# Patient Record
Sex: MALE | HISPANIC OR LATINO | Employment: UNEMPLOYED | URBAN - METROPOLITAN AREA
[De-identification: names, ages, dates, MRNs, and addresses within clinical notes are randomized per-mention and may not be internally consistent; named-entity substitution may affect disease eponyms.]

---

## 2017-01-18 ENCOUNTER — OFFICE VISIT (OUTPATIENT)
Dept: CARDIOLOGY | Facility: MEDICAL CENTER | Age: 52
End: 2017-01-18
Payer: MEDICARE

## 2017-01-18 VITALS
SYSTOLIC BLOOD PRESSURE: 120 MMHG | OXYGEN SATURATION: 95 % | WEIGHT: 218 LBS | HEART RATE: 86 BPM | HEIGHT: 73 IN | BODY MASS INDEX: 28.89 KG/M2 | DIASTOLIC BLOOD PRESSURE: 60 MMHG

## 2017-01-18 DIAGNOSIS — E78.5 DYSLIPIDEMIA: ICD-10-CM

## 2017-01-18 DIAGNOSIS — I25.10 CORONARY ARTERY DISEASE INVOLVING NATIVE CORONARY ARTERY OF NATIVE HEART WITHOUT ANGINA PECTORIS: ICD-10-CM

## 2017-01-18 DIAGNOSIS — I10 ESSENTIAL HYPERTENSION: ICD-10-CM

## 2017-01-18 DIAGNOSIS — I42.9 CARDIOMYOPATHY (HCC): ICD-10-CM

## 2017-01-18 PROCEDURE — 99214 OFFICE O/P EST MOD 30 MIN: CPT | Performed by: INTERNAL MEDICINE

## 2017-01-18 RX ORDER — ATORVASTATIN CALCIUM 40 MG/1
40 TABLET, FILM COATED ORAL
Qty: 90 TAB | Refills: 3 | Status: SHIPPED | OUTPATIENT
Start: 2017-01-18 | End: 2018-02-05

## 2017-01-18 RX ORDER — ATORVASTATIN CALCIUM 20 MG/1
20 TABLET, FILM COATED ORAL
Qty: 90 TAB | Refills: 3 | Status: SHIPPED | OUTPATIENT
Start: 2017-01-18 | End: 2018-02-04 | Stop reason: SDUPTHER

## 2017-01-18 ASSESSMENT — ENCOUNTER SYMPTOMS
BLOOD IN STOOL: 0
DIZZINESS: 0
ABDOMINAL PAIN: 0
PND: 0
HEADACHES: 0
NERVOUS/ANXIOUS: 0
SHORTNESS OF BREATH: 0
MYALGIAS: 0
ORTHOPNEA: 0
DEPRESSION: 0
BLURRED VISION: 0
COUGH: 0
FEVER: 0
BACK PAIN: 1
CLAUDICATION: 0
PALPITATIONS: 0
LOSS OF CONSCIOUSNESS: 0

## 2017-01-18 NOTE — MR AVS SNAPSHOT
"Dariel Diamond   2017 2:15 PM   Office Visit   MRN: [id number]    Department:  [facility]   Dept Phone:  [phone]    Description:  Male : 1965   Provider:  Albina Desai M.D.           Reason for Visit     Follow-Up           Allergies as of 2017     No Known Allergies      You were diagnosed with     Dyslipidemia   [753119]         Vital Signs     Blood Pressure Pulse Height Weight Body Mass Index Oxygen Saturation    120/60 mmHg 86 1.854 m (6' 0.99\") 98.884 kg (218 lb) 28.77 kg/m2 95%    Smoking Status                   Never Smoker            Basic Information     Date Of Birth Sex Race Ethnicity Preferred Language    1965 Male  or   Origin (H[place]er,Fijian,Vietnamese,[place], etc) English      Your appointments     2017  1:15 PM   FOLLOW UP with Albina Desai M.D.   [facility] (--)    [address]  [facility] [id number] [phone]              Problem List              ICD-10-CM Priority Class Noted - Resolved    Acute renal failure (ARF) (CMS-[facility]) N17.9   2014 - Present    Hyperkalemia E87.5   2014 - Present    Diabetes mellitus (CMS-HCC) E11.9   2014 - Present    Troponin level elevated R79.89   2014 - Present    Dyslipidemia E78.5   2014 - Present    HTN (hypertension) I10   2014 - Present    Edema R60.9   2014 - Present    Metabolic acidosis E87.2   2014 - Present    Ventricular tachycardia (CMS-[facility]) I47.2   2014 - Present    ESRD (end stage renal disease) (CMS-HCC) N18.6   10/21/2014 - Present    Cardiomyopathy (CMS-HCC) I42.9   11/10/2016 - Present      Health Maintenance        Date Due Completion Dates    IMM HEP B VACCINE (1 of 3 - Primary Series) 1965 ---    DIABETES MONOFILAMENT / LE EXAM 1966 ---    RETINAL SCREENING 1983 ---    FASTING LIPID PROFILE 1983 ---    IMM DTaP/Tdap/Td Vaccine (1 - Tdap) " 7/30/1984 ---    IMM PNEUMOCOCCAL 19-64 (ADULT) HIGHEST RISK SERIES (1 of 3 - PCV13) 7/30/1984 ---    A1C SCREENING 10/28/2014 4/28/2014    URINE ACR / MICROALBUMIN 4/27/2015 4/27/2014    COLONOSCOPY 7/30/2015 ---    IMM INFLUENZA (1) 9/1/2016 10/17/2014    SERUM CREATININE 12/6/2017 12/6/2016, 10/23/2014, 10/22/2014, 10/21/2014, 5/4/2014, 5/2/2014, 5/1/2014, 4/30/2014, 4/29/2014, 4/28/2014, 4/27/2014, 4/26/2014            Current Immunizations     Influenza TIV (IM) 10/17/2014    Tuberculin Skin Test  Incomplete      Below and/or attached are the medications your provider expects you to take. Review all of your home medications and newly ordered medications with your provider and/or pharmacist. Follow medication instructions as directed by your provider and/or pharmacist. Please keep your medication list with you and share with your provider. Update the information when medications are discontinued, doses are changed, or new medications (including over-the-counter products) are added; and carry medication information at all times in the event of emergency situations     Allergies:  No Known Allergies          Medications  Valid as of: January 18, 2017 -  2:39 PM    Generic Name Brand Name Tablet Size Instructions for use    Aspirin (Tablet Delayed Response) Aspirin 325 MG Take 1 Tab by mouth every day.        Atorvastatin Calcium (Tab) LIPITOR 40 MG Take 1 Tab by mouth every bedtime.        Atorvastatin Calcium (Tab) LIPITOR 20 MG Take 1 Tab by mouth every bedtime.        Carvedilol (Tab) COREG 25 MG Take 1 Tab by mouth 2 times a day, with meals.        Insulin Aspart (Solution) NOVOLOG 100 UNIT/ML insulin aspart (NOVOLOG) injection 2-9 Units  2-9 Units, Sq, 4 TIMES DAILY - qAC + qHS,   For glucose: 70   - 150  mg/dL = 0 Units 151 - 200  mg/dL = 2 Units 201 - 250  mg/dL = 3 Units 251 - 300  mg/dL  = 5 Units 301 - 350  mg/dL= 6 Units 351 - 400 mg/dL =   8 Units  >400 mg/dL =  9 Units        Insulin Aspart (Solution  Pen-injector) NOVOLOG 100 UNIT/ML 7 Units by Subdermal route.        Insulin Glargine (Solution) LANTUS 100 UNIT/ML Inject 10 Units as instructed every evening.        Lisinopril (Tab) PRINIVIL 10 MG Take 1 Tab by mouth every 12 hours.        Lisinopril (Tab) PRINIVIL 5 MG Take 1 Tab by mouth 2 times a day.        OxyCODONE HCl (Tab) ROXICODONE 5 MG Take 1 Tab by mouth every four hours as needed ((4-6)).        Sevelamer Carbonate (Tab) Sevelamer Carbonate 800 MG Take  by mouth.        Torsemide (Tab) DEMADEX 20 MG Take 1 Tab by mouth every day.        .                 Medicines prescribed today were sent to:     None      Medication refill instructions:       If your prescription bottle indicates you have medication refills left, it is not necessary to call your provider’s office. Please contact your pharmacy and they will refill your medication.    If your prescription bottle indicates you do not have any refills left, you may request refills at any time through one of the following ways: The online [employer] system (except Urgent Care), by calling your provider’s office, or by asking your pharmacy to contact your provider’s office with a refill request. Medication refills are processed only during regular business hours and may not be available until the next business day. Your provider may request additional information or to have a follow-up visit with you prior to refilling your medication.   *Please Note: Medication refills are assigned a new Rx number when refilled electronically. Your pharmacy may indicate that no refills were authorized even though a new prescription for the same medication is available at the pharmacy. Please request the medicine by name with the pharmacy before contacting your provider for a refill.        Your To Do List     Future Labs/Procedures Complete By Expires    COMP METABOLIC PANEL  As directed 1/18/2018    LIPID PROFILE  As directed 1/18/2018         [employer] Access Code:  [email or web address]  Expires: 1/21/2017  9:32 AM    U-Subs [employer]i  A secure, online tool to manage your health information     [employer]’s U-Subs [employer]i® is a secure, online tool that connects you to your personalized health information from the privacy of your home -- day or night - making it very easy for you to manage your healthcare. Once the activation process is completed, you can even access your medical information using the U-Subs Deli ruddy, which is available for free in the Apple Ruddy store or Google Play store.     [employer]i provides the following levels of access (as shown below):   My Chart Features   [facility] Primary Care Doctor [facility]  Specialists [facility]  Urgent  Care Non-[facility]  Primary Care  Doctor   Email your healthcare team securely and privately 24/7 X X X    Manage appointments: schedule your next appointment; view details of past/upcoming appointments X      Request prescription refills. X      View recent personal medical records, including lab and immunizations X X X X   View health record, including health history, allergies, medications X X X X   Read reports about your outpatient visits, procedures, consult and ER notes X X X X   See your discharge summary, which is a recap of your hospital and/or ER visit that includes your diagnosis, lab results, and care plan. X X       How to register for [employer]i:  1. Go to  [email or web address].  2. Click on the Sign Up Now box, which takes you to the New Member Sign Up page. You will need to provide the following information:  a. Enter your [employer]i Access Code exactly as it appears at the top of this page. (You will not need to use this code after you’ve completed the sign-up process. If you do not sign up before the expiration date, you must request a new code.)   b. Enter your date of birth.   c. Enter your home email address.   d. Click Submit, and follow the next screen’s instructions.  3. Create a U-[employer]s Deli ID. This will be your [employer]i login ID and cannot be  changed, so think of one that is secure and easy to remember.  4. Create a [employer] password. You can change your password at any time.  5. Enter your Password Reset Question and Answer. This can be used at a later time if you forget your password.   6. Enter your e-mail address. This allows you to receive e-mail notifications when new information is available in [employer].  7. Click Sign Up. You can now view your health information.    For assistance activating your [employer] account, call [phone]

## 2017-01-18 NOTE — PROGRESS NOTES
Subjective:   Dariel Diamond is a 51 y.o. male with known history of ischemic cardiomyopathy LVEF of 45 % angiogram from 2016 showed showed LAD proximal 30% lesion, D1 80-85% stenosis, distal LAD 30% stenosis, OM 2 occluded fills by collaterals, proximal RCA elongated 80% stenosis origin of PAD has subtotal stenosis PLV branch and mid posterior ventricular branch has elongated 90% stenosis, DM, hypertension dyslipidemia CKD on dialysis, presenting today for follow-up evaluation of CAD and ischemic cardiomyopathy.    With day to day activities patient denied any complaints of exertional chest pain, pressure, tightness or dyspnea. No complaints of myalgias while on statins. Denied any complaints of dizziness, lightheadedness or loss of consciousness.    Past Medical History   Diagnosis Date   • Dilated cardiomyopathy (CMS-HCC)    • Type II or unspecified type diabetes mellitus without mention of complication, not stated as uncontrolled    • Depression    • Hypertension    • Hyperlipidemia      Past Surgical History   Procedure Laterality Date   • Av fistula creation  5/1/2014     Performed by Beau Cowart M.D. at [facility]     Family History   Problem Relation Age of Onset   • Hypertension Mother    • Other Father      DM     History   Smoking status   • Never Smoker    Smokeless tobacco   • Never Used     No Known Allergies  Outpatient Encounter Prescriptions as of 1/18/2017   Medication Sig Dispense Refill   • lisinopril (PRINIVIL) 10 MG Tab Take 1 Tab by mouth every 12 hours. 180 Tab 3   • lisinopril (PRINIVIL) 5 MG Tab Take 1 Tab by mouth 2 times a day. 180 Tab 3   • NOVOLOG, insulin aspart, (NOVOLOG) 100 UNIT/ML Solution Pen-injector injection 7 Units by Subdermal route.     • insulin glargine (LANTUS) 100 UNIT/ML Solution Inject 10 Units as instructed every evening.     • Sevelamer Carbonate (RENVELA) 800 MG Tab Take  by mouth.     • atorvastatin (LIPITOR) 80 MG tablet Take 80 mg by mouth every  "evening.     • calcitRIOL (ROCALTROL) 0.25 MCG CAPS Take 1 Cap by mouth every day. 30 Cap 5   • amlodipine (NORVASC) 10 MG TABS Take 1 Tab by mouth every day. 30 Tab 3   • aspirin  MG TBEC Take 1 Tab by mouth every day. 30 Tab 3   • oxycodone, immediate release, (ROXICODONE) 5 MG TABS Take 1 Tab by mouth every four hours as needed ((4-6)). 30 Tab 1   • torsemide (DEMADEX) 20 MG TABS Take 1 Tab by mouth every day. 30 Tab 3   • insulin aspart (NOVOLOG) 100 UNIT/ML SOLN insulin aspart (NOVOLOG) injection 2-9 Units  2-9 Units, Sq, 4 TIMES DAILY - qAC + qHS,   For glucose: 70   - 150  mg/dL = 0 Units 151 - 200  mg/dL = 2 Units 201 - 250  mg/dL = 3 Units 251 - 300  mg/dL  = 5 Units 301 - 350  mg/dL= 6 Units 351 - 400 mg/dL =   8 Units  >400 mg/dL =  9 Units 1 Vial 11   • carvedilol (COREG) 25 MG TABS Take 1 Tab by mouth 2 times a day, with meals. 60 Tab 5     No facility-administered encounter medications on file as of 1/18/2017.     Review of Systems   Constitutional: Negative for fever.   Eyes: Negative for blurred vision.   Respiratory: Negative for cough and shortness of breath.    Cardiovascular: Negative for chest pain, palpitations, orthopnea, claudication, leg swelling and PND.   Gastrointestinal: Negative for abdominal pain, blood in stool and melena.   Genitourinary: Negative for dysuria.   Musculoskeletal: Positive for back pain and joint pain. Negative for myalgias.   Skin: Negative for rash.   Neurological: Negative for dizziness, loss of consciousness and headaches.   Psychiatric/Behavioral: Negative for depression. The patient is not nervous/anxious.         Objective:   /60 mmHg  Pulse 86  Ht 1.854 m (6' 0.99\")  Wt 98.884 kg (218 lb)  BMI 28.77 kg/m2  SpO2 95%    Physical Exam   Constitutional: He is oriented to person, place, and time. He appears well-developed and well-nourished.   HENT:   Head: Normocephalic and atraumatic.   Eyes: Conjunctivae are normal. Pupils are equal, round, and " reactive to light. Right eye exhibits no discharge. Left eye exhibits no discharge.   Neck: No JVD present. No tracheal deviation present.   Cardiovascular: Normal rate and regular rhythm.    No murmur heard.  Pulses:       Carotid pulses are 2+ on the right side, and 2+ on the left side.       Radial pulses are 2+ on the right side, and 2+ on the left side.        Dorsalis pedis pulses are 1+ on the right side, and 1+ on the left side.   Pulmonary/Chest: Effort normal and breath sounds normal. No respiratory distress. He has no wheezes. He has no rales. He exhibits no tenderness.   Abdominal: Soft. Bowel sounds are normal. He exhibits no distension. There is no tenderness. There is no rebound.   Neurological: He is alert and oriented to person, place, and time. No cranial nerve deficit.   Skin: Skin is warm and dry. No erythema.   Psychiatric: He has a normal mood and affect.     Angiogram: 12/6/16  LM free of disease.  LAD proximal 30% lesion. D1 has 80-85% stenosis. Distal to D1 LAD has 30% stenosis.   LCx large caliber. OM1 and OM to a tiny. OM 2 occluded in proximal segment. Fills distally via left sided collaterals. Distal to OM 2 LCx has 10-20% stenosis. Gives off large caliber OM 3 on for no significant disease.  RCA elongated 80% stenosis in the proximal portion acute marginal had 60% stenosis. Origin of PDA has subtotal stenosis but a small vessel. PLV branch and mid posterior ventricular branch has elongated 90% stenosis in midportion.   LVEF 45%.       As noted above, the right coronary artery is diffusely diseased and while the  proximal 2 lesions would be amenable to intervention, the distal lesions would not.  Recommend medical therapy    Transthoracic echo: 11/29/16  Left ventricular ejection fraction is visually estimated to be 60%.   Normal regional wall motion. Grade I diastolic dysfunction.  Trace mitral regurgitation.  Estimated right ventricular systolic pressure is 27 mmHg. Right atrial    pressure is estimated to be 8 mmHg.   Prior study is available for comparison, 04/28/2014 improved left   ventricle function. Prior echo LVEF 40%.    EKG: 10/21/14  EKG personally reviewed by me.  Sinus rhythm left axis deviation, T-wave inversion in lateral leads    Cath: 5/16/14  1.  Elevated left ventricular end diastolic filling pressures in the range of  23 with a modest global impairment in left ventricular systolic function with  ejection fraction of 45%.  2.  Widely patent left and right coronary trees, but with relatively diffuse  noncritical disease, left anterior descending and right coronaries as  described above.    TTE: 4/28/16  No mass or thrombus seen.  Global hypokinesis. Severely reduced left ventricular systolic function. Left ventricular ejection fraction is 20-30%.    TTE: 4/27/14  Mild concentric left ventricular hypertrophy.   Left ventricular ejection fraction is 40% to 45%.   Apical echo density about 1 cm spherical diameter mobile-probable fibroma.    Severely dilated left atrium. TERESA 48 ml.   Aortic sclerosis without borderline stenosis.  Right ventricular systolic pressure is estimated to be 32 to 37 mmHg consistent with mild pulmonary hypertension    Assessment:     1. CAD, proximal and distal LAD 30% stenosis D1 80-85% stenosis, OM 2 occluded proximal RCA 80% stenosis mid RCA 60% stenosis origin of PDA subtotal stenosis PLV and mid posterior ventricular branch elongated 90% stenosis  2. Hypertension  3. Dyslipidemia  4. Ischemic cardiomyopathy LVEF of 40-45%  Medical Decision Making:  Today's Assessment / Status / Plan:     1. Continue Coreg 25 mg BID.  Stable for now.  Continue aspirin   2. Blood pressure is well-controlled at the present visit..  Continue Coreg and lisinopril   3. LDL less than 40.  Decrease Lipitor to 60 mg qHs.  Hypertriglyceridemia-recommended dietary modification  Low HDL-increase physical activity.  4. Patient appears euvolemic.    Follow-up in 6 months.  Labs  prior to next visit.    Thank you for allowing me to participate in taking care of patient.    Albina Enciso MD.

## 2017-07-14 DIAGNOSIS — E78.5 DYSLIPIDEMIA: ICD-10-CM

## 2017-07-19 ENCOUNTER — OFFICE VISIT (OUTPATIENT)
Dept: CARDIOLOGY | Facility: MEDICAL CENTER | Age: 52
End: 2017-07-19
Payer: MEDICARE

## 2017-07-19 VITALS
HEART RATE: 82 BPM | HEIGHT: 71 IN | BODY MASS INDEX: 30.24 KG/M2 | WEIGHT: 216 LBS | SYSTOLIC BLOOD PRESSURE: 128 MMHG | OXYGEN SATURATION: 96 % | DIASTOLIC BLOOD PRESSURE: 70 MMHG

## 2017-07-19 DIAGNOSIS — E78.5 DYSLIPIDEMIA: ICD-10-CM

## 2017-07-19 DIAGNOSIS — I10 ESSENTIAL HYPERTENSION: ICD-10-CM

## 2017-07-19 PROCEDURE — 99214 OFFICE O/P EST MOD 30 MIN: CPT | Performed by: INTERNAL MEDICINE

## 2017-07-19 ASSESSMENT — ENCOUNTER SYMPTOMS
BLOOD IN STOOL: 0
MYALGIAS: 0
BACK PAIN: 1
BLURRED VISION: 0
CLAUDICATION: 0
PND: 0
PALPITATIONS: 0
COUGH: 0
ORTHOPNEA: 0
SHORTNESS OF BREATH: 0
ABDOMINAL PAIN: 0
HEADACHES: 0
NERVOUS/ANXIOUS: 0
DEPRESSION: 0
DIZZINESS: 0
FEVER: 0
LOSS OF CONSCIOUSNESS: 0

## 2017-07-19 NOTE — MR AVS SNAPSHOT
"Dariel Diamond   2017 1:15 PM   Office Visit   MRN: [id number]    Department:  [facility]   Dept Phone:  [phone]    Description:  Male : 1965   Provider:  Albina Desai M.D.           Reason for Visit     Follow-Up           Allergies as of 2017     No Known Allergies      You were diagnosed with     Dyslipidemia   [327246]       Essential hypertension   [5080486]         Vital Signs     Blood Pressure Pulse Height Weight Body Mass Index Oxygen Saturation    128/70 mmHg 82 1.803 m (5' 10.98\") 97.977 kg (216 lb) 30.14 kg/m2 96%    Smoking Status                   Never Smoker            Basic Information     Date Of Birth Sex Race Ethnicity Preferred Language    1965 Male  or   Origin (Faroese,German,Moroccan,[place], etc) English      Problem List              ICD-10-CM Priority Class Noted - Resolved    Acute renal failure (ARF) (CMS-HCC) N17.9   2014 - Present    Hyperkalemia E87.5   2014 - Present    Diabetes mellitus (CMS-HCC) E11.9   2014 - Present    Troponin level elevated R74.8   2014 - Present    Dyslipidemia E78.5   2014 - Present    HTN (hypertension) I10   2014 - Present    Edema R60.9   2014 - Present    Metabolic acidosis E87.2   2014 - Present    Ventricular tachycardia (CMS-HCC) I47.2   2014 - Present    ESRD (end stage renal disease) (CMS-HCC) N18.6   10/21/2014 - Present    Cardiomyopathy (CMS-HCC) I42.9   11/10/2016 - Present      Health Maintenance        Date Due Completion Dates    IMM HEP B VACCINE (1 of 3 - Primary Series) 1965 ---    DIABETES MONOFILAMENT / LE EXAM 1966 ---    RETINAL SCREENING 1983 ---    IMM DTaP/Tdap/Td Vaccine (1 - Tdap) 1984 ---    IMM PNEUMOCOCCAL 19-64 (ADULT) HIGHEST RISK SERIES (1 of 3 - PCV13) 1984 ---    A1C SCREENING 10/28/2014 2014    URINE ACR / MICROALBUMIN 2015    COLONOSCOPY 2015 ---    IMM INFLUENZA " (1) 9/1/2017 10/17/2014    SERUM CREATININE 12/6/2017 12/6/2016, 10/23/2014, 10/22/2014, 10/21/2014, 5/4/2014, 5/2/2014, 5/1/2014, 4/30/2014, 4/29/2014, 4/28/2014, 4/27/2014, 4/26/2014    FASTING LIPID PROFILE 7/14/2018 7/14/2017            Current Immunizations     Influenza TIV (IM) 10/17/2014    Tuberculin Skin Test  Incomplete      Below and/or attached are the medications your provider expects you to take. Review all of your home medications and newly ordered medications with your provider and/or pharmacist. Follow medication instructions as directed by your provider and/or pharmacist. Please keep your medication list with you and share with your provider. Update the information when medications are discontinued, doses are changed, or new medications (including over-the-counter products) are added; and carry medication information at all times in the event of emergency situations     Allergies:  No Known Allergies          Medications  Valid as of: July 19, 2017 -  1:36 PM    Generic Name Brand Name Tablet Size Instructions for use    Aspirin (Tablet Delayed Response) Aspirin 325 MG Take 1 Tab by mouth every day.        Atorvastatin Calcium (Tab) LIPITOR 40 MG Take 1 Tab by mouth every bedtime.        Atorvastatin Calcium (Tab) LIPITOR 20 MG Take 1 Tab by mouth every bedtime.        Carvedilol (Tab) COREG 25 MG Take 1 Tab by mouth 2 times a day, with meals.        Insulin Aspart (Solution) NOVOLOG 100 UNIT/ML insulin aspart (NOVOLOG) injection 2-9 Units  2-9 Units, Sq, 4 TIMES DAILY - qAC + qHS,   For glucose: 70   - 150  mg/dL = 0 Units 151 - 200  mg/dL = 2 Units 201 - 250  mg/dL = 3 Units 251 - 300  mg/dL  = 5 Units 301 - 350  mg/dL= 6 Units 351 - 400 mg/dL =   8 Units  >400 mg/dL =  9 Units        Insulin Aspart (Solution Pen-injector) NOVOLOG 100 UNIT/ML 7 Units by Subdermal route.        Insulin Glargine (Solution) LANTUS 100 UNIT/ML Inject 10 Units as instructed every evening.        Lisinopril (Tab)  PRINIVIL 10 MG Take 1 Tab by mouth every 12 hours.        Lisinopril (Tab) PRINIVIL 5 MG Take 1 Tab by mouth 2 times a day.        OxyCODONE HCl (Tab) ROXICODONE 5 MG Take 1 Tab by mouth every four hours as needed ((4-6)).        Sevelamer Carbonate (Tab) Sevelamer Carbonate 800 MG Take  by mouth.        Torsemide (Tab) DEMADEX 20 MG Take 1 Tab by mouth every day.        .                 Medicines prescribed today were sent to:     None      Medication refill instructions:       If your prescription bottle indicates you have medication refills left, it is not necessary to call your provider’s office. Please contact your pharmacy and they will refill your medication.    If your prescription bottle indicates you do not have any refills left, you may request refills at any time through one of the following ways: The online [employer] system (except Urgent Care), by calling your provider’s office, or by asking your pharmacy to contact your provider’s office with a refill request. Medication refills are processed only during regular business hours and may not be available until the next business day. Your provider may request additional information or to have a follow-up visit with you prior to refilling your medication.   *Please Note: Medication refills are assigned a new Rx number when refilled electronically. Your pharmacy may indicate that no refills were authorized even though a new prescription for the same medication is available at the pharmacy. Please request the medicine by name with the pharmacy before contacting your provider for a refill.        Your To Do List     Future Labs/Procedures Complete By Expires    COMP METABOLIC PANEL  As directed 7/19/2018    COMP METABOLIC PANEL  As directed 7/19/2018    LIPID PROFILE  As directed 7/19/2018    LIPID PROFILE  As directed 7/19/2018         [employer] Status: Patient Declined

## 2017-07-19 NOTE — PROGRESS NOTES
Subjective:   Dariel Diamond is a 51 y.o. male with known history of ischemic cardiomyopathy LVEF of 45 % angiogram from 2016 showed showed LAD proximal 30% lesion, D1 80-85% stenosis, distal LAD 30% stenosis, OM 2 occluded fills by collaterals, proximal RCA elongated 80% stenosis origin of PAD has subtotal stenosis PLV branch and mid posterior ventricular branch has elongated 90% stenosis, DM, hypertension dyslipidemia CKD on dialysis, presenting today for follow-up evaluation of CAD and ischemic cardiomyopathy.    Patient is going for daily walks approximately 3 miles a day.  No complaints of exertional chest pain pressure or tightness.  Denied any complaints of myalgias while on statins.no complaints of palpitations orthopnea or PND.  Denies any complaints of lower extremity edema or claudication.    Past Medical History   Diagnosis Date   • Dilated cardiomyopathy (CMS-HCC)    • Type II or unspecified type diabetes mellitus without mention of complication, not stated as uncontrolled    • Depression    • Hypertension    • Hyperlipidemia      Past Surgical History   Procedure Laterality Date   • Av fistula creation  5/1/2014     Performed by Beau Cowart M.D. at SURGERY [facility]     Family History   Problem Relation Age of Onset   • Hypertension Mother    • Other Father      DM     History   Smoking status   • Never Smoker    Smokeless tobacco   • Never Used     Allergies   Allergen Reactions   • Anesthetic Ether [Ether] Swelling     Swelling of lips, pet pt just stated Anesthesia   • Anesthetic [Benzocaine] Swelling     Swelling of lips, pet pt just stated Anesthesia     Outpatient Encounter Prescriptions as of 7/19/2017   Medication Sig Dispense Refill   • atorvastatin (LIPITOR) 40 MG Tab Take 1 Tab by mouth every bedtime. 90 Tab 3   • atorvastatin (LIPITOR) 20 MG Tab Take 1 Tab by mouth every bedtime. 90 Tab 3   • lisinopril (PRINIVIL) 10 MG Tab Take 1 Tab by mouth every 12 hours. 180 Tab 3   •  "lisinopril (PRINIVIL) 5 MG Tab Take 1 Tab by mouth 2 times a day. 180 Tab 3   • NOVOLOG, insulin aspart, (NOVOLOG) 100 UNIT/ML Solution Pen-injector injection 7 Units by Subdermal route.     • insulin glargine (LANTUS) 100 UNIT/ML Solution Inject 10 Units as instructed every evening.     • Sevelamer Carbonate (RENVELA) 800 MG Tab Take  by mouth.     • carvedilol (COREG) 25 MG TABS Take 1 Tab by mouth 2 times a day, with meals. 60 Tab 5   • aspirin  MG TBEC Take 1 Tab by mouth every day. 30 Tab 3   • torsemide (DEMADEX) 20 MG TABS Take 1 Tab by mouth every day. 30 Tab 3   • oxycodone, immediate release, (ROXICODONE) 5 MG TABS Take 1 Tab by mouth every four hours as needed ((4-6)). 30 Tab 1   • insulin aspart (NOVOLOG) 100 UNIT/ML SOLN insulin aspart (NOVOLOG) injection 2-9 Units  2-9 Units, Sq, 4 TIMES DAILY - qAC + qHS,   For glucose: 70   - 150  mg/dL = 0 Units 151 - 200  mg/dL = 2 Units 201 - 250  mg/dL = 3 Units 251 - 300  mg/dL  = 5 Units 301 - 350  mg/dL= 6 Units 351 - 400 mg/dL =   8 Units  >400 mg/dL =  9 Units 1 Vial 11     No facility-administered encounter medications on file as of 7/19/2017.     Review of Systems   Constitutional: Negative for fever.   Eyes: Negative for blurred vision.   Respiratory: Negative for cough and shortness of breath.    Cardiovascular: Negative for chest pain, palpitations, orthopnea, claudication, leg swelling and PND.   Gastrointestinal: Negative for abdominal pain, blood in stool and melena.   Genitourinary: Negative for dysuria.   Musculoskeletal: Positive for back pain and joint pain. Negative for myalgias.   Skin: Negative for rash.   Neurological: Negative for dizziness, loss of consciousness and headaches.   Psychiatric/Behavioral: Negative for depression. The patient is not nervous/anxious.         Objective:   /70 mmHg  Pulse 82  Ht 1.803 m (5' 10.98\")  Wt 97.977 kg (216 lb)  BMI 30.14 kg/m2  SpO2 96%    Physical Exam   Constitutional: He is oriented " to person, place, and time. He appears well-developed and well-nourished.   HENT:   Head: Normocephalic and atraumatic.   Eyes: Conjunctivae are normal. Pupils are equal, round, and reactive to light. Right eye exhibits no discharge. Left eye exhibits no discharge.   Neck: No JVD present. No tracheal deviation present.   Cardiovascular: Normal rate and regular rhythm.    No murmur heard.  Pulses:       Carotid pulses are 2+ on the right side, and 2+ on the left side.       Radial pulses are 2+ on the right side, and 2+ on the left side.        Dorsalis pedis pulses are 1+ on the right side, and 1+ on the left side.   Pulmonary/Chest: Effort normal and breath sounds normal. No respiratory distress. He has no wheezes. He has no rales. He exhibits no tenderness.   Abdominal: Soft. Bowel sounds are normal. He exhibits no distension. There is no tenderness. There is no rebound.   Neurological: He is alert and oriented to person, place, and time. No cranial nerve deficit.   Skin: Skin is warm and dry. No erythema.   Psychiatric: He has a normal mood and affect.     Angiogram: 12/6/16  LM free of disease.  LAD proximal 30% lesion. D1 has 80-85% stenosis. Distal to D1 LAD has 30% stenosis.   LCx large caliber. OM1 and OM to a tiny. OM 2 occluded in proximal segment. Fills distally via left sided collaterals. Distal to OM 2 LCx has 10-20% stenosis. Gives off large caliber OM 3 on for no significant disease.  RCA elongated 80% stenosis in the proximal portion acute marginal had 60% stenosis. Origin of PDA has subtotal stenosis but a small vessel. PLV branch and mid posterior ventricular branch has elongated 90% stenosis in midportion.   LVEF 45%.       As noted above, the right coronary artery is diffusely diseased and while the  proximal 2 lesions would be amenable to intervention, the distal lesions would not.  Recommend medical therapy    Transthoracic echo: 11/29/16  Left ventricular ejection fraction is visually  estimated to be 60%.   Normal regional wall motion. Grade I diastolic dysfunction.  Trace mitral regurgitation.  Estimated right ventricular systolic pressure is 27 mmHg. Right atrial   pressure is estimated to be 8 mmHg.   Prior study is available for comparison, 04/28/2014 improved left   ventricle function. Prior echo LVEF 40%.    EKG: 10/21/14  EKG personally reviewed by me.  Sinus rhythm left axis deviation, T-wave inversion in lateral leads    Cath: 5/16/14  1.  Elevated left ventricular end diastolic filling pressures in the range of  23 with a modest global impairment in left ventricular systolic function with  ejection fraction of 45%.  2.  Widely patent left and right coronary trees, but with relatively diffuse  noncritical disease, left anterior descending and right coronaries as  described above.    TTE: 4/28/16  No mass or thrombus seen.  Global hypokinesis. Severely reduced left ventricular systolic function. Left ventricular ejection fraction is 20-30%.    TTE: 4/27/14  Mild concentric left ventricular hypertrophy.   Left ventricular ejection fraction is 40% to 45%.   Apical echo density about 1 cm spherical diameter mobile-probable fibroma.    Severely dilated left atrium. TERESA 48 ml.   Aortic sclerosis without borderline stenosis.  Right ventricular systolic pressure is estimated to be 32 to 37 mmHg consistent with mild pulmonary hypertension    Assessment:     1. CAD, proximal and distal LAD 30% stenosis D1 80-85% stenosis, OM 2 occluded proximal RCA 80% stenosis mid RCA 60% stenosis origin of PDA subtotal stenosis PLV and mid posterior ventricular branch elongated 90% stenosis  2. Hypertension  3. Dyslipidemia  4. Ischemic cardiomyopathy LVEF of 40-45%  Medical Decision Making:  Today's Assessment / Status / Plan:     1. No complaints of angina.  Continue Coreg 25 mg BID.  Continue aspirin   2. Blood pressure is well-controlled at the present visit.  Continue Coreg and lisinopril   3. LDL less  than 40.  Decrease Lipitor to 40 mg qHs.  Hypertriglyceridemia-recommended dietary modification.  Check labs in 2 months and than 1 year.  Low HDL-increase physical activity.  4. Patient appears euvolemic.    Follow-up in 1 year.  Labs prior to next visit.    Thank you for allowing me to participate in taking care of patient.    Albina Enciso MD. We called

## 2017-10-25 DIAGNOSIS — E78.5 DYSLIPIDEMIA: ICD-10-CM

## 2017-10-31 NOTE — PROGRESS NOTES
Labs: 10/20/17  Sodium 133, potassium 3.5, chloride 90, bicarbonate 34, glucose 136, BUN 17, creatinine 4.2 GFR 15  ALT 10 AST 15, alkaline phosphatase 73  Total cholesterol 96, triglycerides 167, HDL 28, LDL 35

## 2017-11-03 ENCOUNTER — TELEPHONE (OUTPATIENT)
Dept: CARDIOLOGY | Facility: MEDICAL CENTER | Age: 52
End: 2017-11-03

## 2017-11-03 NOTE — TELEPHONE ENCOUNTER
Pt notified in [place].  Adriana GROSSMAN RN     ----- Message from Navya Perla sent at 11/2/2017  4:20 PM PDT -----   This patient is [place] speaking only - would you mind calling her and letting her know LFTs are good and continue lipitor?  Thanks       ----- Message -----  From: Albina Desai M.D.  Sent: 10/31/2017   3:38 PM  To: Michelle Saez R.N.    Continue Lipitor 40 mg.  LFTs lipid panel looks good.  ----- Message -----  From: Michelle Saez R.N.  Sent: 10/25/2017   9:12 AM  To: Albina Desai M.D.    seen 7/19 for annual F[facility] - Dr. Desai  Lipitor reduced to 40 mg for LDL 30 in July.This is f/u lab

## 2018-02-04 DIAGNOSIS — E78.5 DYSLIPIDEMIA: ICD-10-CM

## 2018-02-05 RX ORDER — ATORVASTATIN CALCIUM 20 MG/1
TABLET, FILM COATED ORAL
Qty: 90 TAB | Refills: 1 | Status: SHIPPED | OUTPATIENT
Start: 2018-02-05

## 2018-08-21 ENCOUNTER — HOSPITAL ENCOUNTER (OUTPATIENT)
Dept: RADIOLOGY | Facility: MEDICAL CENTER | Age: 53
End: 2018-08-21

## 2018-08-21 ENCOUNTER — HOSPITAL ENCOUNTER (INPATIENT)
Facility: MEDICAL CENTER | Age: 53
LOS: 4 days | DRG: 617 | End: 2018-08-25
Attending: EMERGENCY MEDICINE | Admitting: INTERNAL MEDICINE
Payer: MEDICARE

## 2018-08-21 DIAGNOSIS — L97.523 DIABETIC ULCER OF TOE OF LEFT FOOT ASSOCIATED WITH TYPE 2 DIABETES MELLITUS, WITH NECROSIS OF MUSCLE (HCC): ICD-10-CM

## 2018-08-21 DIAGNOSIS — L03.032 CELLULITIS OF TOE OF LEFT FOOT: ICD-10-CM

## 2018-08-21 DIAGNOSIS — E11.621 DIABETIC ULCER OF TOE OF LEFT FOOT ASSOCIATED WITH TYPE 2 DIABETES MELLITUS, WITH NECROSIS OF MUSCLE (HCC): ICD-10-CM

## 2018-08-21 DIAGNOSIS — E11.65 TYPE 2 DIABETES MELLITUS WITH HYPERGLYCEMIA, WITHOUT LONG-TERM CURRENT USE OF INSULIN (HCC): ICD-10-CM

## 2018-08-21 PROBLEM — R30.0 DYSURIA: Status: ACTIVE | Noted: 2018-08-21

## 2018-08-21 LAB
CRP SERPL HS-MCNC: 7.74 MG/DL (ref 0–0.75)
MAGNESIUM SERPL-MCNC: 2.2 MG/DL (ref 1.5–2.5)

## 2018-08-21 PROCEDURE — 87070 CULTURE OTHR SPECIMN AEROBIC: CPT

## 2018-08-21 PROCEDURE — 96365 THER/PROPH/DIAG IV INF INIT: CPT

## 2018-08-21 PROCEDURE — 99285 EMERGENCY DEPT VISIT HI MDM: CPT

## 2018-08-21 PROCEDURE — 700105 HCHG RX REV CODE 258: Performed by: EMERGENCY MEDICINE

## 2018-08-21 PROCEDURE — 700111 HCHG RX REV CODE 636 W/ 250 OVERRIDE (IP): Performed by: EMERGENCY MEDICINE

## 2018-08-21 PROCEDURE — 85652 RBC SED RATE AUTOMATED: CPT

## 2018-08-21 PROCEDURE — 83735 ASSAY OF MAGNESIUM: CPT

## 2018-08-21 PROCEDURE — 96366 THER/PROPH/DIAG IV INF ADDON: CPT

## 2018-08-21 PROCEDURE — 83036 HEMOGLOBIN GLYCOSYLATED A1C: CPT

## 2018-08-21 PROCEDURE — 84145 PROCALCITONIN (PCT): CPT

## 2018-08-21 PROCEDURE — 87205 SMEAR GRAM STAIN: CPT

## 2018-08-21 PROCEDURE — 770006 HCHG ROOM/CARE - MED/SURG/GYN SEMI*

## 2018-08-21 PROCEDURE — 86140 C-REACTIVE PROTEIN: CPT

## 2018-08-21 RX ORDER — LISINOPRIL 10 MG/1
10 TABLET ORAL EVERY 12 HOURS
Status: DISCONTINUED | OUTPATIENT
Start: 2018-08-21 | End: 2018-08-22

## 2018-08-21 RX ORDER — HEPARIN SODIUM 5000 [USP'U]/ML
5000 INJECTION, SOLUTION INTRAVENOUS; SUBCUTANEOUS EVERY 8 HOURS
Status: DISCONTINUED | OUTPATIENT
Start: 2018-08-21 | End: 2018-08-25 | Stop reason: HOSPADM

## 2018-08-21 RX ORDER — DEXTROSE MONOHYDRATE 25 G/50ML
25 INJECTION, SOLUTION INTRAVENOUS
Status: DISCONTINUED | OUTPATIENT
Start: 2018-08-21 | End: 2018-08-22

## 2018-08-21 RX ORDER — LABETALOL HYDROCHLORIDE 5 MG/ML
10 INJECTION, SOLUTION INTRAVENOUS EVERY 4 HOURS PRN
Status: DISCONTINUED | OUTPATIENT
Start: 2018-08-21 | End: 2018-08-25 | Stop reason: HOSPADM

## 2018-08-21 RX ORDER — CARVEDILOL 25 MG/1
25 TABLET ORAL 2 TIMES DAILY WITH MEALS
Status: DISCONTINUED | OUTPATIENT
Start: 2018-08-22 | End: 2018-08-25 | Stop reason: HOSPADM

## 2018-08-21 RX ORDER — LISINOPRIL 10 MG/1
5 TABLET ORAL 2 TIMES DAILY
Status: DISCONTINUED | OUTPATIENT
Start: 2018-08-22 | End: 2018-08-22

## 2018-08-21 RX ORDER — POLYETHYLENE GLYCOL 3350 17 G/17G
1 POWDER, FOR SOLUTION ORAL
Status: DISCONTINUED | OUTPATIENT
Start: 2018-08-21 | End: 2018-08-25 | Stop reason: HOSPADM

## 2018-08-21 RX ORDER — SEVELAMER CARBONATE 800 MG/1
800 TABLET, FILM COATED ORAL
Status: DISCONTINUED | OUTPATIENT
Start: 2018-08-21 | End: 2018-08-25 | Stop reason: HOSPADM

## 2018-08-21 RX ORDER — AMOXICILLIN 250 MG
2 CAPSULE ORAL 2 TIMES DAILY
Status: DISCONTINUED | OUTPATIENT
Start: 2018-08-21 | End: 2018-08-25 | Stop reason: HOSPADM

## 2018-08-21 RX ORDER — BISACODYL 10 MG
10 SUPPOSITORY, RECTAL RECTAL
Status: DISCONTINUED | OUTPATIENT
Start: 2018-08-21 | End: 2018-08-25 | Stop reason: HOSPADM

## 2018-08-21 RX ORDER — INSULIN GLARGINE 100 [IU]/ML
0.2 INJECTION, SOLUTION SUBCUTANEOUS EVERY EVENING
Status: DISCONTINUED | OUTPATIENT
Start: 2018-08-21 | End: 2018-08-22

## 2018-08-21 RX ORDER — TORSEMIDE 20 MG/1
20 TABLET ORAL DAILY
Status: DISCONTINUED | OUTPATIENT
Start: 2018-08-22 | End: 2018-08-25 | Stop reason: HOSPADM

## 2018-08-21 RX ORDER — ATORVASTATIN CALCIUM 20 MG/1
20 TABLET, FILM COATED ORAL EVERY EVENING
Status: DISCONTINUED | OUTPATIENT
Start: 2018-08-22 | End: 2018-08-25 | Stop reason: HOSPADM

## 2018-08-21 RX ORDER — ACETAMINOPHEN 325 MG/1
650 TABLET ORAL EVERY 6 HOURS PRN
Status: DISCONTINUED | OUTPATIENT
Start: 2018-08-21 | End: 2018-08-25 | Stop reason: HOSPADM

## 2018-08-21 RX ADMIN — VANCOMYCIN HYDROCHLORIDE 2500 MG: 100 INJECTION, POWDER, LYOPHILIZED, FOR SOLUTION INTRAVENOUS at 21:33

## 2018-08-21 ASSESSMENT — ENCOUNTER SYMPTOMS
FOCAL WEAKNESS: 0
PHOTOPHOBIA: 0
CHILLS: 1
DEPRESSION: 0
NAUSEA: 0
MYALGIAS: 0
NERVOUS/ANXIOUS: 0
SINUS PAIN: 0
SORE THROAT: 0
FALLS: 0
ORTHOPNEA: 0
CONSTIPATION: 1
FLANK PAIN: 0
BLOOD IN STOOL: 0
SEIZURES: 0
SHORTNESS OF BREATH: 0
SPEECH CHANGE: 0
WEIGHT LOSS: 0
HEADACHES: 0
SPUTUM PRODUCTION: 0
VOMITING: 0
FEVER: 1
DOUBLE VISION: 0
COUGH: 0
PND: 0
BLURRED VISION: 0
DIARRHEA: 0
SENSORY CHANGE: 0
BACK PAIN: 0
INSOMNIA: 0
PALPITATIONS: 0
WEAKNESS: 0
DIAPHORESIS: 0
ABDOMINAL PAIN: 0

## 2018-08-21 ASSESSMENT — LIFESTYLE VARIABLES: SUBSTANCE_ABUSE: 0

## 2018-08-21 ASSESSMENT — PAIN SCALES - GENERAL
PAINLEVEL_OUTOF10: 0
PAINLEVEL_OUTOF10: 3

## 2018-08-22 PROBLEM — I25.5 ISCHEMIC CARDIOMYOPATHY: Status: ACTIVE | Noted: 2018-08-22

## 2018-08-22 PROBLEM — I25.10 CORONARY ARTERY DISEASE INVOLVING NATIVE CORONARY ARTERY OF NATIVE HEART WITHOUT ANGINA PECTORIS: Status: ACTIVE | Noted: 2018-08-22

## 2018-08-22 PROBLEM — L97.523 DIABETIC ULCER OF TOE OF LEFT FOOT ASSOCIATED WITH TYPE 2 DIABETES MELLITUS, WITH NECROSIS OF MUSCLE (HCC): Status: ACTIVE | Noted: 2018-08-21

## 2018-08-22 PROBLEM — E11.621 DIABETIC ULCER OF TOE OF LEFT FOOT ASSOCIATED WITH TYPE 2 DIABETES MELLITUS, WITH NECROSIS OF MUSCLE (HCC): Status: ACTIVE | Noted: 2018-08-21

## 2018-08-22 LAB
ALBUMIN SERPL BCP-MCNC: 3.4 G/DL (ref 3.2–4.9)
ALBUMIN/GLOB SERPL: 1.1 G/DL
ALP SERPL-CCNC: 52 U/L (ref 30–99)
ALT SERPL-CCNC: 7 U/L (ref 2–50)
ANION GAP SERPL CALC-SCNC: 16 MMOL/L (ref 0–11.9)
AST SERPL-CCNC: 6 U/L (ref 12–45)
BASOPHILS # BLD AUTO: 0.3 % (ref 0–1.8)
BASOPHILS # BLD: 0.03 K/UL (ref 0–0.12)
BILIRUB SERPL-MCNC: 0.5 MG/DL (ref 0.1–1.5)
BUN SERPL-MCNC: 60 MG/DL (ref 8–22)
CALCIUM SERPL-MCNC: 9.1 MG/DL (ref 8.5–10.5)
CHLORIDE SERPL-SCNC: 92 MMOL/L (ref 96–112)
CO2 SERPL-SCNC: 29 MMOL/L (ref 20–33)
CREAT SERPL-MCNC: 8.65 MG/DL (ref 0.5–1.4)
EOSINOPHIL # BLD AUTO: 0.1 K/UL (ref 0–0.51)
EOSINOPHIL NFR BLD: 1.1 % (ref 0–6.9)
ERYTHROCYTE [DISTWIDTH] IN BLOOD BY AUTOMATED COUNT: 46.5 FL (ref 35.9–50)
ERYTHROCYTE [SEDIMENTATION RATE] IN BLOOD BY WESTERGREN METHOD: 51 MM/HOUR (ref 0–20)
EST. AVERAGE GLUCOSE BLD GHB EST-MCNC: 174 MG/DL
GLOBULIN SER CALC-MCNC: 3.2 G/DL (ref 1.9–3.5)
GLUCOSE BLD-MCNC: 145 MG/DL (ref 65–99)
GLUCOSE BLD-MCNC: 161 MG/DL (ref 65–99)
GLUCOSE SERPL-MCNC: 146 MG/DL (ref 65–99)
GRAM STN SPEC: NORMAL
HAV IGM SERPL QL IA: NEGATIVE
HBA1C MFR BLD: 7.7 % (ref 0–5.6)
HBV CORE IGM SER QL: NEGATIVE
HBV SURFACE AB SERPL IA-ACNC: 765.98 MIU/ML (ref 0–10)
HBV SURFACE AG SER QL: NEGATIVE
HCT VFR BLD AUTO: 33.7 % (ref 42–52)
HCV AB SER QL: NEGATIVE
HGB BLD-MCNC: 11.3 G/DL (ref 14–18)
IMM GRANULOCYTES # BLD AUTO: 0.04 K/UL (ref 0–0.11)
IMM GRANULOCYTES NFR BLD AUTO: 0.4 % (ref 0–0.9)
LYMPHOCYTES # BLD AUTO: 1.55 K/UL (ref 1–4.8)
LYMPHOCYTES NFR BLD: 16.4 % (ref 22–41)
MCH RBC QN AUTO: 32.6 PG (ref 27–33)
MCHC RBC AUTO-ENTMCNC: 33.5 G/DL (ref 33.7–35.3)
MCV RBC AUTO: 97.1 FL (ref 81.4–97.8)
MONOCYTES # BLD AUTO: 0.93 K/UL (ref 0–0.85)
MONOCYTES NFR BLD AUTO: 9.8 % (ref 0–13.4)
NEUTROPHILS # BLD AUTO: 6.83 K/UL (ref 1.82–7.42)
NEUTROPHILS NFR BLD: 72 % (ref 44–72)
NRBC # BLD AUTO: 0 K/UL
NRBC BLD-RTO: 0 /100 WBC
PHOSPHATE SERPL-MCNC: 6.1 MG/DL (ref 2.5–4.5)
PLATELET # BLD AUTO: 179 K/UL (ref 164–446)
PMV BLD AUTO: 10.7 FL (ref 9–12.9)
POTASSIUM SERPL-SCNC: 5 MMOL/L (ref 3.6–5.5)
PROCALCITONIN SERPL-MCNC: 0.51 NG/ML
PROT SERPL-MCNC: 6.6 G/DL (ref 6–8.2)
RBC # BLD AUTO: 3.47 M/UL (ref 4.7–6.1)
SIGNIFICANT IND 70042: NORMAL
SITE SITE: NORMAL
SODIUM SERPL-SCNC: 137 MMOL/L (ref 135–145)
SOURCE SOURCE: NORMAL
WBC # BLD AUTO: 9.5 K/UL (ref 4.8–10.8)

## 2018-08-22 PROCEDURE — 770006 HCHG ROOM/CARE - MED/SURG/GYN SEMI*

## 2018-08-22 PROCEDURE — A9270 NON-COVERED ITEM OR SERVICE: HCPCS | Performed by: HOSPITALIST

## 2018-08-22 PROCEDURE — 80053 COMPREHEN METABOLIC PANEL: CPT

## 2018-08-22 PROCEDURE — 86706 HEP B SURFACE ANTIBODY: CPT

## 2018-08-22 PROCEDURE — 87040 BLOOD CULTURE FOR BACTERIA: CPT | Mod: 91

## 2018-08-22 PROCEDURE — 82962 GLUCOSE BLOOD TEST: CPT | Mod: 91

## 2018-08-22 PROCEDURE — 700111 HCHG RX REV CODE 636 W/ 250 OVERRIDE (IP): Performed by: HOSPITALIST

## 2018-08-22 PROCEDURE — 700101 HCHG RX REV CODE 250: Performed by: HOSPITALIST

## 2018-08-22 PROCEDURE — 84100 ASSAY OF PHOSPHORUS: CPT

## 2018-08-22 PROCEDURE — 700105 HCHG RX REV CODE 258: Performed by: HOSPITALIST

## 2018-08-22 PROCEDURE — 36415 COLL VENOUS BLD VENIPUNCTURE: CPT

## 2018-08-22 PROCEDURE — 700102 HCHG RX REV CODE 250 W/ 637 OVERRIDE(OP): Performed by: INTERNAL MEDICINE

## 2018-08-22 PROCEDURE — 5A1D70Z PERFORMANCE OF URINARY FILTRATION, INTERMITTENT, LESS THAN 6 HOURS PER DAY: ICD-10-PCS | Performed by: INTERNAL MEDICINE

## 2018-08-22 PROCEDURE — 80074 ACUTE HEPATITIS PANEL: CPT

## 2018-08-22 PROCEDURE — 99223 1ST HOSP IP/OBS HIGH 75: CPT | Mod: AI,GC | Performed by: INTERNAL MEDICINE

## 2018-08-22 PROCEDURE — 90935 HEMODIALYSIS ONE EVALUATION: CPT

## 2018-08-22 PROCEDURE — A9270 NON-COVERED ITEM OR SERVICE: HCPCS | Performed by: STUDENT IN AN ORGANIZED HEALTH CARE EDUCATION/TRAINING PROGRAM

## 2018-08-22 PROCEDURE — 700111 HCHG RX REV CODE 636 W/ 250 OVERRIDE (IP)

## 2018-08-22 PROCEDURE — 700102 HCHG RX REV CODE 250 W/ 637 OVERRIDE(OP): Performed by: STUDENT IN AN ORGANIZED HEALTH CARE EDUCATION/TRAINING PROGRAM

## 2018-08-22 PROCEDURE — 85025 COMPLETE CBC W/AUTO DIFF WBC: CPT

## 2018-08-22 PROCEDURE — 700102 HCHG RX REV CODE 250 W/ 637 OVERRIDE(OP): Performed by: HOSPITALIST

## 2018-08-22 RX ORDER — DEXTROSE MONOHYDRATE 25 G/50ML
25 INJECTION, SOLUTION INTRAVENOUS
Status: DISCONTINUED | OUTPATIENT
Start: 2018-08-22 | End: 2018-08-22

## 2018-08-22 RX ORDER — INSULIN GLARGINE 100 [IU]/ML
10 INJECTION, SOLUTION SUBCUTANEOUS EVERY EVENING
Status: DISCONTINUED | OUTPATIENT
Start: 2018-08-22 | End: 2018-08-25 | Stop reason: HOSPADM

## 2018-08-22 RX ORDER — LISINOPRIL 20 MG/1
40 TABLET ORAL
Status: DISCONTINUED | OUTPATIENT
Start: 2018-08-23 | End: 2018-08-25 | Stop reason: HOSPADM

## 2018-08-22 RX ORDER — HEPARIN SODIUM 1000 [USP'U]/ML
INJECTION, SOLUTION INTRAVENOUS; SUBCUTANEOUS
Status: COMPLETED
Start: 2018-08-22 | End: 2018-08-22

## 2018-08-22 RX ORDER — HEPARIN SODIUM 1000 [USP'U]/ML
1750 INJECTION, SOLUTION INTRAVENOUS; SUBCUTANEOUS
Status: DISCONTINUED | OUTPATIENT
Start: 2018-08-22 | End: 2018-08-25 | Stop reason: HOSPADM

## 2018-08-22 RX ORDER — LISINOPRIL 5 MG/1
15 TABLET ORAL EVERY 12 HOURS
Status: DISCONTINUED | OUTPATIENT
Start: 2018-08-22 | End: 2018-08-22

## 2018-08-22 RX ORDER — DEXTROSE MONOHYDRATE 25 G/50ML
25 INJECTION, SOLUTION INTRAVENOUS
Status: DISCONTINUED | OUTPATIENT
Start: 2018-08-22 | End: 2018-08-24

## 2018-08-22 RX ADMIN — INSULIN GLARGINE 10 UNITS: 100 INJECTION, SOLUTION SUBCUTANEOUS at 18:00

## 2018-08-22 RX ADMIN — LABETALOL HYDROCHLORIDE 10 MG: 5 INJECTION, SOLUTION INTRAVENOUS at 01:27

## 2018-08-22 RX ADMIN — LISINOPRIL 15 MG: 5 TABLET ORAL at 04:28

## 2018-08-22 RX ADMIN — HEPARIN SODIUM 5000 UNITS: 5000 INJECTION, SOLUTION INTRAVENOUS; SUBCUTANEOUS at 04:28

## 2018-08-22 RX ADMIN — SEVELAMER CARBONATE 800 MG: 800 TABLET, FILM COATED ORAL at 17:49

## 2018-08-22 RX ADMIN — SEVELAMER CARBONATE 800 MG: 800 TABLET, FILM COATED ORAL at 08:53

## 2018-08-22 RX ADMIN — HEPARIN SODIUM 5000 UNITS: 5000 INJECTION, SOLUTION INTRAVENOUS; SUBCUTANEOUS at 20:59

## 2018-08-22 RX ADMIN — AMPICILLIN SODIUM AND SULBACTAM SODIUM 3 G: 2; 1 INJECTION, POWDER, FOR SOLUTION INTRAMUSCULAR; INTRAVENOUS at 15:58

## 2018-08-22 RX ADMIN — HEPARIN SODIUM 1750 UNITS: 1000 INJECTION, SOLUTION INTRAVENOUS; SUBCUTANEOUS at 10:07

## 2018-08-22 RX ADMIN — STANDARDIZED SENNA CONCENTRATE AND DOCUSATE SODIUM 2 TABLET: 8.6; 5 TABLET, FILM COATED ORAL at 04:28

## 2018-08-22 RX ADMIN — CARVEDILOL 25 MG: 25 TABLET, FILM COATED ORAL at 17:48

## 2018-08-22 RX ADMIN — AMPICILLIN SODIUM AND SULBACTAM SODIUM 3 G: 2; 1 INJECTION, POWDER, FOR SOLUTION INTRAMUSCULAR; INTRAVENOUS at 03:31

## 2018-08-22 RX ADMIN — CARVEDILOL 25 MG: 25 TABLET, FILM COATED ORAL at 08:53

## 2018-08-22 RX ADMIN — TORSEMIDE 20 MG: 20 TABLET ORAL at 04:28

## 2018-08-22 RX ADMIN — AMPICILLIN SODIUM AND SULBACTAM SODIUM 3 G: 2; 1 INJECTION, POWDER, FOR SOLUTION INTRAMUSCULAR; INTRAVENOUS at 08:54

## 2018-08-22 RX ADMIN — AMPICILLIN SODIUM AND SULBACTAM SODIUM 3 G: 2; 1 INJECTION, POWDER, FOR SOLUTION INTRAMUSCULAR; INTRAVENOUS at 20:55

## 2018-08-22 RX ADMIN — ANHYDROUS CITRIC ACID, SODIUM BICARBONATE, AND ASPIRIN 325 MG: 1000; 1985; 500 GRANULE, EFFERVESCENT ORAL at 04:28

## 2018-08-22 RX ADMIN — ATORVASTATIN CALCIUM 20 MG: 20 TABLET, FILM COATED ORAL at 17:49

## 2018-08-22 ASSESSMENT — COGNITIVE AND FUNCTIONAL STATUS - GENERAL
MOBILITY SCORE: 24
SUGGESTED CMS G CODE MODIFIER DAILY ACTIVITY: CH
SUGGESTED CMS G CODE MODIFIER MOBILITY: CH
DAILY ACTIVITIY SCORE: 24

## 2018-08-22 ASSESSMENT — ENCOUNTER SYMPTOMS
FEVER: 0
CONSTIPATION: 0
CHILLS: 1
DIARRHEA: 0
COUGH: 0
ABDOMINAL PAIN: 0
DIZZINESS: 0
WEAKNESS: 0
FOCAL WEAKNESS: 0
BLURRED VISION: 0
HEADACHES: 0
VOMITING: 0
SHORTNESS OF BREATH: 0
NECK PAIN: 0
BLOOD IN STOOL: 0
DOUBLE VISION: 0
TINGLING: 1
BACK PAIN: 0
NAUSEA: 0
LOSS OF CONSCIOUSNESS: 0
DIARRHEA: 1
PALPITATIONS: 0
CHILLS: 0

## 2018-08-22 ASSESSMENT — PAIN SCALES - GENERAL
PAINLEVEL_OUTOF10: 0

## 2018-08-22 ASSESSMENT — PATIENT HEALTH QUESTIONNAIRE - PHQ9
SUM OF ALL RESPONSES TO PHQ9 QUESTIONS 1 AND 2: 0
2. FEELING DOWN, DEPRESSED, IRRITABLE, OR HOPELESS: NOT AT ALL
1. LITTLE INTEREST OR PLEASURE IN DOING THINGS: NOT AT ALL

## 2018-08-22 ASSESSMENT — LIFESTYLE VARIABLES
ALCOHOL_USE: NO
EVER_SMOKED: YES

## 2018-08-22 NOTE — SENIOR ADMIT NOTE
[facility] INTERNAL MEDICINE SENIOR ADMIT NOTE:  Duglas Monterroso, PGY 3    Patient ID:   Name:             Dariel Diamond     YOB: 1965  Age:                 53 y.o.  male   MRN:               [id number]                                                          Chief Complaint:       Transfer from [facility] with a left 2nd toe infection    History of Present Illness:    Dariel Diamond is a 53 y.o. Bengali speaking male with a PMHx of T2DM on insulin, ESRD on RRT MWF, HTN, and Obesity presents as a transfer from [facility] with left 2nd toe ulcer, swelling, redness and discharge. Symptoms onset 4 days, started with small ulcer and pain, which soon has redness spreading to below knee. There is purulent discharge with pain and minimal swelling. Has a history of similar condition on contralateral toe couple years ago. Doesn't recall history of MRSA. Has low grade fever at [facility] 100.6. Blood cultures were obtained and was given a dose of rocephin before transfer.     ROS: Positive for chills/No fever. Left thigh pain   LABS: Labs from [facility]: wbc 9.9, Hgb 12, gfr 8,    IMAGING: Xray at [facility] was equivocal for OM.     PHYSICAL EXAM  Vitals:   Weight/BMI: Body mass index is 29.6 kg/m².  Blood pressure 158/84, pulse 88, temperature 36.6 °C (97.9 °F), resp. rate 16, height 1.829 m (6'), weight 99 kg (218 lb 4.1 oz), SpO2 98 %.  Vitals:    08/21/18 2039 08/21/18 2040   BP:  158/84   Pulse:  88   Resp:  16   Temp:  36.6 °C (97.9 °F)   SpO2:  98%   Weight: 99 kg (218 lb 4.1 oz)    Height: 1.829 m (6')      Oxygen Therapy:  Pulse Oximetry: 98 %, O2 Delivery: None (Room Air)    GENERAL: Obese male, in no apparent distress.   HEENT: NC/AT, PERRLA, MMM  CVS:RRR, Normal S1, S2, No MRG.  PULM: CTAB  ABD: Soft, NT/ND, (+) BS, No HSM  NEUR: AAO3, NFD  EXT: There is circular 1x1cm ulcerative lesion on the tip of the left digit of left foot, with malodorous purulent discharge. Red streak is present. Lower ext warm to touch. There is swelling and  erythema in foot, ankle and overlying the skin up to mid shin.      ASSESSMENT and PLAN:  # Diabetic foot ulcer of the 2nd left toe, unclear if complicated by OM.   # ESRD on HD MWF  # T2DM insulin dependent  # HTN  # Anemia of chronic disease     Admission to med/surg floor  Blood cultures/Follow blood cultures from [facility].  Unasyn and Vancomycin. Taper antibiotics based on C/S.  Tylenol/Norco for pain. Avoid NSAIDs given ESRD.   Wound care. Consider surgical consult.   ESR/CRP/Procalcitonin. If ESR elevated will pursue with MRI.   Continue hemodialysis MWF as inpatient. Nephrology consult placed.  Continue home insulins. Hypoglycemia protocol, diabetic diet    Continue home antihypertensive medications.     DVT PPX: Heparin  Code Status: Full    Please refer to  H&P for complete admission details.

## 2018-08-22 NOTE — DISCHARGE PLANNING
Outpatient Dialysis Note    Confirmed patient is active at:    [facility]  [address] [place] [id number]       Schedule: Monday, Wednesday, Friday  Time: 5:15 am    Spoke with Allyssa at facility who confirmed.    Forwarded records for review.    Dialysis Coordinator, [facility]  Jazmine Kee [phone]

## 2018-08-22 NOTE — ASSESSMENT & PLAN NOTE
- Pt on IV Unasyn 3g IV q6  - X ray from [facility] positive for osteomyelitis  - s/p 2nd left 2 amputation surgery, 3/4/5 perc flexor tenotomies 8/23    Plan  - continue IV Unasyn 3 gm Q6H  - wound care as per wound team

## 2018-08-22 NOTE — H&P
Internal Medicine Admitting History and Physical    Note Author: Faisal Del Rosario M.D.       Name Dariel Diamond 1965   Age/Sex 53 y.o. male   MRN [id number]   Code Status Full     After 5PM or if no immediate response to page, please call for cross-coverage  Attending/Team: Dr Gómez/ Chaim See Patient List for primary contact information  Call [phone] to page    1st Call - Day Intern (R1):   Dr Quiroz 2nd Call - Day Sr. Resident (R2/R3):   Dr Bonds       Chief Complaint:   Painful red toe x 4 days    HPI:  Pt is a 53 yr old male with PMH significant for long standing Insulin dependent diabetes mellitus type 2 on HD, HTN and DLD who was transferred to [facility] ER from [facility] for presentation of a painful ulcerated red second left toe with fever since the past 4 days. Pt is an exclusive Chinese speaker.   He describes the pain as being localized to the toe and not limiting his movement, with redness and swelling, with some active discharge, and associated with some fever and chills. He reports that he initially did not notice the wound and inflammation around it but later looked at the foot when it began to hurt him. Pt reports that pain is also present in the anterior aspect of his leg and medial thigh on the same side. He is able to walk albeit slowly, pain is currently 4/10. He c/o some tingling and numbness in his lower limbs up to the knees but could not give an exact timeline of onset of these symptoms.  X ray done at [facility] showed no signs of bone involvement, he was given 1 gm of Rocephin there before transfer.  He has had similar complaints in the past but over his right foot, which resolved spontaneously.  He has longstanding DM with nephropathy getting regular HD thrice weekly, he does not c/o altered mental status, dizziness, headache, nausea, vomiting or diarrhea but does endorse some constipation over the past 3 days.  He also denies palpitations, breathlessness, cough, back  pain or joint pain or muscular cramps.  He reports dysuria in the past few days but without hematuria, urgency, frequency, abdominal pain or flank pain.      Review of Systems   Constitutional: Positive for chills and fever. Negative for diaphoresis, malaise/fatigue and weight loss.   HENT: Negative for congestion, sinus pain and sore throat.    Eyes: Negative for blurred vision, double vision and photophobia.   Respiratory: Negative for cough, sputum production and shortness of breath.    Cardiovascular: Positive for leg swelling. Negative for chest pain, palpitations, orthopnea and PND.   Gastrointestinal: Positive for constipation. Negative for abdominal pain, blood in stool, diarrhea, melena, nausea and vomiting.   Genitourinary: Positive for dysuria. Negative for flank pain, frequency, hematuria and urgency.   Musculoskeletal: Negative for back pain, falls, joint pain and myalgias.   Skin: Negative for itching and rash.   Neurological: Negative for sensory change, speech change, focal weakness, seizures, weakness and headaches.   Endo/Heme/Allergies: Negative for environmental allergies.   Psychiatric/Behavioral: Negative for depression and substance abuse. The patient is not nervous/anxious and does not have insomnia.              Past Medical History (Chronic medical problem, known complications and current treatment)    Insulin dependent type 2 DM with CKD on HD  HTN  DLD     Past Surgical History:  Past Surgical History:   Procedure Laterality Date   • AV FISTULA CREATION  5/1/2014    Performed by Beau Cowart M.D. at SURGERY [facility]       Current Outpatient Medications:  Home Medications     Reviewed by Hannah Ford R.N. (Registered Nurse) on 08/21/18 at 2044  Med List Status: Not Addressed   Medication Last Dose Status   aspirin  MG TBEC 7/19/2017 Active   atorvastatin (LIPITOR) 20 MG Tab  Active   carvedilol (COREG) 25 MG TABS 7/19/2017 Active   insulin aspart (NOVOLOG) 100  UNIT/ML SOLN  Active   insulin glargine (LANTUS) 100 UNIT/ML Solution 7/19/2017 Active   lisinopril (PRINIVIL) 10 MG Tab 7/19/2017 Active   lisinopril (PRINIVIL) 5 MG Tab 7/19/2017 Active   NOVOLOG, insulin aspart, (NOVOLOG) 100 UNIT/ML Solution Pen-injector injection 7/19/2017 Active   oxycodone, immediate release, (ROXICODONE) 5 MG TABS 1/18/2017 Active   Sevelamer Carbonate (RENVELA) 800 MG Tab 7/19/2017 Active   torsemide (DEMADEX) 20 MG TABS 7/19/2017 Active                Medication Allergy/Sensitivities:  Allergies   Allergen Reactions   • Anesthetic Ether [Ether] Swelling     Swelling of lips, pet pt just stated Anesthesia   • Anesthetic [Benzocaine] Swelling     Swelling of lips, pet pt just stated Anesthesia         Family History (mandatory)   Family History   Problem Relation Age of Onset   • Hypertension Mother    • Other Father         DM       Social History (mandatory)   Social History     Social History   • Marital status:      Spouse name: N/A   • Number of children: N/A   • Years of education: N/A     Occupational History   • Not on file.     Social History Main Topics   • Smoking status: Never Smoker   • Smokeless tobacco: Never Used   • Alcohol use No   • Drug use: No   • Sexual activity: Not on file     Other Topics Concern   • Not on file     Social History Narrative   • No narrative on file     Living situation: lives with wife  PCP : Pcp Not In Computer    Physical Exam     Vitals:    08/21/18 2100 08/21/18 2208 08/21/18 2340 08/22/18 0000   BP: (!) 167/86 (!) 163/80 (!) 170/94 (!) 185/87   Pulse: 83 79 80 78   Resp: 16 16 16 16   Temp:    36.9 °C (98.4 °F)   SpO2:    97%   Weight:       Height:         Body mass index is 29.6 kg/m².  BP (!) 185/87   Pulse 78   Temp 36.9 °C (98.4 °F)   Resp 16   Ht 1.829 m (6')   Wt 99 kg (218 lb 4.1 oz)   SpO2 97%   BMI 29.60 kg/m²   O2 therapy: Pulse Oximetry: 97 %, O2 Delivery: None (Room Air)    Physical Exam   Constitutional: He is  oriented to person, place, and time and well-developed, well-nourished, and in no distress. No distress.   HENT:   Head: Normocephalic and atraumatic.   Right Ear: External ear normal.   Left Ear: External ear normal.   Nose: Nose normal.   Mouth/Throat: No oropharyngeal exudate.   Eyes: Conjunctivae and EOM are normal. Right eye exhibits no discharge. Left eye exhibits no discharge. No scleral icterus.   Neck: Normal range of motion. Neck supple. No thyromegaly present.   Cardiovascular: Normal rate, regular rhythm and normal heart sounds.  Exam reveals no gallop and no friction rub.    No murmur heard.  Pulmonary/Chest: Effort normal and breath sounds normal. No respiratory distress. He has no wheezes. He has no rales.   Abdominal: Soft. Bowel sounds are normal. He exhibits no distension and no mass. There is no tenderness. There is no guarding.   Musculoskeletal: Normal range of motion. He exhibits edema and tenderness. He exhibits no deformity.   Mildly tender right second toe, some nail changes, erythematous digit with ulcer at tip, erythema tracking upwards onto anterior shin, no limitation of ROM  DP pulse 1+ on left side  No other ulcer over foot   Lymphadenopathy:     He has no cervical adenopathy.   Neurological: He is alert and oriented to person, place, and time. He has normal reflexes. He exhibits normal muscle tone. GCS score is 15.   Skin: Skin is warm and dry. No rash noted. He is not diaphoretic. There is erythema. No pallor.   Psychiatric: Mood, memory, affect and judgment normal.   Vitals reviewed.        Data Review       Old Records Request:   Completed  Current Records review/summary: Completed    Lab Data Review:  Recent Results (from the past 24 hour(s))   WESTERGREN SED RATE    Collection Time: 08/21/18 10:38 PM   Result Value Ref Range    Sed Rate Westergren 51 (H) 0 - 20 mm/hour   CRP QUANTITIVE (NON-CARDIAC)    Collection Time: 08/21/18 10:38 PM   Result Value Ref Range    Stat C-Reactive  Protein 7.74 (H) 0.00 - 0.75 mg/dL   PROCALCITONIN    Collection Time: 08/21/18 10:38 PM   Result Value Ref Range    Procalcitonin 0.51 (H) <0.25 ng/mL   MAGNESIUM    Collection Time: 08/21/18 10:38 PM   Result Value Ref Range    Magnesium 2.2 1.5 - 2.5 mg/dL       Imaging/Procedures Review:    Independant Imaging Review: Completed  OUTSIDE IMAGES-DX LOWER EXTREMITY, LEFT   Final Result      MR-LOWER EXTREMITY-NO JOINT-W/O LEFT    (Results Pending)          Records reviewed and summarized in current documentation :  Yes  [employer] teaching service handout given to patient:  No         Assessment/Plan     * Cellulitis of second toe of left foot   Assessment & Plan    - Pt has longstanding DM type 2, with CKD on HD  - Pt febrile (100.6 F) before transfer, no leukocytosis at present  - Pt recd IV Rocephin prior to transfer  - Prior episode of similar complaints according to hx  - Present soft tissue inflammation is probably secondary to DM with unnoticed trauma to digit producing ulcer  - X ray showed no features suggestive of osteomyelitis    Plan  - Wound culture C/S  - Blood culture x 2  - ESR 51, CRP 7.74, Procalcitonin 0.51  - MRI ordered; infection seems to be tracking up the limb  - BP TPR Q8H, blood C/S if further fever spikes  - IV Vancomycin as per pharmacy protocol  - IV Unasyn 3 gm Q6H  - Wound care consult, to consider Surgery consult        Type 2 diabetes mellitus with chronic kidney disease on chronic dialysis (HCC)- (present on admission)   Assessment & Plan    - Pt has DM complicated by renal disease ESRD on HD M-W-F  - Requires home Insulin  - GFR 8, BUN 36, Cr 7.17, K 4.2, Glu 171 on admission    Plan  - Continue Lantus at 10 U QhS  - Humalog Insulin sliding scale 2-9 U  - Hypoglycemia protocol  - Monitor volume status, electrolytes  - Nephrology consult to resume HD while in hospital  - HbA1c  - Consider starting Duloxetine/ Pregabalin/ Amitriptyline for DM neuropathy        Dysuria   Assessment & Plan     - Pt c/o dysuria w/o pyuria. Hematuria, frequency or suprapubic / flank tenderness    Plan  - Urinalysis  - Urine C/S          HTN (hypertension)- (present on admission)   Assessment & Plan    - Continue home Lisinopril 15 mg PO BID  - Coreg 25 mg PO BID  - Torsemide 20 mg        Dyslipidemia- (present on admission)   Assessment & Plan    - Continue home Lipitor 20 mg             Anticipated Hospital stay:  >2 midnights        Quality Measures  Quality-Core Measures   Reviewed items::  Labs reviewed, Medications reviewed and Radiology images reviewed  Rodríguez catheter::  No Rodríguez  DVT prophylaxis pharmacological::  Heparin  Ulcer Prophylaxis::  No    PCP: Pcp Not In Computer

## 2018-08-22 NOTE — PROGRESS NOTES
2 RN skin check completed with Carol ROMERO. Pt educated on this and gave verbal consent. Foot ulcer noted to 2nd toe on left foot with redness to the left lower extremity. Some scarring noted to left shin. Left AV fistula present. Blanchable redness noted to sacrum. Otherwise skin is intact.

## 2018-08-22 NOTE — NON-PROVIDER
Internal Medicine Medical Student Note  Note Author: Leyda Harding, Student    Name Dariel Diamond 1965   Age/Sex 53 y.o. male   MRN [id number]   Code Status Full             Reason for interval visit  (Principal Problem)   Cellulitis of second toe of left foot    Interval Problem Daily Status Update  (problem status, last 24 hours, new history, new data )   Mr. Diamond is here for a diabetic ulcer of the second toe of the left foot. Yesterday, erythema seemed to be tracking up his leg but this morning he says he believes it looks better than yesterday. He notes he has had an ulcer on his right foot in the past, but he was able to take medicine and it went away. There were no complications. At the hospital in [facility], he was febrile at 100.6 F, but he has been afebrile during his time at [facility]. His WBC count is 9.5. He also mentions mild abdominal pain this morning. He had one episode of non-bloody diarrhea this morning around 5 AM, but he says he typically alternates between constipation and diarrhea regularly so this is not new for him.    Review of Systems   Constitutional: Negative for chills and fever.   HENT: Negative for hearing loss.    Eyes: Negative for blurred vision and double vision.   Respiratory: Negative for cough and shortness of breath.    Cardiovascular: Positive for leg swelling (left foot feels swollen). Negative for chest pain.   Gastrointestinal: Positive for diarrhea (once this morning, non-bloody. was constipated yesterday). Negative for abdominal pain, nausea and vomiting.   Genitourinary: Positive for dysuria. Negative for hematuria and urgency.   Musculoskeletal: Positive for joint pain (toe pain).   Skin: Negative for itching.   Neurological: Positive for tingling (bilateral lower extremities to the knee). Negative for dizziness, focal weakness and headaches.       Physical Exam       Vitals:    18 2340 18 0000 18 0400 18 0700   BP: (!) 170/94  (!) 185/87 (!) 170/92 (!) 161/91   Pulse: 80 78 81 80   Resp: 16 16 16 16   Temp:  36.9 °C (98.4 °F) 37.5 °C (99.5 °F) 37.2 °C (98.9 °F)   SpO2:  97% 94% 96%   Weight:       Height:         Body mass index is 29.6 kg/m². Weight: 99 kg (218 lb 4.1 oz)  Oxygen Therapy:  Pulse Oximetry: 96 %, O2 Delivery: None (Room Air)    Physical Exam   Constitutional: He is oriented to person, place, and time and well-developed, well-nourished, and in no distress.   HENT:   Head: Normocephalic and atraumatic.   Eyes: Pupils are equal, round, and reactive to light. EOM are normal.   Neck: Normal range of motion.   Cardiovascular: Normal rate and regular rhythm.  Exam reveals no gallop and no friction rub.    No murmur heard.  Pulses:       Dorsalis pedis pulses are 2+ on the right side, and 1+ on the left side.   Pulmonary/Chest: Effort normal and breath sounds normal. He has no wheezes.   Abdominal: Soft. Bowel sounds are normal. He exhibits no distension. There is no tenderness.   Musculoskeletal: He exhibits tenderness (in area of left second toe).        Left ankle: He exhibits swelling.   Second toe of left foot is violaceous with a 1 cm x 1 cm black ulcer at the tip of the toe. It is not draining fluid at this time. There are pen marks from where erythema existed yesterday, today the erythema seems to be decreased   Lymphadenopathy:     He has no cervical adenopathy.   Neurological: He is alert and oriented to person, place, and time.   Skin: Skin is dry. There is erythema (minimal, left foot). No pallor.     HEMATOLOGY/ ONCOLOGY/ID:            Recent Labs      08/22/18   0306   WBC  9.5   RBC  3.47*   HEMOGLOBIN  11.3*   HEMATOCRIT  33.7*   MCV  97.1   MCH  32.6   RDW  46.5   PLATELETCT  179   MPV  10.7   NEUTSPOLYS  72.00   LYMPHOCYTES  16.40*   MONOCYTES  9.80   EOSINOPHILS  1.10   BASOPHILS  0.30     RENAL:        Estimated GFR/CRCL = Estimated Creatinine Clearance: 12 mL/min (A) (by C-G formula based on SCr of 8.65 mg/dL  ()).  Recent Labs      08/21/18 2238  08/22/18   0306   SODIUM   --   137   POTASSIUM   --   5.0   CHLORIDE   --   92*   CO2   --   29   GLUCOSE   --   146*   BUN   --   60*   CREATININE   --   8.65*   CALCIUM   --   9.1   MAGNESIUM  2.2   --    PHOSPHORUS   --   6.1*   ALBUMIN   --   3.4       GASTROINTESTINAL/ HEPATIC:          Recent Labs      08/22/18   0306   ALTSGPT  7   ASTSGOT  6*   ALKPHOSPHAT  52   TBILIRUBIN  0.5   ALBUMIN  3.4   GLOBULIN  3.2       Lab Results   Component Value Date    HBA1C 7.7 (H) 08/21/2018    HBA1C 6.1 (H) 04/28/2014         Assessment/Plan     1) Diabetic ulcer on second toe of left foot  - Pt has 20 year history of Type 2 DM with ESRD (receiving hemodialysis)  - Pt has been afebrile, no leukocytosis since he has been at [facility]- when he was transferred from [facility] he had a fever of 100.6  - He had a similar ulcer on the right foot years ago, which he said was uncomplicated and got better with medicine  - Xray showed no osteomyelitis, but waiting on MRI to rule it out completely (will consult surgery once the MRI is done)  - Dorsalis pedis pulse is minimally palpable on the left foot- will get ultrasound to determine if vascularity is intact in the lower extremities. If patient has diminished flow to the area of the ulcer, it will likely take a very long time to heal and risk of infection is increased  - Continue IV Unasyn 3g q6h until cultures come back  - Monitor for signs of worsening infection- fever, leukocytosis, tachycardia    2) Type 2 diabetes  - Pt on Lantus 10 units, will adjust his Humalog such that his glucose is to be checked before meals    - He will get dialysis MWF while in the hospital, nephrology following    3) Dysuria  - Pt complained of dysuria yesterday, today he said it was mild  - UA and culture still pending  - He is on antibiotics for his foot ulcer currently, which will likely cover any gram negative UTI he may have    4) Other chronic dx's  - HTN:  continue home lisinopril, coreg & torsemide  - DLD: continue home lipitor

## 2018-08-22 NOTE — ASSESSMENT & PLAN NOTE
- Pt has DM complicated by renal disease ESRD on HD M-W-F  - Requires home Insulin  - GFR 8, BUN 36, Cr 7.17, K 4.2, Glu 171, HbAlc 7.7  - with renal osteodystrophy- Ca 8.6, PO4 5.8, , Vit D 86    Plan  - Continue Lantus at 10 U QhS  - Basal humalog 4U qhS, plus sliding scale  - HD per nephrology

## 2018-08-22 NOTE — PROGRESS NOTES
LIMB PRESERVATION SERVICE      DATE OF CONSULTATION: 8/22/2018    REASON FOR CONSULT: L 2nd toe     .HISTORY OF PRESENT ILLNESS: Dariel Diamond is a 53 y.o male  with a past medical history that includes uncontrolled type 2 diabetes, ESRD on HD, HTN admitted 8/21/2018 for diabetic foot ulcer.   IV antibiotics were started on this admission.  Infectious diseases has not been consulted.    Xray has been done at [facility], MRI pending. Pt reports 5 days ago 8/17 he noticed his left 2nd toe was black with pus draining from it. He cleaned it with gauze and applied antibiotic ointment. His developed erythema and edema to his foot that did not improve and proceeded to ED at [facility].       He was diagnosed with type 2 diabetes 20 years ago, and is currently managing with insulin.  He checks his blood sugars 3 times daily and reports that these typically run around 130-160s.  He has not had previous diabetes education.  He does have numbness in his feet.  He does not have diabetic shoes but does check his feet routinely.  He has not had previous foot ulcers or foot surgery. does not work.     Smoking: Patient does not currently smoke and denies history of smoking.   Alcohol: denies drinking        PAST MEDICAL HISTORY:   Past Medical History:   Diagnosis Date   • Depression    • Dilated cardiomyopathy (HCC)    • Hyperlipidemia    • Hypertension    • Type II or unspecified type diabetes mellitus without mention of complication, not stated as uncontrolled         MEDICATIONS:   Current Facility-Administered Medications   Medication Dose   • insulin glargine (LANTUS) injection 10 Units  10 Units   • [START ON 8/23/2018] aspirin EC (ECOTRIN) tablet 81 mg  81 mg   • [START ON 8/23/2018] lisinopril (PRINIVIL) tablet 40 mg  40 mg   • heparin injection 1,750 Units  1,750 Units   • insulin lispro (HUMALOG) injection 3 Units  3 Units    And   • insulin lispro (HUMALOG) injection 1-6 Units  1-6 Units    And   • glucose 4  g chewable tablet 16 g  16 g    And   • dextrose 50% (D50W) injection 25 mL  25 mL   • senna-docusate (PERICOLACE or SENOKOT S) 8.6-50 MG per tablet 2 Tab  2 Tab    And   • polyethylene glycol/lytes (MIRALAX) PACKET 1 Packet  1 Packet    And   • magnesium hydroxide (MILK OF MAGNESIA) suspension 30 mL  30 mL    And   • bisacodyl (DULCOLAX) suppository 10 mg  10 mg   • heparin injection 5,000 Units  5,000 Units   • labetalol (NORMODYNE,TRANDATE) injection 10 mg  10 mg   • acetaminophen (TYLENOL) tablet 650 mg  650 mg   • ampicillin/sulbactam (UNASYN) 3 g in  mL IVPB  3 g   • atorvastatin (LIPITOR) tablet 20 mg  20 mg   • carvedilol (COREG) tablet 25 mg  25 mg   • sevelamer carbonate (RENVELA) tablet 800 mg  800 mg   • torsemide (DEMADEX) tablet 20 mg  20 mg       ALLERGIES:    Allergies   Allergen Reactions   • Anesthetic Ether [Ether] Swelling     Swelling of lips, pet pt just stated Anesthesia   • Anesthetic [Benzocaine] Swelling     Swelling of lips, pet pt just stated Anesthesia        PAST SURGICAL HISTORY:   Past Surgical History:   Procedure Laterality Date   • AV FISTULA CREATION  5/1/2014    Performed by Beau Cowart M.D. at [facility]       SOCIAL HISTORY:   Social History     Social History   • Marital status:      Spouse name: N/A   • Number of children: N/A   • Years of education: N/A     Social History Main Topics   • Smoking status: Never Smoker   • Smokeless tobacco: Never Used   • Alcohol use No   • Drug use: No   • Sexual activity: Not on file     Other Topics Concern   • Not on file     Social History Narrative   • No narrative on file        FAMILY HISTORY:   Family History   Problem Relation Age of Onset   • Hypertension Mother    • Other Father         DM       REVIEW OF SYSTEMS:   Constitutional: Negative for chills, fever   Respiratory: Negative for cough and shortness of breath.    Cardiovascular:Negative for chest pain, positive for swelling in L leg, and  negative for claudication.   Gastrointestinal: Negative for constipation, diarrhea, nausea and vomiting.    Neurological: denies numbness in feet and lower legs    DIAGNOSTIC DATA:   Lab Results   Component Value Date/Time    HBA1C 7.7 (H) 08/21/2018 10:38 PM        Recent Labs      08/22/18   0306   WBC  9.5   RBC  3.47*   HEMOGLOBIN  11.3*   HEMATOCRIT  33.7*   MCV  97.1   MCH  32.6   MCHC  33.5*   RDW  46.5   PLATELETCT  179   MPV  10.7     Recent Labs      08/22/18   0306   SODIUM  137   POTASSIUM  5.0   CHLORIDE  92*   CO2  29   GLUCOSE  146*   BUN  60*     ESR: 51  CRP: 7.74    Xray: personal review of L foot images, osseus destruction to L 2nd distal phalanx with ulceration   MRI pending  Arterial studies: n/a  Wound culture: pending     PHYSICAL EXAMINATION:   VITAL SIGNS: /68   Pulse 86   Temp 36.7 °C (98 °F)   Resp 16   Ht 1.829 m (6')   Wt 99 kg (218 lb 4.1 oz)   SpO2 94%   BMI 29.60 kg/m²    Blood glucose 145     General Appearance:  Well developed, well nourished, in no acute distress  A/O x 4    Lower Extremity Assessment:  Edema+3 LLE    Pulses: RLE- DP pulse +2 palpable,  ; PT pulse unable to palpate   Doppler: multiphasic to DP, monophasic to PT               LLE- DP pulse +2 palpable, PT pulse unpalpable,  Doppler: multiphasic to DP, monophasic to PT    ROM: normal dorsi/plantarflexion intact  Structural /mechanical: hammertoes to bilateral toes 2-5, left 3-5 flexible hammertoes  Charcot changes to L foot     Sensory Assessment  Able to sense 4/10 to R foot to light touch  Able to sense 3/10 to L foot to light touch      Wound Assessment:       Wound Characteristics                                                    Location: L 2nd toe distal phalanx     Date: 8/22/18   Tissue Type and %: 40% necrotic, 60% slough   Periwound: Erythema, edema   Drainage: purulent   Exposed structures Bone visualized   Wound Edges:   open   Odor: malodor   S&S of Infection:   yes   Edema: +2-3    Sensation: lops     Measurements     Length (cm) 1.1   Width ( cm) 1.5   Depth (cm) bone   Area (cm2) 1.65   Tract/undermine Tract medial of distal phalanx                        ASSESSMENT AND PLAN:     Wound: L 2nd toe distal tip diabetic ulcer: xray done at [facility] -imaging reviewed, +OM noted,.  clinically bone exposed with purulent malodorous drainage. Canceled MRI.    Surgery: NPO midnight for L 2nd toe amputation with percutaneous tenotomies L toes 3-5 by Dr. Ma   Offloading: offloading shoe ordered   Wound care: dry gauze, tape pre op.       Infection: malodorous purulent drainage with erythema, warmth, and edema. The erythema has improved since starting on IV abx. Managed by [facility] at this time    Vascular: +2 DP pulses bilaterally, unable to palpate PTs bilaterally. DM for 20 years, ESRD. No NIV noted. Arterial studies ordered.     Diabetes: a1c 7.7% this admit. Diabetes edu ordered    Discharge Plan: TBD, lives in [place]    D/W: Dr. Quiroz, Dr. Ma

## 2018-08-22 NOTE — CARE PLAN
Problem: Communication  Goal: The ability to communicate needs accurately and effectively will improve  Outcome: PROGRESSING AS EXPECTED  Pt primarily Kyrgyz speaking. Language line used to complete admit profile.     Problem: Infection  Goal: Will remain free from infection  Outcome: PROGRESSING AS EXPECTED  Pt receiving IV abx for infection.    Problem: Venous Thromboembolism (VTW)/Deep Vein Thrombosis (DVT) Prevention:  Goal: Patient will participate in Venous Thrombosis (VTE)/Deep Vein Thrombosis (DVT)Prevention Measures  Outcome: PROGRESSING AS EXPECTED  Pt receiving unfractionated heparin for VTE prophylaxis.

## 2018-08-22 NOTE — PROGRESS NOTES
3 1/2hr HD started @ 1010 and completed @ 1341,tx well tolerated,VSS,net UF = 3000ml.LUAAVF + B/T,cannulation sites with DD,CDI,report given to Wendy Shukla RN.

## 2018-08-22 NOTE — CONSULTS
"[facility] -  CONSULTATION NOTE               Author: Nikkie Mondragon M.D. Date & Time: 8/22/2018  10:29 AM       REASON FOR CONSULTATION:   - Inpatient hemodialysis management.    CHIEF COMPLAINT:   -  \"My toe hurts\"    HISTORY OF PRESENT ILLNESS:    54 yo HM PMH ESRD MWF iHD, HTN, type 2 DM, anemia of CKD, renal osteodystrophy, and HLD who presents with CC as above.  He was initially seen at [facility] for left second toe pain and was found to have a non-draining wound on that toe with purple discoloration to it.  He denies any pain unless manipulated and states it started about 4 days prior to admit.  Never had anything similar and initially didn't think it was a big deal as it was slightly present but over the 4 day course it progressed rapidly.  XR of foot at [facility] did not show bony involvement but due to his ESRD status he was sent to [facility] for further evaluation/management.  He is Guyanese speaking mostly so an intrepretor was used during the encounter.  He denies C/N/V/CP/SOB.  No abd pain, melena, hematochezia, hematemesis.  No visual changes/HA.  He had some subjective fevers, but never measured them.    REVIEW OF SYSTEMS:    - As per HPI, otherwise review of systems negative per AMA/CMS criteria  - General:  No weakness, malaise  - HEENT:  No ocular pain; no nasal discharge  - CV:  No chest pain, no palpitations  - Lungs:  No cough; no PND  - GI:  No N/V/D; No constipation  - MSK:  No joint pain; No trauma  - Skin: No rashes; No lesions  - Neuro: No paresthesia; No LOC  - Psych: No depression; No anxiety    PAST MEDICAL HISTORY:   - ESRD MWF iHD  - HTN  - type 2 DM  - Anemia of CKD  - Renal osteodystrophy  - HLD  - CAD    PAST SURGICAL HISTORY:   - LAVF creation by Dr. Cowart    FAMILY HISTORY:   - Mother: HTN  - Father: DM    SOCIAL HISTORY:   - No tobacco  - No EtOH  - No illicits  -  and lives with his wife    HOME MEDICATIONS:   - Reviewed and " documented in chart    ALLERGIES:  Anesthetic ether [ether] and Anesthetic [benzocaine]    PHYSICAL EXAM:  VS:  BP (!) 161/91   Pulse 80   Temp 37.2 °C (98.9 °F)   Resp 16   Ht 1.829 m (6')   Wt 99 kg (218 lb 4.1 oz)   SpO2 96%   BMI 29.60 kg/m²   GENERAL:  WDWN, HM, NAD  HEENT:  NC/AT, no scleral icterus; conjunctiva normal  NECK:  Supple; Non tender  CV:  RRR, no m/r/g  LUNGS:  CTAB, no W/R  ABDOMEN:  SNTND, +BS  EXTREMETIES:  No C/C/E; Left second toe with purple discoloration of whole toe and some surrounding erythema at base; No drainage noted, no foul smell  SKIN:  Warm and dry  NEURO:  A&O, no focal deficits  PSYCH:  Cooperative, appropriate mood and affect    LABS:  Recent Results (from the past 24 hour(s))   WESTERGREN SED RATE    Collection Time: 08/21/18 10:38 PM   Result Value Ref Range    Sed Rate Westergren 51 (H) 0 - 20 mm/hour   CRP QUANTITIVE (NON-CARDIAC)    Collection Time: 08/21/18 10:38 PM   Result Value Ref Range    Stat C-Reactive Protein 7.74 (H) 0.00 - 0.75 mg/dL   PROCALCITONIN    Collection Time: 08/21/18 10:38 PM   Result Value Ref Range    Procalcitonin 0.51 (H) <0.25 ng/mL   MAGNESIUM    Collection Time: 08/21/18 10:38 PM   Result Value Ref Range    Magnesium 2.2 1.5 - 2.5 mg/dL   HEMOGLOBIN A1C    Collection Time: 08/21/18 10:38 PM   Result Value Ref Range    Glycohemoglobin 7.7 (H) 0.0 - 5.6 %    Est Avg Glucose 174 mg/dL   ACCU-CHEK GLUCOSE    Collection Time: 08/22/18 12:55 AM   Result Value Ref Range    Glucose - Accu-Ck 145 (H) 65 - 99 mg/dL   CBC with Differential    Collection Time: 08/22/18  3:06 AM   Result Value Ref Range    WBC 9.5 4.8 - 10.8 K/uL    RBC 3.47 (L) 4.70 - 6.10 M/uL    Hemoglobin 11.3 (L) 14.0 - 18.0 g/dL    Hematocrit 33.7 (L) 42.0 - 52.0 %    MCV 97.1 81.4 - 97.8 fL    MCH 32.6 27.0 - 33.0 pg    MCHC 33.5 (L) 33.7 - 35.3 g/dL    RDW 46.5 35.9 - 50.0 fL    Platelet Count 179 164 - 446 K/uL    MPV 10.7 9.0 - 12.9 fL    Neutrophils-Polys 72.00 44.00 -  72.00 %    Lymphocytes 16.40 (L) 22.00 - 41.00 %    Monocytes 9.80 0.00 - 13.40 %    Eosinophils 1.10 0.00 - 6.90 %    Basophils 0.30 0.00 - 1.80 %    Immature Granulocytes 0.40 0.00 - 0.90 %    Nucleated RBC 0.00 /100 WBC    Neutrophils (Absolute) 6.83 1.82 - 7.42 K/uL    Lymphs (Absolute) 1.55 1.00 - 4.80 K/uL    Monos (Absolute) 0.93 (H) 0.00 - 0.85 K/uL    Eos (Absolute) 0.10 0.00 - 0.51 K/uL    Baso (Absolute) 0.03 0.00 - 0.12 K/uL    Immature Granulocytes (abs) 0.04 0.00 - 0.11 K/uL    NRBC (Absolute) 0.00 K/uL   Comp Metabolic Panel (CMP)    Collection Time: 08/22/18  3:06 AM   Result Value Ref Range    Sodium 137 135 - 145 mmol/L    Potassium 5.0 3.6 - 5.5 mmol/L    Chloride 92 (L) 96 - 112 mmol/L    Co2 29 20 - 33 mmol/L    Anion Gap 16.0 (H) 0.0 - 11.9    Glucose 146 (H) 65 - 99 mg/dL    Bun 60 (H) 8 - 22 mg/dL    Creatinine 8.65 (HH) 0.50 - 1.40 mg/dL    Calcium 9.1 8.5 - 10.5 mg/dL    AST(SGOT) 6 (L) 12 - 45 U/L    ALT(SGPT) 7 2 - 50 U/L    Alkaline Phosphatase 52 30 - 99 U/L    Total Bilirubin 0.5 0.1 - 1.5 mg/dL    Albumin 3.4 3.2 - 4.9 g/dL    Total Protein 6.6 6.0 - 8.2 g/dL    Globulin 3.2 1.9 - 3.5 g/dL    A-G Ratio 1.1 g/dL   PHOSPHORUS    Collection Time: 08/22/18  3:06 AM   Result Value Ref Range    Phosphorus 6.1 (H) 2.5 - 4.5 mg/dL   ESTIMATED GFR    Collection Time: 08/22/18  3:06 AM   Result Value Ref Range    GFR If  8 (A) >60 mL/min/1.73 m 2    GFR If Non African American 6 (A) >60 mL/min/1.73 m 2       (click the triangle to expand results)    IMAGING:  OUTSIDE IMAGES-DX LOWER EXTREMITY, LEFT   Final Result      MR-LOWER EXTREMITY-NO JOINT-W/O LEFT    (Results Pending)     IMPRESSION:  - ESRD    * Etiology likely 2/2 HTN/DM    * MWF @ [facility]    * LAVF (+thrill/bruit)  - Non-healing wound left second toe    * Likely DM related complication    * No evidence of OM, but XR not sensitive for it    * MRI pending  - HTN    * Goal BP < 140/90  - Anemia of CKD    * Goal Hgb  10-11    * Goal TSat > 30%  - Renal osteodystrophy    * Ca normal    * PO4 pend    * PTH pend    * Vit D pend  - HLD  - CAD    PLAN:  - MWF iHD  - UF as tolerated  - MRI non contrast today  - MBD panel in AM  - Iron panel in AM  - Dose all meds per ESRD guidelines    Thank you for the consultation!

## 2018-08-22 NOTE — ED PROVIDER NOTES
ED Provider Note    Scribed for Luiz Lambert M.D. by Nehal Diaz. 8/21/2018, 8:48 PM.    Primary care provider: Pcp Not In Computer  Means of arrival: EMS  History obtained from: patient   History limited by: none     CHIEF COMPLAINT  Chief Complaint   Patient presents with   • Wound Infection     pt transfered from [facility], for diabetic foot cellulitis to (L) 2nd toe,  hx DM 2 and also dialysis pt.         SHAWNA Diamodn is a 53 y.o. male with a history of type 2 diabetes and hypertension who presents to the Emergency Department via EMS as a transfer from [facility] for evaluation of worsening diabetic foot cellulitis to his left second toe which began 4 days ago. Patient reports associated pain to the area with redness and swelling of his entire left second toe and drainage from the tip of the toe, pain to his left anterior shin, and fevers. His pain is exacerbated with palpation. Patient is also a dialysis patient and receives dialysis on Monday, Wednesday, and Friday's. No other acute medical complaints or concerns.     Per review of records from [facility], the patient's potassium was normal at 4.3, creatinine elevated at 7.92, glucose 177, WBC 9.9, H&H low at 12.0/35.0, x-ray shows possible osteomyelitis of the left second toe.     REVIEW OF SYSTEMS  Review of Systems   Constitutional: Positive for fever.   Musculoskeletal:        Pain to left anterior shin   Skin:        diabetic foot cellulitis to his left second toe with redness, pain, and drainage    All other systems reviewed and are negative.  C.     PAST MEDICAL HISTORY   has a past medical history of Depression; Dilated cardiomyopathy (HCC); Hyperlipidemia; Hypertension; and Type II or unspecified type diabetes mellitus without mention of complication, not stated as uncontrolled.    SURGICAL HISTORY   has a past surgical history that includes av fistula creation (5/1/2014).    SOCIAL HISTORY  Social History   Substance Use  Topics   • Smoking status: Never Smoker   • Smokeless tobacco: Never Used   • Alcohol use No      History   Drug Use No       FAMILY HISTORY  Family History   Problem Relation Age of Onset   • Hypertension Mother    • Other Father         DM       CURRENT MEDICATIONS  Home Medications     Reviewed by Hannah Ford R.N. (Registered Nurse) on 08/21/18 at 2044  Med List Status: Not Addressed   Medication Last Dose Status   aspirin  MG TBEC 7/19/2017 Active   atorvastatin (LIPITOR) 20 MG Tab  Active   carvedilol (COREG) 25 MG TABS 7/19/2017 Active   insulin aspart (NOVOLOG) 100 UNIT/ML SOLN  Active   insulin glargine (LANTUS) 100 UNIT/ML Solution 7/19/2017 Active   lisinopril (PRINIVIL) 10 MG Tab 7/19/2017 Active   lisinopril (PRINIVIL) 5 MG Tab 7/19/2017 Active   NOVOLOG, insulin aspart, (NOVOLOG) 100 UNIT/ML Solution Pen-injector injection 7/19/2017 Active   oxycodone, immediate release, (ROXICODONE) 5 MG TABS 1/18/2017 Active   Sevelamer Carbonate (RENVELA) 800 MG Tab 7/19/2017 Active   torsemide (DEMADEX) 20 MG TABS 7/19/2017 Active                ALLERGIES  Allergies   Allergen Reactions   • Anesthetic Ether [Ether] Swelling     Swelling of lips, pet pt just stated Anesthesia   • Anesthetic [Benzocaine] Swelling     Swelling of lips, pet pt just stated Anesthesia       PHYSICAL EXAM  VITAL SIGNS: /84   Pulse 88   Temp 36.6 °C (97.9 °F)   Resp 16   Ht 1.829 m (6')   Wt 99 kg (218 lb 4.1 oz)   SpO2 98%   BMI 29.60 kg/m²     Constitutional: Well developed, Well nourished, Non-toxic appearance.   HENT: Normocephalic, Atraumatic, Bilateral external ears normal, oropharynx moist, No oral exudates, Nose normal.   Eyes:conjunctiva is normal, there are no signs of exudate.   Neck: Supple, no meningeal signs.  Lymphatic: No lymphadenopathy noted.   Cardiovascular: Regular rate and rhythm without murmurs gallops or rubs.   Thorax & Lungs: No respiratory distress. Breathing comfortably. Lungs are  clear to auscultation bilaterally, there are no wheezes no rales. Chest wall is nontender.  Abdomen: Soft, nontender, nondistended. Bowel sounds are present.   Skin: Warm. Left second toe has an ulcer with drainage from the tip of the toe, there is tracking up the left anterior shin. Fistula to left arm with good thrill.   Back: No tenderness, No CVA tenderness.  Musculoskeletal: Good range of motion in all major joints. Intact distal pulses, no clubbing, no cyanosis. Left second toe has an ulcer with drainage from the tip of the toe, there is tracking up the left anterior shin. Fistula to left arm with good thrill.    Neurologic: Alert & oriented x 3, Moving all extremities. No gross abnormalities.    Psychiatric: Affect normal, Judgment normal, Mood normal.     LABS  From [facility] facility as described above  All labs reviewed by me.    RADIOLOGY  OUTSIDE IMAGES-DX LOWER EXTREMITY, LEFT   Final Result        The radiologist's interpretation of all radiological studies have been reviewed by me.    COURSE & MEDICAL DECISION MAKING  Pertinent Labs & Imaging studies reviewed. (See chart for details)    Per review of records from [facility], the patient's potassium was normal at 4.3, creatinine elevated at 7.92, glucose 177, WBC 9.9, H&H low at 12.0/35.0, x-ray shows possible osteomyelitis of the left second toe.     8:48 PM - Patient seen and examined at bedside. Patient will be treated with Vancomycin 2,500 mg in  mL IVPB. Ordered culture wound with gram stain to evaluate his symptoms. The differential diagnoses include but are not limited to: cellulitis, renal failure, osteomyelitis.      9:11 PM- Paged [facility] to admit.     9:18 PM- Spoke with [facility] who accepts the patient for admission.     Decision Making:  Patient does have what appears to be a diabetic ulcer with cellulitis of that toe.  It does extend was lymphangitis up into the anterior shin.  Patient was only given Rocephin at the [facility]  facility so I will add a dose of vancomycin.  At this point I feel the patient should be admitted to the hospital for further treatment and care as well as set up for dialysis tomorrow.  I have spoken to [place] [facility] for this admission.    DISPOSITION:  Patient will be admitted to [facility] in guarded condition.    FINAL IMPRESSION  1. Cellulitis of toe of left foot    2. Type 2 diabetes mellitus with hyperglycemia, without long-term current use of insulin (HCC)          INehal (Scribe), am scribing for, and in the presence of, Luiz Lambert M.D..    Electronically signed by: Nehal Diaz (Annibiram), 8/21/2018    ILuiz M.D. personally performed the services described in this documentation, as scribed by Nehal iDaz in my presence, and it is both accurate and complete.    The note accurately reflects work and decisions made by me.  Luiz Lambret  8/21/2018  10:17 PM

## 2018-08-22 NOTE — DIETARY
Nutrition note:  Pt with pressure ulcer per nutrition admit screen; however, pt does not have a pressure injury.  Pt has a diabetic ulcer on left foot.   Pt ate % of diabetic breakfast today.  RD will monitor per dept policy.

## 2018-08-22 NOTE — PROGRESS NOTES
Pt arrived on unit at 2350. Pt awake, alert and oriented x4, primarily Brazilian speaking. No complaints of pain/discomfort at this time. Language line used to complete admit profile and assessment. 2 RN skin check completed. FSBS 145. Pt is up self and ambulatory with steady gait. Pt denies any further needs at this time. Call light and personal belongings within reach, bed locked in lowest position.

## 2018-08-22 NOTE — ASSESSMENT & PLAN NOTE
- Pt c/o dysuria w/o pyuria. Hematuria, frequency or suprapubic / flank tenderness    Plan  - Urinalysis  - Urine C/S

## 2018-08-23 PROBLEM — R30.0 DYSURIA: Status: RESOLVED | Noted: 2018-08-21 | Resolved: 2018-08-23

## 2018-08-23 LAB
25(OH)D3 SERPL-MCNC: 86 NG/ML (ref 30–100)
ALBUMIN SERPL BCP-MCNC: 3.1 G/DL (ref 3.2–4.9)
BASOPHILS # BLD AUTO: 0.5 % (ref 0–1.8)
BASOPHILS # BLD: 0.03 K/UL (ref 0–0.12)
BUN SERPL-MCNC: 44 MG/DL (ref 8–22)
CALCIUM SERPL-MCNC: 8.6 MG/DL (ref 8.5–10.5)
CHLORIDE SERPL-SCNC: 96 MMOL/L (ref 96–112)
CHOLEST SERPL-MCNC: 123 MG/DL (ref 100–199)
CO2 SERPL-SCNC: 28 MMOL/L (ref 20–33)
CREAT SERPL-MCNC: 6.8 MG/DL (ref 0.5–1.4)
EOSINOPHIL # BLD AUTO: 0.14 K/UL (ref 0–0.51)
EOSINOPHIL NFR BLD: 2.2 % (ref 0–6.9)
ERYTHROCYTE [DISTWIDTH] IN BLOOD BY AUTOMATED COUNT: 45.1 FL (ref 35.9–50)
FERRITIN SERPL-MCNC: 64.7 NG/ML (ref 22–322)
GLUCOSE BLD-MCNC: 124 MG/DL (ref 65–99)
GLUCOSE BLD-MCNC: 301 MG/DL (ref 65–99)
GLUCOSE SERPL-MCNC: 124 MG/DL (ref 65–99)
GRAM STN SPEC: NORMAL
GRAM STN SPEC: NORMAL
HCT VFR BLD AUTO: 32.8 % (ref 42–52)
HDLC SERPL-MCNC: 24 MG/DL
HGB BLD-MCNC: 11.2 G/DL (ref 14–18)
IMM GRANULOCYTES # BLD AUTO: 0.02 K/UL (ref 0–0.11)
IMM GRANULOCYTES NFR BLD AUTO: 0.3 % (ref 0–0.9)
IRON SATN MFR SERPL: 17 % (ref 15–55)
IRON SERPL-MCNC: 45 UG/DL (ref 50–180)
LDLC SERPL CALC-MCNC: 65 MG/DL
LYMPHOCYTES # BLD AUTO: 1.39 K/UL (ref 1–4.8)
LYMPHOCYTES NFR BLD: 21.6 % (ref 22–41)
MAGNESIUM SERPL-MCNC: 2.2 MG/DL (ref 1.5–2.5)
MCH RBC QN AUTO: 32.7 PG (ref 27–33)
MCHC RBC AUTO-ENTMCNC: 34.1 G/DL (ref 33.7–35.3)
MCV RBC AUTO: 95.9 FL (ref 81.4–97.8)
MONOCYTES # BLD AUTO: 0.84 K/UL (ref 0–0.85)
MONOCYTES NFR BLD AUTO: 13 % (ref 0–13.4)
NEUTROPHILS # BLD AUTO: 4.02 K/UL (ref 1.82–7.42)
NEUTROPHILS NFR BLD: 62.4 % (ref 44–72)
NRBC # BLD AUTO: 0 K/UL
NRBC BLD-RTO: 0 /100 WBC
PHOSPHATE SERPL-MCNC: 5.8 MG/DL (ref 2.5–4.5)
PLATELET # BLD AUTO: 180 K/UL (ref 164–446)
PMV BLD AUTO: 10.2 FL (ref 9–12.9)
POTASSIUM SERPL-SCNC: 4.2 MMOL/L (ref 3.6–5.5)
PTH-INTACT SERPL-MCNC: 399.2 PG/ML (ref 14–72)
RBC # BLD AUTO: 3.42 M/UL (ref 4.7–6.1)
SIGNIFICANT IND 70042: NORMAL
SIGNIFICANT IND 70042: NORMAL
SITE SITE: NORMAL
SITE SITE: NORMAL
SODIUM SERPL-SCNC: 138 MMOL/L (ref 135–145)
SOURCE SOURCE: NORMAL
SOURCE SOURCE: NORMAL
TIBC SERPL-MCNC: 265 UG/DL (ref 250–450)
TRIGL SERPL-MCNC: 169 MG/DL (ref 0–149)
WBC # BLD AUTO: 6.4 K/UL (ref 4.8–10.8)

## 2018-08-23 PROCEDURE — 82306 VITAMIN D 25 HYDROXY: CPT

## 2018-08-23 PROCEDURE — 160035 HCHG PACU - 1ST 60 MINS PHASE I: Performed by: ORTHOPAEDIC SURGERY

## 2018-08-23 PROCEDURE — 83735 ASSAY OF MAGNESIUM: CPT

## 2018-08-23 PROCEDURE — 80061 LIPID PANEL: CPT

## 2018-08-23 PROCEDURE — 93922 UPR/L XTREMITY ART 2 LEVELS: CPT | Mod: 26 | Performed by: SURGERY

## 2018-08-23 PROCEDURE — 500881 HCHG PACK, EXTREMITY: Performed by: ORTHOPAEDIC SURGERY

## 2018-08-23 PROCEDURE — 87077 CULTURE AEROBIC IDENTIFY: CPT | Mod: 91

## 2018-08-23 PROCEDURE — 700105 HCHG RX REV CODE 258: Performed by: INTERNAL MEDICINE

## 2018-08-23 PROCEDURE — 87075 CULTR BACTERIA EXCEPT BLOOD: CPT

## 2018-08-23 PROCEDURE — 99232 SBSQ HOSP IP/OBS MODERATE 35: CPT | Mod: GC | Performed by: INTERNAL MEDICINE

## 2018-08-23 PROCEDURE — A9270 NON-COVERED ITEM OR SERVICE: HCPCS | Performed by: INTERNAL MEDICINE

## 2018-08-23 PROCEDURE — 160028 HCHG SURGERY MINUTES - 1ST 30 MINS LEVEL 3: Performed by: ORTHOPAEDIC SURGERY

## 2018-08-23 PROCEDURE — 501838 HCHG SUTURE GENERAL: Performed by: ORTHOPAEDIC SURGERY

## 2018-08-23 PROCEDURE — 700111 HCHG RX REV CODE 636 W/ 250 OVERRIDE (IP): Performed by: INTERNAL MEDICINE

## 2018-08-23 PROCEDURE — 160048 HCHG OR STATISTICAL LEVEL 1-5: Performed by: ORTHOPAEDIC SURGERY

## 2018-08-23 PROCEDURE — 85025 COMPLETE CBC W/AUTO DIFF WBC: CPT

## 2018-08-23 PROCEDURE — 160002 HCHG RECOVERY MINUTES (STAT): Performed by: ORTHOPAEDIC SURGERY

## 2018-08-23 PROCEDURE — 87205 SMEAR GRAM STAIN: CPT

## 2018-08-23 PROCEDURE — 700111 HCHG RX REV CODE 636 W/ 250 OVERRIDE (IP)

## 2018-08-23 PROCEDURE — 700105 HCHG RX REV CODE 258: Performed by: HOSPITALIST

## 2018-08-23 PROCEDURE — 700101 HCHG RX REV CODE 250

## 2018-08-23 PROCEDURE — 80069 RENAL FUNCTION PANEL: CPT

## 2018-08-23 PROCEDURE — 83550 IRON BINDING TEST: CPT

## 2018-08-23 PROCEDURE — 83970 ASSAY OF PARATHORMONE: CPT

## 2018-08-23 PROCEDURE — 82962 GLUCOSE BLOOD TEST: CPT | Mod: 91

## 2018-08-23 PROCEDURE — 160022 HCHG BLOCK: Performed by: ORTHOPAEDIC SURGERY

## 2018-08-23 PROCEDURE — 87070 CULTURE OTHR SPECIMN AEROBIC: CPT

## 2018-08-23 PROCEDURE — 160039 HCHG SURGERY MINUTES - EA ADDL 1 MIN LEVEL 3: Performed by: ORTHOPAEDIC SURGERY

## 2018-08-23 PROCEDURE — 87186 SC STD MICRODIL/AGAR DIL: CPT

## 2018-08-23 PROCEDURE — 700102 HCHG RX REV CODE 250 W/ 637 OVERRIDE(OP): Performed by: STUDENT IN AN ORGANIZED HEALTH CARE EDUCATION/TRAINING PROGRAM

## 2018-08-23 PROCEDURE — A9270 NON-COVERED ITEM OR SERVICE: HCPCS | Performed by: HOSPITALIST

## 2018-08-23 PROCEDURE — 770006 HCHG ROOM/CARE - MED/SURG/GYN SEMI*

## 2018-08-23 PROCEDURE — 93922 UPR/L XTREMITY ART 2 LEVELS: CPT

## 2018-08-23 PROCEDURE — 700102 HCHG RX REV CODE 250 W/ 637 OVERRIDE(OP): Performed by: HOSPITALIST

## 2018-08-23 PROCEDURE — 0Y6S0Z0 DETACHMENT AT LEFT 2ND TOE, COMPLETE, OPEN APPROACH: ICD-10-PCS | Performed by: ORTHOPAEDIC SURGERY

## 2018-08-23 PROCEDURE — 0L8W0ZZ DIVISION OF LEFT FOOT TENDON, OPEN APPROACH: ICD-10-PCS | Performed by: ORTHOPAEDIC SURGERY

## 2018-08-23 PROCEDURE — 700102 HCHG RX REV CODE 250 W/ 637 OVERRIDE(OP): Performed by: INTERNAL MEDICINE

## 2018-08-23 PROCEDURE — 82728 ASSAY OF FERRITIN: CPT

## 2018-08-23 PROCEDURE — 160009 HCHG ANES TIME/MIN: Performed by: ORTHOPAEDIC SURGERY

## 2018-08-23 PROCEDURE — A9270 NON-COVERED ITEM OR SERVICE: HCPCS | Performed by: STUDENT IN AN ORGANIZED HEALTH CARE EDUCATION/TRAINING PROGRAM

## 2018-08-23 PROCEDURE — 83540 ASSAY OF IRON: CPT

## 2018-08-23 PROCEDURE — 87015 SPECIMEN INFECT AGNT CONCNTJ: CPT

## 2018-08-23 PROCEDURE — 700111 HCHG RX REV CODE 636 W/ 250 OVERRIDE (IP): Performed by: HOSPITALIST

## 2018-08-23 PROCEDURE — 36415 COLL VENOUS BLD VENIPUNCTURE: CPT

## 2018-08-23 PROCEDURE — 160036 HCHG PACU - EA ADDL 30 MINS PHASE I: Performed by: ORTHOPAEDIC SURGERY

## 2018-08-23 RX ORDER — MAGNESIUM HYDROXIDE 1200 MG/15ML
LIQUID ORAL
Status: COMPLETED | OUTPATIENT
Start: 2018-08-23 | End: 2018-08-23

## 2018-08-23 RX ADMIN — HEPARIN SODIUM 5000 UNITS: 5000 INJECTION, SOLUTION INTRAVENOUS; SUBCUTANEOUS at 05:49

## 2018-08-23 RX ADMIN — TORSEMIDE 20 MG: 20 TABLET ORAL at 05:49

## 2018-08-23 RX ADMIN — STANDARDIZED SENNA CONCENTRATE AND DOCUSATE SODIUM 2 TABLET: 8.6; 5 TABLET, FILM COATED ORAL at 05:49

## 2018-08-23 RX ADMIN — HEPARIN SODIUM 5000 UNITS: 5000 INJECTION, SOLUTION INTRAVENOUS; SUBCUTANEOUS at 20:46

## 2018-08-23 RX ADMIN — SEVELAMER CARBONATE 800 MG: 800 TABLET, FILM COATED ORAL at 10:27

## 2018-08-23 RX ADMIN — ATORVASTATIN CALCIUM 20 MG: 20 TABLET, FILM COATED ORAL at 18:00

## 2018-08-23 RX ADMIN — AMPICILLIN SODIUM AND SULBACTAM SODIUM 3 G: 2; 1 INJECTION, POWDER, FOR SOLUTION INTRAMUSCULAR; INTRAVENOUS at 03:02

## 2018-08-23 RX ADMIN — INSULIN GLARGINE 10 UNITS: 100 INJECTION, SOLUTION SUBCUTANEOUS at 20:45

## 2018-08-23 RX ADMIN — LISINOPRIL 40 MG: 20 TABLET ORAL at 05:49

## 2018-08-23 RX ADMIN — SEVELAMER CARBONATE 800 MG: 800 TABLET, FILM COATED ORAL at 17:30

## 2018-08-23 RX ADMIN — AMPICILLIN SODIUM AND SULBACTAM SODIUM 3 G: 2; 1 INJECTION, POWDER, FOR SOLUTION INTRAMUSCULAR; INTRAVENOUS at 18:00

## 2018-08-23 RX ADMIN — STANDARDIZED SENNA CONCENTRATE AND DOCUSATE SODIUM 2 TABLET: 8.6; 5 TABLET, FILM COATED ORAL at 18:00

## 2018-08-23 RX ADMIN — ASPIRIN 81 MG: 81 TABLET, DELAYED RELEASE ORAL at 05:49

## 2018-08-23 RX ADMIN — CARVEDILOL 25 MG: 25 TABLET, FILM COATED ORAL at 17:30

## 2018-08-23 RX ADMIN — CARVEDILOL 25 MG: 25 TABLET, FILM COATED ORAL at 10:27

## 2018-08-23 ASSESSMENT — ENCOUNTER SYMPTOMS
GASTROINTESTINAL NEGATIVE: 1
NEUROLOGICAL NEGATIVE: 1
RESPIRATORY NEGATIVE: 1
CARDIOVASCULAR NEGATIVE: 1
PSYCHIATRIC NEGATIVE: 1
CONSTITUTIONAL NEGATIVE: 1
EYES NEGATIVE: 1

## 2018-08-23 ASSESSMENT — PAIN SCALES - GENERAL
PAINLEVEL_OUTOF10: 0

## 2018-08-23 NOTE — CONSULTS
8/23/2018    Dariel Diamond is a 53 y.o. male who presents with a nonhealing ulcer left second toe with exposed bone.  Erythema improved with antibiotics, second toe remains necrotic.  Flexible hammering other lesser toes.    Past Medical History:   Diagnosis Date   • Depression    • Dilated cardiomyopathy (HCC)    • Hyperlipidemia    • Hypertension    • Type II or unspecified type diabetes mellitus without mention of complication, not stated as uncontrolled        Past Surgical History:   Procedure Laterality Date   • AV FISTULA CREATION  5/1/2014    Performed by Beau Cowart M.D. at SURGERY [facility] ORS       Medications  No current facility-administered medications on file prior to encounter.      Current Outpatient Prescriptions on File Prior to Encounter   Medication Sig Dispense Refill   • atorvastatin (LIPITOR) 20 MG Tab TAKE 1 TABLET EVERY EVENING 90 Tab 1   • lisinopril (PRINIVIL) 10 MG Tab Take 1 Tab by mouth every 12 hours. 180 Tab 3   • lisinopril (PRINIVIL) 5 MG Tab Take 1 Tab by mouth 2 times a day. 180 Tab 3   • NOVOLOG, insulin aspart, (NOVOLOG) 100 UNIT/ML Solution Pen-injector injection 7 Units by Subdermal route.     • insulin glargine (LANTUS) 100 UNIT/ML Solution Inject 10 Units as instructed every evening.     • Sevelamer Carbonate (RENVELA) 800 MG Tab Take  by mouth.     • carvedilol (COREG) 25 MG TABS Take 1 Tab by mouth 2 times a day, with meals. 60 Tab 5   • aspirin  MG TBEC Take 1 Tab by mouth every day. 30 Tab 3   • oxycodone, immediate release, (ROXICODONE) 5 MG TABS Take 1 Tab by mouth every four hours as needed ((4-6)). 30 Tab 1   • torsemide (DEMADEX) 20 MG TABS Take 1 Tab by mouth every day. 30 Tab 3   • insulin aspart (NOVOLOG) 100 UNIT/ML SOLN insulin aspart (NOVOLOG) injection 2-9 Units  2-9 Units, Sq, 4 TIMES DAILY - qAC + qHS,   For glucose: 70   - 150  mg/dL = 0 Units 151 - 200  mg/dL = 2 Units 201 - 250  mg/dL = 3 Units 251 - 300  mg/dL  = 5 Units 301 - 350  mg/dL=  6 Units 351 - 400 mg/dL =   8 Units  >400 mg/dL =  9 Units 1 Vial 11       Allergies  Contrast media with iodine [iodine]    ROS   All other systems were reviewed and found to be negative    Family History   Problem Relation Age of Onset   • Hypertension Mother    • Other Father         DM       Social History     Social History   • Marital status:      Spouse name: N/A   • Number of children: N/A   • Years of education: N/A     Social History Main Topics   • Smoking status: Never Smoker   • Smokeless tobacco: Never Used   • Alcohol use No   • Drug use: No   • Sexual activity: Not on file     Other Topics Concern   • Not on file     Social History Narrative   • No narrative on file       Physical Exam  Vitals  Blood pressure 157/78, pulse 80, temperature 36.7 °C (98.1 °F), resp. rate 16, height 1.829 m (6'), weight 99 kg (218 lb 4.1 oz), SpO2 96 %.  General: Well Developed, Well Nourished, no acute distress  HEENT: Normocephalic, atraumatic  Eyes: Anicteric, PERRLA, EOMI  LLE: Necrotic second toe with ulcer at tip, bone palpable.  Dec sens stocking dist to above ankle.  Palpable PT and DP pulses.  Flexible hammering 3/4/5 toes.      Radiographs:  LE ART/TING         OUTSIDE IMAGES-DX LOWER EXTREMITY, LEFT   Final Result          Laboratory Values  Recent Labs      08/22/18   0306  08/23/18   0218   WBC  9.5  6.4   RBC  3.47*  3.42*   HEMOGLOBIN  11.3*  11.2*   HEMATOCRIT  33.7*  32.8*   MCV  97.1  95.9   MCH  32.6  32.7   MCHC  33.5*  34.1   RDW  46.5  45.1   PLATELETCT  179  180   MPV  10.7  10.2     Recent Labs      08/22/18   0306  08/23/18   0218   SODIUM  137  138   POTASSIUM  5.0  4.2   CHLORIDE  92*  96   CO2  29  28   GLUCOSE  146*  124*   BUN  60*  44*           Impression: Left nonhealing second toe ulcer with exposed bone, flexible hammering 3/4/5 toes.     Plan:     NPO for Left second toe amp, 3/4/5 perc flexor tenotomies  Patient in agreement with plan  WBAT LLE in shoe postop     Luiz GROSSMAN  Francesca FOSTER

## 2018-08-23 NOTE — CARE PLAN
Problem: Communication  Goal: The ability to communicate needs accurately and effectively will improve  Outcome: PROGRESSING AS EXPECTED    Intervention: [place] patient and significant other/support system to call light to alert staff of needs  Educated patient and family to use call light if he needs anything, pt verbally acknowledges. Call bell within reach.       Problem: Infection  Goal: Will remain free from infection  Outcome: PROGRESSING AS EXPECTED  Patient will show no signs of new or worsening infection. Continue IV abx treatment q6h.

## 2018-08-23 NOTE — OR SURGEON
Immediate Post OP Note    PreOp Diagnosis: Left necrotic second toe, left 3/4/5 hammertoes    PostOp Diagnosis: Same    Procedure(s):  TOE AMPUTATION L 2ND TOE - Wound Class: Dirty or Infected    Surgeon(s):  Luiz Ma M.D.    Anesthesiologist/Type of Anesthesia:  Anesthesiologist: Wilder Dick/General    Surgical Staff:  Circulator: Krystal Erazo R.N.  Scrub Person: Marifer Leiva    Specimens removed if any: Toe for culture, second prox phalanx for clean culture    Estimated Blood Loss: 10cc    Findings: Necrotic second toe    Complications: None        8/23/2018 2:04 PM Luiz Ma M.D.

## 2018-08-23 NOTE — H&P
Internal Medicine Interval Note  Note Author: Maicol Quiroz M.D.     Name Dariel Diamond 1965   Age/Sex 53 y.o. male   MRN [id number]   Code Status full     After 5PM or if no immediate response to page, please call for cross-coverage  Attending/Team: Chaim Gómez See Patient List for primary contact information  Call [phone] to page    1st Call - Day Intern (R1):   Gabriella 2nd Call - Day Sr. Resident (R2/R3):   Vamshi         Reason for interval visit  (Principal Problem)   Osteomyelitis 2/2 diabetic foot ulcer      Interval Problem Daily Status Update  (24 hours, problem oriented, brief subjective history, new lab/imaging data pertinent to that problem)   (exclusively Citizen of [place] speaker - RN bedside to help translate)    -patient has been NPO since midnight, for surgery in day time.  -wearing offloading shoe as per ortho  -the patient is allergic to contrast media, not anesthesia as was previously charted.    Review of Systems   Reason unable to perform ROS: Citizen of [place] speaker.       Disposition/Barriers to discharge:   Surgical intervention for osteomyelitis    Consultants/Specialty  Orthopedics - Dr. Ma  PCP: Pcp Not In Computer      Quality Measures  Quality-Core Measures   Reviewed items::  Labs reviewed and Medications reviewed  Rodríguez catheter::  No Rodríguez  DVT prophylaxis pharmacological::  Heparin          Physical Exam       Vitals:    18 1430 18 1445 18 1500 18 1526   BP:    158/74   Pulse: 71 70 73 77   Resp: 20 19 20 16   Temp:    36.3 °C (97.4 °F)   SpO2: 98% 100% 100% 98%   Weight:       Height:         Body mass index is 29.6 kg/m².    Oxygen Therapy:  Pulse Oximetry: 98 %, O2 (LPM): 0, O2 Delivery: Nasal Cannula    Physical Exam   Constitutional: He is oriented to person, place, and time and well-developed, well-nourished, and in no distress.   HENT:   Head: Normocephalic and atraumatic.   Eyes: EOM are normal.   Neck: Normal range of motion. Neck supple. No  tracheal deviation present.   Cardiovascular: Normal rate, regular rhythm and normal heart sounds.  Exam reveals no gallop and no friction rub.    No murmur heard.  Pulmonary/Chest: Breath sounds normal. No respiratory distress. He has no wheezes.   Abdominal: Bowel sounds are normal. He exhibits no distension. There is no tenderness.   Musculoskeletal: Normal range of motion. He exhibits no edema.   Left 2 toe was taped up with bandage, did not observe.  Erythematous, hyperpigmented L lower extremity  No tenderness to palpation of foot/calf/shin Left   Neurological: He is alert and oriented to person, place, and time.   Skin: Skin is warm and dry.   Psychiatric: Affect normal.             Assessment/Plan     * Diabetic ulcer of toe of left foot associated with type 2 diabetes mellitus, with necrosis of muscle (HCC)   Assessment & Plan    - Pt on IV Unasyn 3g IV q6  - X ray from [facility] positive for osteomyelitis  - s/p 2nd left 2 amputation surgery, 3/4/5 perc flexor tenotomies 8/23    Plan  - continue IV Unasyn 3 gm Q6H        Type 2 diabetes mellitus with chronic kidney disease on chronic dialysis (HCC)- (present on admission)   Assessment & Plan    - Pt has DM complicated by renal disease ESRD on HD M-W-F  - Requires home Insulin  - GFR 8, BUN 36, Cr 7.17, K 4.2, Glu 171, HbAlc 7.7    Plan  - Continue Lantus at 10 U QhS  - Basal humalog 3U qhS, plus sliding scale  - Hypoglycemia protocol  - Monitor volume status, electrolytes  - HD per nephrology  - Consider starting Duloxetine/ Pregabalin/ Amitriptyline for DM neuropathy        HTN (hypertension)- (present on admission)   Assessment & Plan    - Continue home Lisinopril 15 mg PO BID  - Coreg 25 mg PO BID  - Torsemide 20 mg        Dyslipidemia- (present on admission)   Assessment & Plan    - Continue home Lipitor 20 mg

## 2018-08-23 NOTE — OP REPORT
DATE OF SERVICE:  08/23/2018    PREOPERATIVE DIAGNOSES:  1.  Diabetes mellitus type 2.  2.  Left necrotic second toe with osteomyelitis.  3.  Left 3rd, 4th and 5th flexible hammertoes.    POSTOPERATIVE DIAGNOSES:  1.  Diabetes mellitus type 2.  2.  Left necrotic second toe with osteomyelitis.  3.  Left 3rd, 4th and 5th flexible hammertoes.    PROCEDURES:  1.  Left 2nd toe amputation.  2.  Left 3rd, 4th and 5th percutaneous tenotomies.    SURGEON:  Luiz Ma MD    ASSISTANT:  Isaeblla Wheeler.    ANESTHESIA:  General anesthesia.    ESTIMATED BLOOD LOSS:  10 mL.    FINDINGS:  Necrotic 2nd toe with palpation to bone.  Flexible hammering 3rd,   4th and 5th toes relaxed nicely with percutaneous tenotomy.    SPECIMENS:  The 2nd toe for culture, 2nd proximal phalanx for clean culture.    COMPLICATIONS:  None.    OUTCOME:  PACU in stable condition.    HISTORY OF PRESENT ILLNESS:  This is a pleasant 53-year-old gentleman who   presents with a nonhealing ulceration at the tip of the left 2nd toe.  The toe   has become necrotic.  The ulceration tracks to bone.  He is indicated for the   above stated procedure.  He was greeted in the preoperative holding area and   identified by name and medical record number.  The left lower extremity was   marked.  Risks of the procedure including bleeding, infection, pain, need for   more surgery ____.    DESCRIPTION OF PROCEDURE:  He was taken to the operating room, placed on the   table in supine position.  Preoperative antibiotics were administered.    General anesthesia was induced.  A nonsterile tourniquet was placed on the   left lower extremity, though was not used.  Left lower extremity was prepped   and draped in the usual sterile fashion.  An operative pause was undertaken,   where all present were in agreement with patient identification, laterality   and procedure to be performed.    3rd, 4th and 5th percutaneous flexor tenotomies; a 15 blade was inserted in   the plantar  aspect of the base of the 3rd toe, and while pulling axial   traction, the long flexor tendon was transected in its entirety.  This did   provide relaxation of the toe.  This identical procedure was performed on the   4th and the 5th also providing nice relaxation.  Each was closed with 3-0   nylon in simple fashion.    2nd toe amputation; a circumferential incision was made around the 2nd toe   around the condyles of the proximal phalanx.  This disarticulated the proximal   interphalangeal joint.  The toe was sent off for culture.  We then made a   longitudinal incision towards the metatarsophalangeal joint and the proximal   phalanx was removed in its entirety.  Flexor and extensor tendons were pulled   into the incision and cut.  The wound was copiously irrigated.  Forceps and   scalpel used to remove any additional nonviable tissue.  There was no tracking   erythema or purulence.  A 3-0 nylon suture was then used to close the wound   in horizontal mattress fashion.  Xeroform gauze and a compression wrap were   placed.  Patient was awakened and extubated in stable condition.    POSTOPERATIVE COURSE:  He will remain under the care of the hospitalist   service with orthopedics, limb preservation and infectious disease following.    A dressing change postoperative day #2.  Weightbearing as tolerated in a   postoperative shoe.       ____________________________________     MD SUDHA COVARRUBIAS / BERNABE    DD:  08/23/2018 14:35:25  DT:  08/23/2018 15:00:16    D#:  [id number]  Job#:  [id number]

## 2018-08-23 NOTE — WOUND TEAM
Pt seen by LPS with dressing recommendations made.  Pt to have amputation 8/24/18.  Wound team signing off for now.

## 2018-08-23 NOTE — PROGRESS NOTES
Delivered and applied off loading shoe to pt's left foot. Any questions please do not hesitate to contact the ortho tech team at ext# [phone]

## 2018-08-23 NOTE — PROGRESS NOTES
[facility] -  PROGRESS NOTE               Author: Nikkie Mondragon M.D. Date & Time: 8/23/2018  9:13 AM     HPI:  52 yo HM PMH ESRD MWF iHD, HTN, type 2 DM, anemia of CKD, renal osteodystrophy, and HLD who presents with CC as above.  He was initially seen at [facility] for left second toe pain and was found to have a non-draining wound on that toe with purple discoloration to it.  He denies any pain unless manipulated and states it started about 4 days prior to admit.  Never had anything similar and initially didn't think it was a big deal as it was slightly present but over the 4 day course it progressed rapidly.  XR of foot at [facility] did not show bony involvement but due to his ESRD status he was sent [facility] evaluation/management.  He is [place] speaking mostly so an intrepretor was used during the encounter.  He denies C/N/V/CP/SOB.  No abd pain, melena, hematochezia, hematemesis.  No visual changes/HA.  He had some subjective fevers, but never measured them.    DAILY NEPHROLOGY SUMMARY:  8/22/18 - Consult done  8/23/18 - NAEO, no complaints, doing well, NPO for surgery this AM    Review of Systems   Constitutional: Negative.    HENT: Negative.    Eyes: Negative.    Respiratory: Negative.    Cardiovascular: Negative.    Gastrointestinal: Negative.    Musculoskeletal: Positive for joint pain.   Skin: Negative.    Neurological: Negative.    Endo/Heme/Allergies: Negative.    Psychiatric/Behavioral: Negative.    All other systems reviewed and are negative.    Physical Exam   Constitutional: He is oriented to person, place, and time. He appears well-developed and well-nourished.   HENT:   Head: Normocephalic and atraumatic.   Eyes: Conjunctivae are normal. No scleral icterus.   Neck: Neck supple. No tracheal deviation present.   Cardiovascular: Normal rate and regular rhythm.    Pulmonary/Chest: Effort normal and breath sounds normal.   Abdominal: Soft. Bowel  sounds are normal.   Musculoskeletal: He exhibits tenderness (Left foot 2nd toe and milder pain from 3-5 toes on left). He exhibits no edema.   Neurological: He is alert and oriented to person, place, and time.   Skin: Skin is warm and dry. There is erythema (mild around left 2nd toe).   Psychiatric: He has a normal mood and affect. His behavior is normal.   Nursing note and vitals reviewed.    PAST FAMILY HISTORY: Reviewed and Unchanged  SOCIAL HISTORY: Reviewed and Unchanged  CURRENT MEDICATIONS: Reviewed  IMAGING STUDIES: Reviewed    Fluids:  In: 940 [P.O.:240; I.V.:200; Dialysis:500]  Out: 3500     LABS:  Recent Results (from the past 24 hour(s))   HEPATITIS PANEL ACUTE(4 COMPONENTS)    Collection Time: 08/22/18 10:10 AM   Result Value Ref Range    Hepatitis B Surface Antigen Negative Negative    Hepatitis C Antibody Negative Negative    Hepatitis B Cors Ab,IgM Negative Negative    Hepatitis A Virus Ab, IgM Negative Negative   HEP B SURFACE AB    Collection Time: 08/22/18 10:10 AM   Result Value Ref Range    Hep B Surface Antibody Quant 765.98 (H) 0.00 - 10.00 mIU/mL   ACCU-CHEK GLUCOSE    Collection Time: 08/22/18  5:55 PM   Result Value Ref Range    Glucose - Accu-Ck 161 (H) 65 - 99 mg/dL   LIPID PROFILE    Collection Time: 08/23/18  2:18 AM   Result Value Ref Range    Cholesterol,Tot 123 100 - 199 mg/dL    Triglycerides 169 (H) 0 - 149 mg/dL    HDL 24 (A) >=40 mg/dL    LDL 65 <100 mg/dL   MAGNESIUM    Collection Time: 08/23/18  2:18 AM   Result Value Ref Range    Magnesium 2.2 1.5 - 2.5 mg/dL   IRON/TOTAL IRON BIND    Collection Time: 08/23/18  2:18 AM   Result Value Ref Range    Iron 45 (L) 50 - 180 ug/dL    Total Iron Binding 265 250 - 450 ug/dL    % Saturation 17 15 - 55 %   CBC WITH DIFFERENTIAL    Collection Time: 08/23/18  2:18 AM   Result Value Ref Range    WBC 6.4 4.8 - 10.8 K/uL    RBC 3.42 (L) 4.70 - 6.10 M/uL    Hemoglobin 11.2 (L) 14.0 - 18.0 g/dL    Hematocrit 32.8 (L) 42.0 - 52.0 %    MCV 95.9  81.4 - 97.8 fL    MCH 32.7 27.0 - 33.0 pg    MCHC 34.1 33.7 - 35.3 g/dL    RDW 45.1 35.9 - 50.0 fL    Platelet Count 180 164 - 446 K/uL    MPV 10.2 9.0 - 12.9 fL    Neutrophils-Polys 62.40 44.00 - 72.00 %    Lymphocytes 21.60 (L) 22.00 - 41.00 %    Monocytes 13.00 0.00 - 13.40 %    Eosinophils 2.20 0.00 - 6.90 %    Basophils 0.50 0.00 - 1.80 %    Immature Granulocytes 0.30 0.00 - 0.90 %    Nucleated RBC 0.00 /100 WBC    Neutrophils (Absolute) 4.02 1.82 - 7.42 K/uL    Lymphs (Absolute) 1.39 1.00 - 4.80 K/uL    Monos (Absolute) 0.84 0.00 - 0.85 K/uL    Eos (Absolute) 0.14 0.00 - 0.51 K/uL    Baso (Absolute) 0.03 0.00 - 0.12 K/uL    Immature Granulocytes (abs) 0.02 0.00 - 0.11 K/uL    NRBC (Absolute) 0.00 K/uL   RENAL FUNCTION PANEL    Collection Time: 08/23/18  2:18 AM   Result Value Ref Range    Phosphorus 5.8 (H) 2.5 - 4.5 mg/dL    Sodium 138 135 - 145 mmol/L    Potassium 4.2 3.6 - 5.5 mmol/L    Chloride 96 96 - 112 mmol/L    Co2 28 20 - 33 mmol/L    Glucose 124 (H) 65 - 99 mg/dL    Creatinine 6.80 (HH) 0.50 - 1.40 mg/dL    Bun 44 (H) 8 - 22 mg/dL    Calcium 8.6 8.5 - 10.5 mg/dL    Albumin 3.1 (L) 3.2 - 4.9 g/dL   ESTIMATED GFR    Collection Time: 08/23/18  2:18 AM   Result Value Ref Range    GFR If African American 10 (A) >60 mL/min/1.73 m 2    GFR If Non African American 9 (A) >60 mL/min/1.73 m 2   VITAMIN D,25 HYDROXY    Collection Time: 08/23/18  2:19 AM   Result Value Ref Range    25-Hydroxy   Vitamin D 25 86 30 - 100 ng/mL   PTH INTACT (PTH ONLY)    Collection Time: 08/23/18  2:19 AM   Result Value Ref Range    Pth, Intact 399.2 (H) 14.0 - 72.0 pg/mL   FERRITIN    Collection Time: 08/23/18  2:19 AM   Result Value Ref Range    Ferritin 64.7 22.0 - 322.0 ng/mL       (click the triangle to expand results)    IMPRESSION:  - ESRD    * Etiology likely 2/2 HTN/DM    * MWF @ [facility]    * LAVF (+thrill/bruit)  - Non-healing wound left second toe    * Likely DM related complication    * No evidence of OM, but XR not  sensitive for it    * MRI cancelled    * Surgery to take to OR 8/23 for amputation  - HTN    * Goal BP < 140/90  - Anemia of CKD    * Goal Hgb 10-11    * Goal TSat > 30%  - Renal osteodystrophy    * Ca 8.6    * PO4 5.8    *     * Vit D 86  - HLD  - CAD     PLAN:  - MWF iHD  - UF as tolerated  - OR today for amputation  - Dose all meds per ESRD rec's  - No dietary protein restrictions  - Low sodium diet

## 2018-08-23 NOTE — PROGRESS NOTES
Internal Medicine Interval Note  Note Author: Maicol Quiroz M.D.     Name Dariel Diamond 1965   Age/Sex 53 y.o. male   MRN [id number]   Code Status full     After 5PM or if no immediate response to page, please call for cross-coverage  Attending/Team: Chaim Gómez See Patient List for primary contact information  Call [phone] to page    1st Call - Day Intern (R1):   Gabriella 2nd Call - Day Sr. Resident (R2/R3):   Vamshi         Reason for interval visit  (Principal Problem)   Ulcer of left 2nd digit      Interval Problem Daily Status Update  (24 hours, problem oriented, brief subjective history, new lab/imaging data pertinent to that problem)     (Welsh speaker)    -no new events overnight.  No pain over ulcerated area.  No active puss draining.  Some numbness and tingling radiating up medial left thigh.  -orthopedics has seen patient.  Will go for surgery tomorrow (L 2nd toe amputation with percutaneous tenotomies L toes 3-5 by Dr. Ma)     Review of Systems   Constitutional: Positive for chills. Negative for fever and malaise/fatigue.   Respiratory: Negative for cough.    Cardiovascular: Negative for chest pain and palpitations.   Gastrointestinal: Negative for abdominal pain, blood in stool, constipation, diarrhea, nausea and vomiting.   Genitourinary: Negative for dysuria.   Musculoskeletal: Negative for back pain and neck pain.   Skin: Negative for itching.   Neurological: Negative for dizziness, loss of consciousness, weakness and headaches.       Disposition/Barriers to discharge:   Surgical intervention    Consultants/Specialty  Orthopedics - Dr. Ma  PCP: Pcp Not In Computer      Quality Measures  Quality-Core Measures   Reviewed items::  Labs reviewed, Medications reviewed and Radiology images reviewed  Rodríguez catheter::  No Rodríguez  DVT prophylaxis pharmacological::  Heparin          Physical Exam       Vitals:    18 0000 18 0400 18 0700 18 1500   BP: (!)  185/87 (!) 170/92 (!) 161/91 147/68   Pulse: 78 81 80 86   Resp: 16 16 16 16   Temp: 36.9 °C (98.4 °F) 37.5 °C (99.5 °F) 37.2 °C (98.9 °F) 36.7 °C (98 °F)   SpO2: 97% 94% 96% 94%   Weight:       Height:         Body mass index is 29.6 kg/m². Weight: 99 kg (218 lb 4.1 oz)  Oxygen Therapy:  Pulse Oximetry: 94 %, O2 Delivery: None (Room Air)    Physical Exam   Constitutional: He is oriented to person, place, and time and well-developed, well-nourished, and in no distress.   HENT:   Head: Normocephalic and atraumatic.   Eyes: EOM are normal.   Neck: Normal range of motion. Neck supple. No tracheal deviation present.   Cardiovascular: Normal rate, regular rhythm and normal heart sounds.  Exam reveals no gallop and no friction rub.    No murmur heard.  Pulmonary/Chest: Effort normal and breath sounds normal. No respiratory distress. He has no wheezes.   Abdominal: Bowel sounds are normal. He exhibits no distension. There is no tenderness. There is no guarding.   Musculoskeletal: Normal range of motion. He exhibits no edema.   Ulcerated L 2nd digit with surrounding erythema and edema radiating up calf and medial thigh.  parethesias running up from medial ankle to medial thigh.   Neurological: He is alert and oriented to person, place, and time.   Skin: Skin is warm and dry. No rash noted.   Psychiatric: Mood and affect normal.             Assessment/Plan     * Diabetic ulcer of toe of left foot associated with type 2 diabetes mellitus, with necrosis of muscle (HCC)   Assessment & Plan    - Pt has longstanding DM type 2, with CKD on HD  - Pt febrile (100.6 F) before transfer, no leukocytosis at present  - Pt on IV Unasyn 3g IV q6  - Present soft tissue inflammation is probably secondary to DM with unnoticed trauma to digit producing ulcer  - X ray shows some features of OM  - ESR 51, CRP 7.74, Procalcitonin 0.51    Plan  - orthopedics on board  - for L 2nd digit amputation tomrrow  - keep NPO after midnight  - continue IV  Unasyn 3 gm Q6H  - Wound care consult  -arterial doppler lower extremities        Type 2 diabetes mellitus with chronic kidney disease on chronic dialysis (HCC)- (present on admission)   Assessment & Plan    - Pt has DM complicated by renal disease ESRD on HD M-W-F  - Requires home Insulin  - GFR 8, BUN 36, Cr 7.17, K 4.2, Glu 171, HbAlc 7.7    Plan  - Continue Lantus at 10 U QhS  - Basal humalog 3U qhS, plus sliding scale  - Hypoglycemia protocol  - Monitor volume status, electrolytes  - HD per nephrology  - Consider starting Duloxetine/ Pregabalin/ Amitriptyline for DM neuropathy        Dysuria   Assessment & Plan    - Pt c/o dysuria w/o pyuria. Hematuria, frequency or suprapubic / flank tenderness    Plan  - Urinalysis  - Urine C/S          HTN (hypertension)- (present on admission)   Assessment & Plan    - Continue home Lisinopril 15 mg PO BID  - Coreg 25 mg PO BID  - Torsemide 20 mg        Dyslipidemia- (present on admission)   Assessment & Plan    - Continue home Lipitor 20 mg

## 2018-08-23 NOTE — PROGRESS NOTES
Patient alert and oriented x4. Up self using diabetic shoe on left foot. Patient is to be NPO at midnight in preparation for amputation of 2nd digit on LLE due to diabetic ulcer. Continue IV abx Unasyn q6h. Patient receives hemodialysis M-W-F, has AV fistula in left arm, thrill and bruit present. No c/o pain. No signs of distress noted at this time.

## 2018-08-24 LAB
ALBUMIN SERPL BCP-MCNC: 3.2 G/DL (ref 3.2–4.9)
ALBUMIN/GLOB SERPL: 1 G/DL
ALP SERPL-CCNC: 48 U/L (ref 30–99)
ALT SERPL-CCNC: 8 U/L (ref 2–50)
ANION GAP SERPL CALC-SCNC: 13 MMOL/L (ref 0–11.9)
AST SERPL-CCNC: 7 U/L (ref 12–45)
BACTERIA WND AEROBE CULT: NORMAL
BILIRUB SERPL-MCNC: 0.4 MG/DL (ref 0.1–1.5)
BUN SERPL-MCNC: 61 MG/DL (ref 8–22)
CALCIUM SERPL-MCNC: 8.5 MG/DL (ref 8.5–10.5)
CHLORIDE SERPL-SCNC: 94 MMOL/L (ref 96–112)
CO2 SERPL-SCNC: 27 MMOL/L (ref 20–33)
CREAT SERPL-MCNC: 8.52 MG/DL (ref 0.5–1.4)
ERYTHROCYTE [DISTWIDTH] IN BLOOD BY AUTOMATED COUNT: 45.1 FL (ref 35.9–50)
GLOBULIN SER CALC-MCNC: 3.2 G/DL (ref 1.9–3.5)
GLUCOSE BLD-MCNC: 118 MG/DL (ref 65–99)
GLUCOSE BLD-MCNC: 161 MG/DL (ref 65–99)
GLUCOSE BLD-MCNC: 166 MG/DL (ref 65–99)
GLUCOSE BLD-MCNC: 185 MG/DL (ref 65–99)
GLUCOSE SERPL-MCNC: 202 MG/DL (ref 65–99)
GRAM STN SPEC: NORMAL
HCT VFR BLD AUTO: 31.3 % (ref 42–52)
HGB BLD-MCNC: 10.6 G/DL (ref 14–18)
MAGNESIUM SERPL-MCNC: 2.3 MG/DL (ref 1.5–2.5)
MCH RBC QN AUTO: 32.5 PG (ref 27–33)
MCHC RBC AUTO-ENTMCNC: 33.9 G/DL (ref 33.7–35.3)
MCV RBC AUTO: 96 FL (ref 81.4–97.8)
PHOSPHATE SERPL-MCNC: 6.6 MG/DL (ref 2.5–4.5)
PLATELET # BLD AUTO: 191 K/UL (ref 164–446)
PMV BLD AUTO: 10.4 FL (ref 9–12.9)
POTASSIUM SERPL-SCNC: 4.8 MMOL/L (ref 3.6–5.5)
PROT SERPL-MCNC: 6.4 G/DL (ref 6–8.2)
RBC # BLD AUTO: 3.26 M/UL (ref 4.7–6.1)
SIGNIFICANT IND 70042: NORMAL
SITE SITE: NORMAL
SODIUM SERPL-SCNC: 134 MMOL/L (ref 135–145)
SOURCE SOURCE: NORMAL
WBC # BLD AUTO: 6.2 K/UL (ref 4.8–10.8)

## 2018-08-24 PROCEDURE — A9270 NON-COVERED ITEM OR SERVICE: HCPCS | Performed by: INTERNAL MEDICINE

## 2018-08-24 PROCEDURE — 99232 SBSQ HOSP IP/OBS MODERATE 35: CPT | Mod: GC | Performed by: INTERNAL MEDICINE

## 2018-08-24 PROCEDURE — 700111 HCHG RX REV CODE 636 W/ 250 OVERRIDE (IP): Performed by: HOSPITALIST

## 2018-08-24 PROCEDURE — 700102 HCHG RX REV CODE 250 W/ 637 OVERRIDE(OP): Performed by: HOSPITALIST

## 2018-08-24 PROCEDURE — 85027 COMPLETE CBC AUTOMATED: CPT

## 2018-08-24 PROCEDURE — 5A1D70Z PERFORMANCE OF URINARY FILTRATION, INTERMITTENT, LESS THAN 6 HOURS PER DAY: ICD-10-PCS | Performed by: INTERNAL MEDICINE

## 2018-08-24 PROCEDURE — 700102 HCHG RX REV CODE 250 W/ 637 OVERRIDE(OP): Performed by: INTERNAL MEDICINE

## 2018-08-24 PROCEDURE — 90935 HEMODIALYSIS ONE EVALUATION: CPT

## 2018-08-24 PROCEDURE — A9270 NON-COVERED ITEM OR SERVICE: HCPCS | Performed by: HOSPITALIST

## 2018-08-24 PROCEDURE — 700102 HCHG RX REV CODE 250 W/ 637 OVERRIDE(OP): Performed by: STUDENT IN AN ORGANIZED HEALTH CARE EDUCATION/TRAINING PROGRAM

## 2018-08-24 PROCEDURE — 700111 HCHG RX REV CODE 636 W/ 250 OVERRIDE (IP): Performed by: INTERNAL MEDICINE

## 2018-08-24 PROCEDURE — 770006 HCHG ROOM/CARE - MED/SURG/GYN SEMI*

## 2018-08-24 PROCEDURE — 83735 ASSAY OF MAGNESIUM: CPT

## 2018-08-24 PROCEDURE — 80053 COMPREHEN METABOLIC PANEL: CPT

## 2018-08-24 PROCEDURE — 82962 GLUCOSE BLOOD TEST: CPT | Mod: 91

## 2018-08-24 PROCEDURE — 700105 HCHG RX REV CODE 258: Performed by: INTERNAL MEDICINE

## 2018-08-24 PROCEDURE — 700111 HCHG RX REV CODE 636 W/ 250 OVERRIDE (IP)

## 2018-08-24 PROCEDURE — A9270 NON-COVERED ITEM OR SERVICE: HCPCS | Performed by: STUDENT IN AN ORGANIZED HEALTH CARE EDUCATION/TRAINING PROGRAM

## 2018-08-24 PROCEDURE — 36415 COLL VENOUS BLD VENIPUNCTURE: CPT

## 2018-08-24 PROCEDURE — 84100 ASSAY OF PHOSPHORUS: CPT

## 2018-08-24 RX ORDER — ERGOCALCIFEROL 1.25 MG/1
50000 CAPSULE ORAL
COMMUNITY

## 2018-08-24 RX ORDER — DEXTROSE MONOHYDRATE 25 G/50ML
25 INJECTION, SOLUTION INTRAVENOUS
Status: DISCONTINUED | OUTPATIENT
Start: 2018-08-24 | End: 2018-08-25 | Stop reason: HOSPADM

## 2018-08-24 RX ORDER — HEPARIN SODIUM 1000 [USP'U]/ML
INJECTION, SOLUTION INTRAVENOUS; SUBCUTANEOUS
Status: COMPLETED
Start: 2018-08-24 | End: 2018-08-24

## 2018-08-24 RX ADMIN — HEPARIN SODIUM 5000 UNITS: 5000 INJECTION, SOLUTION INTRAVENOUS; SUBCUTANEOUS at 21:18

## 2018-08-24 RX ADMIN — LISINOPRIL 40 MG: 20 TABLET ORAL at 05:01

## 2018-08-24 RX ADMIN — HEPARIN SODIUM 1750 UNITS: 1000 INJECTION, SOLUTION INTRAVENOUS; SUBCUTANEOUS at 08:11

## 2018-08-24 RX ADMIN — ATORVASTATIN CALCIUM 20 MG: 20 TABLET, FILM COATED ORAL at 17:41

## 2018-08-24 RX ADMIN — HEPARIN SODIUM 5000 UNITS: 5000 INJECTION, SOLUTION INTRAVENOUS; SUBCUTANEOUS at 13:06

## 2018-08-24 RX ADMIN — ASPIRIN 81 MG: 81 TABLET, DELAYED RELEASE ORAL at 05:01

## 2018-08-24 RX ADMIN — CARVEDILOL 25 MG: 25 TABLET, FILM COATED ORAL at 17:41

## 2018-08-24 RX ADMIN — HEPARIN SODIUM 5000 UNITS: 5000 INJECTION, SOLUTION INTRAVENOUS; SUBCUTANEOUS at 05:01

## 2018-08-24 RX ADMIN — TORSEMIDE 20 MG: 20 TABLET ORAL at 05:01

## 2018-08-24 RX ADMIN — SEVELAMER CARBONATE 800 MG: 800 TABLET, FILM COATED ORAL at 17:41

## 2018-08-24 RX ADMIN — AMPICILLIN SODIUM AND SULBACTAM SODIUM 3 G: 2; 1 INJECTION, POWDER, FOR SOLUTION INTRAMUSCULAR; INTRAVENOUS at 17:40

## 2018-08-24 RX ADMIN — INSULIN GLARGINE 10 UNITS: 100 INJECTION, SOLUTION SUBCUTANEOUS at 17:46

## 2018-08-24 RX ADMIN — SEVELAMER CARBONATE 800 MG: 800 TABLET, FILM COATED ORAL at 12:40

## 2018-08-24 RX ADMIN — SEVELAMER CARBONATE 800 MG: 800 TABLET, FILM COATED ORAL at 07:48

## 2018-08-24 RX ADMIN — CARVEDILOL 25 MG: 25 TABLET, FILM COATED ORAL at 07:48

## 2018-08-24 RX ADMIN — STANDARDIZED SENNA CONCENTRATE AND DOCUSATE SODIUM 2 TABLET: 8.6; 5 TABLET, FILM COATED ORAL at 05:01

## 2018-08-24 RX ADMIN — STANDARDIZED SENNA CONCENTRATE AND DOCUSATE SODIUM 2 TABLET: 8.6; 5 TABLET, FILM COATED ORAL at 17:41

## 2018-08-24 ASSESSMENT — ENCOUNTER SYMPTOMS
EYES NEGATIVE: 1
NEUROLOGICAL NEGATIVE: 1
PSYCHIATRIC NEGATIVE: 1
RESPIRATORY NEGATIVE: 1
GASTROINTESTINAL NEGATIVE: 1
CARDIOVASCULAR NEGATIVE: 1
CONSTITUTIONAL NEGATIVE: 1

## 2018-08-24 ASSESSMENT — PAIN SCALES - GENERAL
PAINLEVEL_OUTOF10: 0
PAINLEVEL_OUTOF10: 0

## 2018-08-24 NOTE — DISCHARGE SUMMARY
Internal Medicine Discharge Summary  Note Author: Maicol Quiroz M.D.       Name Dariel Diamond 1965   Age/Sex 53 y.o. male   MRN [id number]         Admit Date:  2018       Discharge Date:   2018    Service:   R Internal Medicine orange Team  Attending Physician(s):   Darrell Gómez       Senior Resident(s):   Cecil Bonds  Thad Resident(s):   Maicol Quiroz  PCP: Jacqueline Pepper      Primary Diagnosis:   Osteomyelitis    Secondary Diagnoses:                Principal Problem (Resolved):    Diabetic ulcer of toe of left foot associated with type 2 diabetes mellitus, with necrosis of muscle (HCC) POA: Unknown  Active Problems:    Type 2 diabetes mellitus with chronic kidney disease on chronic dialysis (HCC) POA: Yes    Dyslipidemia POA: Yes    HTN (hypertension) POA: Yes    Coronary artery disease involving native coronary artery of native heart without angina pectoris POA: Unknown      Overview: Cath 2016: Findings: 80% prox RCA, 60% id RCA. ST at origin of very       small PDA. 90% mid JOSE. 90% origin of large ISA. 30% prox LAD. 30-40%       lLAD immediately distal to LADD1 origin.100% small OM 2 with L to L       filling. LV EF45% with akinetic anterior wall.    Ischemic cardiomyopathy POA: Unknown      Overview: Echo 2016: Left ventricular ejection fraction is visually estimated to       be 60%. Normal regional wall motion. Grade I diastolic dysfunction.Trace       mitral regurgitation.Estimated right ventricular systolic pressure is 27       mmHg. Right atrial pressure is estimated to be 8 mmHg. Prior study is       available for comparison, 2014 improved left ventricle function.       Prior echo LVEF 40%.  Resolved Problems:    Dysuria POA: Unknown      Hospital Summary (Brief Narrative):       53-year-old patient with a past medical history of end-stage renal failure on hemodialysis, hypertension, type II-diabetes mellitus, anemia of chronic disease, renal osteodystrophy,  dyslipidemia presented to the emergency department as a transfer from [facility] for left second toe diabetic ulcer with exposed bone.  Necrosis was noted and the patient underwent amputation on 08/23/18.  Unasyn was chosen as an antibiotics and culture were positive for Enterococcus and Gram negatives. Follow ups with orthopedics, primary care provider, nephrology, and wound care.  Patient was discharged on Augmentin and Linzeolid for 2 days.    Patient /Hospital Summary (Details -- Problem Oriented) :          Type 2 diabetes mellitus with chronic kidney disease on chronic dialysis ([facility])   Assessment & Plan    - Pt has DM complicated by renal disease ESRD on HD M-W-F  - Requires home Insulin  - GFR 8, BUN 36, Cr 7.17, K 4.2, Glu 171, HbAlc 7.7  - with renal osteodystrophy- Ca 8.6, PO4 5.8, , Vit D 86    Plan  - Continue Lantus at 10 U QhS  - Basal humalog 4U qhS, plus sliding scale  - HD per nephrology        * Diabetic ulcer of toe of left foot associated with type 2 diabetes mellitus, with necrosis of muscle ([facility])   Assessment & Plan    - Pt on IV Unasyn 3g IV q6  - X ray from [facility] positive for osteomyelitis  - s/p 2nd left 2 amputation surgery, 3/4/5 perc flexor tenotomies 8/23    Plan  - continue on Augmentin bid and Linezolid 600 bid for 2 days  - toe cultures positive for Group D enterococcus and grame negative rods        Dysuria-resolved as of 8/23/2018   Assessment & Plan    - Pt c/o dysuria w/o pyuria. Hematuria, frequency or suprapubic / flank tenderness        HTN (hypertension)   Assessment & Plan    - Continue home Lisinopril 15 mg PO BID  - Coreg 25 mg PO BID  - Torsemide 20 mg        Dyslipidemia   Assessment & Plan    - Continue home Lipitor 20 mg             Consultants:     Nephrology - Dr. Nikkie stock  Orthopedics - Dr. Luiz Ma      Procedures:        Left second toe amp, 3/4/5 perc flexor tenotomies    Imaging/ Testing:      LE ART/TING   Final Result       OUTSIDE IMAGES-DX LOWER EXTREMITY, LEFT   Final Result            Discharge Medications:         Medication Reconciliation: Completed       Medication List      START taking these medications      Instructions   amoxicillin-clavulanate 875-125 MG Tabs  Commonly known as:  AUGMENTIN   Take 1 Tab by mouth every 12 hours for 2 days.  Dose:  1 Tab     insulin lispro 100 UNIT/ML Soln  Commonly known as:  HUMALOG   Inject 4 Units as instructed 3 times a day before meals.  Dose:  4 Units     linezolid 600 MG Tabs  Commonly known as:  ZYVOX   Take 1 Tab by mouth every 12 hours for 2 days.  Dose:  600 mg        CHANGE how you take these medications      Instructions   aspirin 81 MG EC tablet  What changed:  · medication strength  · how much to take   Take 1 Tab by mouth every day.  Dose:  81 mg     sevelamer carbonate 800 MG Tabs tablet  What changed:  · how much to take  · when to take this  Commonly known as:  RENVELA   Take 1 Tab by mouth 3 times a day, with meals.  Dose:  800 mg        CONTINUE taking these medications      Instructions   atorvastatin 20 MG Tabs  Commonly known as:  LIPITOR   TAKE 1 TABLET EVERY EVENING     carvedilol 25 MG Tabs  Commonly known as:  COREG   Take 1 Tab by mouth 2 times a day, with meals.  Dose:  25 mg     insulin glargine 100 UNIT/ML Soln  Commonly known as:  LANTUS   Inject 12 Units as instructed every evening.  Dose:  12 Units     lisinopril 10 MG Tabs  Commonly known as:  PRINIVIL   Doctor's comments:  Lisinopril 15 mg BID  Take 1 Tab by mouth every 12 hours.  Dose:  10 mg     torsemide 20 MG Tabs  Commonly known as:  DEMADEX   Take 1 Tab by mouth every day.  Dose:  20 mg     vitamin D (Ergocalciferol) 47295 units Caps capsule  Commonly known as:  DRISDOL   Take 50,000 Units by mouth every 7 days.  Dose:  40331 Units        STOP taking these medications    insulin aspart 100 UNIT/ML Soln  Commonly known as:  NOVOLOG            Can use .DISCHARGEMEDSLIST if going to another facility          Disposition:   stable    Diet:   Diabetic, Renal    Activity:   As tolerated    Instructions:         The patient was instructed to return to the ER in the event of worsening symptoms. I have counseled the patient on the importance of compliance and the patient has agreed to proceed with all medical recommendations and follow up plan indicated above.   The patient understands that all medications come with benefits and risks. Risks may include permanent injury or death and these risks can be minimized with close reassessment and monitoring.        Primary Care Provider:    Jacqueline Pepper  Discharge summary faxed to primary care provider:  Deferred  Copy of discharge summary given to the patient: Deferred      Follow up appointment details :      PCP - Jacqueline Pepper ([place])  Dixie Ma    Pending Studies:        none    Time spent on discharge day patient visit, preparing discharge paperwork and arranging for patient follow up.    Summary of follow up issues:       Discharge Time (Minutes) :    45  Hospital Course Type: Inpatient Stay < 2 midnights, patient recovered more rapidly than anticipated      Condition on Discharge    ______________________________________________________________________    Interval history/exam for day of discharge:    Patient is stable, afrebrile. Was seen by wound team, wound clean.  No pain, fevers/chills.  Blood sugars in 100's.  Tolerated HD well yesterday.      Most Recent Labs:    Lab Results   Component Value Date/Time    WBC 8.0 08/25/2018 02:48 AM    RBC 3.38 (L) 08/25/2018 02:48 AM    HEMOGLOBIN 11.1 (L) 08/25/2018 02:48 AM    HEMATOCRIT 33.2 (L) 08/25/2018 02:48 AM    MCV 98.2 (H) 08/25/2018 02:48 AM    MCH 32.8 08/25/2018 02:48 AM    MCHC 33.4 (L) 08/25/2018 02:48 AM    MPV 10.1 08/25/2018 02:48 AM    NEUTSPOLYS 58.40 08/25/2018 02:48 AM    LYMPHOCYTES 30.40 08/25/2018 02:48 AM    MONOCYTES 9.00 08/25/2018 02:48 AM    EOSINOPHILS 1.30 08/25/2018 02:48  AM    BASOPHILS 0.40 08/25/2018 02:48 AM      Lab Results   Component Value Date/Time    SODIUM 134 (L) 08/24/2018 02:22 AM    POTASSIUM 4.8 08/24/2018 02:22 AM    CHLORIDE 94 (L) 08/24/2018 02:22 AM    CO2 27 08/24/2018 02:22 AM    GLUCOSE 202 (H) 08/24/2018 02:22 AM    BUN 61 (H) 08/24/2018 02:22 AM    CREATININE 8.52 (HH) 08/24/2018 02:22 AM      Lab Results   Component Value Date/Time    ALTSGPT 8 08/24/2018 02:22 AM    ASTSGOT 7 (L) 08/24/2018 02:22 AM    ALKPHOSPHAT 48 08/24/2018 02:22 AM    TBILIRUBIN 0.4 08/24/2018 02:22 AM    ALBUMIN 3.2 08/24/2018 02:22 AM    GLOBULIN 3.2 08/24/2018 02:22 AM    INR 1.00 12/06/2016 07:20 AM     Lab Results   Component Value Date/Time    PROTHROMBTM 13.5 12/06/2016 07:20 AM    INR 1.00 12/06/2016 07:20 AM

## 2018-08-24 NOTE — PROGRESS NOTES
Hemodialysis ordered by Dr. Mondragon. Treatment started at 0811 and ended at 1141. Pt stable, vss, no c/o post tx. Net UF 3.0 L. Report to ANN Perez RN. Lt ua avf dressing clean, dry, intact.

## 2018-08-24 NOTE — PROGRESS NOTES
Med rec is updated after calling the pharmacy.  Unable to interview the patient.  Pharmacist updated.

## 2018-08-24 NOTE — PROGRESS NOTES
[facility] -  PROGRESS NOTE               Author: Nikkie Mondragon M.D. Date & Time: 8/24/2018  9:11 AM     HPI:  52 yo HM PMH ESRD MWF iHD, HTN, type 2 DM, anemia of CKD, renal osteodystrophy, and HLD who presents with CC as above.  He was initially seen at [facility] for left second toe pain and was found to have a non-draining wound on that toe with purple discoloration to it.  He denies any pain unless manipulated and states it started about 4 days prior to admit.  Never had anything similar and initially didn't think it was a big deal as it was slightly present but over the 4 day course it progressed rapidly.  XR of foot at [facility] did not show bony involvement but due to his ESRD status he was sent to [facility] for further evaluation/management.  He is Danish speaking mostly so an intrepretor was used during the encounter.  He denies C/N/V/CP/SOB.  No abd pain, melena, hematochezia, hematemesis.  No visual changes/HA.  He had some subjective fevers, but never measured them.    DAILY NEPHROLOGY SUMMARY:  8/22/18 - Consult done  8/23/18 - NAEO, no complaints, doing well, NPO for surgery this AM  8/24/18 - NAEO, SOD, Qb350, 3L UF goal    Review of Systems   Constitutional: Negative.    HENT: Negative.    Eyes: Negative.    Respiratory: Negative.    Cardiovascular: Negative.    Gastrointestinal: Negative.    Musculoskeletal: Positive for joint pain.   Skin: Negative.    Neurological: Negative.    Endo/Heme/Allergies: Negative.    Psychiatric/Behavioral: Negative.    All other systems reviewed and are negative.    Physical Exam   Constitutional: He is oriented to person, place, and time. He appears well-developed and well-nourished.   HENT:   Head: Normocephalic and atraumatic.   Eyes: Conjunctivae are normal. No scleral icterus.   Neck: Neck supple. No tracheal deviation present.   Cardiovascular: Normal rate and regular rhythm.    Pulmonary/Chest: Effort normal and breath  sounds normal.   Abdominal: Soft. Bowel sounds are normal.   Musculoskeletal: He exhibits tenderness (Left foot 2nd toe and milder pain from 3-5 toes on left). He exhibits no edema.   Neurological: He is alert and oriented to person, place, and time.   Skin: Skin is warm and dry. There is erythema (mild around left 2nd toe).   Psychiatric: He has a normal mood and affect. His behavior is normal.   Nursing note and vitals reviewed.    PAST FAMILY HISTORY: Reviewed and Unchanged  SOCIAL HISTORY: Reviewed and Unchanged  CURRENT MEDICATIONS: Reviewed  IMAGING STUDIES: Reviewed    Fluids:  In: 580 [P.O.:480; I.V.:100]  Out: 210     LABS:  Recent Results (from the past 24 hour(s))   ACCU-CHEK GLUCOSE    Collection Time: 08/23/18 10:40 AM   Result Value Ref Range    Glucose - Accu-Ck 124 (H) 65 - 99 mg/dL   GRAM STAIN    Collection Time: 08/23/18  1:40 PM   Result Value Ref Range    Significant Indicator .     Source TISS     Site Second Toe     Gram Stain Result Few WBCs.  No organisms seen.      GRAM STAIN    Collection Time: 08/23/18  1:42 PM   Result Value Ref Range    Significant Indicator .     Source TISS     Site Proxmal Phalanx Clean     Gram Stain Result Rare WBCs.  No organisms seen.      ACCU-CHEK GLUCOSE    Collection Time: 08/23/18  8:43 PM   Result Value Ref Range    Glucose - Accu-Ck 301 (H) 65 - 99 mg/dL   CBC WITHOUT DIFFERENTIAL    Collection Time: 08/24/18  2:22 AM   Result Value Ref Range    WBC 6.2 4.8 - 10.8 K/uL    RBC 3.26 (L) 4.70 - 6.10 M/uL    Hemoglobin 10.6 (L) 14.0 - 18.0 g/dL    Hematocrit 31.3 (L) 42.0 - 52.0 %    MCV 96.0 81.4 - 97.8 fL    MCH 32.5 27.0 - 33.0 pg    MCHC 33.9 33.7 - 35.3 g/dL    RDW 45.1 35.9 - 50.0 fL    Platelet Count 191 164 - 446 K/uL    MPV 10.4 9.0 - 12.9 fL   COMP METABOLIC PANEL    Collection Time: 08/24/18  2:22 AM   Result Value Ref Range    Sodium 134 (L) 135 - 145 mmol/L    Potassium 4.8 3.6 - 5.5 mmol/L    Chloride 94 (L) 96 - 112 mmol/L    Co2 27 20 - 33  mmol/L    Anion Gap 13.0 (H) 0.0 - 11.9    Glucose 202 (H) 65 - 99 mg/dL    Bun 61 (H) 8 - 22 mg/dL    Creatinine 8.52 (HH) 0.50 - 1.40 mg/dL    Calcium 8.5 8.5 - 10.5 mg/dL    AST(SGOT) 7 (L) 12 - 45 U/L    ALT(SGPT) 8 2 - 50 U/L    Alkaline Phosphatase 48 30 - 99 U/L    Total Bilirubin 0.4 0.1 - 1.5 mg/dL    Albumin 3.2 3.2 - 4.9 g/dL    Total Protein 6.4 6.0 - 8.2 g/dL    Globulin 3.2 1.9 - 3.5 g/dL    A-G Ratio 1.0 g/dL   MAGNESIUM    Collection Time: 08/24/18  2:22 AM   Result Value Ref Range    Magnesium 2.3 1.5 - 2.5 mg/dL   PHOSPHORUS    Collection Time: 08/24/18  2:22 AM   Result Value Ref Range    Phosphorus 6.6 (H) 2.5 - 4.5 mg/dL   ESTIMATED GFR    Collection Time: 08/24/18  2:22 AM   Result Value Ref Range    GFR If  8 (A) >60 mL/min/1.73 m 2    GFR If Non African American 7 (A) >60 mL/min/1.73 m 2   ACCU-CHEK GLUCOSE    Collection Time: 08/24/18  7:47 AM   Result Value Ref Range    Glucose - Accu-Ck 161 (H) 65 - 99 mg/dL       (click the triangle to expand results)    IMPRESSION:  - ESRD    * Etiology likely 2/2 HTN/DM    * MWF @ [facility]    * LAVF (+thrill/bruit)  - Non-healing wound left second toe    * Likely DM related complication    * No evidence of OM, but XR not sensitive for it    * MRI cancelled    * S/P left 2nd toe amputation  - HTN    * Goal BP < 140/90  - Anemia of CKD    * Goal Hgb 10-11    * Goal TSat > 30%  - Renal osteodystrophy    * Ca 8.6    * PO4 5.8    *     * Vit D 86  - HLD  - CAD     PLAN:  - MWF iHD  - UF as tolerated  - Dose all meds per ESRD rec's  - No dietary protein restrictions  - Low sodium diet

## 2018-08-24 NOTE — PROGRESS NOTES
Pt is AOX4. He denies pain at this time. Pt has original dressing on left foot. Pt tolerated HD well this morning. All needs have been met. VSS. Will continue to monitor.

## 2018-08-24 NOTE — PROGRESS NOTES
Internal Medicine Interval Note  Note Author: Maicol Quiroz M.D.     Name Dariel Diamond 1965   Age/Sex 53 y.o. male   MRN [id number]   Code Status full     After 5PM or if no immediate response to page, please call for cross-coverage  Attending/Team: sharon whalen See Patient List for primary contact information  Call [phone] to page    1st Call - Day Intern (R1):   Gabriella 2nd Call - Day Sr. Resident (R2/R3):   Vamshi         Reason for interval visit  (Principal Problem)   Osteomyelitis require 2nd L toe amputation      Interval Problem Daily Status Update  (24 hours, problem oriented, brief subjective history, new lab/imaging data pertinent to that problem)   -Albanian speaker    -1 day post L toe amputation - patient doesn't complain of pain, fevers/chills.  -hemodialysis well tolerated at 11 pm  - blood sugars in 160's over last 24 hours.  Insulin regimen 10 lantus, 3 short, plus sliding scale    Review of Systems   Reason unable to perform ROS: Albanian speaker. no pain and no other complaint when prompted.       Disposition/Barriers to discharge:   Post-surgical    Consultants/Specialty  Nephrology - Dr. stock  Orthopedic - Dr. Ma  PCP: Pcp Not In Computer      Quality Measures  Quality-Core Measures   Reviewed items::  Labs reviewed and Medications reviewed  Rodríguez catheter::  No Rodríguez  DVT prophylaxis pharmacological::  Heparin          Physical Exam       Vitals:    18 0340 18 0715 18 0811 18 1141   BP: 155/89 (!) 170/69 (!) 176/90 151/82   Pulse: 74 72 73 71   Resp:    Temp: 36.9 °C (98.4 °F) 36.3 °C (97.3 °F) 36.2 °C (97.2 °F) 36.2 °C (97.1 °F)   SpO2: 96% 96%     Weight:       Height:         Body mass index is 29.6 kg/m².    Oxygen Therapy:  Pulse Oximetry: 96 %, O2 (LPM): 0, O2 Delivery: None (Room Air)    Physical Exam   Constitutional: He is oriented to person, place, and time and well-developed, well-nourished, and in no distress.   HENT:   Head:  Normocephalic and atraumatic.   Eyes: EOM are normal.   Neck: Normal range of motion. Neck supple. No tracheal deviation present.   Cardiovascular: Normal rate and regular rhythm.  Exam reveals no gallop and no friction rub.    No murmur heard.  Pulmonary/Chest: Breath sounds normal. No respiratory distress. He has no wheezes. He has no rales.   Abdominal: Bowel sounds are normal. He exhibits no distension. There is no tenderness.   Musculoskeletal: Normal range of motion.   Left foot wrapped tightly - did not inspect toe   Neurological: He is alert and oriented to person, place, and time.   Skin: Skin is dry. No rash noted.   Psychiatric: Mood and affect normal.             Assessment/Plan     * Diabetic ulcer of toe of left foot associated with type 2 diabetes mellitus, with necrosis of muscle (HCC)   Assessment & Plan    - Pt on IV Unasyn 3g IV q6  - X ray from [facility] positive for osteomyelitis  - s/p 2nd left 2 amputation surgery, 3/4/5 perc flexor tenotomies 8/23    Plan  - continue IV Unasyn 3 gm Q6H  - wound care as per wound team        Type 2 diabetes mellitus with chronic kidney disease on chronic dialysis (HCC)- (present on admission)   Assessment & Plan    - Pt has DM complicated by renal disease ESRD on HD M-W-F  - Requires home Insulin  - GFR 8, BUN 36, Cr 7.17, K 4.2, Glu 171, HbAlc 7.7  - with renal osteodystrophy- Ca 8.6, PO4 5.8, , Vit D 86    Plan  - Continue Lantus at 10 U QhS  - Basal humalog 3U qhS, plus sliding scale  - Hypoglycemia protocol  - Monitor volume status, electrolytes  - HD per nephrology        HTN (hypertension)- (present on admission)   Assessment & Plan    - Continue home Lisinopril 15 mg PO BID  - Coreg 25 mg PO BID  - Torsemide 20 mg        Dyslipidemia- (present on admission)   Assessment & Plan    - Continue home Lipitor 20 mg

## 2018-08-24 NOTE — CARE PLAN
Problem: Infection  Goal: Will remain free from infection  Outcome: PROGRESSING AS EXPECTED  Patient will show no signs of new or worsening infection from amputation. Pt receiving IV abx treatment Unasyn q24h.    Problem: Knowledge Deficit  Goal: Knowledge of disease process/condition, treatment plan, diagnostic tests, and medications will improve  Outcome: PROGRESSING AS EXPECTED  Discussed treatment plan with patient and family, all verbally acknowledge understanding.

## 2018-08-24 NOTE — PROGRESS NOTES
Patient alert and oriented x4, up self with steady gait. Patient had amputation of second toe on left foot, drsng is c/d/i. Foot is elevated on pillow. Pt does not complain of pain at this time, no PRN given. Pt receiving IV abx treatment of Unasyn q24h. Pt receives hemodialysis M-W-F, AV fistula in left arm, thrill and bruit present. Pt to receive diabetic education tomorrow 8/24/18. No signs of distress noted at this time.

## 2018-08-25 ENCOUNTER — PATIENT OUTREACH (OUTPATIENT)
Dept: HEALTH INFORMATION MANAGEMENT | Facility: OTHER | Age: 53
End: 2018-08-25

## 2018-08-25 VITALS
SYSTOLIC BLOOD PRESSURE: 158 MMHG | HEART RATE: 79 BPM | WEIGHT: 218.26 LBS | TEMPERATURE: 98 F | RESPIRATION RATE: 16 BRPM | BODY MASS INDEX: 29.56 KG/M2 | DIASTOLIC BLOOD PRESSURE: 73 MMHG | HEIGHT: 72 IN | OXYGEN SATURATION: 95 %

## 2018-08-25 PROBLEM — E11.621 DIABETIC ULCER OF TOE OF LEFT FOOT ASSOCIATED WITH TYPE 2 DIABETES MELLITUS, WITH NECROSIS OF MUSCLE (HCC): Status: RESOLVED | Noted: 2018-08-21 | Resolved: 2018-08-25

## 2018-08-25 PROBLEM — L97.523 DIABETIC ULCER OF TOE OF LEFT FOOT ASSOCIATED WITH TYPE 2 DIABETES MELLITUS, WITH NECROSIS OF MUSCLE (HCC): Status: RESOLVED | Noted: 2018-08-21 | Resolved: 2018-08-25

## 2018-08-25 LAB
BASOPHILS # BLD AUTO: 0.4 % (ref 0–1.8)
BASOPHILS # BLD: 0.03 K/UL (ref 0–0.12)
BNP SERPL-MCNC: 942 PG/ML (ref 0–100)
EOSINOPHIL # BLD AUTO: 0.1 K/UL (ref 0–0.51)
EOSINOPHIL NFR BLD: 1.3 % (ref 0–6.9)
ERYTHROCYTE [DISTWIDTH] IN BLOOD BY AUTOMATED COUNT: 45.6 FL (ref 35.9–50)
GLUCOSE BLD-MCNC: 148 MG/DL (ref 65–99)
GLUCOSE BLD-MCNC: 96 MG/DL (ref 65–99)
HCT VFR BLD AUTO: 33.2 % (ref 42–52)
HGB BLD-MCNC: 11.1 G/DL (ref 14–18)
IMM GRANULOCYTES # BLD AUTO: 0.04 K/UL (ref 0–0.11)
IMM GRANULOCYTES NFR BLD AUTO: 0.5 % (ref 0–0.9)
LYMPHOCYTES # BLD AUTO: 2.43 K/UL (ref 1–4.8)
LYMPHOCYTES NFR BLD: 30.4 % (ref 22–41)
MCH RBC QN AUTO: 32.8 PG (ref 27–33)
MCHC RBC AUTO-ENTMCNC: 33.4 G/DL (ref 33.7–35.3)
MCV RBC AUTO: 98.2 FL (ref 81.4–97.8)
MONOCYTES # BLD AUTO: 0.72 K/UL (ref 0–0.85)
MONOCYTES NFR BLD AUTO: 9 % (ref 0–13.4)
NEUTROPHILS # BLD AUTO: 4.68 K/UL (ref 1.82–7.42)
NEUTROPHILS NFR BLD: 58.4 % (ref 44–72)
NRBC # BLD AUTO: 0 K/UL
NRBC BLD-RTO: 0 /100 WBC
PLATELET # BLD AUTO: 199 K/UL (ref 164–446)
PMV BLD AUTO: 10.1 FL (ref 9–12.9)
RBC # BLD AUTO: 3.38 M/UL (ref 4.7–6.1)
TROPONIN I SERPL-MCNC: 0.06 NG/ML (ref 0–0.04)
WBC # BLD AUTO: 8 K/UL (ref 4.8–10.8)

## 2018-08-25 PROCEDURE — 700102 HCHG RX REV CODE 250 W/ 637 OVERRIDE(OP): Performed by: INTERNAL MEDICINE

## 2018-08-25 PROCEDURE — A9270 NON-COVERED ITEM OR SERVICE: HCPCS | Performed by: INTERNAL MEDICINE

## 2018-08-25 PROCEDURE — 82962 GLUCOSE BLOOD TEST: CPT | Mod: 91

## 2018-08-25 PROCEDURE — 84484 ASSAY OF TROPONIN QUANT: CPT

## 2018-08-25 PROCEDURE — 36415 COLL VENOUS BLD VENIPUNCTURE: CPT

## 2018-08-25 PROCEDURE — 700102 HCHG RX REV CODE 250 W/ 637 OVERRIDE(OP): Performed by: STUDENT IN AN ORGANIZED HEALTH CARE EDUCATION/TRAINING PROGRAM

## 2018-08-25 PROCEDURE — A9270 NON-COVERED ITEM OR SERVICE: HCPCS | Performed by: STUDENT IN AN ORGANIZED HEALTH CARE EDUCATION/TRAINING PROGRAM

## 2018-08-25 PROCEDURE — 700111 HCHG RX REV CODE 636 W/ 250 OVERRIDE (IP): Performed by: HOSPITALIST

## 2018-08-25 PROCEDURE — 83880 ASSAY OF NATRIURETIC PEPTIDE: CPT

## 2018-08-25 PROCEDURE — 99239 HOSP IP/OBS DSCHRG MGMT >30: CPT | Mod: GC | Performed by: INTERNAL MEDICINE

## 2018-08-25 PROCEDURE — 700102 HCHG RX REV CODE 250 W/ 637 OVERRIDE(OP): Performed by: HOSPITALIST

## 2018-08-25 PROCEDURE — 85025 COMPLETE CBC W/AUTO DIFF WBC: CPT

## 2018-08-25 PROCEDURE — A9270 NON-COVERED ITEM OR SERVICE: HCPCS | Performed by: HOSPITALIST

## 2018-08-25 RX ORDER — ASPIRIN 81 MG/1
81 TABLET ORAL DAILY
Qty: 30 TAB | Refills: 2 | Status: SHIPPED | OUTPATIENT
Start: 2018-08-26

## 2018-08-25 RX ORDER — AMOXICILLIN AND CLAVULANATE POTASSIUM 875; 125 MG/1; MG/1
1 TABLET, FILM COATED ORAL EVERY 12 HOURS
Status: DISCONTINUED | OUTPATIENT
Start: 2018-08-25 | End: 2018-08-25 | Stop reason: HOSPADM

## 2018-08-25 RX ORDER — LINEZOLID 600 MG/1
600 TABLET, FILM COATED ORAL EVERY 12 HOURS
Qty: 4 TAB | Refills: 0 | Status: SHIPPED | OUTPATIENT
Start: 2018-08-25 | End: 2018-08-27

## 2018-08-25 RX ORDER — LINEZOLID 600 MG/1
600 TABLET, FILM COATED ORAL EVERY 12 HOURS
Status: DISCONTINUED | OUTPATIENT
Start: 2018-08-25 | End: 2018-08-25 | Stop reason: HOSPADM

## 2018-08-25 RX ORDER — AMOXICILLIN AND CLAVULANATE POTASSIUM 875; 125 MG/1; MG/1
1 TABLET, FILM COATED ORAL EVERY 12 HOURS
Qty: 4 TAB | Refills: 0 | Status: SHIPPED | OUTPATIENT
Start: 2018-08-25 | End: 2018-08-27

## 2018-08-25 RX ORDER — SEVELAMER CARBONATE 800 MG/1
800 TABLET, FILM COATED ORAL
Qty: 270 TAB | Refills: 0 | Status: SHIPPED | OUTPATIENT
Start: 2018-08-25

## 2018-08-25 RX ADMIN — SEVELAMER CARBONATE 800 MG: 800 TABLET, FILM COATED ORAL at 12:15

## 2018-08-25 RX ADMIN — SEVELAMER CARBONATE 800 MG: 800 TABLET, FILM COATED ORAL at 07:36

## 2018-08-25 RX ADMIN — HEPARIN SODIUM 5000 UNITS: 5000 INJECTION, SOLUTION INTRAVENOUS; SUBCUTANEOUS at 05:45

## 2018-08-25 RX ADMIN — TORSEMIDE 20 MG: 20 TABLET ORAL at 05:45

## 2018-08-25 RX ADMIN — STANDARDIZED SENNA CONCENTRATE AND DOCUSATE SODIUM 2 TABLET: 8.6; 5 TABLET, FILM COATED ORAL at 05:45

## 2018-08-25 RX ADMIN — CARVEDILOL 25 MG: 25 TABLET, FILM COATED ORAL at 07:36

## 2018-08-25 RX ADMIN — ASPIRIN 81 MG: 81 TABLET, DELAYED RELEASE ORAL at 05:45

## 2018-08-25 RX ADMIN — LISINOPRIL 40 MG: 20 TABLET ORAL at 05:45

## 2018-08-25 ASSESSMENT — ENCOUNTER SYMPTOMS
PSYCHIATRIC NEGATIVE: 1
NEUROLOGICAL NEGATIVE: 1
EYES NEGATIVE: 1
RESPIRATORY NEGATIVE: 1
CONSTITUTIONAL NEGATIVE: 1
GASTROINTESTINAL NEGATIVE: 1
CARDIOVASCULAR NEGATIVE: 1

## 2018-08-25 ASSESSMENT — PAIN SCALES - GENERAL: PAINLEVEL_OUTOF10: 0

## 2018-08-25 NOTE — DISCHARGE INSTRUCTIONS
Discharge Instructions    Discharged to home by car with relative. Discharged via wheelchair, hospital escort: Yes.  Special equipment needed: Not Applicable    Be sure to schedule a follow-up appointment with your primary care doctor or any specialists as instructed.     Discharge Plan:   Diet Plan: Discussed  Activity Level: Discussed  Confirmed Follow up Appointment: Patient to Call and Schedule Appointment  Confirmed Symptoms Management: Discussed  Medication Reconciliation Updated: Yes  Influenza Vaccine Indication: Patient Refuses    I understand that a diet low in cholesterol, fat, and sodium is recommended for good health. Unless I have been given specific instructions below for another diet, I accept this instruction as my diet prescription.   Other diet: Diabetic    Special Instructions: None    · Is patient discharged on Warfarin / Coumadin?   No     Depression / Suicide Risk    As you are discharged from this [facility] facility, it is important to learn how to keep safe from harming yourself.    Recognize the warning signs:  · Abrupt changes in personality, positive or negative- including increase in energy   · Giving away possessions  · Change in eating patterns- significant weight changes-  positive or negative  · Change in sleeping patterns- unable to sleep or sleeping all the time   · Unwillingness or inability to communicate  · Depression  · Unusual sadness, discouragement and loneliness  · Talk of wanting to die  · Neglect of personal appearance   · Rebelliousness- reckless behavior  · Withdrawal from people/activities they love  · Confusion- inability to concentrate     If you or a loved one observes any of these behaviors or has concerns about self-harm, here's what you can do:  · Talk about it- your feelings and reasons for harming yourself  · Remove any means that you might use to hurt yourself (examples: pills, rope, extension cords, firearm)  · Get professional help from the community  (Mental Health, Substance Abuse, psychological counseling)  · Do not be alone:Call your Safe Contact- someone whom you trust who will be there for you.  · Call your local CRISIS HOTLINE 170-8147 or [phone]  · Call your local Children's Mobile Crisis Response Team [place] (673) 438-3814 or www.Enclarity  · Call the toll free National Suicide Prevention Hotlines   · National Suicide Prevention Lifeline 445-247-VBCY (1985)  · [employer] 800-SUICIDE (384-6909)    La diabetes mellitus y los alimentos  (Diabetes Mellitus and Food)  Es importante que controle manzo nivel de azúcar en la steph (glucosa). El nivel de glucosa en steph depende en gran medida de lo que usted come. [place] alimentos saludables en las cantidades adecuadas a lo jeanie del día, aproximadamente a la misma hora todos los días, lo ayudará a controlar manzo nivel de glucosa en steph. También puede ayudarlo a retrasar o evitar el empeoramiento de la diabetes mellitus. [place] de manera saludable incluso puede ayudarlo a mejorar el nivel de presión arterial y a alcanzar o mantener un peso saludable.  Entre las recomendaciones generales para alimentarse y cocinar los alimentos de forma saludable, se incluyen las siguientes:  · Respetar las comidas principales y comer colaciones con regularidad. Evitar pasar largos períodos sin comer con el fin de perder peso.  · Seguir bettina dieta que consista principalmente en alimentos de origen vegetal, charlotte frutas, vegetales, junior secos, legumbres y cereales integrales.  · Utilizar métodos de cocción a baja temperatura, charlotte hornear, en lugar de métodos de cocción a jay jay temperatura, charlotte freír en abundante aceite.  Trabaje con el nutricionista para aprender a usar la información nutricional de las etiquetas de los alimentos.  ¿CÓMO PUEDEN AFECTARME LOS ALIMENTOS?  Carbohidratos   Los carbohidratos afectan el nivel de glucosa en steph más que cualquier otro tipo de alimento. El nutricionista  lo ayudará a determinar cuántos carbohidratos puede consumir en cada comida y enseñarle a contarlos. El recuento de carbohidratos es importante para mantener la glucosa en steph en un nivel saludable, en especial si utiliza insulina o grant determinados medicamentos para la diabetes mellitus.  Alcohol   El alcohol puede provocar disminuciones súbitas de la glucosa en steph (hipoglucemia), en especial si utiliza insulina o grant determinados medicamentos para la diabetes mellitus. La hipoglucemia es bettina afección que puede poner en peligro la matthew. Los síntomas de la hipoglucemia (somnolencia, mareos y desorientación) son similares a los síntomas de vernell consumido mucho alcohol.  Si el médico lo autoriza a beber alcohol, hágalo con moderación y siga estas pautas:  · Las mujeres no deben beber más de un trago por día, y los hombres no deben beber más de dos tragos por día. Un trago es igual a:  ¨ 12 onzas (355 ml) de cerveza  ¨ 5 onzas de vino (150 ml) de vino  ¨ 1,5 onzas (45 ml) de bebidas espirituosas  · No brandi con el estómago vacío.  · Manténgase hidratado. Brandi agua, gaseosas dietéticas o té helado sin azúcar.  · Las gaseosas comunes, los jugos y otros refrescos podrían contener muchos carbohidratos y se deben contar.  ¿QUÉ ALIMENTOS NO SE RECOMIENDAN?  Cuando elias las elecciones de alimentos, es importante que recuerde que todos los alimentos son distintos. Algunos tienen menos nutrientes que otros por porción, aunque podrían tener la misma cantidad de calorías o carbohidratos. Es difícil darle al cuerpo lo que necesita cuando consume alimentos con menos nutrientes. Estos son algunos ejemplos de alimentos que debería evitar ya que contienen muchas calorías y carbohidratos, chely pocos nutrientes:  · Grasas trans (la mayoría de los alimentos procesados incluyen grasas trans en la etiqueta de Información nutricional).  · Gaseosas comunes.  · Jugos.  · Caramelos.  · Dulces, charlotte tortas, pasteles, rosquillas y  galletas.  · Comidas fritas.  ¿QUÉ ALIMENTOS PUEDO COMER?  Consuma alimentos ricos en nutrientes, que nutrirán el cuerpo y lo mantendrán saludable. Los alimentos que debe comer también dependerán de varios factores, charlotte:  · Las calorías que necesita.  · Los medicamentos que grant.  · Ga peso.  · El nivel de glucosa en steph.  · El nivel de presión arterial.  · El nivel de colesterol.  Debe consumir bettina amplia variedad de alimentos, por ejemplo:  · Proteínas.  ¨ Avelar de carne magros.  ¨ Proteínas con bajo contenido de grasas saturadas, charlotte pescado, tierra de huevo y frijoles. Evite las olaf procesadas.  · Frutas y vegetales.  ¨ Frutas y vegetales que pueden ayudar a controlar los niveles sanguíneos de glucosa, charlotet manzanas, mangos y batatas.  · Productos lácteos.  ¨ Elija productos lácteos sin grasa o con bajo contenido de grasa, charlotte leche, yogur y queso.  · Cereales, panes, pastas y arroz.  ¨ Elija cereales integrales, charlotte panes multicereales, nik en grano y arroz integral. Estos alimentos pueden ayudar a controlar la presión arterial.  · Grasas.  ¨ Alimentos que contengan grasas saludables, charlotte junior secos, aguacate, aceite de cortes, aceite de canola y pescado.  ¿TODOS LOS QUE PADECEN DIABETES MELLITUS TIENEN EL MISMO PLAN DE COMIDAS?  Dado que todas las personas que padecen diabetes mellitus son distintas, no hay un solo plan de comidas que funcione para todos. Es muy importante que se reúna con un nutricionista que lo ayudará a crear un plan de comidas adecuado para usted.  Esta información no tiene charlotte fin reemplazar el consejo del médico. Asegúrese de hacerle al médico cualquier pregunta que tenga.  Document Released: 03/26/2009 Document Revised: 01/08/2016 Document Reviewed: 11/14/2014  Elsevier Interactive Patient Education © 2017 Elsevier Inc.      Diabetes y cuidados del pie  (Diabetes and Foot Care)  La diabetes puede ser la causa de que el flujo sanguíneo (circulación) en las piernas y los  pies sea deficiente. Debido a esto, la piel de los pies se torna más delgada, se rompe con facilidad y se jese más lentamente. La piel puede estar seca, despellejarse y agrietarse. También pueden estar dañados los nervios de las piernas y de los pies lo que provoca bettina disminución de la sensibilidad. Es posible que no advierta heridas más pequeñas en los pies, que pueden causar infecciones graves. Cuidar inez pies es bettina de las cosas más importantes que puede hacer por usted mismo.  INSTRUCCIONES PARA EL CUIDADO EN EL HOGAR  · Use siempre calzado, aún dentro de manzo casa. No camine descalzo. Caminar descalzo facilita que se lastime.  · Controle inez pies diariamente para observar ampollas, spencer y enrojecimiento. Si no puede alec la planta del pie, use un mary beth o pídale ayuda a otra persona.  · Lave inez pies con agua tibia (no use [place]) y un jabón suave. Seque rayo inez pies, y la lidya entre los dedos dando palmaditas, hasta que estén completamente secos. Noremoje los pies, ya que esto puede resecar la piel.  · Aplique bettina loción hidratante o vaselina (que no contenga alcohol ni perfume) en los pies y en las uñas secas y quebradizas. No aplique loción entre los dedos.  · Recorte las uñas en forma recta. No escarbe debajo de las uñas o alrededor de las cutículas. Lime los bordes de las uñas con bettina lima o esmeril.  · No argelia las durezas o callosidades, ni trate de quitarlas con medicamentos.  · Use calcetines de algodón o medias limpias todos los días. Asegúrese de que no le ajusten demasiado. Nouse calcetines que le lleguen a las rodillas, ya que podrían disminuir el flujo de steph a las piernas.  · Use zapatos de cuero que le queden rayo y que gabe acolchados. Para amoldar los zapatos, cálcelos sólo algunas horas por día. [place] evitará lesiones en los pies. Revise siempre los zapatos antes de ponerlos para asegurarse de que no haya objetos en manzo interior.  · No cruce las piernas. [place] puede disminuir el flujo  de steph a los pies.  · Si algo le ha raspado, cortado o lastimado la piel de los pies, mantenga la piel de tigre lidya limpia y seca. Debe higienizar estas zonas con agua y un jabón suave. No limpie la lidya con agua oxigenada, alcohol ni yodo.  · Cuando se quite un vendaje adhesivo, asegúrese de no dañar la piel.  · Si tiene bettina herida, obsérvela varias veces por día para asegurarse de que se está curando.  · No use bolsas de [place] ni almohadillas térmicas. Podrían causar quemaduras. Si ha perdido la sensibilidad en los pies o las piernas, no sabrá lo que le está sucediendo hasta que sea demasiado tarde.  · Asegúrese de que manzo médico le elias un examen completo de los pies por lo menos bettina vez al año, o con más frecuencia si usted tiene problemas en los pies. Informe todos los spencer, llagas o moretones a manzo médico inmediatamente.  SOLICITE ATENCIÓN MÉDICA SI:  · Tiene bettina lesión que no se jese.  · Tiene spencer o rajaduras en la piel.  · Tiene bettina uña encarnada.  · Nota bettina lidya irritada en las piernas o los pies.  · Siente bettina sensación de ardor u hormigueo en las piernas o los pies.  · Siente dolor o calambres en las piernas o los pies.  · Las piernas o los pies están adormecidos.  · Siente los pies siempre fríos.  SOLICITE ATENCIÓN MÉDICA DE INMEDIATO SI:  · Presenta enrojecimiento, hinchazón o aumento del dolor en bettina herida.  · Nota bettina línea kristian que sube por pierna.  · Aparece pus en la herida.  · Le sube la fiebre o según lo que le indique el médico.  · Advierte un olor fétido que proviene de bettina úlcera o bettina herida.  Esta información no tiene charlotte fin reemplazar el consejo del médico. Asegúrese de hacerle al médico cualquier pregunta que tenga.  Document Released: 12/18/2006 Document Revised: 04/10/2017 Document Reviewed: 05/27/2014  ElsePrezi Interactive Patient Education © 2017 Elsevier Inc.      Cómo evitar los problemas relacionados con la diabetes  (How to Avoid Diabetes Problems)  Usted puede  hacer varias cosas para prevenir o disminuir los problemas relacionados con la diabetes. Seguir un plan para la diabetes y cuidarse usted mismo puede reducir el riesgo de complicaciones graves o potencialmente mortales. A continuación, encontrará algunas cosas que puede hacer para prevenir los problemas de la diabetes.  CONTROLE LA DIABETES  Siga las instrucciones de manzo médico, enfermera educadora en diabetes y nutricionista para controlar la enfermedad. Le enseñarán los fundamentos para el cuidado de la diabetes. Le ayudar con las preguntas que pueda tener. Aprenda acerca de la diabetes y a masha decisiones saludables en materia de alimentación y actividad física. Controle manzo nivel de glucosa en la steph con regularidad. El médico le ayudará a decidir con qué frecuencia debe revisar manzo nivel de glucosa en la steph, en función de los objetivos de manzo tratamiento y el éxito en cumplirlos.   NO USE NICOTINA  La nicotina y la diabetes son bettina combinación peligrosa. La nicotina aumenta el riesgo de problemas con la diabetes. Si darwin de usar nicotina, reducirá el riesgo de infarto de miocardio, ictus, enfermedades del sistema nervioso y enfermedades renales. Pueden mejorar el colesterol y inez niveles de presión arterial. La circulación sanguínea mejorará también. No consuma ningún producto que contenga tabaco, incluidos cigarrillos, tabaco de mascar o cigarrillos electrónicos. Si necesita ayuda para dejar de fumar, hable con el médico.  MANTENGA MANZO PRESIÓN ARTERIAL BAJO CONTROL  El médico determinará cuál debería ser manzo presión arterial en función de manzo edad, los medicamentos que consuma, el tiempo que hace que tiene diabetes y cualquier otra enfermedad que padezca. La presión arterial consiste en dos números. En general, el objetivo es mantener el número de arriba (presión sistólica) en un valor benny de 130 y el número de abajo (presión diastólica) en un valor benny de 80. Si corresponde, el médico recomendará un  objetivo de presión arterial con valores más bajos. La planificación de comidas, los medicamentos y el ejercicio pueden ayudarle a alcanzar inez objetivos. Asegúrese de que el médico le mida la presión arterial en cada visita.  MANTENGA LOS NIVELES DE COLESTEROL BAJO CONTROL  Los niveles normales de colesterol ayudan a prevenir enfermedades del corazón y el ictus. Estos son los mayores problemas de dereck para las personas con diabetes. Mantener los niveles de colesterol bajo control también puede ayudar con el flujo sanguíneo. Controle manzo nivel de colesterol por lo menos bettina vez al año. Manzo médico puede recetarle un medicamento llamado estatina. Las estatinas reducen el colesterol. Si no está tomando bettina estatina, pregúntele a manzo médico si debería tomarla. La planificación de comidas, el ejercicio y los medicamentos pueden ayudar a alcanzar inez objetivos relacionados con el nivel de colesterol.   PLANIFIQUE Y CUMPLA CON INEZ EXAMENES FISICOS Y OCULARES ANUALES  El médico le dirá la frecuencia con la que quiere controlarlo en función de manzo plan de tratamiento. Es importante que cumpla con estos controles para identificar rápidamente posibles problemas y puedan evitarse o tratarse las complicaciones.  · En cada visita, manzo médico debe pesarlo, medirle la presión arterial y evaluar manzo control del nivel de glucosa.  · La hemoglobina A1c debe controlarse:  ¨ Por lo menos dos veces al año si usted está en el nivel adecuado.  ¨ Cada 3 meses si hay cambios en el tratamiento.  ¨ Si usted no está alcanzando inez objetivos.  · Los lípidos de la steph deben controlarse anualmente. También hay que controlar anualmente la presencia de proteínas en la orina (microalbuminuria).  · Si tiene diabetes tipo 1, hágase un examen ocular en el término de 3 a 5 años después del diagnóstico, y luego bettina vez al año después del primer examen.  · Si tiene diabetes tipo 2, hágase un examen ocular tan pronto charlotte le diagnostiquen la enfermedad, y  luego bettina vez al año después del primer examen.  MANTÉNGASE AL DÍA CON LAS VACUNAS  Se recomienda que se vacune contra la gripe (influenza) todos los años. Además, que se vacune contra la neumonía (vacuna antineumocócica) y la hepatitis B. Si es mayor de 65 años y nunca se vacunó contra la neumonía, esta vacuna puede administrarse charlotte bettina serie de dos vacunas por separado. Pregúntele al médico qué otras vacunas se pueden recomendar.  CUIDE VEDA PIES   La diabetes puede hacer que el flujo sanguíneo (circulación) en las piernas y los pies sea deficiente. Debido a esto, la piel se torna más delgada, se rompe con facilidad y se jese más lentamente. También puede sufrir un daño en los nervios de las piernas y los pies, lo que disminuye la sensibilidad. Es posible que no advierta las heridas más pequeñas que pueden conducir a infecciones graves. El cuidado de los pies es muy importante.  Se hará bettina inspección visual en cada visita médica de rutina. Estos controles observarán si hay spencer, lesiones u otros problemas en los pies. Bettina vez por año debe hacerse un examen más intensivo. Emiliana examen incluye la inspección visual y bettina evaluación de los pulsos del pie y la sensibilidad. Usted también debe hacer lo siguiente:  · Examine veda pies todos los días para detectar spencer, ampollas, callos, uñas encarnadas, y signos de infección, tales charlotte enrojecimiento, hinchazón o pus.  · Lave y seque rayo los pies, especialmente entre los dedos.  · Evite sumergir veda pies regularmente en [place].  · Hidrate la piel seca con loción, evitando colocarla entre los dedos.  · Córtese las uñas en línea recta y lime los bordes.  · Evite los zapatos que no calzan rayo o tienen áreas que irritan la piel.  · Evite ir descalzo o con calcetines solamente. Veda pies necesitan protección.  CUIDE VEDA DIENTES  Las personas con diabetes mal controlada son más propensas a tener enfermedades en las encías (periodontales). Estas infecciones hacen  que la diabetes sea difícil de controlar. Las enfermedades periodontales, si se adan sin tratamiento, pueden conducir a la pérdida de dientes. [place] inez dientes dos veces al día, use hilo dental y visite al dentista para los controles y limpieza cada 6 meses o 2 veces al año.  CONSULTE A MANZO MÉDICO SOBRE EL CONSUMO DE ASPIRINA  Sirena aspirina a diario se recomienda para ayudar a prevenir la enfermedad cardiovascular en personas con y sin diabetes. Pregúntele a manzo médico si esto lo beneficiaría y cuál es la dosis que le recomienda.  ADRIEL DE MANERA RESPONSABLE  Las cantidades moderadas de alcohol (menos de 1 bebida al día para mujeres adultas y menores de 2 bebidas al día para hombres adultos) tienen un mínimo efecto sobre la glucosa en la steph si se ingiere con los alimentos. Es importante comer alimentos cuando se clyde alcohol para evitar la hipoglucemia. Las personas deben evitar el alcohol si tienen un historial de consumo excesivo o dependencia, si es bettina giuliana embarazada, y si tiene bettina enfermedad hepática, pancreatitis, neuropatía avanzada, o hipertrigliceridemia grave.  DISMINUYA EL NIVEL DE ESTRÉS  Vivir con diabetes puede ser estresante. Cuando usted está bajo estrés, el nivel de glucosa en la steph puede verse afectada de dos maneras:  · Las hormonas del estrés pueden hacer que la glucosa en la steph se eleve.  · Probablemente no se cuidó lo suficiente.  Es bettina buena idea estar al tanto del nivel de estrés y hacer los cambios que gabe necesarios para ayudar a manejar mejor las situaciones difíciles. Los grupos de apoyo, bettina relajación planificada, un pasatiempo que le guste, la meditación, las relaciones saludables y hacer ejercicio son factores que ayudan a reducir el nivel de estrés. Si inez esfuerzos no parecen tener buenos resultados, pídale ayuda a manzo médico o a un profesional de la dereck mental capacitado.  Esta información no tiene charlotte fin reemplazar el consejo del médico. Asegúrese de hacerle  al médico cualquier pregunta que tenga.  Document Released: 12/06/2012 Document Revised: 04/10/2017  Elsevier Interactive Patient Education © 2017 Elsevier Inc.      Diabetes mellitus tipo 2 en los adultos, cuidados personales  (Type 2 Diabetes Mellitus, Self Care, Adult)  Las personas que tienen diabetes tipo 2 (diabetes mellitus tipo 2) deben mantener el nivel de glucosa en la steph bajo control. Es posible hacerlo a través de lo siguiente:  · Nutrición.  · Actividad física.  · Cambios en el estilo de matthew.  · Medicamentos o insulina, si es necesario.  · El apoyo de los médicos y de otras personas.  ¿CÓMO ME CONTROLO EL NIVEL DE GLUCOSA EN LA STEPH?  · Contrólese la glucemia todos los días, con la frecuencia que le hayan indicado.  · Llame al médico si el nivel de glucosa en la steph está por encima de las cifras ideales en 2 análisis seguidos.  · Contrólese el nivel de hemoglobina A1c al menos dos veces al año. Realícese controles más frecuentes si el médico se lo indica.  El médico fijará los objetivos del tratamiento para usted. Generalmente, los resultados de los niveles de glucosa en la steph deben ser los siguientes:  · Antes de las comidas (preprandial): de 80 a 130 mg/dl (4,4 a 7,2 mmol/l).  · Después de las comidas (posprandial): menos de 180 mg/dl (10 mmol/l).  · Nivel de A1c: menos del 7 %.  ¿QUÉ MORRIS SABER ACERCA DE LA GLUCEMIA ELVA?  El nivel alto de glucosa en la steph se denomina hiperglucemia. Conozca los signos de la hiperglucemia. Los signos pueden incluir lo siguiente:  · Sentir que tiene lo siguiente:  ¨ Sed.  ¨ Apetito.  ¨ Mucho cansancio.  · Necesidad de orinar con mayor frecuencia que lo habitual.  · Visión borrosa.  ¿QUÉ MORRIS SABER ACERCA DE LA GLUCEMIA BAJA?  El nivel bajo de glucosa en la steph se denomina hipoglucemia. Emiliana cuadro ocurre cuando el nivel de glucosa en la steph es igual o paul que 70 mg/dl (3,9 mmol/l). Entre los síntomas se pueden incluir los siguientes:  · Sentir  que tiene lo siguiente:  ¨ Apetito.  ¨ Preocupación o nervios (ansioso).  ¨ Sudoración y piel húmeda.  ¨ Confusión.  ¨ Mareos.  ¨ Somnolencia.  ¨ Náuseas.  · Tener lo siguiente:  ¨ Latidos cardíacos rápidos (palpitaciones).  ¨ Dolor de christiana.  ¨ Cambios en la visión.  ¨ Bettina crisis de movimientos que no puede controlar (convulsiones).  ¨ Pesadillas.  ¨ Hormigueo y falta de sensibilidad (adormecimiento) alrededor de la boca, los labios o la lengua.  · Dificultades para hacer lo siguiente:  ¨ Hablar.  ¨ Prestar atención (concentrarse).  ¨ Moverse (coordinación).  ¨ Dormir.  · Temblores.  · Desmayos.  · Molestarse con facilidad (irritabilidad).  Tratamiento de la glucemia baja   Para tratar la glucemia baja, ingiera un alimento o bettina bebida azucarada de inmediato. Si puede pensar con claridad y tragar de manera lezama, siga la apoorva 15/15, que consiste en lo siguiente:  · Consumir 15 gramos de un hidrato de carbono de acción rápida. Algunos hidratos de carbono de acción rápida son los siguientes:  ¨ 1 pomo de glucosa en gel.  ¨ 3 comprimidos de azúcar (comprimidos de glucosa).  ¨ 6 a 8 unidades de caramelos duros.  ¨ 4 onzas (120 ml) de jugo de frutas.  ¨ 4 onzas (120 ml) de gaseosa común (no dietética).  · Contrólese la glucemia 15 minutos después de ingerir el hidrato de carbono.  · Si el nivel de glucosa en la steph todavía es igual o paul que 70 mg/dl (3,9 mmol/l), ingiera nuevamente 15 gramos de un hidrato de carbono.  · Si el nivel de glucosa en la steph no supera los 70 mg/dl (3,9 mmol/l) después de 3 intentos, solicite ayuda de inmediato.  · Ingiera bettina comida o bettina colación en el transcurso de 1 hora después de que la glucemia se haya normalizado.  Tratamiento de la glucemia muy baja   Si el nivel de glucosa en la steph es igual o paul que 54 mg/dl (3 mmol/l), significa que está muy bajo (hipoglucemia grave). [place] es bettina emergencia. No espere hasta que los síntomas desaparezcan. Solicite atención médica  de inmediato. Comuníquese con el servicio de emergencias de manzo localidad (911 en los [place]). No conduzca por inez propios medios hasta el hospital.  Si manzo nivel de glucosa en la steph muy bajo y no puede ingerir ningún alimento ni bebida, qian vez deba aplicarse bettina inyección de glucagón. Un familiar o un amigo deben aprender a controlarle la glucemia y a aplicarle bettina inyección de glucagón. Pregúntele al médico si debe tener un kit de inyecciones de glucagón en manzo casa.  ¿QUÉ OTRAS COSAS SON IMPORTANTES PARA CONTROLAR LA DIABETES?  Medicamentos   Siga estas indicaciones respecto de la insulina y los medicamentos para la diabetes:  · Tómelos charlotte se lo haya indicado el médico.  · Ajústelos charlotte se lo haya indicado el médico.  · No se quede sin medicamentos.  La diabetes puede aumentar el riesgo de tener otras enfermedades a jeanie plazo. Estas incluyen las cardiopatías coronarias o enfermedades renales. El médico puede recetar medicamentos para evitar los problemas causados por la diabetes.  Alimento   · Opte por opciones de alimentos saludables. Estos incluyen los siguientes:  ¨ Jayson, pescado, claras de huevos y frijoles.  ¨ Braxton, salvado, marcella burgol, arroz integral, quinua y mijo.  ¨ Frutas y verduras frescas.  ¨ Productos lácteos descremados.  ¨ Gregoria secos, aguacate, aceite de cortes y aceite de canola.  · Arme un plan de alimentación con un especialista (nutricionista).  · Siga las indicaciones del médico respecto de lo que no puede comer o beber.  · Donna suficiente líquido para mantener el pis (orina) vicky o de color amarillo pálido.  · Ingiera colaciones saludables entre comidas saludables.  · Lleve un registro de los hidratos de carbono que consume. Raquel las etiquetas de los alimentos. Conozca el tamaño de las porciones de los alimentos.  · Siga el plan para los días de enfermedad cuando no pueda comer o beber normalmente. Arme martin plan con el médico, de modo que esté listo para  usarlo.  Actividad   · Practique ejercicios al menos 3 veces por semana.  · No deje pasar más de 2 días sin hacer actividad física.  · Hable con el médico antes de comenzar un ejercicio nuevo. Es posible que el médico deba hacer ajustes en la insulina, los medicamentos o los alimentos.  Estilo de matthew   · No use productos que contengan tabaco. Estos incluyen cigarrillos, tabaco de mascar y cigarrillos electrónicos. Si necesita ayuda para dejar de fumar, consulte al médico.  · Pregúntele a manzo médico qué cantidad de alcohol puede masha.  · Aprenda a sobrellevar el estrés. Si necesita ayuda para lograrlo, consulte al médico.  Cuidado del cuerpo   · Manténgase al día con las vacunas.  · Hágase controlar los ojos y los pies por un médico con la frecuencia que le hayan indicado.  · Revísese la piel y los pies todos los días. Examine si hay spencer, moretones, enrojecimiento, ampollas o llagas.  · Cepíllese los dientes y las encías dos veces al día.  · Use el hilo dental al menos bettina vez al día.  · Vaya al dentista al menos bettina vez cada 6 meses.  · Mantenga un peso saludable.  Instrucciones generales   · [place] los medicamentos de venta kirstie y los recetados solamente charlotte se lo haya indicado el médico.  · Comparta manzo plan de atención de la diabetes con estas personas:  ¨ Compañeros de trabajo o de la escuela.  ¨ Aquellas con las que convive.  · Hágase análisis de orina para detectar la presencia de cetonas:  ¨ Cuando esté enfermo.  ¨ [place] se lo haya indicado el médico.  · Lleve consigo bettina tarjeta, o use un brazalete o bettina medalla que indiquen que es diabético.  · Pregúntele al médico lo siguiente:  ¨ ¿Hailee reunirme con un instructor para el cuidado de la diabetes?  ¨ ¿Dónde puedo encontrar un miranda de apoyo para personas diabéticas?  · Concurra a todas las visitas de control charlotte se lo haya indicado el médico. [place] es importante.  DÓNDE ENCONTRAR MÁS INFORMACIÓN:  Para obtener más información sobre la diabetes, visite los  siguientes sitios:  · Asociación Americana de la Diabetes (American Diabetes Association): www.diabetes.org  · Asociación Norteamericana de Instructores para el Cuidado de la Diabetes (American Association of Diabetes Educators): www.diabeteseducator.org/patient-resources  Esta información no tiene charlotte fin reemplazar el consejo del médico. Asegúrese de hacerle al médico cualquier pregunta que tenga.  Document Released: 04/10/2017 Document Revised: 04/10/2017 Document Reviewed: 01/20/2017  Elsevier Interactive Patient Education © 2017 Elsevier Inc.

## 2018-08-25 NOTE — DISCHARGE SUMMARY
Internal Medicine Discharge Summary  Note Author: Maicol Quiroz M.D.         Name Dariel Diamond 1965   Age/Sex 53 y.o. 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 ST at origin of very       small PDA. 90% mid JOSE. 90% origin of large ISA. 30% prox LAD. 30-40%       lLAD immediately distal to LADD1 origin.100% small OM 2 with L to L       filling. LV EF45% with akinetic anterior wall.    Ischemic cardiomyopathy POA: Unknown      Overview: Echo 2016: Left ventricular ejection fraction is visually estimated to       be 60%. Normal regional wall motion. Grade I diastolic dysfunction.Trace       mitral regurgitation.Estimated right ventricular systolic pressure is 27       mmHg. Right atrial pressure is estimated to be 8 mmHg. Prior study is       available for comparison, 2014 improved left ventricle function.       Prior echo LVEF 40%.  Resolved Problems:    Dysuria POA: Unknown        Hospital Summary (Brief Narrative):       53-year-old patient with a past medical history of end-stage renal failure on hemodialysis, hypertension, type II-diabetes mellitus, anemia of chronic disease, renal osteodystrophy,  dyslipidemia presented to the emergency department as a transfer from [facility] for left second toe diabetic ulcer with exposed bone.  Necrosis was noted and the patient underwent amputation on 08/23/18.  Unasyn was chosen as an antibiotics and culture were positive for Enterococcus and Gram negatives. Follow ups with orthopedics, primary care provider, nephrology, and wound care.  Patient was discharged on Augmentin and Linzeolid for 2 days.     Patient /Hospital Summary (Details -- Problem Oriented) :               Type 2 diabetes mellitus with chronic kidney disease on chronic dialysis ([facility])   Assessment & Plan     - Pt has DM complicated by renal disease ESRD on HD M-W-F  - Requires home Insulin  - GFR 8, BUN 36, Cr 7.17, K 4.2, Glu 171, HbAlc 7.7  - with renal osteodystrophy- Ca 8.6, PO4 5.8, , Vit D 86     Plan  - Continue Lantus at 10 U QhS  - Basal humalog 4U qhS, plus sliding scale  - HD per nephrology          * Diabetic ulcer of toe of left foot associated with type 2 diabetes mellitus, with necrosis of muscle ([facility])   Assessment & Plan     - Pt on IV Unasyn 3g IV q6  - X ray from [facility] positive for osteomyelitis  - s/p 2nd left 2 amputation surgery, 3/4/5 perc flexor tenotomies 8/23     Plan  - continue on Augmentin bid and Linezolid 600 bid for 2 days  - toe cultures positive for Group D enterococcus and grame negative rods          Dysuria-resolved as of 8/23/2018   Assessment & Plan     - Pt c/o dysuria w/o pyuria. Hematuria, frequency or suprapubic / flank tenderness          HTN (hypertension)   Assessment & Plan     - Continue home Lisinopril 15 mg PO BID  - Coreg 25 mg PO BID  - Torsemide 20 mg          Dyslipidemia   Assessment & Plan     - Continue home Lipitor 20 mg                 Consultants:     Nephrology - Dr. Nikkie stock  Orthopedics - Dr. Luiz Ma        Procedures:        Left second toe amp, 3/4/5 perc flexor tenotomies     Imaging/ Testing:      JOHNNY  ART/TING   Final Result       OUTSIDE IMAGES-DX LOWER EXTREMITY, LEFT   Final Result                Discharge Medications:         Medication Reconciliation: Completed               Medication List            START taking these medications      Instructions   amoxicillin-clavulanate 875-125 MG Tabs  Commonly known as:  AUGMENTIN    Take 1 Tab by mouth every 12 hours for 2 days.  Dose:  1 Tab      insulin lispro 100 UNIT/ML Soln  Commonly known as:  HUMALOG    Inject 4 Units as instructed 3 times a day before meals.  Dose:  4 Units      linezolid 600 MG Tabs  Commonly known as:  ZYVOX    Take 1 Tab by mouth every 12 hours for 2 days.  Dose:  600 mg                  CHANGE how you take these medications      Instructions   aspirin 81 MG EC tablet  What changed:  · medication strength  · how much to take    Take 1 Tab by mouth every day.  Dose:  81 mg      sevelamer carbonate 800 MG Tabs tablet  What changed:  · how much to take  · when to take this  Commonly known as:  RENVELA    Take 1 Tab by mouth 3 times a day, with meals.  Dose:  800 mg                  CONTINUE taking these medications      Instructions   atorvastatin 20 MG Tabs  Commonly known as:  LIPITOR    TAKE 1 TABLET EVERY EVENING      carvedilol 25 MG Tabs  Commonly known as:  COREG    Take 1 Tab by mouth 2 times a day, with meals.  Dose:  25 mg      insulin glargine 100 UNIT/ML Soln  Commonly known as:  LANTUS    Inject 12 Units as instructed every evening.  Dose:  12 Units      lisinopril 10 MG Tabs  Commonly known as:  PRINIVIL    Doctor's comments:  Lisinopril 15 mg BID  Take 1 Tab by mouth every 12 hours.  Dose:  10 mg      torsemide 20 MG Tabs  Commonly known as:  DEMADEX    Take 1 Tab by mouth every day.  Dose:  20 mg      vitamin D (Ergocalciferol) 58706 units Caps capsule  Commonly known as:  DRISDOL    Take 50,000 Units by mouth every 7 days.  Dose:  10363 Units          STOP taking these medications    insulin aspart 100 UNIT/ML Soln  Commonly  known as:  NOVOLOG                Can use .DISCHARGEMEDSLIST if going to another facility           Disposition:   stable     Diet:   Diabetic, Renal     Activity:   As tolerated    Instructions:         The patient was instructed to return to the ER in the event of worsening symptoms. I have counseled the patient on the importance of compliance and the patient has agreed to proceed with all medical recommendations and follow up plan indicated above.   The patient understands that all medications come with benefits and risks. Risks may include permanent injury or death and these risks can be minimized with close reassessment and monitoring.         Primary Care Provider:    Jacqueline Pepper  Discharge summary faxed to primary care provider:  Deferred  Copy of discharge summary given to the patient: Deferred        Follow up appointment details :      PCP - Jacqueline Pepper ([place])  Orjeffrey Ma     Pending Studies:        none     Time spent on discharge day patient visit, preparing discharge paperwork and arranging for patient follow up.     Summary of follow up issues:         Discharge Time (Minutes) :    45  Hospital Course Type: Inpatient Stay < 2 midnights, patient recovered more rapidly than anticipated        Condition on Discharge    ______________________________________________________________________     Interval history/exam for day of discharge:    Patient is stable, afrebrile. Was seen by wound team, wound clean.  No pain, fevers/chills.  Blood sugars in 100's.  Tolerated HD well yesterday.        Most Recent Labs:          Lab Results   Component Value Date/Time     WBC 8.0 08/25/2018 02:48 AM     RBC 3.38 (L) 08/25/2018 02:48 AM     HEMOGLOBIN 11.1 (L) 08/25/2018 02:48 AM     HEMATOCRIT 33.2 (L) 08/25/2018 02:48 AM     MCV 98.2 (H) 08/25/2018 02:48 AM     MCH 32.8 08/25/2018 02:48 AM     MCHC 33.4 (L) 08/25/2018 02:48 AM     MPV 10.1 08/25/2018 02:48 AM     NEUTSPOLYS 58.40 08/25/2018  02:48 AM     LYMPHOCYTES 30.40 08/25/2018 02:48 AM     MONOCYTES 9.00 08/25/2018 02:48 AM     EOSINOPHILS 1.30 08/25/2018 02:48 AM     BASOPHILS 0.40 08/25/2018 02:48 AM            Lab Results   Component Value Date/Time     SODIUM 134 (L) 08/24/2018 02:22 AM     POTASSIUM 4.8 08/24/2018 02:22 AM     CHLORIDE 94 (L) 08/24/2018 02:22 AM     CO2 27 08/24/2018 02:22 AM     GLUCOSE 202 (H) 08/24/2018 02:22 AM     BUN 61 (H) 08/24/2018 02:22 AM     CREATININE 8.52 (HH) 08/24/2018 02:22 AM            Lab Results   Component Value Date/Time     ALTSGPT 8 08/24/2018 02:22 AM     ASTSGOT 7 (L) 08/24/2018 02:22 AM     ALKPHOSPHAT 48 08/24/2018 02:22 AM     TBILIRUBIN 0.4 08/24/2018 02:22 AM     ALBUMIN 3.2 08/24/2018 02:22 AM     GLOBULIN 3.2 08/24/2018 02:22 AM     INR 1.00 12/06/2016 07:20 AM            Lab Results   Component Value Date/Time     PROTHROMBTM 13.5 12/06/2016 07:20 AM     INR 1.00 12/06/2016 07:20 AM

## 2018-08-25 NOTE — PROGRESS NOTES
RN reviewed discharge instructions with pt- using the iPad . Pt verbalized understanding of instructions, including prescriptions and follow-up appointment. All questions were answered. IV was removed. Pt has boot on Left foot, dressing is C/D/I when discharged. All belongings were gathered. Pt was taken down to ride via wheelchair by CNA.

## 2018-08-25 NOTE — PROGRESS NOTES
Pt is AOX4. He denies pain at this time. Per MD possible discharge today or tomorrow. VSS. Needs have been addressed at this time. Will continue to monitor. Fall precautions in place. Call bell within reach.

## 2018-08-25 NOTE — PROGRESS NOTES
LIMB PRESERVATION SERVICE INITIAL CONSULT    REASON FOR LPS CONSULTATION: S/P Left Foot - 2nd Toe Amp,  3rd  - 5th digit Tenotomy with Dr Ma 8/23/18    History of Present Illness:   Dariel Diamond is a 53 y.o male  with a past medical history that includes uncontrolled type 2 diabetes, ESRD on HD, HTN admitted 8/21/2018 for diabetic foot ulcer.   IV antibiotics were started on this admission.  Infectious diseases has not been consulted.    Xray has been done at [facility], MRI pending. Pt reports 5 days ago 8/17 he noticed his left 2nd toe was black with pus draining from it. He cleaned it with gauze and applied antibiotic ointment. His developed erythema and edema to his foot that did not improve and proceeded to ED at [facility].    He was diagnosed with type 2 diabetes 20 years ago, and is currently managing with insulin.  He checks his blood sugars 3 times daily and reports that these typically run around 130-160s.  He has not had previous diabetes education.  He does have numbness in his feet.  He does not have diabetic shoes but does check his feet routinely.  He has not had previous foot ulcers or foot surgery. does not work.    Smoking:   reports that he has never smoked. He has never used smokeless tobacco.    Alcohol:   reports that he does not drink alcohol.    DIAGNOSTIC DATA:   Lab Results   Component Value Date/Time    HBA1C 7.7 (H) 08/21/2018 10:38 PM        WBC   Date/Time Value Ref Range Status   08/25/2018 02:48 AM 8.0 4.8 - 10.8 K/uL Final     Recent Labs      08/23/18 0218  08/24/18 0222  08/25/18   0248   WBC  6.4  6.2  8.0   RBC  3.42*  3.26*  3.38*   HEMOGLOBIN  11.2*  10.6*  11.1*   HEMATOCRIT  32.8*  31.3*  33.2*   MCV  95.9  96.0  98.2*   MCH  32.7  32.5  32.8   MCHC  34.1  33.9  33.4*   RDW  45.1  45.1  45.6   PLATELETCT  180  191  199   MPV  10.2  10.4  10.1     Recent Labs      08/23/18 0218  08/24/18   0222   SODIUM  138  134*   POTASSIUM  4.2  4.8   CHLORIDE  96   94*   CO2  28  27   GLUCOSE  124*  202*   BUN  44*  61*     Microbiology:  Results     Procedure Component Value Units Date/Time    CULTURE TISSUE W/ GRM STAIN [[id number]]  (Abnormal) Collected:  08/23/18 1342    Order Status:  Completed Specimen:  Tissue Updated:  08/25/18 1005     Significant Indicator POS (POS)     Source TISS     Site Proxmal Phalanx Clean     Tissue Culture Growth noted after further incubation, see below for  organism identification.   (A)     Gram Stain Result Rare WBCs.  No organisms seen.       Tissue Culture Lactose fermenting Gram negative katarina  Rare growth   (A)      Group D Enterococcus species  Rare growth  Combination therapy with ampicillin, penicillin, or  vancomycin (for susceptible strains) plus an aminoglycoside  is usually indicated for serious enterococcal infections,  such as endocarditis unless high-level resistance to both  gentamicin and streptomycin is documented; such combinations  are predicted to result in synergistic killing of the  Enterococcus.   (A)    ANAEROBIC CULTURE [[id number]] Collected:  08/23/18 1342    Order Status:  Completed Specimen:  Tissue Updated:  08/25/18 1005     Significant Indicator NEG     Source TISS     Site Proxmal Phalanx Clean     Anaerobic Culture, Culture Res Culture in progress.    CULTURE TISSUE W/ GRM STAIN [[id number]] Collected:  08/23/18 1340    Order Status:  Completed Specimen:  Tissue Updated:  08/24/18 1418     Significant Indicator NEG     Source TISS     Site Second Toe     Tissue Culture Culture in progress.     Gram Stain Result Few WBCs.  No organisms seen.      ANAEROBIC CULTURE [[id number]] Collected:  08/23/18 1340    Order Status:  Completed Specimen:  Tissue Updated:  08/24/18 1418     Significant Indicator NEG     Source TISS     Site Second Toe     Anaerobic Culture, Culture Res Culture in progress.    CULTURE WOUND W/ GRAM STAIN [[id number]] Collected:  08/21/18 2130    Order Status:  Completed Specimen:  Wound from Toe  "Updated:  08/24/18 0944     Gram Stain Result Few WBCs.  Few Gram positive cocci.       Significant Indicator NEG     Source WND     Site Left 2nd Toe     Culture Result Wound Moderate growth mixed enteric sina.    GRAM STAIN [[id number]] Collected:  08/23/18 1340    Order Status:  Completed Specimen:  Tissue Updated:  08/23/18 2307     Significant Indicator .     Source TISS     Site Second Toe     Gram Stain Result Few WBCs.  No organisms seen.      GRAM STAIN [[id number]] Collected:  08/23/18 1342    Order Status:  Completed Specimen:  Tissue Updated:  08/23/18 2307     Significant Indicator .     Source TISS     Site Proxmal Phalanx Clean     Gram Stain Result Rare WBCs.  No organisms seen.      BLOOD CULTURE [[id number]] Collected:  08/22/18 0306    Order Status:  Completed Specimen:  Blood from Peripheral Updated:  08/23/18 0857     Significant Indicator NEG     Source BLD     Site PERIPHERAL     Blood Culture No Growth    Note: Blood cultures are incubated for 5 days and  are monitored continuously.Positive blood cultures  are called to the RN and reported as soon as  they are identified.      Narrative:       Per Hospital Policy: Only change Specimen Src: to \"Line\" if  specified by physician order.    BLOOD CULTURE [[id number]] Collected:  08/22/18 0306    Order Status:  Completed Specimen:  Blood from Peripheral Updated:  08/23/18 0857     Significant Indicator NEG     Source BLD     Site PERIPHERAL     Blood Culture No Growth    Note: Blood cultures are incubated for 5 days and  are monitored continuously.Positive blood cultures  are called to the RN and reported as soon as  they are identified.      Narrative:       Per Hospital Policy: Only change Specimen Src: to \"Line\" if  specified by physician order.    GRAM STAIN [[id number]] Collected:  08/21/18 2130    Order Status:  Completed Specimen:  Wound Updated:  08/22/18 1405     Significant Indicator .     Source WND     Site Left 2nd Toe     Gram Stain " Result Few WBCs.  Few Gram positive cocci.      URINALYSIS [[id number]] Collected:  08/21/18 0000    Order Status:  Canceled Specimen:  Urine from Urine, Clean Catch     URINE CULTURE(NEW) [[id number]] Collected:  08/21/18 0000    Order Status:  Canceled Specimen:  Other from Urine, Clean Catch          PHYSICAL EXAMINATION:   VITAL SIGNS: /93   Pulse 74   Temp 36.6 °C (97.9 °F)   Resp 16   Ht 1.829 m (6')   Wt 99 kg (218 lb 4.1 oz)   SpO2 94%   BMI 29.60 kg/m²      Blood glucose 202    Wound Assessment:    Wound Characteristics                                                                            Location: Left Foot - 2nd Toe Amp - 3 - 5th digit tenotomy Initial Evaluation  Date:8/25/2018   Tissue Type and %: 100% Red   Periwound: Pink, Edematous   Drainage: Scant Serosanginous   Exposed structures Sutures   Wound Edges:   Attached 100% Approximated   Odor: None   S&S of Infection:   None   Edema: Localized at Site   Sensation: Insensate                      Pain:        Patient resting comfortably    Vitals  /93   Pulse 74   Temp 36.6 °C (97.9 °F)   Resp 16   Ht 1.829 m (6')   Wt 99 kg (218 lb 4.1 oz)   SpO2 94%   BMI 29.60 kg/m²     Procedures:       Debridement :  NA    Cleansed with:     NS                                                                     Periwound protected with: skin prep   Primary dressing: adaptic   Secondary Dressing: foam   Other: roll guaze / elastic wrap    NURSING TO CHANGE LEFT FOOT SURGICAL SITE DRESSING EVERY 48 HOURS AND PRN FOR SATURATION OR DISLODGEMENT  Nursing to cleanse wound/periwound with Normal Saline (NS).  Pat periwound dry.  Apply skin prep/No Sting to periwound.  Let air dry for 1-2 minutes. Apply SINGLE layer adaptic, cut to size, to suture sites. Cover with Non Adhesive Foam and secure with Roll Gauze.  Then apply elastic bandage to secure dressings in place.   Please take Weekly Wound Photos. Notify wound team if wound deteriorates or  fails to progress.      Plan:      1. Labs\Imaging: Reviewed    2. Treatment:   Wound Care by Nursing, LPS to Follow.         Will see in LPS rounds in 3 weeks to remove sutures and see wound F/U with Dr Ma/Yudelka.     Dressing Orders Updated. Skin Care Updated.      Nursing to change every 48 hours and PRN for Saturation or Dislodgement     Antibiotics per IM     HWB when OOB wearing Shoe    3.Collaboration:      Diabetes Educator Involved: A1C is 7.7% Pt educated on keeping BS less than 150 to control infection and promote wound healing     Ortho Techs involved: shoe at bedside       Anticipated discharge plans (X):   SNF:            Home Care:            Outpatient Wound Center: X lps rounds 9/14/18       Self Care:             Other:            TBD:     Professional Collaboration: D/W:        Patient requires skilled therapeutic intervention for debridement, product selection and application, education, wound bed preparation and assessment.  Will see in LPS rounds in 2 weeks to remove sutures and see wounds resolution and disposition.

## 2018-08-25 NOTE — PROGRESS NOTES
[facility] -  PROGRESS NOTE               Author: Nikkie Mondragon M.D. Date & Time: 8/25/2018  8:42 AM     HPI:  52 yo HM PMH ESRD MWF iHD, HTN, type 2 DM, anemia of CKD, renal osteodystrophy, and HLD who presents with CC as above.  He was initially seen at [facility] for left second toe pain and was found to have a non-draining wound on that toe with purple discoloration to it.  He denies any pain unless manipulated and states it started about 4 days prior to admit.  Never had anything similar and initially didn't think it was a big deal as it was slightly present but over the 4 day course it progressed rapidly.  XR of foot at [facility] did not show bony involvement but due to his ESRD status he was sent to [facility] for further evaluation/management.  He is Mauritanian speaking mostly so an intrepretor was used during the encounter.  He denies C/N/V/CP/SOB.  No abd pain, melena, hematochezia, hematemesis.  No visual changes/HA.  He had some subjective fevers, but never measured them.    DAILY NEPHROLOGY SUMMARY:  8/22/18 - Consult done  8/23/18 - NAEO, no complaints, doing well, NPO for surgery this AM  8/24/18 - NAEO, SOD, Qb350, 3L UF goal  8/25/18 - NAEO, no complaints, doing well, asking about going home    Review of Systems   Constitutional: Negative.    HENT: Negative.    Eyes: Negative.    Respiratory: Negative.    Cardiovascular: Negative.    Gastrointestinal: Negative.    Musculoskeletal: Positive for joint pain.   Skin: Negative.    Neurological: Negative.    Endo/Heme/Allergies: Negative.    Psychiatric/Behavioral: Negative.    All other systems reviewed and are negative.    Physical Exam   Constitutional: He is oriented to person, place, and time. He appears well-developed and well-nourished.   HENT:   Head: Normocephalic and atraumatic.   Eyes: Conjunctivae are normal. No scleral icterus.   Neck: Neck supple. No tracheal deviation present.   Cardiovascular: Normal rate  and regular rhythm.    Pulmonary/Chest: Effort normal and breath sounds normal.   Abdominal: Soft. Bowel sounds are normal.   Musculoskeletal: He exhibits tenderness (Left foot 2nd toe and milder pain from 3-5 toes on left). He exhibits no edema.   Neurological: He is alert and oriented to person, place, and time.   Skin: Skin is warm and dry. There is erythema (mild around left 2nd toe).   Psychiatric: He has a normal mood and affect. His behavior is normal.   Nursing note and vitals reviewed.    PAST FAMILY HISTORY: Reviewed and Unchanged  SOCIAL HISTORY: Reviewed and Unchanged  CURRENT MEDICATIONS: Reviewed  IMAGING STUDIES: Reviewed    Fluids:  In: 860 [P.O.:360; Dialysis:500]  Out: 3500     LABS:  Recent Results (from the past 24 hour(s))   ACCU-CHEK GLUCOSE    Collection Time: 08/24/18 12:37 PM   Result Value Ref Range    Glucose - Accu-Ck 166 (H) 65 - 99 mg/dL   ACCU-CHEK GLUCOSE    Collection Time: 08/24/18  5:46 PM   Result Value Ref Range    Glucose - Accu-Ck 185 (H) 65 - 99 mg/dL   ACCU-CHEK GLUCOSE    Collection Time: 08/24/18  9:20 PM   Result Value Ref Range    Glucose - Accu-Ck 118 (H) 65 - 99 mg/dL   CBC WITH DIFFERENTIAL    Collection Time: 08/25/18  2:48 AM   Result Value Ref Range    WBC 8.0 4.8 - 10.8 K/uL    RBC 3.38 (L) 4.70 - 6.10 M/uL    Hemoglobin 11.1 (L) 14.0 - 18.0 g/dL    Hematocrit 33.2 (L) 42.0 - 52.0 %    MCV 98.2 (H) 81.4 - 97.8 fL    MCH 32.8 27.0 - 33.0 pg    MCHC 33.4 (L) 33.7 - 35.3 g/dL    RDW 45.6 35.9 - 50.0 fL    Platelet Count 199 164 - 446 K/uL    MPV 10.1 9.0 - 12.9 fL    Neutrophils-Polys 58.40 44.00 - 72.00 %    Lymphocytes 30.40 22.00 - 41.00 %    Monocytes 9.00 0.00 - 13.40 %    Eosinophils 1.30 0.00 - 6.90 %    Basophils 0.40 0.00 - 1.80 %    Immature Granulocytes 0.50 0.00 - 0.90 %    Nucleated RBC 0.00 /100 WBC    Neutrophils (Absolute) 4.68 1.82 - 7.42 K/uL    Lymphs (Absolute) 2.43 1.00 - 4.80 K/uL    Monos (Absolute) 0.72 0.00 - 0.85 K/uL    Eos (Absolute) 0.10  0.00 - 0.51 K/uL    Baso (Absolute) 0.03 0.00 - 0.12 K/uL    Immature Granulocytes (abs) 0.04 0.00 - 0.11 K/uL    NRBC (Absolute) 0.00 K/uL   ACCU-CHEK GLUCOSE    Collection Time: 08/25/18  5:44 AM   Result Value Ref Range    Glucose - Accu-Ck 96 65 - 99 mg/dL       (click the triangle to expand results)    IMPRESSION:  - ESRD    * Etiology likely 2/2 HTN/DM    * MWF @ [facility]    * LAVF (+thrill/bruit)  - Non-healing wound left second toe    * Likely DM related complication    * No evidence of OM, but XR not sensitive for it    * MRI cancelled    * S/P left 2nd toe amputation  - HTN    * Goal BP < 140/90  - Anemia of CKD    * Goal Hgb 10-11    * Goal TSat > 30%  - Renal osteodystrophy    * Ca 8.6    * PO4 5.8    *     * Vit D 86  - HLD  - CAD     PLAN:  - MWF iHD  - UF as tolerated  - Dose all meds per ESRD rec's  - No dietary protein restrictions  - Low sodium diet  - Ok to transition to outpatient care from renal standpoint when stable from other issues

## 2018-08-25 NOTE — PROGRESS NOTES
Internal Medicine Interval Note  Note Author: Maicol Quiroz M.D.     Name Dariel Diamond 1965   Age/Sex 53 y.o. male   MRN [id number]   Code Status full     After 5PM or if no immediate response to page, please call for cross-coverage  Attending/Team: Chaim Gómez See Patient List for primary contact information  Call [phone] to page    1st Call - Day Intern (R1):   Gabriella 2nd Call - Day Sr. Resident (R2/R3):   Vamshi         Reason for interval visit  (Principal Problem)   osteomyelitis      Interval Problem Daily Status Update  (24 hours, problem oriented, brief subjective history, new lab/imaging data pertinent to that problem)     Patient afrebile, no pain, no fevers/chills.  Wound looks well per wound team, will need wound care.  Blood sugars in 100's.     Review of Systems   Reason unable to perform ROS: [place]ican speaker.       Disposition/Barriers to discharge:   none    Consultants/Specialty  Orthopedics- Luiz Ma  PCP: Pcp Not In Computer      Quality Measures  Quality-Core Measures   Reviewed items::  Labs reviewed and Medications reviewed  Rodríguez catheter::  No Rodríguez          Physical Exam       Vitals:    18 1915 18 0340 18 0700 18 1500   BP: 152/73 150/74 157/93 158/73   Pulse: 79 71 74 79   Resp: 18 18 16 16   Temp: 36.3 °C (97.4 °F) 36.6 °C (97.8 °F) 36.6 °C (97.9 °F) 36.7 °C (98 °F)   SpO2: 93% 92% 94% 95%   Weight:       Height:         Body mass index is 29.6 kg/m².    Oxygen Therapy:  Pulse Oximetry: 95 %, O2 (LPM): 0, O2 Delivery: None (Room Air)    Physical Exam   Constitutional: He is oriented to person, place, and time and well-developed, well-nourished, and in no distress.   HENT:   Head: Normocephalic and atraumatic.   Eyes: EOM are normal.   Neck: Normal range of motion. No tracheal deviation present.   Cardiovascular: Normal rate, regular rhythm and normal heart sounds.  Exam reveals no gallop and no friction rub.    No murmur  heard.  Pulmonary/Chest: Breath sounds normal. No respiratory distress. He has no wheezes. He has no rales.   Abdominal: Bowel sounds are normal. He exhibits no distension. There is no tenderness.   Musculoskeletal: Normal range of motion. He exhibits no edema.   Neurological: He is alert and oriented to person, place, and time.   Skin: Skin is dry. No rash noted.   Psychiatric: Mood and affect normal.             Assessment/Plan     Type 2 diabetes mellitus with chronic kidney disease on chronic dialysis (HCC)- (present on admission)   Assessment & Plan    - Pt has DM complicated by renal disease ESRD on HD M-W-F  - Requires home Insulin  - GFR 8, BUN 36, Cr 7.17, K 4.2, Glu 171, HbAlc 7.7  - with renal osteodystrophy- Ca 8.6, PO4 5.8, , Vit D 86    Plan  - Continue Lantus at 10 U QhS  - Basal humalog 4U qhS, plus sliding scale  - HD per nephrology        HTN (hypertension)- (present on admission)   Assessment & Plan    - Continue home Lisinopril 15 mg PO BID  - Coreg 25 mg PO BID  - Torsemide 20 mg        Dyslipidemia- (present on admission)   Assessment & Plan    - Continue home Lipitor 20 mg

## 2018-08-25 NOTE — PROGRESS NOTES
Pt had uneventful night. Pt slept all night. No insulin given during shift. BS WNL. Will continue to monitor.

## 2018-08-25 NOTE — CARE PLAN
Problem: Safety  Goal: Will remain free from injury  Outcome: PROGRESSING AS EXPECTED  Will utilize bed alarms if necesarry, non skid socks, remind patient to call for assistance if needed      Problem: Infection  Goal: Will remain free from infection  Outcome: PROGRESSING AS EXPECTED  Hand hygiene will be performed when entering room, ordered abx meds will be given

## 2018-08-26 ENCOUNTER — PATIENT OUTREACH (OUTPATIENT)
Dept: HEALTH INFORMATION MANAGEMENT | Facility: OTHER | Age: 53
End: 2018-08-26

## 2018-08-26 LAB
BACTERIA SPEC ANAEROBE CULT: NORMAL
BACTERIA SPEC ANAEROBE CULT: NORMAL
BACTERIA TISS AEROBE CULT: ABNORMAL
GRAM STN SPEC: ABNORMAL
GRAM STN SPEC: ABNORMAL
SIGNIFICANT IND 70042: ABNORMAL
SIGNIFICANT IND 70042: ABNORMAL
SIGNIFICANT IND 70042: NORMAL
SIGNIFICANT IND 70042: NORMAL
SITE SITE: ABNORMAL
SITE SITE: ABNORMAL
SITE SITE: NORMAL
SITE SITE: NORMAL
SOURCE SOURCE: ABNORMAL
SOURCE SOURCE: ABNORMAL
SOURCE SOURCE: NORMAL
SOURCE SOURCE: NORMAL

## 2018-08-27 LAB
BACTERIA BLD CULT: NORMAL
BACTERIA BLD CULT: NORMAL
SIGNIFICANT IND 70042: NORMAL
SIGNIFICANT IND 70042: NORMAL
SITE SITE: NORMAL
SITE SITE: NORMAL
SOURCE SOURCE: NORMAL
SOURCE SOURCE: NORMAL

## 2018-08-28 ENCOUNTER — NON-PROVIDER VISIT (OUTPATIENT)
Dept: WOUND CARE | Facility: MEDICAL CENTER | Age: 53
End: 2018-08-28
Attending: NURSE PRACTITIONER
Payer: MEDICARE

## 2018-08-28 PROCEDURE — 99201 HCHG LEVEL I NEW PATIENT: CPT

## 2018-08-28 PROCEDURE — 99211 OFF/OP EST MAY X REQ PHY/QHP: CPT

## 2018-08-28 NOTE — CERTIFICATION
"                 Advanced Wound Care   [facility]   [address]   [address]   [place] [address]   [phone] Fax: [phone]     Initial Evaluation  For Certification Period:  08/28/2018 - 11/18/2018          Referring Physician:  Amina PARKER  Primary Physician:  Jacqueline HANNA  Consulting Physicians:   Wound(s):  Left 2nd toe amputation site, left 3-5 digit tenotomies site  Start of Care:  08/28/2018       Subjective:       HPI: From discharge summary note on 8/25/18: \"53-year-old patient with a past medical history of end-stage renal failure on hemodialysis, hypertension, type II-diabetes mellitus, anemia of chronic disease, renal osteodystrophy, dyslipidemia presented to the emergency department as a transfer from [facility] for left second toe diabetic ulcer with exposed bone.  Necrosis was noted and the patient underwent amputation on 08/23/18.  Unasyn was chosen as an antibiotics and culture were positive for Enterococcus and Gram negatives. Follow ups with orthopedics, primary care provider, nephrology, and wound care.  Patient was discharged on Augmentin and Linzeolid for 2 days.\"   Pt also had 3-5 toe tenotomies on 08/23/18.         Pain: Denies pain     Past Medical History:    Past Medical History:   Diagnosis Date   • Depression    • Dilated cardiomyopathy (HCC)    • Hyperlipidemia    • Hypertension    • Type II or unspecified type diabetes mellitus without mention of complication, not stated as uncontrolled           Current Medications:   Current Outpatient Prescriptions:   •  aspirin EC 81 MG EC tablet, Take 1 Tab by mouth every day., Disp: 30 Tab, Rfl: 2  •  sevelamer carbonate (RENVELA) 800 MG Tab tablet, Take 1 Tab by mouth 3 times a day, with meals., Disp: 270 Tab, Rfl: 0  •  insulin lispro (HUMALOG) 100 UNIT/ML Solution, Inject 4 Units as instructed 3 times a day before meals., Disp: 10 mL, Rfl: 1  •  vitamin D, Ergocalciferol, (DRISDOL) 13354 " units Cap capsule, Take 50,000 Units by mouth every 7 days., Disp: , Rfl:   •  atorvastatin (LIPITOR) 20 MG Tab, TAKE 1 TABLET EVERY EVENING, Disp: 90 Tab, Rfl: 1  •  lisinopril (PRINIVIL) 10 MG Tab, Take 1 Tab by mouth every 12 hours., Disp: 180 Tab, Rfl: 3  •  insulin glargine (LANTUS) 100 UNIT/ML Solution, Inject 12 Units as instructed every evening., Disp: , Rfl:   •  carvedilol (COREG) 25 MG TABS, Take 1 Tab by mouth 2 times a day, with meals., Disp: 60 Tab, Rfl: 5  •  torsemide (DEMADEX) 20 MG TABS, Take 1 Tab by mouth every day., Disp: 30 Tab, Rfl: 3      Allergies:    Allergies   Allergen Reactions   • Contrast Media With Iodine [Iodine] Rash and Itching     Per patient          Past Surgical History:    Past Surgical History:   Procedure Laterality Date   • TOE AMPUTATION Left 8/23/2018    Procedure: TOE AMPUTATION L 2ND TOE;  Surgeon: Luzi Ma M.D.;  Location: SURGERY [facility];  Service: Orthopedics   • AV FISTULA CREATION  5/1/2014    Performed by Beau Cowart M.D. at SURGERY [facility]          Social History:     Social History     Social History   • Marital status:      Spouse name: N/A   • Number of children: N/A   • Years of education: N/A     Occupational History   • Not on file.     Social History Main Topics   • Smoking status: Never Smoker   • Smokeless tobacco: Never Used   • Alcohol use No   • Drug use: No   • Sexual activity: Not on file     Other Topics Concern   • Not on file     Social History Narrative   • No narrative on file           Objective:       Tests and Measures: BLE DP and PT easily palpable 2+ foot warm to touch,  this AM usually under 115. Monofilament test LLE 5/10 RLE 3/10.      Orthotic, protective, supportive devices: Four wheel walker at home but not using at this time     Fall Risk Assessment (cabrera all that apply with an X):  Completed at initial evaluation on 08/28/2018              65 years or older                Fall within the  last 2 years, uses   Ambulatory devices  xLoss of protective sensation in feet,   Use of prostethic/orthotic, years               xPresence of lower extremity/foot/toe amputation              xTaking medication that increases risk (per facility policy)     Interventions Recommended (if any of the above are selected):              Use of Assistive Device:________________________              Supervision with ambulation:  Caregiver              Assistance with ambulation:  Caregiver              xHome safety education:  Educational material provided         Wound Characteristics                                                    Location: L foot 2nd toe amp site and 3-5 digit tenotomies site   Initial Evaluation  Date: 08/28/2018   Tissue Type and %: Well approximated incision sites with sutures   Periwound: Intact   Drainage: None   Exposed structures Sutures   Wound Edges:   Closed   Odor: None   S&S of Infection:   None   Edema: Local   Sensation: Partial LOPS               Measurements:    Initial Evaluation  Date: 08/28/2018   Length (cm) Not measured, incision sites well approximated   Width (cm)    Depth (cm)    Area (cm2)    Tract/undermine         Procedures:                Debridement : None              Cleansed with: NS and gauze              Periwound protected with: Skin prep              Primary dressing: Mepitel contact layer              Secondary Dressing: Nonadhesive foam secured with hypafix              Other: Kerlix then ACE wrap     Patient Education: Instructed pt on s/s infection - chills, fever, malaise, NV, increased redness/swelling/pain/exudate - and to go to ER/Urgent Care; keep dressing clean, dry, intact- if it gets wet change with dry dressing, if pt notices drainage from wound call [facility] for earlier appointment; perform daily foot checks due to partial LOPS; and return to [facility] next week for skin check then following week for LPS round.    Professional Collaboration:  service  (Renea [id number]). Initial evaluation certification sent to referring provider, Amina PARKER via EPIC. Scheduled for LPS on 9/14/18 per inpatient LPS note     Assessment:       Wound etiology: Surgical - 2nd toe amputation and 3-5 toe tenotomies     Wound Progress:  TBD -  Initial encounter     Rationale for Treatment: Mepitel to cover sutures, nonadhesive foam to pad and protect     Patient tolerance/compliance: Pt tolerated treatment well and appears eager for wound to heal     Complicating factors: DM, ESRD     Need for ongoing Advanced Wound Care services: Patient requires skilled therapeutic wound care services for product selection, application of product, debridement, close monitoring with clinical assessment for expedite of wound healing.          Plan:       Treatment Plan and Recommendations:  Diagnosis/ICD10: E11.65 (ICD-10-CM) - Type 2 diabetes mellitus with hyperglycemia, without long-term current use of insulin ([facility])  E11.621,L97.523 (ICD-10-CM) - Diabetic ulcer of toe of left foot associated with type 2 diabetes mellitus, with necrosis of muscle ([facility])    Procedures/CPT: Level 1 06593     Frequency: 1x/w        Treatment Goals: STG 2 Weeks          LTG 4 Weeks   Granulation Tissue: Resolved Resolved   Decrease Necrotic Tissue to:     Wound Phase:             Maturation Maturation   Decrease Size by:     Periwound:           Decrease tracts/undermining by:     Decrease Pain:              At the time of each visit a thorough assessment of the patient is completed to assure the  appropriateness of our plan of care.  The dressings or modalities may need to be adapted   from the original plan to address any significant changes in the wound environment.              Clinician Signature:_______________________________Date__________________        Physician Signature:______________________________Date:__________________

## 2018-09-05 ENCOUNTER — NON-PROVIDER VISIT (OUTPATIENT)
Dept: WOUND CARE | Facility: MEDICAL CENTER | Age: 53
End: 2018-09-05
Attending: NURSE PRACTITIONER
Payer: MEDICARE

## 2018-09-05 PROCEDURE — 99211 OFF/OP EST MAY X REQ PHY/QHP: CPT

## 2018-09-05 NOTE — WOUND TEAM
"                 Advanced Wound Care   [facility]   [address]   [address]   [place] [address]   [phone] Fax: [phone]     Encounter Note  For Certification Period:  08/28/2018 - 11/18/2018          Referring Physician:  Amina PARKER  Primary Physician: Jacqueline HANNA  Consulting Physicians:   Wound(s): Left 2nd toe amputation site, left 3-5 digit tenotomies site  Start of Care: 08/28/2018       Subjective:       HPI: From discharge summary note on 8/25/18: \"53-year-old patient with a past medical history of end-stage renal failure on hemodialysis, hypertension, type II-diabetes mellitus, anemia of chronic disease, renal osteodystrophy, dyslipidemia presented to the emergency department as a transfer from [facility] for left second toe diabetic ulcer with exposed bone.  Necrosis was noted and the patient underwent amputation on 08/23/18.  Unasyn was chosen as an antibiotics and culture were positive for Enterococcus and Gram negatives. Follow ups with orthopedics, primary care provider, nephrology, and wound care.  Patient was discharged on Augmentin and Linzeolid for 2 days.\"   Pt also had 3-5 toe tenotomies on 08/23/18.         Pain: Patient denies pain at incision sites today.     Past Medical History:    Past Medical History:   Diagnosis Date   • Depression    • Dilated cardiomyopathy (HCC)    • Hyperlipidemia    • Hypertension    • Type II or unspecified type diabetes mellitus without mention of complication, not stated as uncontrolled           Current Medications:   Current Outpatient Prescriptions:   •  aspirin EC 81 MG EC tablet, Take 1 Tab by mouth every day., Disp: 30 Tab, Rfl: 2  •  sevelamer carbonate (RENVELA) 800 MG Tab tablet, Take 1 Tab by mouth 3 times a day, with meals., Disp: 270 Tab, Rfl: 0  •  insulin lispro (HUMALOG) 100 UNIT/ML Solution, Inject 4 Units as instructed 3 times a day before meals., Disp: 10 mL, Rfl: 1  •  vitamin D, " Ergocalciferol, (DRISDOL) 36782 units Cap capsule, Take 50,000 Units by mouth every 7 days., Disp: , Rfl:   •  atorvastatin (LIPITOR) 20 MG Tab, TAKE 1 TABLET EVERY EVENING, Disp: 90 Tab, Rfl: 1  •  lisinopril (PRINIVIL) 10 MG Tab, Take 1 Tab by mouth every 12 hours., Disp: 180 Tab, Rfl: 3  •  insulin glargine (LANTUS) 100 UNIT/ML Solution, Inject 12 Units as instructed every evening., Disp: , Rfl:   •  carvedilol (COREG) 25 MG TABS, Take 1 Tab by mouth 2 times a day, with meals., Disp: 60 Tab, Rfl: 5  •  torsemide (DEMADEX) 20 MG TABS, Take 1 Tab by mouth every day., Disp: 30 Tab, Rfl: 3      Allergies:    Allergies   Allergen Reactions   • Contrast Media With Iodine [Iodine] Rash and Itching     Per patient          Past Surgical History:    Past Surgical History:   Procedure Laterality Date   • TOE AMPUTATION Left 8/23/2018    Procedure: TOE AMPUTATION L 2ND TOE;  Surgeon: Luiz Ma M.D.;  Location: SURGERY [facility];  Service: Orthopedics   • AV FISTULA CREATION  5/1/2014    Performed by Beau Cowart M.D. at SURGERY [facility]          Social History:     Social History     Social History   • Marital status:      Spouse name: N/A   • Number of children: N/A   • Years of education: N/A     Occupational History   • Not on file.     Social History Main Topics   • Smoking status: Never Smoker   • Smokeless tobacco: Never Used   • Alcohol use No   • Drug use: No   • Sexual activity: Not on file     Other Topics Concern   • Not on file     Social History Narrative   • No narrative on file           Objective:       Tests and Measures:   8/28/2018: BLE DP and PT easily palpable 2+ foot warm to touch,  this AM usually under 115. Monofilament test LLE 5/10 RLE 3/10.      Orthotic, protective, supportive devices: Cane     Fall Risk Assessment (cabrera all that apply with an X):  Completed at initial evaluation on 08/28/2018                 65 years or older                   Fall within  the last 2 years   X Uses ambulatory devices  X Loss of protective sensation in feet      Use of prostethic/orthotic              X Presence of lower extremity/foot/toe amputation              X Taking medication that increases risk (per facility policy)     Interventions Recommended (if any of the above are selected):                 Use of Assistive Device: Cane                 Supervision with ambulation: Independent                 Assistance with ambulation: Independent              X Home safety education:  Educational material provided         Wound Characteristics                                                    Location: Left foot 2nd toe amp site and 3-5 digit tenotomies site   Initial Evaluation  Date: 08/28/2018 Encounter Date:  9/5/2018   Tissue Type and %: Well approximated incision sites with sutures Well approximated incision sites with black sutures   Periwound: Intact Intact   Drainage: None None   Exposed structures Sutures Sutures   Wound Edges:   Closed Closed   Odor: None None   S&S of Infection:   None None   Edema: Local Local   Sensation: Partial LOPS LOPS               Measurements:   Left foot 2nd toe amp site and 3-5 digit tenotomies site   Initial Evaluation  Date: 08/28/2018 Encounter Date:  9/5/2018   Length (cm) Not measured, incision sites well approximated Approximated incision sites, not measured.   Width (cm)     Depth (cm)     Area (cm2)     Tract/undermine                    Procedures:                Debridement: None              Cleansed with: No rinse foam cleanser to foot, then NS.              Periwound protected with: Sween cream to intact skin of foot.              Primary dressing: Mepitel as a non-stick contact layer.              Secondary Dressing: Nonadhesive foam secured with kerlix.              Other: Ace wrap around foot and ankle.     Patient Education: Wound progress and plan of care discussed with patient through  services (Sydnie [id number]).  Reviewed s/s of infection (increased redness/pain/swelling/drainage, fever, fatigue, malaise, N/V), and advised patient to call the office or go to Urgent Care/ER if necessary. Discussed importance of checking feet daily, and of blood glucose control. Patient verbalized understanding of instructions, he reports that his blood glucose is always 115 or less.    Professional Collaboration:  service (Sydnie [id number])     Assessment:       Wound etiology: Surgical - 2nd toe amputation and 3-5 toe tenotomies.     Wound Progress: Incision sites remain approximated.     Rationale for Treatment: Mepitel to cover sutures. Nonadhesive foam to pad and protect. Kerlix to secure dressings.     Patient tolerance/compliance: Patient tolerated treatment well, compliant with plan of care and appointments.     Complicating factors: DM, ESRD.     Need for ongoing Advanced Wound Care services: Patient requires skilled therapeutic wound care services for product selection, application of product, debridement, close monitoring with clinical assessment to expedite wound healing.          Plan:       Treatment Plan and Recommendations:  Diagnosis/ICD10: E11.65 (ICD-10-CM) - Type 2 diabetes mellitus with hyperglycemia, without long-term current use of insulin (HCC)  E11.621,L97.523 (ICD-10-CM) - Diabetic ulcer of toe of left foot associated with type 2 diabetes mellitus, with necrosis of muscle (HCC)    Procedures/CPT: Level I Visit Established 79001     Frequency: 1x / week        Treatment Goals: STG 2 Weeks          LTG 4 Weeks   Granulation Tissue: Resolved Resolved   Decrease Necrotic Tissue to:     Wound Phase:             Maturation Maturation   Decrease Size by:     Periwound:           Decrease tracts/undermining by:     Decrease Pain:              At the time of each visit a thorough assessment of the patient is completed to assure the  appropriateness of our plan of care.  The dressings or modalities may need to be  adapted   from the original plan to address any significant changes in the wound environment.

## 2018-09-14 ENCOUNTER — OFFICE VISIT (OUTPATIENT)
Dept: WOUND CARE | Facility: MEDICAL CENTER | Age: 53
End: 2018-09-14
Attending: NURSE PRACTITIONER
Payer: MEDICARE

## 2018-09-14 PROCEDURE — 99213 OFFICE O/P EST LOW 20 MIN: CPT

## 2018-09-14 NOTE — PROGRESS NOTES
LIMB PRESERVATION SERVICE ROUNDS     Patient seen in collaboration with interdisciplinary team during LPS rounds in wound clinic for L Foot 2nd toe amp with Left foot  Digits 2 through 5 Tenotomies    Patient states blood sugar were 150     OBJECTIVE FINDINGS:      PROCEDURE   Wound care completed by Catia Baxter RN     PLAN:   Wound Care: RN removed sutures from amp site, left MARQUISE. PRN no further AWC needs  Offloading: Rx to Ability given to Pt  Antibiotics: none -   Surgery: None  Referral: None     LPS: PRN - Pt told to f/u as needed if site deteriotes

## 2018-09-20 ENCOUNTER — OFFICE VISIT (OUTPATIENT)
Dept: WOUND CARE | Facility: MEDICAL CENTER | Age: 53
End: 2018-09-20
Attending: NURSE PRACTITIONER
Payer: MEDICARE

## 2018-09-20 DIAGNOSIS — N18.6 TYPE 2 DIABETES MELLITUS WITH CHRONIC KIDNEY DISEASE ON CHRONIC DIALYSIS, WITHOUT LONG-TERM CURRENT USE OF INSULIN (HCC): ICD-10-CM

## 2018-09-20 DIAGNOSIS — S98.132A AMPUTATED TOE OF LEFT FOOT (HCC): ICD-10-CM

## 2018-09-20 DIAGNOSIS — Z99.2 TYPE 2 DIABETES MELLITUS WITH CHRONIC KIDNEY DISEASE ON CHRONIC DIALYSIS, WITHOUT LONG-TERM CURRENT USE OF INSULIN (HCC): ICD-10-CM

## 2018-09-20 DIAGNOSIS — E11.22 TYPE 2 DIABETES MELLITUS WITH CHRONIC KIDNEY DISEASE ON CHRONIC DIALYSIS, WITHOUT LONG-TERM CURRENT USE OF INSULIN (HCC): ICD-10-CM

## 2018-09-20 PROCEDURE — 99213 OFFICE O/P EST LOW 20 MIN: CPT

## 2018-09-20 PROCEDURE — 99212 OFFICE O/P EST SF 10 MIN: CPT | Performed by: NURSE PRACTITIONER

## 2018-09-20 NOTE — PATIENT INSTRUCTIONS
Pt instr dc today, keep area clean, it will be fragile for a few days, bathe and dry area gently, only ever regains a maximum of 80% of the tensile strength of the surrounding skin, remodeling of scar can continue for 6mo - a year. Contact PCP for a referral back her if any problems with area opening and draining again. Pt understands.    Prescription given to patient for diabetic shoes.

## 2018-09-21 NOTE — PROGRESS NOTES
Left 2nd toe amp site healed  Provided pt with Rx for diabetic shoes and inserts  Reviewed diabetic foot care  Advised him to RTC if wound recurs    Discharge from [facility]

## 2018-09-26 ENCOUNTER — APPOINTMENT (OUTPATIENT)
Dept: WOUND CARE | Facility: MEDICAL CENTER | Age: 53
End: 2018-09-26
Attending: NURSE PRACTITIONER
Payer: MEDICARE

## 2025-01-01 ENCOUNTER — APPOINTMENT (OUTPATIENT)
Dept: CARDIOLOGY | Facility: MEDICAL CENTER | Age: 60
DRG: 215 | End: 2025-01-01
Attending: INTERNAL MEDICINE
Payer: MEDICARE

## 2025-01-01 ENCOUNTER — APPOINTMENT (OUTPATIENT)
Dept: RADIOLOGY | Facility: MEDICAL CENTER | Age: 60
DRG: 215 | End: 2025-01-01
Payer: MEDICARE

## 2025-01-01 ENCOUNTER — APPOINTMENT (OUTPATIENT)
Dept: CARDIOLOGY | Facility: MEDICAL CENTER | Age: 60
End: 2025-01-01
Attending: INTERNAL MEDICINE
Payer: MEDICARE

## 2025-01-01 ENCOUNTER — APPOINTMENT (OUTPATIENT)
Dept: RADIOLOGY | Facility: MEDICAL CENTER | Age: 60
DRG: 215 | End: 2025-01-01
Attending: INTERNAL MEDICINE
Payer: MEDICARE

## 2025-01-01 ENCOUNTER — APPOINTMENT (OUTPATIENT)
Dept: RADIOLOGY | Facility: MEDICAL CENTER | Age: 60
DRG: 215 | End: 2025-01-01
Attending: EMERGENCY MEDICINE
Payer: MEDICARE

## 2025-01-01 ENCOUNTER — ANESTHESIA EVENT (OUTPATIENT)
Dept: SURGERY | Facility: MEDICAL CENTER | Age: 60
End: 2025-01-01
Payer: MEDICARE

## 2025-01-01 ENCOUNTER — HOSPITAL ENCOUNTER (OUTPATIENT)
Dept: RADIOLOGY | Facility: MEDICAL CENTER | Age: 60
End: 2025-04-28
Attending: INTERNAL MEDICINE

## 2025-01-01 ENCOUNTER — ANESTHESIA (OUTPATIENT)
Dept: SURGERY | Facility: MEDICAL CENTER | Age: 60
End: 2025-01-01
Payer: MEDICARE

## 2025-01-01 ENCOUNTER — HOSPITAL ENCOUNTER (INPATIENT)
Facility: MEDICAL CENTER | Age: 60
LOS: 5 days | End: 2025-04-30
Attending: EMERGENCY MEDICINE | Admitting: INTERNAL MEDICINE
Payer: MEDICARE

## 2025-01-01 ENCOUNTER — HOSPITAL ENCOUNTER (OUTPATIENT)
Dept: RADIOLOGY | Facility: MEDICAL CENTER | Age: 60
End: 2025-04-26
Attending: INTERNAL MEDICINE

## 2025-01-01 ENCOUNTER — HOSPITAL ENCOUNTER (OUTPATIENT)
Dept: RADIOLOGY | Facility: MEDICAL CENTER | Age: 60
End: 2025-04-27
Attending: INTERNAL MEDICINE

## 2025-01-01 VITALS
WEIGHT: 241.62 LBS | RESPIRATION RATE: 32 BRPM | HEART RATE: 128 BPM | DIASTOLIC BLOOD PRESSURE: 37 MMHG | TEMPERATURE: 98.2 F | HEIGHT: 72 IN | SYSTOLIC BLOOD PRESSURE: 77 MMHG | BODY MASS INDEX: 32.73 KG/M2 | OXYGEN SATURATION: 92 %

## 2025-01-01 DIAGNOSIS — S02.2XXA CLOSED FRACTURE OF NASAL BONE, INITIAL ENCOUNTER: ICD-10-CM

## 2025-01-01 DIAGNOSIS — I21.4 NSTEMI (NON-ST ELEVATION MYOCARDIAL INFARCTION) (HCC): ICD-10-CM

## 2025-01-01 DIAGNOSIS — J96.01 ACUTE RESPIRATORY FAILURE WITH HYPOXIA (HCC): ICD-10-CM

## 2025-01-01 DIAGNOSIS — N18.6 ESRD (END STAGE RENAL DISEASE) (HCC): ICD-10-CM

## 2025-01-01 DIAGNOSIS — I21.4 NSTEMI (NON-ST ELEVATED MYOCARDIAL INFARCTION) (HCC): ICD-10-CM

## 2025-01-01 DIAGNOSIS — S01.511A LIP LACERATION, INITIAL ENCOUNTER: ICD-10-CM

## 2025-01-01 DIAGNOSIS — Z95.5 S/P DRUG ELUTING CORONARY STENT PLACEMENT: ICD-10-CM

## 2025-01-01 DIAGNOSIS — R55 SYNCOPE AND COLLAPSE: ICD-10-CM

## 2025-01-01 DIAGNOSIS — S09.90XA CLOSED HEAD INJURY, INITIAL ENCOUNTER: ICD-10-CM

## 2025-01-01 DIAGNOSIS — S01.81XA FOREHEAD LACERATION, INITIAL ENCOUNTER: ICD-10-CM

## 2025-01-01 DIAGNOSIS — R57.0 CARDIOGENIC SHOCK (HCC): ICD-10-CM

## 2025-01-01 DIAGNOSIS — I46.9 CARDIAC ARREST (HCC): ICD-10-CM

## 2025-01-01 DIAGNOSIS — E21.5 PARATHYROID ABNORMALITY (HCC): ICD-10-CM

## 2025-01-01 LAB
ABO + RH BLD: NORMAL
ABO GROUP BLD: NORMAL
ACT BLD: 147 SEC (ref 74–137)
ACT BLD: 153 SEC (ref 74–137)
ACT BLD: 158 SEC (ref 74–137)
ACT BLD: 164 SEC (ref 74–137)
ACT BLD: 175 SEC (ref 74–137)
ACT BLD: 176 SEC (ref 74–137)
ACT BLD: 216 SEC (ref 74–137)
ACT BLD: 268 SEC (ref 74–137)
ACT BLD: 279 SEC (ref 74–137)
ACT BLD: 291 SEC (ref 74–137)
ALBUMIN SERPL BCP-MCNC: 2.4 G/DL (ref 3.2–4.9)
ALBUMIN SERPL BCP-MCNC: 2.5 G/DL (ref 3.2–4.9)
ALBUMIN SERPL BCP-MCNC: 2.5 G/DL (ref 3.2–4.9)
ALBUMIN SERPL BCP-MCNC: 2.6 G/DL (ref 3.2–4.9)
ALBUMIN SERPL BCP-MCNC: 3.1 G/DL (ref 3.2–4.9)
ALBUMIN SERPL BCP-MCNC: 3.2 G/DL (ref 3.2–4.9)
ALBUMIN SERPL BCP-MCNC: 3.5 G/DL (ref 3.2–4.9)
ALBUMIN SERPL BCP-MCNC: 3.9 G/DL (ref 3.2–4.9)
ALBUMIN/GLOB SERPL: 1.1 G/DL
ALBUMIN/GLOB SERPL: 1.1 G/DL
ALBUMIN/GLOB SERPL: 1.2 G/DL
ALBUMIN/GLOB SERPL: 1.3 G/DL
ALP SERPL-CCNC: 105 U/L (ref 30–99)
ALP SERPL-CCNC: 113 U/L (ref 30–99)
ALP SERPL-CCNC: 115 U/L (ref 30–99)
ALP SERPL-CCNC: 69 U/L (ref 30–99)
ALP SERPL-CCNC: 70 U/L (ref 30–99)
ALP SERPL-CCNC: 71 U/L (ref 30–99)
ALP SERPL-CCNC: 89 U/L (ref 30–99)
ALP SERPL-CCNC: 97 U/L (ref 30–99)
ALT SERPL-CCNC: 15 U/L (ref 2–50)
ALT SERPL-CCNC: 30 U/L (ref 2–50)
ALT SERPL-CCNC: 35 U/L (ref 2–50)
ALT SERPL-CCNC: 39 U/L (ref 2–50)
ALT SERPL-CCNC: 41 U/L (ref 2–50)
ALT SERPL-CCNC: 44 U/L (ref 2–50)
ALT SERPL-CCNC: 44 U/L (ref 2–50)
ALT SERPL-CCNC: 77 U/L (ref 2–50)
AMORPHOUS CRYSTALS 1764: PRESENT /HPF
ANION GAP SERPL CALC-SCNC: 13 MMOL/L (ref 7–16)
ANION GAP SERPL CALC-SCNC: 14 MMOL/L (ref 7–16)
ANION GAP SERPL CALC-SCNC: 15 MMOL/L (ref 7–16)
ANION GAP SERPL CALC-SCNC: 16 MMOL/L (ref 7–16)
ANION GAP SERPL CALC-SCNC: 17 MMOL/L (ref 7–16)
ANION GAP SERPL CALC-SCNC: 17 MMOL/L (ref 7–16)
ANION GAP SERPL CALC-SCNC: 20 MMOL/L (ref 7–16)
ANION GAP SERPL CALC-SCNC: 23 MMOL/L (ref 7–16)
ANION GAP SERPL CALC-SCNC: 26 MMOL/L (ref 7–16)
ANION GAP SERPL CALC-SCNC: 28 MMOL/L (ref 7–16)
ANION GAP SERPL CALC-SCNC: 30 MMOL/L (ref 7–16)
ANION GAP SERPL CALC-SCNC: 33 MMOL/L (ref 7–16)
APPEARANCE UR: ABNORMAL
APTT PPP: 29 SEC (ref 24.7–36)
APTT PPP: 32.2 SEC (ref 24.7–36)
ARTERIAL PATENCY WRIST A: ABNORMAL
AST SERPL-CCNC: 181 U/L (ref 12–45)
AST SERPL-CCNC: 20 U/L (ref 12–45)
AST SERPL-CCNC: 261 U/L (ref 12–45)
AST SERPL-CCNC: 31 U/L (ref 12–45)
AST SERPL-CCNC: 323 U/L (ref 12–45)
AST SERPL-CCNC: 33 U/L (ref 12–45)
AST SERPL-CCNC: 41 U/L (ref 12–45)
AST SERPL-CCNC: 46 U/L (ref 12–45)
B-OH-BUTYR SERPL-MCNC: 2.05 MMOL/L (ref 0.02–0.27)
BACTERIA #/AREA URNS HPF: ABNORMAL /HPF
BACTERIA BLD CULT: ABNORMAL
BACTERIA BLD CULT: NORMAL
BARCODED ABORH UBTYP: 5100
BARCODED ABORH UBTYP: 5100
BARCODED PRD CODE UBPRD: NORMAL
BARCODED PRD CODE UBPRD: NORMAL
BARCODED UNIT NUM UBUNT: NORMAL
BARCODED UNIT NUM UBUNT: NORMAL
BASE EXCESS BLDA CALC-SCNC: -1 MMOL/L (ref -4–3)
BASE EXCESS BLDA CALC-SCNC: -1 MMOL/L (ref -4–3)
BASE EXCESS BLDA CALC-SCNC: -13 MMOL/L (ref -4–3)
BASE EXCESS BLDA CALC-SCNC: -15 MMOL/L (ref -4–3)
BASE EXCESS BLDA CALC-SCNC: -17 MMOL/L (ref -4–3)
BASE EXCESS BLDA CALC-SCNC: -18 MMOL/L (ref -4–3)
BASE EXCESS BLDA CALC-SCNC: -2 MMOL/L (ref -4–3)
BASE EXCESS BLDA CALC-SCNC: -2 MMOL/L (ref -4–3)
BASE EXCESS BLDA CALC-SCNC: -3 MMOL/L (ref -4–3)
BASE EXCESS BLDA CALC-SCNC: -4 MMOL/L (ref -4–3)
BASE EXCESS BLDA CALC-SCNC: -4 MMOL/L (ref -4–3)
BASE EXCESS BLDA CALC-SCNC: -5 MMOL/L (ref -4–3)
BASE EXCESS BLDA CALC-SCNC: -7 MMOL/L (ref -4–3)
BASE EXCESS BLDA CALC-SCNC: -8 MMOL/L (ref -4–3)
BASE EXCESS BLDA CALC-SCNC: 0 MMOL/L (ref -4–3)
BASE EXCESS BLDA CALC-SCNC: 1 MMOL/L (ref -4–3)
BASE EXCESS BLDA CALC-SCNC: 3 MMOL/L (ref -4–3)
BASE EXCESS BLDA CALC-SCNC: 5 MMOL/L (ref -4–3)
BASE EXCESS BLDA CALC-SCNC: 5 MMOL/L (ref -4–3)
BASE EXCESS BLDA CALC-SCNC: 6 MMOL/L (ref -4–3)
BASE EXCESS BLDA CALC-SCNC: 7 MMOL/L (ref -4–3)
BASE EXCESS BLDV CALC-SCNC: -1 MMOL/L (ref -2–3)
BASE EXCESS BLDV CALC-SCNC: -13 MMOL/L (ref -2–3)
BASE EXCESS BLDV CALC-SCNC: -17 MMOL/L (ref -2–3)
BASE EXCESS BLDV CALC-SCNC: -2 MMOL/L (ref -2–3)
BASE EXCESS BLDV CALC-SCNC: -3 MMOL/L (ref -2–3)
BASE EXCESS BLDV CALC-SCNC: -3 MMOL/L (ref -2–3)
BASE EXCESS BLDV CALC-SCNC: -8 MMOL/L (ref -2–3)
BASE EXCESS BLDV CALC-SCNC: 0 MMOL/L (ref -2–3)
BASE EXCESS BLDV CALC-SCNC: 3 MMOL/L (ref -2–3)
BASE EXCESS BLDV CALC-SCNC: 6 MMOL/L (ref -2–3)
BASE EXCESS BLDV CALC-SCNC: 7 MMOL/L (ref -2–3)
BASOPHILS # BLD AUTO: 0 % (ref 0–1.8)
BASOPHILS # BLD AUTO: 0.4 % (ref 0–1.8)
BASOPHILS # BLD: 0 K/UL (ref 0–0.12)
BASOPHILS # BLD: 0.05 K/UL (ref 0–0.12)
BILIRUB CONJ SERPL-MCNC: 0.3 MG/DL (ref 0.1–0.5)
BILIRUB CONJ SERPL-MCNC: 0.4 MG/DL (ref 0.1–0.5)
BILIRUB CONJ SERPL-MCNC: 0.4 MG/DL (ref 0.1–0.5)
BILIRUB CONJ SERPL-MCNC: 0.5 MG/DL (ref 0.1–0.5)
BILIRUB CONJ SERPL-MCNC: 0.8 MG/DL (ref 0.1–0.5)
BILIRUB CONJ SERPL-MCNC: 0.8 MG/DL (ref 0.1–0.5)
BILIRUB INDIRECT SERPL-MCNC: 0.3 MG/DL (ref 0–1)
BILIRUB INDIRECT SERPL-MCNC: 0.3 MG/DL (ref 0–1)
BILIRUB INDIRECT SERPL-MCNC: 0.4 MG/DL (ref 0–1)
BILIRUB INDIRECT SERPL-MCNC: 0.4 MG/DL (ref 0–1)
BILIRUB INDIRECT SERPL-MCNC: 1.4 MG/DL (ref 0–1)
BILIRUB INDIRECT SERPL-MCNC: 2.3 MG/DL (ref 0–1)
BILIRUB SERPL-MCNC: 0.6 MG/DL (ref 0.1–1.5)
BILIRUB SERPL-MCNC: 0.7 MG/DL (ref 0.1–1.5)
BILIRUB SERPL-MCNC: 0.8 MG/DL (ref 0.1–1.5)
BILIRUB SERPL-MCNC: 0.9 MG/DL (ref 0.1–1.5)
BILIRUB SERPL-MCNC: 1 MG/DL (ref 0.1–1.5)
BILIRUB SERPL-MCNC: 2 MG/DL (ref 0.1–1.5)
BILIRUB SERPL-MCNC: 2.2 MG/DL (ref 0.1–1.5)
BILIRUB SERPL-MCNC: 3.1 MG/DL (ref 0.1–1.5)
BLD GP AB SCN SERPL QL: NORMAL
BODY TEMPERATURE: ABNORMAL DEGREES
BREATHS SETTING VENT: 14
BREATHS SETTING VENT: 18
BREATHS SETTING VENT: 24
BREATHS SETTING VENT: 28
BREATHS SETTING VENT: 30
BUN SERPL-MCNC: 22 MG/DL (ref 8–22)
BUN SERPL-MCNC: 27 MG/DL (ref 8–22)
BUN SERPL-MCNC: 32 MG/DL (ref 8–22)
BUN SERPL-MCNC: 34 MG/DL (ref 8–22)
BUN SERPL-MCNC: 36 MG/DL (ref 8–22)
BUN SERPL-MCNC: 39 MG/DL (ref 8–22)
BUN SERPL-MCNC: 46 MG/DL (ref 8–22)
BUN SERPL-MCNC: 53 MG/DL (ref 8–22)
BUN SERPL-MCNC: 54 MG/DL (ref 8–22)
BUN SERPL-MCNC: 57 MG/DL (ref 8–22)
BUN SERPL-MCNC: 59 MG/DL (ref 8–22)
BUN SERPL-MCNC: 60 MG/DL (ref 8–22)
BUN SERPL-MCNC: 61 MG/DL (ref 8–22)
BUN SERPL-MCNC: 62 MG/DL (ref 8–22)
CA-I BLD ISE-SCNC: 1.23 MMOL/L (ref 1.1–1.3)
CA-I SERPL-SCNC: 1.1 MMOL/L (ref 1.1–1.3)
CA-I SERPL-SCNC: 1.2 MMOL/L (ref 1.1–1.3)
CA-I SERPL-SCNC: 1.2 MMOL/L (ref 1.1–1.3)
CALCIUM ALBUM COR SERPL-MCNC: 10 MG/DL (ref 8.5–10.5)
CALCIUM ALBUM COR SERPL-MCNC: 11.3 MG/DL (ref 8.5–10.5)
CALCIUM ALBUM COR SERPL-MCNC: 9.8 MG/DL (ref 8.5–10.5)
CALCIUM ALBUM COR SERPL-MCNC: 9.8 MG/DL (ref 8.5–10.5)
CALCIUM SERPL-MCNC: 10 MG/DL (ref 8.5–10.5)
CALCIUM SERPL-MCNC: 8.4 MG/DL (ref 8.5–10.5)
CALCIUM SERPL-MCNC: 8.4 MG/DL (ref 8.5–10.5)
CALCIUM SERPL-MCNC: 8.5 MG/DL (ref 8.5–10.5)
CALCIUM SERPL-MCNC: 8.5 MG/DL (ref 8.5–10.5)
CALCIUM SERPL-MCNC: 8.8 MG/DL (ref 8.5–10.5)
CALCIUM SERPL-MCNC: 8.8 MG/DL (ref 8.5–10.5)
CALCIUM SERPL-MCNC: 8.9 MG/DL (ref 8.5–10.5)
CALCIUM SERPL-MCNC: 9 MG/DL (ref 8.5–10.5)
CALCIUM SERPL-MCNC: 9.2 MG/DL (ref 8.5–10.5)
CALCIUM SERPL-MCNC: 9.4 MG/DL (ref 8.5–10.5)
CALCIUM SERPL-MCNC: 9.8 MG/DL (ref 8.5–10.5)
CALCIUM SERPL-MCNC: 9.9 MG/DL (ref 8.5–10.5)
CALCIUM SERPL-MCNC: 9.9 MG/DL (ref 8.5–10.5)
CASTS URNS QL MICRO: ABNORMAL /LPF (ref 0–2)
CFT BLD TEG: 5.1 MIN (ref 4.6–9.1)
CFT BLD TEG: >17 MIN (ref 4.6–9.1)
CFT BLD TEG: ABNORMAL MIN (ref 4.6–9.1)
CFT P HPASE BLD TEG: 13.2 MIN (ref 4.3–8.3)
CFT P HPASE BLD TEG: 5.5 MIN (ref 4.3–8.3)
CFT P HPASE BLD TEG: ABNORMAL MIN (ref 4.3–8.3)
CHLORIDE SERPL-SCNC: 100 MMOL/L (ref 96–112)
CHLORIDE SERPL-SCNC: 101 MMOL/L (ref 96–112)
CHLORIDE SERPL-SCNC: 101 MMOL/L (ref 96–112)
CHLORIDE SERPL-SCNC: 102 MMOL/L (ref 96–112)
CHLORIDE SERPL-SCNC: 103 MMOL/L (ref 96–112)
CHLORIDE SERPL-SCNC: 105 MMOL/L (ref 96–112)
CHLORIDE SERPL-SCNC: 97 MMOL/L (ref 96–112)
CHLORIDE SERPL-SCNC: 98 MMOL/L (ref 96–112)
CHLORIDE SERPL-SCNC: 99 MMOL/L (ref 96–112)
CHOLEST SERPL-MCNC: 72 MG/DL (ref 100–199)
CLOT ANGLE BLD TEG: 40 DEGREES (ref 63–78)
CLOT ANGLE BLD TEG: 74.5 DEGREES (ref 63–78)
CLOT ANGLE BLD TEG: ABNORMAL DEGREES (ref 63–78)
CLOT LYSIS 30M P MA LENFR BLD TEG: ABNORMAL % (ref 0–2.6)
CO2 BLDA-SCNC: 11 MMOL/L (ref 32–48)
CO2 BLDA-SCNC: 13 MMOL/L (ref 32–48)
CO2 BLDA-SCNC: 13 MMOL/L (ref 32–48)
CO2 BLDA-SCNC: 14 MMOL/L (ref 32–48)
CO2 BLDA-SCNC: 17 MMOL/L (ref 32–48)
CO2 BLDA-SCNC: 18 MMOL/L (ref 32–48)
CO2 BLDA-SCNC: 21 MMOL/L (ref 32–48)
CO2 BLDA-SCNC: 22 MMOL/L (ref 32–48)
CO2 BLDA-SCNC: 22 MMOL/L (ref 32–48)
CO2 BLDA-SCNC: 23 MMOL/L (ref 32–48)
CO2 BLDA-SCNC: 24 MMOL/L (ref 32–48)
CO2 BLDA-SCNC: 24 MMOL/L (ref 32–48)
CO2 BLDA-SCNC: 25 MMOL/L (ref 32–48)
CO2 BLDA-SCNC: 26 MMOL/L (ref 32–48)
CO2 BLDA-SCNC: 27 MMOL/L (ref 32–48)
CO2 BLDA-SCNC: 28 MMOL/L (ref 32–48)
CO2 BLDA-SCNC: 28 MMOL/L (ref 32–48)
CO2 BLDA-SCNC: 36 MMOL/L (ref 32–48)
CO2 BLDA-SCNC: 9 MMOL/L (ref 32–48)
CO2 BLDV-SCNC: 12 MMOL/L (ref 20–33)
CO2 BLDV-SCNC: 15 MMOL/L (ref 20–33)
CO2 BLDV-SCNC: 17 MMOL/L (ref 20–33)
CO2 BLDV-SCNC: 21 MMOL/L (ref 20–33)
CO2 BLDV-SCNC: 23 MMOL/L (ref 20–33)
CO2 BLDV-SCNC: 23 MMOL/L (ref 20–33)
CO2 BLDV-SCNC: 25 MMOL/L (ref 20–33)
CO2 BLDV-SCNC: 26 MMOL/L (ref 20–33)
CO2 BLDV-SCNC: 26 MMOL/L (ref 20–33)
CO2 BLDV-SCNC: 27 MMOL/L (ref 20–33)
CO2 BLDV-SCNC: 28 MMOL/L (ref 20–33)
CO2 SERPL-SCNC: 10 MMOL/L (ref 20–33)
CO2 SERPL-SCNC: 11 MMOL/L (ref 20–33)
CO2 SERPL-SCNC: 12 MMOL/L (ref 20–33)
CO2 SERPL-SCNC: 15 MMOL/L (ref 20–33)
CO2 SERPL-SCNC: 18 MMOL/L (ref 20–33)
CO2 SERPL-SCNC: 18 MMOL/L (ref 20–33)
CO2 SERPL-SCNC: 19 MMOL/L (ref 20–33)
CO2 SERPL-SCNC: 19 MMOL/L (ref 20–33)
CO2 SERPL-SCNC: 21 MMOL/L (ref 20–33)
CO2 SERPL-SCNC: 22 MMOL/L (ref 20–33)
CO2 SERPL-SCNC: 23 MMOL/L (ref 20–33)
CO2 SERPL-SCNC: 24 MMOL/L (ref 20–33)
CO2 SERPL-SCNC: 9 MMOL/L (ref 20–33)
CO2 SERPL-SCNC: 9 MMOL/L (ref 20–33)
COLOR UR: ABNORMAL
COMPONENT R 8504R: NORMAL
COMPONENT R 8504R: NORMAL
CREAT SERPL-MCNC: 1.17 MG/DL (ref 0.5–1.4)
CREAT SERPL-MCNC: 1.57 MG/DL (ref 0.5–1.4)
CREAT SERPL-MCNC: 1.75 MG/DL (ref 0.5–1.4)
CREAT SERPL-MCNC: 1.81 MG/DL (ref 0.5–1.4)
CREAT SERPL-MCNC: 2.42 MG/DL (ref 0.5–1.4)
CREAT SERPL-MCNC: 2.49 MG/DL (ref 0.5–1.4)
CREAT SERPL-MCNC: 2.61 MG/DL (ref 0.5–1.4)
CREAT SERPL-MCNC: 2.75 MG/DL (ref 0.5–1.4)
CREAT SERPL-MCNC: 2.94 MG/DL (ref 0.5–1.4)
CREAT SERPL-MCNC: 3.09 MG/DL (ref 0.5–1.4)
CREAT SERPL-MCNC: 3.25 MG/DL (ref 0.5–1.4)
CREAT SERPL-MCNC: 3.4 MG/DL (ref 0.5–1.4)
CREAT SERPL-MCNC: 3.57 MG/DL (ref 0.5–1.4)
CREAT SERPL-MCNC: 3.72 MG/DL (ref 0.5–1.4)
CREAT SERPL-MCNC: 3.86 MG/DL (ref 0.5–1.4)
CREAT SERPL-MCNC: 4.15 MG/DL (ref 0.5–1.4)
CRP SERPL HS-MCNC: 6.54 MG/DL (ref 0–0.75)
CRP SERPL HS-MCNC: 6.92 MG/DL (ref 0–0.75)
CT.EXTRINSIC BLD ROTEM: 1.2 MIN (ref 0.8–2.1)
CT.EXTRINSIC BLD ROTEM: ABNORMAL MIN (ref 0.8–2.1)
CT.EXTRINSIC BLD ROTEM: ABNORMAL MIN (ref 0.8–2.1)
DELSYS IDSYS: ABNORMAL
EKG IMPRESSION: NORMAL
END TIDAL CARBON DIOXIDE IECO2: 20 MMHG
END TIDAL CARBON DIOXIDE IECO2: 21 MMHG
END TIDAL CARBON DIOXIDE IECO2: 21 MMHG
END TIDAL CARBON DIOXIDE IECO2: 22 MMHG
END TIDAL CARBON DIOXIDE IECO2: 24 MMHG
END TIDAL CARBON DIOXIDE IECO2: 25 MMHG
END TIDAL CARBON DIOXIDE IECO2: 25 MMHG
END TIDAL CARBON DIOXIDE IECO2: 28 MMHG
END TIDAL CARBON DIOXIDE IECO2: 34 MMHG
END TIDAL CARBON DIOXIDE IECO2: 35 MMHG
END TIDAL CARBON DIOXIDE IECO2: 36 MMHG
END TIDAL CARBON DIOXIDE IECO2: 37 MMHG
END TIDAL CARBON DIOXIDE IECO2: 38 MMHG
EOSINOPHIL # BLD AUTO: 0 K/UL (ref 0–0.51)
EOSINOPHIL # BLD AUTO: 0.04 K/UL (ref 0–0.51)
EOSINOPHIL NFR BLD: 0 % (ref 0–6.9)
EOSINOPHIL NFR BLD: 0.3 % (ref 0–6.9)
EPITHELIAL CELLS 1715: ABNORMAL /HPF (ref 0–5)
ERYTHROCYTE [DISTWIDTH] IN BLOOD BY AUTOMATED COUNT: 36.9 FL (ref 35.9–50)
ERYTHROCYTE [DISTWIDTH] IN BLOOD BY AUTOMATED COUNT: 37.7 FL (ref 35.9–50)
ERYTHROCYTE [DISTWIDTH] IN BLOOD BY AUTOMATED COUNT: 39.2 FL (ref 35.9–50)
ERYTHROCYTE [DISTWIDTH] IN BLOOD BY AUTOMATED COUNT: 39.6 FL (ref 35.9–50)
ERYTHROCYTE [DISTWIDTH] IN BLOOD BY AUTOMATED COUNT: 39.8 FL (ref 35.9–50)
ERYTHROCYTE [DISTWIDTH] IN BLOOD BY AUTOMATED COUNT: 39.8 FL (ref 35.9–50)
ERYTHROCYTE [DISTWIDTH] IN BLOOD BY AUTOMATED COUNT: 39.9 FL (ref 35.9–50)
ERYTHROCYTE [DISTWIDTH] IN BLOOD BY AUTOMATED COUNT: 41.1 FL (ref 35.9–50)
ERYTHROCYTE [DISTWIDTH] IN BLOOD BY AUTOMATED COUNT: 42.5 FL (ref 35.9–50)
ERYTHROCYTE [DISTWIDTH] IN BLOOD BY AUTOMATED COUNT: 43.6 FL (ref 35.9–50)
ERYTHROCYTE [DISTWIDTH] IN BLOOD BY AUTOMATED COUNT: 45.8 FL (ref 35.9–50)
EST. AVERAGE GLUCOSE BLD GHB EST-MCNC: 174 MG/DL
ETEST SENSITIVITY ETEST: NORMAL
FIBRINOGEN PPP-MCNC: 472 MG/DL (ref 215–460)
GFR SERPLBLD CREATININE-BSD FMLA CKD-EPI: 16 ML/MIN/1.73 M 2
GFR SERPLBLD CREATININE-BSD FMLA CKD-EPI: 17 ML/MIN/1.73 M 2
GFR SERPLBLD CREATININE-BSD FMLA CKD-EPI: 18 ML/MIN/1.73 M 2
GFR SERPLBLD CREATININE-BSD FMLA CKD-EPI: 19 ML/MIN/1.73 M 2
GFR SERPLBLD CREATININE-BSD FMLA CKD-EPI: 20 ML/MIN/1.73 M 2
GFR SERPLBLD CREATININE-BSD FMLA CKD-EPI: 21 ML/MIN/1.73 M 2
GFR SERPLBLD CREATININE-BSD FMLA CKD-EPI: 22 ML/MIN/1.73 M 2
GFR SERPLBLD CREATININE-BSD FMLA CKD-EPI: 24 ML/MIN/1.73 M 2
GFR SERPLBLD CREATININE-BSD FMLA CKD-EPI: 26 ML/MIN/1.73 M 2
GFR SERPLBLD CREATININE-BSD FMLA CKD-EPI: 27 ML/MIN/1.73 M 2
GFR SERPLBLD CREATININE-BSD FMLA CKD-EPI: 29 ML/MIN/1.73 M 2
GFR SERPLBLD CREATININE-BSD FMLA CKD-EPI: 30 ML/MIN/1.73 M 2
GFR SERPLBLD CREATININE-BSD FMLA CKD-EPI: 42 ML/MIN/1.73 M 2
GFR SERPLBLD CREATININE-BSD FMLA CKD-EPI: 44 ML/MIN/1.73 M 2
GFR SERPLBLD CREATININE-BSD FMLA CKD-EPI: 50 ML/MIN/1.73 M 2
GFR SERPLBLD CREATININE-BSD FMLA CKD-EPI: 71 ML/MIN/1.73 M 2
GLOBULIN SER CALC-MCNC: 2.2 G/DL (ref 1.9–3.5)
GLOBULIN SER CALC-MCNC: 2.6 G/DL (ref 1.9–3.5)
GLOBULIN SER CALC-MCNC: 3 G/DL (ref 1.9–3.5)
GLOBULIN SER CALC-MCNC: 3.2 G/DL (ref 1.9–3.5)
GLUCOSE BLD STRIP.AUTO-MCNC: 116 MG/DL (ref 65–99)
GLUCOSE BLD STRIP.AUTO-MCNC: 116 MG/DL (ref 65–99)
GLUCOSE BLD STRIP.AUTO-MCNC: 122 MG/DL (ref 65–99)
GLUCOSE BLD STRIP.AUTO-MCNC: 122 MG/DL (ref 65–99)
GLUCOSE BLD STRIP.AUTO-MCNC: 123 MG/DL (ref 65–99)
GLUCOSE BLD STRIP.AUTO-MCNC: 124 MG/DL (ref 65–99)
GLUCOSE BLD STRIP.AUTO-MCNC: 126 MG/DL (ref 65–99)
GLUCOSE BLD STRIP.AUTO-MCNC: 127 MG/DL (ref 65–99)
GLUCOSE BLD STRIP.AUTO-MCNC: 127 MG/DL (ref 65–99)
GLUCOSE BLD STRIP.AUTO-MCNC: 130 MG/DL (ref 65–99)
GLUCOSE BLD STRIP.AUTO-MCNC: 130 MG/DL (ref 65–99)
GLUCOSE BLD STRIP.AUTO-MCNC: 132 MG/DL (ref 65–99)
GLUCOSE BLD STRIP.AUTO-MCNC: 134 MG/DL (ref 65–99)
GLUCOSE BLD STRIP.AUTO-MCNC: 135 MG/DL (ref 65–99)
GLUCOSE BLD STRIP.AUTO-MCNC: 136 MG/DL (ref 65–99)
GLUCOSE BLD STRIP.AUTO-MCNC: 136 MG/DL (ref 65–99)
GLUCOSE BLD STRIP.AUTO-MCNC: 137 MG/DL (ref 65–99)
GLUCOSE BLD STRIP.AUTO-MCNC: 137 MG/DL (ref 65–99)
GLUCOSE BLD STRIP.AUTO-MCNC: 139 MG/DL (ref 65–99)
GLUCOSE BLD STRIP.AUTO-MCNC: 139 MG/DL (ref 65–99)
GLUCOSE BLD STRIP.AUTO-MCNC: 140 MG/DL (ref 65–99)
GLUCOSE BLD STRIP.AUTO-MCNC: 140 MG/DL (ref 65–99)
GLUCOSE BLD STRIP.AUTO-MCNC: 141 MG/DL (ref 65–99)
GLUCOSE BLD STRIP.AUTO-MCNC: 146 MG/DL (ref 65–99)
GLUCOSE BLD STRIP.AUTO-MCNC: 147 MG/DL (ref 65–99)
GLUCOSE BLD STRIP.AUTO-MCNC: 149 MG/DL (ref 65–99)
GLUCOSE BLD STRIP.AUTO-MCNC: 151 MG/DL (ref 65–99)
GLUCOSE BLD STRIP.AUTO-MCNC: 151 MG/DL (ref 65–99)
GLUCOSE BLD STRIP.AUTO-MCNC: 156 MG/DL (ref 65–99)
GLUCOSE BLD STRIP.AUTO-MCNC: 156 MG/DL (ref 65–99)
GLUCOSE BLD STRIP.AUTO-MCNC: 157 MG/DL (ref 65–99)
GLUCOSE BLD STRIP.AUTO-MCNC: 157 MG/DL (ref 65–99)
GLUCOSE BLD STRIP.AUTO-MCNC: 159 MG/DL (ref 65–99)
GLUCOSE BLD STRIP.AUTO-MCNC: 162 MG/DL (ref 65–99)
GLUCOSE BLD STRIP.AUTO-MCNC: 163 MG/DL (ref 65–99)
GLUCOSE BLD STRIP.AUTO-MCNC: 163 MG/DL (ref 65–99)
GLUCOSE BLD STRIP.AUTO-MCNC: 167 MG/DL (ref 65–99)
GLUCOSE BLD STRIP.AUTO-MCNC: 168 MG/DL (ref 65–99)
GLUCOSE BLD STRIP.AUTO-MCNC: 169 MG/DL (ref 65–99)
GLUCOSE BLD STRIP.AUTO-MCNC: 170 MG/DL (ref 65–99)
GLUCOSE BLD STRIP.AUTO-MCNC: 173 MG/DL (ref 65–99)
GLUCOSE BLD STRIP.AUTO-MCNC: 176 MG/DL (ref 65–99)
GLUCOSE BLD STRIP.AUTO-MCNC: 176 MG/DL (ref 65–99)
GLUCOSE BLD STRIP.AUTO-MCNC: 177 MG/DL (ref 65–99)
GLUCOSE BLD STRIP.AUTO-MCNC: 181 MG/DL (ref 65–99)
GLUCOSE BLD STRIP.AUTO-MCNC: 181 MG/DL (ref 65–99)
GLUCOSE BLD STRIP.AUTO-MCNC: 183 MG/DL (ref 65–99)
GLUCOSE BLD STRIP.AUTO-MCNC: 184 MG/DL (ref 65–99)
GLUCOSE BLD STRIP.AUTO-MCNC: 185 MG/DL (ref 65–99)
GLUCOSE BLD STRIP.AUTO-MCNC: 185 MG/DL (ref 65–99)
GLUCOSE BLD STRIP.AUTO-MCNC: 188 MG/DL (ref 65–99)
GLUCOSE BLD STRIP.AUTO-MCNC: 188 MG/DL (ref 65–99)
GLUCOSE BLD STRIP.AUTO-MCNC: 189 MG/DL (ref 65–99)
GLUCOSE BLD STRIP.AUTO-MCNC: 190 MG/DL (ref 65–99)
GLUCOSE BLD STRIP.AUTO-MCNC: 190 MG/DL (ref 65–99)
GLUCOSE BLD STRIP.AUTO-MCNC: 191 MG/DL (ref 65–99)
GLUCOSE BLD STRIP.AUTO-MCNC: 192 MG/DL (ref 65–99)
GLUCOSE BLD STRIP.AUTO-MCNC: 193 MG/DL (ref 65–99)
GLUCOSE BLD STRIP.AUTO-MCNC: 193 MG/DL (ref 65–99)
GLUCOSE BLD STRIP.AUTO-MCNC: 194 MG/DL (ref 65–99)
GLUCOSE BLD STRIP.AUTO-MCNC: 195 MG/DL (ref 65–99)
GLUCOSE BLD STRIP.AUTO-MCNC: 196 MG/DL (ref 65–99)
GLUCOSE BLD STRIP.AUTO-MCNC: 196 MG/DL (ref 65–99)
GLUCOSE BLD STRIP.AUTO-MCNC: 201 MG/DL (ref 65–99)
GLUCOSE BLD STRIP.AUTO-MCNC: 201 MG/DL (ref 65–99)
GLUCOSE BLD STRIP.AUTO-MCNC: 206 MG/DL (ref 65–99)
GLUCOSE BLD STRIP.AUTO-MCNC: 207 MG/DL (ref 65–99)
GLUCOSE BLD STRIP.AUTO-MCNC: 213 MG/DL (ref 65–99)
GLUCOSE BLD STRIP.AUTO-MCNC: 218 MG/DL (ref 65–99)
GLUCOSE BLD STRIP.AUTO-MCNC: 219 MG/DL (ref 65–99)
GLUCOSE BLD STRIP.AUTO-MCNC: 223 MG/DL (ref 65–99)
GLUCOSE BLD STRIP.AUTO-MCNC: 224 MG/DL (ref 65–99)
GLUCOSE BLD STRIP.AUTO-MCNC: 224 MG/DL (ref 65–99)
GLUCOSE BLD STRIP.AUTO-MCNC: 228 MG/DL (ref 65–99)
GLUCOSE BLD STRIP.AUTO-MCNC: 261 MG/DL (ref 65–99)
GLUCOSE BLD STRIP.AUTO-MCNC: 307 MG/DL (ref 65–99)
GLUCOSE BLD STRIP.AUTO-MCNC: 319 MG/DL (ref 65–99)
GLUCOSE BLD STRIP.AUTO-MCNC: 347 MG/DL (ref 65–99)
GLUCOSE BLD STRIP.AUTO-MCNC: 374 MG/DL (ref 65–99)
GLUCOSE BLD STRIP.AUTO-MCNC: 387 MG/DL (ref 65–99)
GLUCOSE BLD STRIP.AUTO-MCNC: 390 MG/DL (ref 65–99)
GLUCOSE BLD STRIP.AUTO-MCNC: 422 MG/DL (ref 65–99)
GLUCOSE SERPL-MCNC: 132 MG/DL (ref 65–99)
GLUCOSE SERPL-MCNC: 137 MG/DL (ref 65–99)
GLUCOSE SERPL-MCNC: 159 MG/DL (ref 65–99)
GLUCOSE SERPL-MCNC: 167 MG/DL (ref 65–99)
GLUCOSE SERPL-MCNC: 179 MG/DL (ref 65–99)
GLUCOSE SERPL-MCNC: 183 MG/DL (ref 65–99)
GLUCOSE SERPL-MCNC: 185 MG/DL (ref 65–99)
GLUCOSE SERPL-MCNC: 186 MG/DL (ref 65–99)
GLUCOSE SERPL-MCNC: 202 MG/DL (ref 65–99)
GLUCOSE SERPL-MCNC: 211 MG/DL (ref 65–99)
GLUCOSE SERPL-MCNC: 213 MG/DL (ref 65–99)
GLUCOSE SERPL-MCNC: 219 MG/DL (ref 65–99)
GLUCOSE SERPL-MCNC: 241 MG/DL (ref 65–99)
GLUCOSE SERPL-MCNC: 374 MG/DL (ref 65–99)
GLUCOSE SERPL-MCNC: 422 MG/DL (ref 65–99)
GLUCOSE SERPL-MCNC: 474 MG/DL (ref 65–99)
GRANULAR CASTS  1716G: PRESENT /LPF
HBA1C MFR BLD: 7.7 % (ref 4–5.6)
HBV CORE AB SERPL QL IA: NONREACTIVE
HBV SURFACE AB SERPL IA-ACNC: 381 MIU/ML (ref 0–10)
HBV SURFACE AG SER QL: NORMAL
HCO3 BLDA-SCNC: 10.2 MMOL/L (ref 21–28)
HCO3 BLDA-SCNC: 12.2 MMOL/L (ref 21–28)
HCO3 BLDA-SCNC: 12.6 MMOL/L (ref 21–28)
HCO3 BLDA-SCNC: 13.8 MMOL/L (ref 21–28)
HCO3 BLDA-SCNC: 16.4 MMOL/L (ref 21–28)
HCO3 BLDA-SCNC: 17.2 MMOL/L (ref 21–28)
HCO3 BLDA-SCNC: 19.6 MMOL/L (ref 21–28)
HCO3 BLDA-SCNC: 20.5 MMOL/L (ref 21–28)
HCO3 BLDA-SCNC: 20.9 MMOL/L (ref 21–28)
HCO3 BLDA-SCNC: 22.4 MMOL/L (ref 21–28)
HCO3 BLDA-SCNC: 22.6 MMOL/L (ref 21–28)
HCO3 BLDA-SCNC: 23.2 MMOL/L (ref 21–28)
HCO3 BLDA-SCNC: 23.5 MMOL/L (ref 21–28)
HCO3 BLDA-SCNC: 24.6 MMOL/L (ref 21–28)
HCO3 BLDA-SCNC: 24.9 MMOL/L (ref 21–28)
HCO3 BLDA-SCNC: 25.6 MMOL/L (ref 21–28)
HCO3 BLDA-SCNC: 25.8 MMOL/L (ref 21–28)
HCO3 BLDA-SCNC: 26.4 MMOL/L (ref 21–28)
HCO3 BLDA-SCNC: 26.8 MMOL/L (ref 21–28)
HCO3 BLDA-SCNC: 33.7 MMOL/L (ref 21–28)
HCO3 BLDA-SCNC: 8.7 MMOL/L (ref 21–28)
HCO3 BLDV-SCNC: 11.1 MMOL/L (ref 22–29)
HCO3 BLDV-SCNC: 13.6 MMOL/L (ref 22–29)
HCO3 BLDV-SCNC: 16.5 MMOL/L (ref 22–29)
HCO3 BLDV-SCNC: 20.6 MMOL/L (ref 22–29)
HCO3 BLDV-SCNC: 22 MMOL/L (ref 22–29)
HCO3 BLDV-SCNC: 22.1 MMOL/L (ref 22–29)
HCO3 BLDV-SCNC: 23.3 MMOL/L (ref 22–29)
HCO3 BLDV-SCNC: 25 MMOL/L (ref 22–29)
HCO3 BLDV-SCNC: 25 MMOL/L (ref 22–29)
HCO3 BLDV-SCNC: 25.4 MMOL/L (ref 22–29)
HCO3 BLDV-SCNC: 26.8 MMOL/L (ref 22–29)
HCO3 BLDV-SCNC: 26.9 MMOL/L (ref 22–29)
HCO3 BLDV-SCNC: 27.6 MMOL/L (ref 22–29)
HCT VFR BLD AUTO: 23.4 % (ref 42–52)
HCT VFR BLD AUTO: 23.8 % (ref 42–52)
HCT VFR BLD AUTO: 23.9 % (ref 42–52)
HCT VFR BLD AUTO: 24 % (ref 42–52)
HCT VFR BLD AUTO: 24.4 % (ref 42–52)
HCT VFR BLD AUTO: 24.5 % (ref 42–52)
HCT VFR BLD AUTO: 24.6 % (ref 42–52)
HCT VFR BLD AUTO: 25.1 % (ref 42–52)
HCT VFR BLD AUTO: 27 % (ref 42–52)
HCT VFR BLD AUTO: 27.2 % (ref 42–52)
HCT VFR BLD AUTO: 27.6 % (ref 42–52)
HCT VFR BLD AUTO: 27.6 % (ref 42–52)
HCT VFR BLD AUTO: 31.7 % (ref 42–52)
HCT VFR BLD AUTO: 31.9 % (ref 42–52)
HCT VFR BLD AUTO: 33.4 % (ref 42–52)
HCT VFR BLD AUTO: 38.1 % (ref 42–52)
HCT VFR BLD AUTO: 38.4 % (ref 42–52)
HCT VFR BLD AUTO: 40.9 % (ref 42–52)
HCT VFR BLD AUTO: 41.7 % (ref 42–52)
HCT VFR BLD AUTO: 42 % (ref 42–52)
HCT VFR BLD AUTO: 45.5 % (ref 42–52)
HCT VFR BLD AUTO: 46.5 % (ref 42–52)
HDLC SERPL-MCNC: 29 MG/DL
HGB BLD-MCNC: 10.2 G/DL (ref 14–18)
HGB BLD-MCNC: 10.3 G/DL (ref 14–18)
HGB BLD-MCNC: 11 G/DL (ref 14–18)
HGB BLD-MCNC: 12.9 G/DL (ref 14–18)
HGB BLD-MCNC: 12.9 G/DL (ref 14–18)
HGB BLD-MCNC: 13.6 G/DL (ref 14–18)
HGB BLD-MCNC: 13.9 G/DL (ref 14–18)
HGB BLD-MCNC: 14.3 G/DL (ref 14–18)
HGB BLD-MCNC: 14.5 G/DL (ref 14–18)
HGB BLD-MCNC: 15 G/DL (ref 14–18)
HGB BLD-MCNC: 7.6 G/DL (ref 14–18)
HGB BLD-MCNC: 8 G/DL (ref 14–18)
HGB BLD-MCNC: 8.1 G/DL (ref 14–18)
HGB BLD-MCNC: 8.3 G/DL (ref 14–18)
HGB BLD-MCNC: 8.4 G/DL (ref 14–18)
HGB BLD-MCNC: 8.4 G/DL (ref 14–18)
HGB BLD-MCNC: 8.9 G/DL (ref 14–18)
HGB BLD-MCNC: 9.2 G/DL (ref 14–18)
HOROWITZ INDEX BLDA+IHG-RTO: 103 MM[HG]
HOROWITZ INDEX BLDA+IHG-RTO: 118 MM[HG]
HOROWITZ INDEX BLDA+IHG-RTO: 195 MM[HG]
HOROWITZ INDEX BLDA+IHG-RTO: 244 MM[HG]
HOROWITZ INDEX BLDA+IHG-RTO: 247 MM[HG]
HOROWITZ INDEX BLDA+IHG-RTO: 247 MM[HG]
HOROWITZ INDEX BLDA+IHG-RTO: 250 MM[HG]
HOROWITZ INDEX BLDA+IHG-RTO: 264 MM[HG]
HOROWITZ INDEX BLDA+IHG-RTO: 270 MM[HG]
HOROWITZ INDEX BLDA+IHG-RTO: 270 MM[HG]
HOROWITZ INDEX BLDA+IHG-RTO: 287 MM[HG]
HOROWITZ INDEX BLDA+IHG-RTO: 293 MM[HG]
HOROWITZ INDEX BLDA+IHG-RTO: 297 MM[HG]
HOROWITZ INDEX BLDA+IHG-RTO: 64 MM[HG]
HOROWITZ INDEX BLDV+IHG-RTO: 100 MM[HG]
HOROWITZ INDEX BLDV+IHG-RTO: 103 MM[HG]
HOROWITZ INDEX BLDV+IHG-RTO: 103 MM[HG]
HOROWITZ INDEX BLDV+IHG-RTO: 107 MM[HG]
HOROWITZ INDEX BLDV+IHG-RTO: 147 MM[HG]
HOROWITZ INDEX BLDV+IHG-RTO: 33 MM[HG]
HOROWITZ INDEX BLDV+IHG-RTO: 83 MM[HG]
HOROWITZ INDEX BLDV+IHG-RTO: 99 MM[HG]
IMM GRANULOCYTES # BLD AUTO: 0.05 K/UL (ref 0–0.11)
IMM GRANULOCYTES NFR BLD AUTO: 0.4 % (ref 0–0.9)
INR PPP: 1.4 (ref 0.87–1.13)
INR PPP: 1.47 (ref 0.87–1.13)
INR PPP: 1.57 (ref 0.87–1.13)
INR PPP: 1.67 (ref 0.87–1.13)
INR PPP: 1.72 (ref 0.87–1.13)
LACTATE BLD-SCNC: 0.6 MMOL/L (ref 0.5–2)
LACTATE BLD-SCNC: 0.7 MMOL/L (ref 0.5–2)
LACTATE BLD-SCNC: 0.8 MMOL/L (ref 0.5–2)
LACTATE BLD-SCNC: 0.9 MMOL/L (ref 0.5–2)
LACTATE BLD-SCNC: 1 MMOL/L (ref 0.5–2)
LACTATE BLD-SCNC: 1.1 MMOL/L (ref 0.5–2)
LACTATE BLD-SCNC: 1.2 MMOL/L (ref 0.5–2)
LACTATE BLD-SCNC: 1.4 MMOL/L (ref 0.5–2)
LACTATE BLD-SCNC: 1.7 MMOL/L (ref 0.5–2)
LACTATE BLD-SCNC: 1.8 MMOL/L (ref 0.5–2)
LACTATE BLD-SCNC: 10 MMOL/L (ref 0.5–2)
LACTATE BLD-SCNC: 10.1 MMOL/L (ref 0.5–2)
LACTATE BLD-SCNC: 13.5 MMOL/L (ref 0.5–2)
LACTATE BLD-SCNC: 13.5 MMOL/L (ref 0.5–2)
LACTATE BLD-SCNC: 14.4 MMOL/L (ref 0.5–2)
LACTATE BLD-SCNC: 15.4 MMOL/L (ref 0.5–2)
LACTATE BLD-SCNC: 3.5 MMOL/L (ref 0.5–2)
LACTATE BLD-SCNC: 3.6 MMOL/L (ref 0.5–2)
LACTATE BLD-SCNC: 6.3 MMOL/L (ref 0.5–2)
LACTATE BLD-SCNC: 6.6 MMOL/L (ref 0.5–2)
LACTATE SERPL-SCNC: 0.6 MMOL/L (ref 0.5–2)
LACTATE SERPL-SCNC: 1.1 MMOL/L (ref 0.5–2)
LACTATE SERPL-SCNC: 1.2 MMOL/L (ref 0.5–2)
LACTATE SERPL-SCNC: 1.3 MMOL/L (ref 0.5–2)
LACTATE SERPL-SCNC: 1.4 MMOL/L (ref 0.5–2)
LACTATE SERPL-SCNC: 1.5 MMOL/L (ref 0.5–2)
LACTATE SERPL-SCNC: 1.6 MMOL/L (ref 0.5–2)
LACTATE SERPL-SCNC: 1.6 MMOL/L (ref 0.5–2)
LACTATE SERPL-SCNC: 1.7 MMOL/L (ref 0.5–2)
LACTATE SERPL-SCNC: 10 MMOL/L (ref 0.5–2)
LACTATE SERPL-SCNC: 14.3 MMOL/L (ref 0.5–2)
LACTATE SERPL-SCNC: 14.7 MMOL/L (ref 0.5–2)
LACTATE SERPL-SCNC: 16.2 MMOL/L (ref 0.5–2)
LACTATE SERPL-SCNC: 2.2 MMOL/L (ref 0.5–2)
LACTATE SERPL-SCNC: 2.3 MMOL/L (ref 0.5–2)
LACTATE SERPL-SCNC: 3.1 MMOL/L (ref 0.5–2)
LACTATE SERPL-SCNC: 5.9 MMOL/L (ref 0.5–2)
LACTATE SERPL-SCNC: 8.9 MMOL/L (ref 0.5–2)
LDH SERPL L TO P-CCNC: 1181 U/L (ref 107–266)
LDH SERPL L TO P-CCNC: 1228 U/L (ref 107–266)
LDLC SERPL CALC-MCNC: 32 MG/DL
LIPASE SERPL-CCNC: 46 U/L (ref 11–82)
LPM ILPM: 12 LPM
LPM ILPM: 12 LPM
LPM ILPM: 4 LPM
LPM ILPM: 4 LPM
LPM ILPM: 7 LPM
LPM ILPM: 7 LPM
LV EJECT FRACT  99904: 15
LV EJECT FRACT MOD 2C 99903: 14.41
LV EJECT FRACT MOD 4C 99902: 18.7
LV EJECT FRACT MOD BP 99901: 19.57
LYMPHOCYTES # BLD AUTO: 0.42 K/UL (ref 1–4.8)
LYMPHOCYTES # BLD AUTO: 1.25 K/UL (ref 1–4.8)
LYMPHOCYTES NFR BLD: 10.6 % (ref 22–41)
LYMPHOCYTES NFR BLD: 2.6 % (ref 22–41)
MAGNESIUM SERPL-MCNC: 1.4 MG/DL (ref 1.5–2.5)
MAGNESIUM SERPL-MCNC: 1.6 MG/DL (ref 1.5–2.5)
MAGNESIUM SERPL-MCNC: 1.9 MG/DL (ref 1.5–2.5)
MAGNESIUM SERPL-MCNC: 2 MG/DL (ref 1.5–2.5)
MAGNESIUM SERPL-MCNC: 2.6 MG/DL (ref 1.5–2.5)
MANUAL DIFF BLD: ABNORMAL
MCF BLD TEG: 66.7 MM (ref 52–69)
MCF BLD TEG: <40 MM (ref 52–69)
MCF BLD TEG: ABNORMAL MM (ref 52–69)
MCF.PLATELET INHIB BLD ROTEM: 29.2 MM (ref 15–32)
MCF.PLATELET INHIB BLD ROTEM: 32 MM (ref 15–32)
MCF.PLATELET INHIB BLD ROTEM: ABNORMAL MM (ref 15–32)
MCH RBC QN AUTO: 27.3 PG (ref 27–33)
MCH RBC QN AUTO: 27.9 PG (ref 27–33)
MCH RBC QN AUTO: 27.9 PG (ref 27–33)
MCH RBC QN AUTO: 28 PG (ref 27–33)
MCH RBC QN AUTO: 28 PG (ref 27–33)
MCH RBC QN AUTO: 28.1 PG (ref 27–33)
MCH RBC QN AUTO: 28.2 PG (ref 27–33)
MCH RBC QN AUTO: 28.2 PG (ref 27–33)
MCH RBC QN AUTO: 28.4 PG (ref 27–33)
MCH RBC QN AUTO: 28.6 PG (ref 27–33)
MCH RBC QN AUTO: 28.7 PG (ref 27–33)
MCHC RBC AUTO-ENTMCNC: 31.7 G/DL (ref 32.3–36.5)
MCHC RBC AUTO-ENTMCNC: 31.9 G/DL (ref 32.3–36.5)
MCHC RBC AUTO-ENTMCNC: 32.2 G/DL (ref 32.3–36.5)
MCHC RBC AUTO-ENTMCNC: 32.3 G/DL (ref 32.3–36.5)
MCHC RBC AUTO-ENTMCNC: 32.3 G/DL (ref 32.3–36.5)
MCHC RBC AUTO-ENTMCNC: 32.4 G/DL (ref 32.3–36.5)
MCHC RBC AUTO-ENTMCNC: 32.5 G/DL (ref 32.3–36.5)
MCHC RBC AUTO-ENTMCNC: 32.7 G/DL (ref 32.3–36.5)
MCHC RBC AUTO-ENTMCNC: 33.3 G/DL (ref 32.3–36.5)
MCHC RBC AUTO-ENTMCNC: 33.6 G/DL (ref 32.3–36.5)
MCHC RBC AUTO-ENTMCNC: 35 G/DL (ref 32.3–36.5)
MCV RBC AUTO: 81.8 FL (ref 81.4–97.8)
MCV RBC AUTO: 83.8 FL (ref 81.4–97.8)
MCV RBC AUTO: 84.1 FL (ref 81.4–97.8)
MCV RBC AUTO: 85 FL (ref 81.4–97.8)
MCV RBC AUTO: 86 FL (ref 81.4–97.8)
MCV RBC AUTO: 86.2 FL (ref 81.4–97.8)
MCV RBC AUTO: 87.2 FL (ref 81.4–97.8)
MCV RBC AUTO: 87.7 FL (ref 81.4–97.8)
MCV RBC AUTO: 88.1 FL (ref 81.4–97.8)
MCV RBC AUTO: 88.2 FL (ref 81.4–97.8)
MCV RBC AUTO: 88.3 FL (ref 81.4–97.8)
MICRO URNS: ABNORMAL
MODE IMODE: ABNORMAL
MONOCYTES # BLD AUTO: 0.42 K/UL (ref 0–0.85)
MONOCYTES # BLD AUTO: 0.55 K/UL (ref 0–0.85)
MONOCYTES NFR BLD AUTO: 2.6 % (ref 0–13.4)
MONOCYTES NFR BLD AUTO: 4.7 % (ref 0–13.4)
MORPHOLOGY BLD-IMP: NORMAL
NEUTROPHILS # BLD AUTO: 15.17 K/UL (ref 1.82–7.42)
NEUTROPHILS # BLD AUTO: 9.85 K/UL (ref 1.82–7.42)
NEUTROPHILS NFR BLD: 83.6 % (ref 44–72)
NEUTROPHILS NFR BLD: 94.8 % (ref 44–72)
NRBC # BLD AUTO: 0 K/UL
NRBC BLD-RTO: 0 /100 WBC (ref 0–0.2)
NT-PROBNP SERPL IA-MCNC: 2116 PG/ML (ref 0–125)
NT-PROBNP SERPL IA-MCNC: 5387 PG/ML (ref 0–125)
O2/TOTAL GAS SETTING VFR VENT: 100 %
O2/TOTAL GAS SETTING VFR VENT: 30 %
O2/TOTAL GAS SETTING VFR VENT: 40 %
O2/TOTAL GAS SETTING VFR VENT: 40 %
O2/TOTAL GAS SETTING VFR VENT: 50 %
O2/TOTAL GAS SETTING VFR VENT: 70 %
O2/TOTAL GAS SETTING VFR VENT: 70 %
PA AA BLD-ACNC: 32.8 % (ref 0–11)
PA AA BLD-ACNC: 5.8 % (ref 0–11)
PA AA BLD-ACNC: 51.2 % (ref 0–11)
PA ADP BLD-ACNC: 16.4 % (ref 0–17)
PA ADP BLD-ACNC: 31.8 % (ref 0–17)
PA ADP BLD-ACNC: 96.6 % (ref 0–17)
PCO2 BLDA: 20.6 MMHG (ref 32–48)
PCO2 BLDA: 22 MMHG (ref 32–48)
PCO2 BLDA: 22 MMHG (ref 32–48)
PCO2 BLDA: 24.3 MMHG (ref 32–48)
PCO2 BLDA: 24.4 MMHG (ref 32–48)
PCO2 BLDA: 24.5 MMHG (ref 32–48)
PCO2 BLDA: 25.1 MMHG (ref 32–48)
PCO2 BLDA: 29.1 MMHG (ref 32–48)
PCO2 BLDA: 29.2 MMHG (ref 32–48)
PCO2 BLDA: 31.8 MMHG (ref 32–48)
PCO2 BLDA: 32.8 MMHG (ref 32–48)
PCO2 BLDA: 32.9 MMHG (ref 32–48)
PCO2 BLDA: 33.5 MMHG (ref 32–48)
PCO2 BLDA: 34.8 MMHG (ref 32–48)
PCO2 BLDA: 35.8 MMHG (ref 32–48)
PCO2 BLDA: 35.8 MMHG (ref 32–48)
PCO2 BLDA: 37 MMHG (ref 32–48)
PCO2 BLDA: 37.4 MMHG (ref 32–48)
PCO2 BLDA: 37.5 MMHG (ref 32–48)
PCO2 BLDA: 38.6 MMHG (ref 32–48)
PCO2 BLDA: 63.8 MMHG (ref 32–48)
PCO2 BLDV: 26.4 MMHG (ref 38–54)
PCO2 BLDV: 26.6 MMHG (ref 38–54)
PCO2 BLDV: 28.9 MMHG (ref 38–54)
PCO2 BLDV: 29.2 MMHG (ref 38–54)
PCO2 BLDV: 33.8 MMHG (ref 38–54)
PCO2 BLDV: 34.2 MMHG (ref 38–54)
PCO2 BLDV: 36.4 MMHG (ref 38–54)
PCO2 BLDV: 37.2 MMHG (ref 38–54)
PCO2 BLDV: 38.1 MMHG (ref 38–54)
PCO2 BLDV: 41.2 MMHG (ref 38–54)
PCO2 BLDV: 42.1 MMHG (ref 38–54)
PCO2 BLDV: 42.1 MMHG (ref 38–54)
PCO2 BLDV: 42.3 MMHG (ref 38–54)
PCO2 TEMP ADJ BLDA: 21.6 MMHG (ref 32–48)
PCO2 TEMP ADJ BLDA: 21.9 MMHG (ref 32–48)
PCO2 TEMP ADJ BLDA: 22.6 MMHG (ref 32–48)
PCO2 TEMP ADJ BLDA: 24.7 MMHG (ref 32–48)
PCO2 TEMP ADJ BLDA: 24.7 MMHG (ref 32–48)
PCO2 TEMP ADJ BLDA: 25.3 MMHG (ref 32–48)
PCO2 TEMP ADJ BLDA: 25.6 MMHG (ref 32–48)
PCO2 TEMP ADJ BLDA: 28.2 MMHG (ref 32–48)
PCO2 TEMP ADJ BLDA: 28.8 MMHG (ref 32–48)
PCO2 TEMP ADJ BLDA: 31.3 MMHG (ref 32–48)
PCO2 TEMP ADJ BLDA: 33.1 MMHG (ref 32–48)
PCO2 TEMP ADJ BLDA: 33.3 MMHG (ref 32–48)
PCO2 TEMP ADJ BLDA: 33.7 MMHG (ref 32–48)
PCO2 TEMP ADJ BLDA: 34.3 MMHG (ref 32–48)
PCO2 TEMP ADJ BLDA: 35 MMHG (ref 32–48)
PCO2 TEMP ADJ BLDA: 35.5 MMHG (ref 32–48)
PCO2 TEMP ADJ BLDA: 35.8 MMHG (ref 32–48)
PCO2 TEMP ADJ BLDA: 35.9 MMHG (ref 32–48)
PCO2 TEMP ADJ BLDA: 36.2 MMHG (ref 32–48)
PCO2 TEMP ADJ BLDA: 37.6 MMHG (ref 32–48)
PCO2 TEMP ADJ BLDV: 27.1 MMHG (ref 38–54)
PCO2 TEMP ADJ BLDV: 27.1 MMHG (ref 38–54)
PCO2 TEMP ADJ BLDV: 29.2 MMHG (ref 38–54)
PCO2 TEMP ADJ BLDV: 30.1 MMHG (ref 38–54)
PCO2 TEMP ADJ BLDV: 32.8 MMHG (ref 38–54)
PCO2 TEMP ADJ BLDV: 33.7 MMHG (ref 38–54)
PCO2 TEMP ADJ BLDV: 37.1 MMHG (ref 38–54)
PCO2 TEMP ADJ BLDV: 37.4 MMHG (ref 38–54)
PCO2 TEMP ADJ BLDV: 37.8 MMHG (ref 38–54)
PCO2 TEMP ADJ BLDV: 40.5 MMHG (ref 38–54)
PCO2 TEMP ADJ BLDV: 40.6 MMHG (ref 38–54)
PCO2 TEMP ADJ BLDV: 40.7 MMHG (ref 38–54)
PCO2 TEMP ADJ BLDV: 41.2 MMHG (ref 38–54)
PEEP END EXPIRATORY PRESSURE IPEEP: 12 CMH20
PEEP END EXPIRATORY PRESSURE IPEEP: 8 CMH20
PH BLDA: 7.15 [PH] (ref 7.35–7.45)
PH BLDA: 7.19 [PH] (ref 7.35–7.45)
PH BLDA: 7.21 [PH] (ref 7.35–7.45)
PH BLDA: 7.24 [PH] (ref 7.35–7.45)
PH BLDA: 7.33 [PH] (ref 7.35–7.45)
PH BLDA: 7.38 [PH] (ref 7.35–7.45)
PH BLDA: 7.39 [PH] (ref 7.35–7.45)
PH BLDA: 7.4 [PH] (ref 7.35–7.45)
PH BLDA: 7.41 [PH] (ref 7.35–7.45)
PH BLDA: 7.41 [PH] (ref 7.35–7.45)
PH BLDA: 7.42 [PH] (ref 7.35–7.45)
PH BLDA: 7.42 [PH] (ref 7.35–7.45)
PH BLDA: 7.43 [PH] (ref 7.35–7.45)
PH BLDA: 7.48 [PH] (ref 7.35–7.45)
PH BLDA: 7.5 [PH] (ref 7.35–7.45)
PH BLDA: 7.61 [PH] (ref 7.35–7.45)
PH BLDA: 7.63 [PH] (ref 7.35–7.45)
PH BLDA: 7.65 [PH] (ref 7.35–7.45)
PH BLDV: 7.12 [PH] (ref 7.31–7.45)
PH BLDV: 7.21 [PH] (ref 7.31–7.45)
PH BLDV: 7.36 [PH] (ref 7.31–7.45)
PH BLDV: 7.37 [PH] (ref 7.31–7.45)
PH BLDV: 7.38 [PH] (ref 7.31–7.45)
PH BLDV: 7.39 [PH] (ref 7.31–7.45)
PH BLDV: 7.39 [PH] (ref 7.31–7.45)
PH BLDV: 7.46 [PH] (ref 7.31–7.45)
PH BLDV: 7.5 [PH] (ref 7.31–7.45)
PH BLDV: 7.58 [PH] (ref 7.31–7.45)
PH BLDV: 7.61 [PH] (ref 7.31–7.45)
PH TEMP ADJ BLDA: 7.16 [PH] (ref 7.35–7.45)
PH TEMP ADJ BLDA: 7.2 [PH] (ref 7.35–7.45)
PH TEMP ADJ BLDA: 7.21 [PH] (ref 7.35–7.45)
PH TEMP ADJ BLDA: 7.25 [PH] (ref 7.35–7.45)
PH TEMP ADJ BLDA: 7.38 [PH] (ref 7.35–7.45)
PH TEMP ADJ BLDA: 7.4 [PH] (ref 7.35–7.45)
PH TEMP ADJ BLDA: 7.4 [PH] (ref 7.35–7.45)
PH TEMP ADJ BLDA: 7.41 [PH] (ref 7.35–7.45)
PH TEMP ADJ BLDA: 7.41 [PH] (ref 7.35–7.45)
PH TEMP ADJ BLDA: 7.42 [PH] (ref 7.35–7.45)
PH TEMP ADJ BLDA: 7.42 [PH] (ref 7.35–7.45)
PH TEMP ADJ BLDA: 7.43 [PH] (ref 7.35–7.45)
PH TEMP ADJ BLDA: 7.43 [PH] (ref 7.35–7.45)
PH TEMP ADJ BLDA: 7.44 [PH] (ref 7.35–7.45)
PH TEMP ADJ BLDA: 7.44 [PH] (ref 7.35–7.45)
PH TEMP ADJ BLDA: 7.48 [PH] (ref 7.35–7.45)
PH TEMP ADJ BLDA: 7.49 [PH] (ref 7.35–7.45)
PH TEMP ADJ BLDA: 7.6 [PH] (ref 7.35–7.45)
PH TEMP ADJ BLDA: 7.62 [PH] (ref 7.35–7.45)
PH TEMP ADJ BLDA: 7.64 [PH] (ref 7.35–7.45)
PH TEMP ADJ BLDV: 7.12 [PH] (ref 7.31–7.45)
PH TEMP ADJ BLDV: 7.22 [PH] (ref 7.31–7.45)
PH TEMP ADJ BLDV: 7.36 [PH] (ref 7.31–7.45)
PH TEMP ADJ BLDV: 7.37 [PH] (ref 7.31–7.45)
PH TEMP ADJ BLDV: 7.38 [PH] (ref 7.31–7.45)
PH TEMP ADJ BLDV: 7.38 [PH] (ref 7.31–7.45)
PH TEMP ADJ BLDV: 7.39 [PH] (ref 7.31–7.45)
PH TEMP ADJ BLDV: 7.39 [PH] (ref 7.31–7.45)
PH TEMP ADJ BLDV: 7.4 [PH] (ref 7.31–7.45)
PH TEMP ADJ BLDV: 7.46 [PH] (ref 7.31–7.45)
PH TEMP ADJ BLDV: 7.49 [PH] (ref 7.31–7.45)
PH TEMP ADJ BLDV: 7.57 [PH] (ref 7.31–7.45)
PH TEMP ADJ BLDV: 7.61 [PH] (ref 7.31–7.45)
PHOSPHATE SERPL-MCNC: 2.1 MG/DL (ref 2.5–4.5)
PHOSPHATE SERPL-MCNC: 3.3 MG/DL (ref 2.5–4.5)
PHOSPHATE SERPL-MCNC: 4.6 MG/DL (ref 2.5–4.5)
PHOSPHATE SERPL-MCNC: 7.2 MG/DL (ref 2.5–4.5)
PLATELET # BLD AUTO: 103 K/UL (ref 164–446)
PLATELET # BLD AUTO: 109 K/UL (ref 164–446)
PLATELET # BLD AUTO: 114 K/UL (ref 164–446)
PLATELET # BLD AUTO: 130 K/UL (ref 164–446)
PLATELET # BLD AUTO: 141 K/UL (ref 164–446)
PLATELET # BLD AUTO: 172 K/UL (ref 164–446)
PLATELET # BLD AUTO: 186 K/UL (ref 164–446)
PLATELET # BLD AUTO: 208 K/UL (ref 164–446)
PLATELET # BLD AUTO: 250 K/UL (ref 164–446)
PLATELET # BLD AUTO: 92 K/UL (ref 164–446)
PLATELET # BLD AUTO: 94 K/UL (ref 164–446)
PLATELET BLD QL SMEAR: NORMAL
PLATELETS.RETICULATED NFR BLD AUTO: 11 % (ref 0.6–13.1)
PLATELETS.RETICULATED NFR BLD AUTO: 9.2 % (ref 0.6–13.1)
PMV BLD AUTO: 10.4 FL (ref 9–12.9)
PMV BLD AUTO: 10.7 FL (ref 9–12.9)
PMV BLD AUTO: 10.8 FL (ref 9–12.9)
PMV BLD AUTO: 11 FL (ref 9–12.9)
PMV BLD AUTO: 11.4 FL (ref 9–12.9)
PMV BLD AUTO: 11.4 FL (ref 9–12.9)
PMV BLD AUTO: 11.6 FL (ref 9–12.9)
PMV BLD AUTO: 11.6 FL (ref 9–12.9)
PMV BLD AUTO: 11.9 FL (ref 9–12.9)
PMV BLD AUTO: 12.1 FL (ref 9–12.9)
PMV BLD AUTO: 12.3 FL (ref 9–12.9)
PO2 BLDA: 103 MMHG (ref 83–108)
PO2 BLDA: 118 MMHG (ref 83–108)
PO2 BLDA: 132 MMHG (ref 83–108)
PO2 BLDA: 171 MMHG (ref 83–108)
PO2 BLDA: 53 MMHG (ref 83–108)
PO2 BLDA: 59 MMHG (ref 83–108)
PO2 BLDA: 63 MMHG (ref 83–108)
PO2 BLDA: 63 MMHG (ref 83–108)
PO2 BLDA: 64 MMHG (ref 83–108)
PO2 BLDA: 64 MMHG (ref 83–108)
PO2 BLDA: 71 MMHG (ref 83–108)
PO2 BLDA: 74 MMHG (ref 83–108)
PO2 BLDA: 74 MMHG (ref 83–108)
PO2 BLDA: 75 MMHG (ref 83–108)
PO2 BLDA: 78 MMHG (ref 83–108)
PO2 BLDA: 81 MMHG (ref 83–108)
PO2 BLDA: 81 MMHG (ref 83–108)
PO2 BLDA: 86 MMHG (ref 83–108)
PO2 BLDA: 87 MMHG (ref 83–108)
PO2 BLDA: 88 MMHG (ref 83–108)
PO2 BLDA: 89 MMHG (ref 83–108)
PO2 BLDV: 24 MMHG (ref 23–48)
PO2 BLDV: 25 MMHG (ref 23–48)
PO2 BLDV: 28 MMHG (ref 23–48)
PO2 BLDV: 30 MMHG (ref 23–48)
PO2 BLDV: 30 MMHG (ref 23–48)
PO2 BLDV: 31 MMHG (ref 23–48)
PO2 BLDV: 32 MMHG (ref 23–48)
PO2 BLDV: 33 MMHG (ref 23–48)
PO2 BLDV: 33 MMHG (ref 23–48)
PO2 BLDV: 44 MMHG (ref 23–48)
PO2 BLDV: 69 MMHG (ref 23–48)
PO2 TEMP ADJ BLDA: 101 MMHG (ref 83–108)
PO2 TEMP ADJ BLDA: 116 MMHG (ref 83–108)
PO2 TEMP ADJ BLDA: 141 MMHG (ref 83–108)
PO2 TEMP ADJ BLDA: 167 MMHG (ref 83–108)
PO2 TEMP ADJ BLDA: 55 MMHG (ref 83–108)
PO2 TEMP ADJ BLDA: 60 MMHG (ref 83–108)
PO2 TEMP ADJ BLDA: 62 MMHG (ref 83–108)
PO2 TEMP ADJ BLDA: 63 MMHG (ref 83–108)
PO2 TEMP ADJ BLDA: 64 MMHG (ref 83–108)
PO2 TEMP ADJ BLDA: 67 MMHG (ref 83–108)
PO2 TEMP ADJ BLDA: 70 MMHG (ref 83–108)
PO2 TEMP ADJ BLDA: 73 MMHG (ref 83–108)
PO2 TEMP ADJ BLDA: 76 MMHG (ref 83–108)
PO2 TEMP ADJ BLDA: 81 MMHG (ref 83–108)
PO2 TEMP ADJ BLDA: 84 MMHG (ref 83–108)
PO2 TEMP ADJ BLDA: 85 MMHG (ref 83–108)
PO2 TEMP ADJ BLDA: 85 MMHG (ref 83–108)
PO2 TEMP ADJ BLDA: 86 MMHG (ref 83–108)
PO2 TEMP ADJ BLDV: 25 MMHG (ref 23–48)
PO2 TEMP ADJ BLDV: 26 MMHG (ref 23–48)
PO2 TEMP ADJ BLDV: 28 MMHG (ref 23–48)
PO2 TEMP ADJ BLDV: 29 MMHG (ref 23–48)
PO2 TEMP ADJ BLDV: 30 MMHG (ref 23–48)
PO2 TEMP ADJ BLDV: 32 MMHG (ref 23–48)
PO2 TEMP ADJ BLDV: 33 MMHG (ref 23–48)
PO2 TEMP ADJ BLDV: 33 MMHG (ref 23–48)
PO2 TEMP ADJ BLDV: 35 MMHG (ref 23–48)
PO2 TEMP ADJ BLDV: 43 MMHG (ref 23–48)
PO2 TEMP ADJ BLDV: 66 MMHG (ref 23–48)
POTASSIUM BLD-SCNC: 3.5 MMOL/L (ref 3.6–5.5)
POTASSIUM SERPL-SCNC: 3.4 MMOL/L (ref 3.6–5.5)
POTASSIUM SERPL-SCNC: 3.5 MMOL/L (ref 3.6–5.5)
POTASSIUM SERPL-SCNC: 3.6 MMOL/L (ref 3.6–5.5)
POTASSIUM SERPL-SCNC: 3.7 MMOL/L (ref 3.6–5.5)
POTASSIUM SERPL-SCNC: 3.8 MMOL/L (ref 3.6–5.5)
POTASSIUM SERPL-SCNC: 3.8 MMOL/L (ref 3.6–5.5)
POTASSIUM SERPL-SCNC: 3.9 MMOL/L (ref 3.6–5.5)
POTASSIUM SERPL-SCNC: 3.9 MMOL/L (ref 3.6–5.5)
POTASSIUM SERPL-SCNC: 4 MMOL/L (ref 3.6–5.5)
POTASSIUM SERPL-SCNC: 4.3 MMOL/L (ref 3.6–5.5)
POTASSIUM SERPL-SCNC: 4.5 MMOL/L (ref 3.6–5.5)
POTASSIUM SERPL-SCNC: 4.6 MMOL/L (ref 3.6–5.5)
POTASSIUM SERPL-SCNC: 4.9 MMOL/L (ref 3.6–5.5)
POTASSIUM SERPL-SCNC: 5.5 MMOL/L (ref 3.6–5.5)
PREALB SERPL-MCNC: 10.3 MG/DL (ref 18–38)
PROCALCITONIN SERPL-MCNC: 0.94 NG/ML
PROCALCITONIN SERPL-MCNC: 1.7 NG/ML
PRODUCT TYPE UPROD: NORMAL
PRODUCT TYPE UPROD: NORMAL
PROT SERPL-MCNC: 4.6 G/DL (ref 6–8.2)
PROT SERPL-MCNC: 5.4 G/DL (ref 6–8.2)
PROT SERPL-MCNC: 5.8 G/DL (ref 6–8.2)
PROT SERPL-MCNC: 5.8 G/DL (ref 6–8.2)
PROT SERPL-MCNC: 6.7 G/DL (ref 6–8.2)
PROT SERPL-MCNC: 6.9 G/DL (ref 6–8.2)
PROTHROMBIN TIME: 17.2 SEC (ref 12–14.6)
PROTHROMBIN TIME: 17.9 SEC (ref 12–14.6)
PROTHROMBIN TIME: 18.8 SEC (ref 12–14.6)
PROTHROMBIN TIME: 19.7 SEC (ref 12–14.6)
PROTHROMBIN TIME: 20.2 SEC (ref 12–14.6)
RBC # BLD AUTO: 2.72 M/UL (ref 4.7–6.1)
RBC # BLD AUTO: 2.86 M/UL (ref 4.7–6.1)
RBC # BLD AUTO: 3.1 M/UL (ref 4.7–6.1)
RBC # BLD AUTO: 3.28 M/UL (ref 4.7–6.1)
RBC # BLD AUTO: 3.66 M/UL (ref 4.7–6.1)
RBC # BLD AUTO: 3.73 M/UL (ref 4.7–6.1)
RBC # BLD AUTO: 4.58 M/UL (ref 4.7–6.1)
RBC # BLD AUTO: 4.87 M/UL (ref 4.7–6.1)
RBC # BLD AUTO: 5 M/UL (ref 4.7–6.1)
RBC # BLD AUTO: 5.15 M/UL (ref 4.7–6.1)
RBC # BLD AUTO: 5.28 M/UL (ref 4.7–6.1)
RBC # URNS HPF: ABNORMAL /HPF (ref 0–2)
RBC BLD AUTO: NORMAL
RH BLD: NORMAL
SAO2 % BLDA: 86 % (ref 93–99)
SAO2 % BLDA: 88 % (ref 93–99)
SAO2 % BLDA: 89 % (ref 93–99)
SAO2 % BLDA: 92 % (ref 93–99)
SAO2 % BLDA: 94 % (ref 93–99)
SAO2 % BLDA: 94 % (ref 93–99)
SAO2 % BLDA: 95 % (ref 93–99)
SAO2 % BLDA: 97 % (ref 93–99)
SAO2 % BLDA: 98 % (ref 93–99)
SAO2 % BLDA: 99 % (ref 93–99)
SAO2 % BLDA: 99 % (ref 93–99)
SAO2 % BLDV: 43 % (ref 60–85)
SAO2 % BLDV: 46 % (ref 60–85)
SAO2 % BLDV: 47 % (ref 60–85)
SAO2 % BLDV: 51 % (ref 60–85)
SAO2 % BLDV: 56 % (ref 60–85)
SAO2 % BLDV: 57 % (ref 60–85)
SAO2 % BLDV: 58 % (ref 60–85)
SAO2 % BLDV: 58 % (ref 60–85)
SAO2 % BLDV: 71 % (ref 60–85)
SAO2 % BLDV: 71 % (ref 60–85)
SAO2 % BLDV: 75 % (ref 60–85)
SAO2 % BLDV: 82 % (ref 60–85)
SAO2 % BLDV: 90 % (ref 60–85)
SCCMEC + MECA PNL NOSE NAA+PROBE: NEGATIVE
SIGNIFICANT IND 70042: ABNORMAL
SIGNIFICANT IND 70042: ABNORMAL
SIGNIFICANT IND 70042: NORMAL
SITE SITE: ABNORMAL
SITE SITE: ABNORMAL
SITE SITE: NORMAL
SODIUM BLD-SCNC: 134 MMOL/L (ref 135–145)
SODIUM SERPL-SCNC: 134 MMOL/L (ref 135–145)
SODIUM SERPL-SCNC: 134 MMOL/L (ref 135–145)
SODIUM SERPL-SCNC: 135 MMOL/L (ref 135–145)
SODIUM SERPL-SCNC: 136 MMOL/L (ref 135–145)
SODIUM SERPL-SCNC: 137 MMOL/L (ref 135–145)
SODIUM SERPL-SCNC: 138 MMOL/L (ref 135–145)
SODIUM SERPL-SCNC: 139 MMOL/L (ref 135–145)
SODIUM SERPL-SCNC: 141 MMOL/L (ref 135–145)
SOURCE SOURCE: ABNORMAL
SOURCE SOURCE: ABNORMAL
SOURCE SOURCE: NORMAL
SP GR UR STRIP.AUTO: ABNORMAL
SPECIMEN DRAWN FROM PATIENT: ABNORMAL
TACROLIMUS BLD-MCNC: 8.1 NG/ML (ref 5–20)
TEG ALGORITHM TGALG: ABNORMAL
TEG ALGORITHM TGALG: ABNORMAL
TEG ALGORITHM TGALG: NORMAL
TIDAL VOLUME IVT: 450 ML
TIDAL VOLUME IVT: 470 ML
TIDAL VOLUME IVT: 500 ML
TIDAL VOLUME IVT: 500 ML
TRIGL SERPL-MCNC: 56 MG/DL (ref 0–149)
TROPONIN T SERPL-MCNC: 157 NG/L (ref 6–19)
TROPONIN T SERPL-MCNC: 63 NG/L (ref 6–19)
UFH PPP CHRO-ACNC: >1.1 IU/ML
UNIT STATUS USTAT: NORMAL
UNIT STATUS USTAT: NORMAL
WBC # BLD AUTO: 10.5 K/UL (ref 4.8–10.8)
WBC # BLD AUTO: 10.6 K/UL (ref 4.8–10.8)
WBC # BLD AUTO: 10.8 K/UL (ref 4.8–10.8)
WBC # BLD AUTO: 11.8 K/UL (ref 4.8–10.8)
WBC # BLD AUTO: 12.5 K/UL (ref 4.8–10.8)
WBC # BLD AUTO: 13.7 K/UL (ref 4.8–10.8)
WBC # BLD AUTO: 15.4 K/UL (ref 4.8–10.8)
WBC # BLD AUTO: 16 K/UL (ref 4.8–10.8)
WBC # BLD AUTO: 17.7 K/UL (ref 4.8–10.8)
WBC # BLD AUTO: 18.6 K/UL (ref 4.8–10.8)
WBC # BLD AUTO: 21.7 K/UL (ref 4.8–10.8)
WBC #/AREA URNS HPF: ABNORMAL /HPF

## 2025-01-01 PROCEDURE — 99292 CRITICAL CARE ADDL 30 MIN: CPT | Mod: 25 | Performed by: INTERNAL MEDICINE

## 2025-01-01 PROCEDURE — 99152 MOD SED SAME PHYS/QHP 5/>YRS: CPT | Performed by: INTERNAL MEDICINE

## 2025-01-01 PROCEDURE — 85520 HEPARIN ASSAY: CPT

## 2025-01-01 PROCEDURE — 700111 HCHG RX REV CODE 636 W/ 250 OVERRIDE (IP): Mod: JZ

## 2025-01-01 PROCEDURE — 700111 HCHG RX REV CODE 636 W/ 250 OVERRIDE (IP)

## 2025-01-01 PROCEDURE — 93005 ELECTROCARDIOGRAM TRACING: CPT | Mod: TC | Performed by: INTERNAL MEDICINE

## 2025-01-01 PROCEDURE — 700101 HCHG RX REV CODE 250: Performed by: INTERNAL MEDICINE

## 2025-01-01 PROCEDURE — 85014 HEMATOCRIT: CPT | Mod: 91

## 2025-01-01 PROCEDURE — 99291 CRITICAL CARE FIRST HOUR: CPT | Mod: 25 | Performed by: EMERGENCY MEDICINE

## 2025-01-01 PROCEDURE — 94150 VITAL CAPACITY TEST: CPT

## 2025-01-01 PROCEDURE — 700102 HCHG RX REV CODE 250 W/ 637 OVERRIDE(OP): Performed by: INTERNAL MEDICINE

## 2025-01-01 PROCEDURE — 700111 HCHG RX REV CODE 636 W/ 250 OVERRIDE (IP): Performed by: INTERNAL MEDICINE

## 2025-01-01 PROCEDURE — 84145 PROCALCITONIN (PCT): CPT

## 2025-01-01 PROCEDURE — 700111 HCHG RX REV CODE 636 W/ 250 OVERRIDE (IP): Mod: JZ | Performed by: STUDENT IN AN ORGANIZED HEALTH CARE EDUCATION/TRAINING PROGRAM

## 2025-01-01 PROCEDURE — 85730 THROMBOPLASTIN TIME PARTIAL: CPT | Mod: 91

## 2025-01-01 PROCEDURE — 83605 ASSAY OF LACTIC ACID: CPT

## 2025-01-01 PROCEDURE — 94799 UNLISTED PULMONARY SVC/PX: CPT

## 2025-01-01 PROCEDURE — 83615 LACTATE (LD) (LDH) ENZYME: CPT

## 2025-01-01 PROCEDURE — 87077 CULTURE AEROBIC IDENTIFY: CPT | Mod: 91

## 2025-01-01 PROCEDURE — 700111 HCHG RX REV CODE 636 W/ 250 OVERRIDE (IP): Performed by: SURGERY

## 2025-01-01 PROCEDURE — 700105 HCHG RX REV CODE 258: Performed by: STUDENT IN AN ORGANIZED HEALTH CARE EDUCATION/TRAINING PROGRAM

## 2025-01-01 PROCEDURE — 82803 BLOOD GASES ANY COMBINATION: CPT | Mod: 91

## 2025-01-01 PROCEDURE — 160039 HCHG SURGERY MINUTES - EA ADDL 1 MIN LEVEL 3: Performed by: SURGERY

## 2025-01-01 PROCEDURE — 71045 X-RAY EXAM CHEST 1 VIEW: CPT

## 2025-01-01 PROCEDURE — 84100 ASSAY OF PHOSPHORUS: CPT

## 2025-01-01 PROCEDURE — 700105 HCHG RX REV CODE 258: Performed by: INTERNAL MEDICINE

## 2025-01-01 PROCEDURE — A9270 NON-COVERED ITEM OR SERVICE: HCPCS | Performed by: INTERNAL MEDICINE

## 2025-01-01 PROCEDURE — 700101 HCHG RX REV CODE 250: Performed by: EMERGENCY MEDICINE

## 2025-01-01 PROCEDURE — 5A1945Z RESPIRATORY VENTILATION, 24-96 CONSECUTIVE HOURS: ICD-10-PCS | Performed by: EMERGENCY MEDICINE

## 2025-01-01 PROCEDURE — 80048 BASIC METABOLIC PNL TOTAL CA: CPT | Mod: 91

## 2025-01-01 PROCEDURE — 36620 INSERTION CATHETER ARTERY: CPT

## 2025-01-01 PROCEDURE — 700111 HCHG RX REV CODE 636 W/ 250 OVERRIDE (IP): Performed by: EMERGENCY MEDICINE

## 2025-01-01 PROCEDURE — 85014 HEMATOCRIT: CPT

## 2025-01-01 PROCEDURE — 85027 COMPLETE CBC AUTOMATED: CPT

## 2025-01-01 PROCEDURE — 700102 HCHG RX REV CODE 250 W/ 637 OVERRIDE(OP)

## 2025-01-01 PROCEDURE — 82803 BLOOD GASES ANY COMBINATION: CPT

## 2025-01-01 PROCEDURE — 93010 ELECTROCARDIOGRAM REPORT: CPT | Performed by: INTERNAL MEDICINE

## 2025-01-01 PROCEDURE — 700102 HCHG RX REV CODE 250 W/ 637 OVERRIDE(OP): Performed by: EMERGENCY MEDICINE

## 2025-01-01 PROCEDURE — 700105 HCHG RX REV CODE 258: Performed by: EMERGENCY MEDICINE

## 2025-01-01 PROCEDURE — 85347 COAGULATION TIME ACTIVATED: CPT

## 2025-01-01 PROCEDURE — 92979 ENDOLUMINL IVUS OCT C EA: CPT | Mod: LC

## 2025-01-01 PROCEDURE — 85018 HEMOGLOBIN: CPT | Mod: 91

## 2025-01-01 PROCEDURE — 93308 TTE F-UP OR LMTD: CPT | Mod: 26 | Performed by: INTERNAL MEDICINE

## 2025-01-01 PROCEDURE — 83735 ASSAY OF MAGNESIUM: CPT

## 2025-01-01 PROCEDURE — 90935 HEMODIALYSIS ONE EVALUATION: CPT

## 2025-01-01 PROCEDURE — 31500 INSERT EMERGENCY AIRWAY: CPT

## 2025-01-01 PROCEDURE — 700111 HCHG RX REV CODE 636 W/ 250 OVERRIDE (IP): Mod: JZ | Performed by: INTERNAL MEDICINE

## 2025-01-01 PROCEDURE — 83605 ASSAY OF LACTIC ACID: CPT | Mod: 91

## 2025-01-01 PROCEDURE — 31500 INSERT EMERGENCY AIRWAY: CPT | Performed by: INTERNAL MEDICINE

## 2025-01-01 PROCEDURE — 37799 UNLISTED PX VASCULAR SURGERY: CPT

## 2025-01-01 PROCEDURE — 84484 ASSAY OF TROPONIN QUANT: CPT | Mod: 91

## 2025-01-01 PROCEDURE — 160015 HCHG STAT PREOP MINUTES: Performed by: SURGERY

## 2025-01-01 PROCEDURE — 87340 HEPATITIS B SURFACE AG IA: CPT

## 2025-01-01 PROCEDURE — C1751 CATH, INF, PER/CENT/MIDLINE: HCPCS

## 2025-01-01 PROCEDURE — 99291 CRITICAL CARE FIRST HOUR: CPT | Mod: 25 | Performed by: INTERNAL MEDICINE

## 2025-01-01 PROCEDURE — 36415 COLL VENOUS BLD VENIPUNCTURE: CPT

## 2025-01-01 PROCEDURE — 30901 CONTROL OF NOSEBLEED: CPT | Performed by: INTERNAL MEDICINE

## 2025-01-01 PROCEDURE — 99291 CRITICAL CARE FIRST HOUR: CPT | Performed by: INTERNAL MEDICINE

## 2025-01-01 PROCEDURE — 86704 HEP B CORE ANTIBODY TOTAL: CPT

## 2025-01-01 PROCEDURE — 700105 HCHG RX REV CODE 258

## 2025-01-01 PROCEDURE — 82962 GLUCOSE BLOOD TEST: CPT | Mod: 91

## 2025-01-01 PROCEDURE — 700117 HCHG RX CONTRAST REV CODE 255: Performed by: INTERNAL MEDICINE

## 2025-01-01 PROCEDURE — 93010 ELECTROCARDIOGRAM REPORT: CPT | Mod: 76 | Performed by: INTERNAL MEDICINE

## 2025-01-01 PROCEDURE — 06HY33Z INSERTION OF INFUSION DEVICE INTO LOWER VEIN, PERCUTANEOUS APPROACH: ICD-10-PCS | Performed by: NURSE PRACTITIONER

## 2025-01-01 PROCEDURE — 99221 1ST HOSP IP/OBS SF/LOW 40: CPT | Mod: 25 | Performed by: SURGERY

## 2025-01-01 PROCEDURE — 80197 ASSAY OF TACROLIMUS: CPT

## 2025-01-01 PROCEDURE — 93503 INSERT/PLACE HEART CATHETER: CPT | Mod: 59 | Performed by: INTERNAL MEDICINE

## 2025-01-01 PROCEDURE — 700101 HCHG RX REV CODE 250: Performed by: STUDENT IN AN ORGANIZED HEALTH CARE EDUCATION/TRAINING PROGRAM

## 2025-01-01 PROCEDURE — 700101 HCHG RX REV CODE 250: Performed by: SURGERY

## 2025-01-01 PROCEDURE — 30233N1 TRANSFUSION OF NONAUTOLOGOUS RED BLOOD CELLS INTO PERIPHERAL VEIN, PERCUTANEOUS APPROACH: ICD-10-PCS | Performed by: INTERNAL MEDICINE

## 2025-01-01 PROCEDURE — 93308 TTE F-UP OR LMTD: CPT

## 2025-01-01 PROCEDURE — 85027 COMPLETE CBC AUTOMATED: CPT | Mod: 91

## 2025-01-01 PROCEDURE — 84134 ASSAY OF PREALBUMIN: CPT

## 2025-01-01 PROCEDURE — 85384 FIBRINOGEN ACTIVITY: CPT

## 2025-01-01 PROCEDURE — 94003 VENT MGMT INPAT SUBQ DAY: CPT

## 2025-01-01 PROCEDURE — A9270 NON-COVERED ITEM OR SERVICE: HCPCS | Performed by: EMERGENCY MEDICINE

## 2025-01-01 PROCEDURE — 99233 SBSQ HOSP IP/OBS HIGH 50: CPT | Performed by: INTERNAL MEDICINE

## 2025-01-01 PROCEDURE — 80061 LIPID PANEL: CPT

## 2025-01-01 PROCEDURE — 99292 CRITICAL CARE ADDL 30 MIN: CPT | Mod: 25 | Performed by: STUDENT IN AN ORGANIZED HEALTH CARE EDUCATION/TRAINING PROGRAM

## 2025-01-01 PROCEDURE — 85576 BLOOD PLATELET AGGREGATION: CPT

## 2025-01-01 PROCEDURE — 93005 ELECTROCARDIOGRAM TRACING: CPT | Mod: TC | Performed by: STUDENT IN AN ORGANIZED HEALTH CARE EDUCATION/TRAINING PROGRAM

## 2025-01-01 PROCEDURE — 85610 PROTHROMBIN TIME: CPT

## 2025-01-01 PROCEDURE — 87040 BLOOD CULTURE FOR BACTERIA: CPT

## 2025-01-01 PROCEDURE — 5A1D70Z PERFORMANCE OF URINARY FILTRATION, INTERMITTENT, LESS THAN 6 HOURS PER DAY: ICD-10-PCS | Performed by: INTERNAL MEDICINE

## 2025-01-01 PROCEDURE — 85576 BLOOD PLATELET AGGREGATION: CPT | Mod: 91

## 2025-01-01 PROCEDURE — A9270 NON-COVERED ITEM OR SERVICE: HCPCS

## 2025-01-01 PROCEDURE — 86900 BLOOD TYPING SEROLOGIC ABO: CPT | Mod: 91

## 2025-01-01 PROCEDURE — P9016 RBC LEUKOCYTES REDUCED: HCPCS

## 2025-01-01 PROCEDURE — 93460 R&L HRT ART/VENTRICLE ANGIO: CPT | Mod: 26,59 | Performed by: INTERNAL MEDICINE

## 2025-01-01 PROCEDURE — 5A0221D ASSISTANCE WITH CARDIAC OUTPUT USING IMPELLER PUMP, CONTINUOUS: ICD-10-PCS | Performed by: INTERNAL MEDICINE

## 2025-01-01 PROCEDURE — 36556 INSERT NON-TUNNEL CV CATH: CPT | Performed by: NURSE PRACTITIONER

## 2025-01-01 PROCEDURE — 160028 HCHG SURGERY MINUTES - 1ST 30 MINS LEVEL 3: Performed by: SURGERY

## 2025-01-01 PROCEDURE — 03HY32Z INSERTION OF MONITORING DEVICE INTO UPPER ARTERY, PERCUTANEOUS APPROACH: ICD-10-PCS | Performed by: EMERGENCY MEDICINE

## 2025-01-01 PROCEDURE — C9248 INJ, CLEVIDIPINE BUTYRATE: HCPCS | Mod: JZ,TB | Performed by: INTERNAL MEDICINE

## 2025-01-01 PROCEDURE — 36620 INSERTION CATHETER ARTERY: CPT | Performed by: EMERGENCY MEDICINE

## 2025-01-01 PROCEDURE — 2Y41X5Z PACKING OF NASAL REGION USING PACKING MATERIAL: ICD-10-PCS | Performed by: INTERNAL MEDICINE

## 2025-01-01 PROCEDURE — 82248 BILIRUBIN DIRECT: CPT

## 2025-01-01 PROCEDURE — 02HQ32Z INSERTION OF MONITORING DEVICE INTO RIGHT PULMONARY ARTERY, PERCUTANEOUS APPROACH: ICD-10-PCS | Performed by: INTERNAL MEDICINE

## 2025-01-01 PROCEDURE — 97602 WOUND(S) CARE NON-SELECTIVE: CPT

## 2025-01-01 PROCEDURE — 700111 HCHG RX REV CODE 636 W/ 250 OVERRIDE (IP): Mod: JZ | Performed by: EMERGENCY MEDICINE

## 2025-01-01 PROCEDURE — 82010 KETONE BODYS QUAN: CPT

## 2025-01-01 PROCEDURE — 80053 COMPREHEN METABOLIC PANEL: CPT

## 2025-01-01 PROCEDURE — 93005 ELECTROCARDIOGRAM TRACING: CPT | Mod: TC | Performed by: EMERGENCY MEDICINE

## 2025-01-01 PROCEDURE — 027034Z DILATION OF CORONARY ARTERY, ONE ARTERY WITH DRUG-ELUTING INTRALUMINAL DEVICE, PERCUTANEOUS APPROACH: ICD-10-PCS | Performed by: INTERNAL MEDICINE

## 2025-01-01 PROCEDURE — 92972 PERQ TRLUML CORONRY LITHOTRP: CPT

## 2025-01-01 PROCEDURE — B240ZZ3 ULTRASONOGRAPHY OF SINGLE CORONARY ARTERY, INTRAVASCULAR: ICD-10-PCS | Performed by: INTERNAL MEDICINE

## 2025-01-01 PROCEDURE — 0BH17EZ INSERTION OF ENDOTRACHEAL AIRWAY INTO TRACHEA, VIA NATURAL OR ARTIFICIAL OPENING: ICD-10-PCS | Performed by: INTERNAL MEDICINE

## 2025-01-01 PROCEDURE — 4A023N8 MEASUREMENT OF CARDIAC SAMPLING AND PRESSURE, BILATERAL, PERCUTANEOUS APPROACH: ICD-10-PCS | Performed by: INTERNAL MEDICINE

## 2025-01-01 PROCEDURE — 96365 THER/PROPH/DIAG IV INF INIT: CPT

## 2025-01-01 PROCEDURE — 86140 C-REACTIVE PROTEIN: CPT

## 2025-01-01 PROCEDURE — 700111 HCHG RX REV CODE 636 W/ 250 OVERRIDE (IP): Performed by: STUDENT IN AN ORGANIZED HEALTH CARE EDUCATION/TRAINING PROGRAM

## 2025-01-01 PROCEDURE — 83880 ASSAY OF NATRIURETIC PEPTIDE: CPT

## 2025-01-01 PROCEDURE — 80076 HEPATIC FUNCTION PANEL: CPT

## 2025-01-01 PROCEDURE — 36620 INSERTION CATHETER ARTERY: CPT | Performed by: NURSE PRACTITIONER

## 2025-01-01 PROCEDURE — 86850 RBC ANTIBODY SCREEN: CPT

## 2025-01-01 PROCEDURE — 75710 ARTERY X-RAYS ARM/LEG: CPT | Mod: 26,59 | Performed by: INTERNAL MEDICINE

## 2025-01-01 PROCEDURE — 92979 ENDOLUMINL IVUS OCT C EA: CPT | Mod: 26,LD | Performed by: INTERNAL MEDICINE

## 2025-01-01 PROCEDURE — 99233 SBSQ HOSP IP/OBS HIGH 50: CPT | Mod: GC | Performed by: INTERNAL MEDICINE

## 2025-01-01 PROCEDURE — C1769 GUIDE WIRE: HCPCS | Performed by: SURGERY

## 2025-01-01 PROCEDURE — 99152 MOD SED SAME PHYS/QHP 5/>YRS: CPT

## 2025-01-01 PROCEDURE — 87641 MR-STAPH DNA AMP PROBE: CPT

## 2025-01-01 PROCEDURE — 80048 BASIC METABOLIC PNL TOTAL CA: CPT

## 2025-01-01 PROCEDURE — 86923 COMPATIBILITY TEST ELECTRIC: CPT

## 2025-01-01 PROCEDURE — 92978 ENDOLUMINL IVUS OCT C 1ST: CPT | Mod: 26,LM | Performed by: INTERNAL MEDICINE

## 2025-01-01 PROCEDURE — 700111 HCHG RX REV CODE 636 W/ 250 OVERRIDE (IP): Mod: JZ,TB | Performed by: INTERNAL MEDICINE

## 2025-01-01 PROCEDURE — 83036 HEMOGLOBIN GLYCOSYLATED A1C: CPT

## 2025-01-01 PROCEDURE — 304217 HCHG IRRIGATION SYSTEM

## 2025-01-01 PROCEDURE — B2111ZZ FLUOROSCOPY OF MULTIPLE CORONARY ARTERIES USING LOW OSMOLAR CONTRAST: ICD-10-PCS | Performed by: INTERNAL MEDICINE

## 2025-01-01 PROCEDURE — 92950 HEART/LUNG RESUSCITATION CPR: CPT

## 2025-01-01 PROCEDURE — 81001 URINALYSIS AUTO W/SCOPE: CPT

## 2025-01-01 PROCEDURE — 72125 CT NECK SPINE W/O DYE: CPT

## 2025-01-01 PROCEDURE — 93325 DOPPLER ECHO COLOR FLOW MAPG: CPT

## 2025-01-01 PROCEDURE — 02703ZZ DILATION OF CORONARY ARTERY, ONE ARTERY, PERCUTANEOUS APPROACH: ICD-10-PCS | Performed by: INTERNAL MEDICINE

## 2025-01-01 PROCEDURE — 87015 SPECIMEN INFECT AGNT CONCNTJ: CPT

## 2025-01-01 PROCEDURE — 700111 HCHG RX REV CODE 636 W/ 250 OVERRIDE (IP): Mod: JZ,TB | Performed by: STUDENT IN AN ORGANIZED HEALTH CARE EDUCATION/TRAINING PROGRAM

## 2025-01-01 PROCEDURE — 82330 ASSAY OF CALCIUM: CPT | Mod: 91

## 2025-01-01 PROCEDURE — 770022 HCHG ROOM/CARE - ICU (200)

## 2025-01-01 PROCEDURE — 02F03ZZ FRAGMENTATION IN CORONARY ARTERY, ONE ARTERY, PERCUTANEOUS APPROACH: ICD-10-PCS | Performed by: INTERNAL MEDICINE

## 2025-01-01 PROCEDURE — 84295 ASSAY OF SERUM SODIUM: CPT

## 2025-01-01 PROCEDURE — 85610 PROTHROMBIN TIME: CPT | Mod: 91

## 2025-01-01 PROCEDURE — 160048 HCHG OR STATISTICAL LEVEL 1-5: Performed by: SURGERY

## 2025-01-01 PROCEDURE — 85384 FIBRINOGEN ACTIVITY: CPT | Mod: 91

## 2025-01-01 PROCEDURE — 303747 HCHG EXTRA SUTURE

## 2025-01-01 PROCEDURE — 36430 TRANSFUSION BLD/BLD COMPNT: CPT

## 2025-01-01 PROCEDURE — 92972 PERQ TRLUML CORONRY LITHOTRP: CPT | Mod: LD | Performed by: INTERNAL MEDICINE

## 2025-01-01 PROCEDURE — 99292 CRITICAL CARE ADDL 30 MIN: CPT | Mod: 25 | Performed by: EMERGENCY MEDICINE

## 2025-01-01 PROCEDURE — 86706 HEP B SURFACE ANTIBODY: CPT

## 2025-01-01 PROCEDURE — 04HY32Z INSERTION OF MONITORING DEVICE INTO LOWER ARTERY, PERCUTANEOUS APPROACH: ICD-10-PCS | Performed by: NURSE PRACTITIONER

## 2025-01-01 PROCEDURE — 304999 HCHG REPAIR-SIMPLE/INTERMED LEVEL 1

## 2025-01-01 PROCEDURE — 87040 BLOOD CULTURE FOR BACTERIA: CPT | Mod: 91

## 2025-01-01 PROCEDURE — 700101 HCHG RX REV CODE 250

## 2025-01-01 PROCEDURE — 5A1935Z RESPIRATORY VENTILATION, LESS THAN 24 CONSECUTIVE HOURS: ICD-10-PCS | Performed by: INTERNAL MEDICINE

## 2025-01-01 PROCEDURE — 83690 ASSAY OF LIPASE: CPT

## 2025-01-01 PROCEDURE — 85025 COMPLETE CBC W/AUTO DIFF WBC: CPT

## 2025-01-01 PROCEDURE — 99291 CRITICAL CARE FIRST HOUR: CPT

## 2025-01-01 PROCEDURE — A9270 NON-COVERED ITEM OR SERVICE: HCPCS | Performed by: STUDENT IN AN ORGANIZED HEALTH CARE EDUCATION/TRAINING PROGRAM

## 2025-01-01 PROCEDURE — 85018 HEMOGLOBIN: CPT

## 2025-01-01 PROCEDURE — 02HA3RZ INSERTION OF SHORT-TERM EXTERNAL HEART ASSIST SYSTEM INTO HEART, PERCUTANEOUS APPROACH: ICD-10-PCS | Performed by: INTERNAL MEDICINE

## 2025-01-01 PROCEDURE — 700102 HCHG RX REV CODE 250 W/ 637 OVERRIDE(OP): Performed by: STUDENT IN AN ORGANIZED HEALTH CARE EDUCATION/TRAINING PROGRAM

## 2025-01-01 PROCEDURE — 70450 CT HEAD/BRAIN W/O DYE: CPT

## 2025-01-01 PROCEDURE — B41G1ZZ FLUOROSCOPY OF LEFT LOWER EXTREMITY ARTERIES USING LOW OSMOLAR CONTRAST: ICD-10-PCS | Performed by: INTERNAL MEDICINE

## 2025-01-01 PROCEDURE — 85347 COAGULATION TIME ACTIVATED: CPT | Mod: 91

## 2025-01-01 PROCEDURE — 160009 HCHG ANES TIME/MIN: Performed by: SURGERY

## 2025-01-01 PROCEDURE — 92920 PRQ TRLUML C ANGIOP 1ART&/BR: CPT | Mod: LC,59 | Performed by: INTERNAL MEDICINE

## 2025-01-01 PROCEDURE — 99223 1ST HOSP IP/OBS HIGH 75: CPT | Mod: GC | Performed by: INTERNAL MEDICINE

## 2025-01-01 PROCEDURE — 99223 1ST HOSP IP/OBS HIGH 75: CPT | Performed by: INTERNAL MEDICINE

## 2025-01-01 PROCEDURE — 85055 RETICULATED PLATELET ASSAY: CPT

## 2025-01-01 PROCEDURE — 99291 CRITICAL CARE FIRST HOUR: CPT | Performed by: EMERGENCY MEDICINE

## 2025-01-01 PROCEDURE — 0CQ0XZZ REPAIR UPPER LIP, EXTERNAL APPROACH: ICD-10-PCS | Performed by: EMERGENCY MEDICINE

## 2025-01-01 PROCEDURE — 36556 INSERT NON-TUNNEL CV CATH: CPT

## 2025-01-01 PROCEDURE — 96375 TX/PRO/DX INJ NEW DRUG ADDON: CPT

## 2025-01-01 PROCEDURE — 33992 RMVL PERQ LEFT HEART VAD: CPT | Performed by: SURGERY

## 2025-01-01 PROCEDURE — 0HQ1XZZ REPAIR FACE SKIN, EXTERNAL APPROACH: ICD-10-PCS | Performed by: EMERGENCY MEDICINE

## 2025-01-01 PROCEDURE — 85007 BL SMEAR W/DIFF WBC COUNT: CPT

## 2025-01-01 PROCEDURE — 94002 VENT MGMT INPAT INIT DAY: CPT

## 2025-01-01 PROCEDURE — 87181 SC STD AGAR DILUTION PER AGT: CPT

## 2025-01-01 PROCEDURE — 82330 ASSAY OF CALCIUM: CPT

## 2025-01-01 PROCEDURE — 92928 PRQ TCAT PLMT NTRAC ST 1 LES: CPT | Mod: LD | Performed by: INTERNAL MEDICINE

## 2025-01-01 PROCEDURE — 86901 BLOOD TYPING SEROLOGIC RH(D): CPT | Mod: 91

## 2025-01-01 PROCEDURE — 33991 INSJ PERQ VAD L HRT ARTL&VEN: CPT | Performed by: INTERNAL MEDICINE

## 2025-01-01 PROCEDURE — 92950 HEART/LUNG RESUSCITATION CPR: CPT | Performed by: INTERNAL MEDICINE

## 2025-01-01 PROCEDURE — B543ZZA ULTRASONOGRAPHY OF RIGHT JUGULAR VEINS, GUIDANCE: ICD-10-PCS | Performed by: INTERNAL MEDICINE

## 2025-01-01 PROCEDURE — C1894 INTRO/SHEATH, NON-LASER: HCPCS

## 2025-01-01 PROCEDURE — 02PA3RZ REMOVAL OF SHORT-TERM EXTERNAL HEART ASSIST SYSTEM FROM HEART, PERCUTANEOUS APPROACH: ICD-10-PCS | Performed by: SURGERY

## 2025-01-01 PROCEDURE — 84132 ASSAY OF SERUM POTASSIUM: CPT

## 2025-01-01 RX ORDER — AMOXICILLIN 250 MG
2 CAPSULE ORAL EVERY EVENING
Status: DISCONTINUED | OUTPATIENT
Start: 2025-01-01 | End: 2025-01-01

## 2025-01-01 RX ORDER — HEPARIN SODIUM 5000 [USP'U]/100ML
0-1000 INJECTION, SOLUTION INTRAVENOUS CONTINUOUS
Status: DISCONTINUED | OUTPATIENT
Start: 2025-01-01 | End: 2025-01-01

## 2025-01-01 RX ORDER — DEXMEDETOMIDINE HYDROCHLORIDE 4 UG/ML
.1-1.5 INJECTION, SOLUTION INTRAVENOUS CONTINUOUS
Status: DISCONTINUED | OUTPATIENT
Start: 2025-01-01 | End: 2025-01-01

## 2025-01-01 RX ORDER — CALCIUM CHLORIDE 100 MG/ML
1 INJECTION INTRAVENOUS; INTRAVENTRICULAR ONCE
Status: COMPLETED | OUTPATIENT
Start: 2025-01-01 | End: 2025-01-01

## 2025-01-01 RX ORDER — GABAPENTIN 100 MG/1
200 CAPSULE ORAL DAILY
COMMUNITY
Start: 2025-01-01

## 2025-01-01 RX ORDER — TRANEXAMIC ACID 100 MG/ML
3 INJECTION, SOLUTION INTRAVENOUS ONCE
Status: DISCONTINUED | OUTPATIENT
Start: 2025-01-01 | End: 2025-01-01

## 2025-01-01 RX ORDER — INDOMETHACIN 25 MG/1
CAPSULE ORAL
Status: DISCONTINUED | OUTPATIENT
Start: 2025-01-01 | End: 2025-01-01

## 2025-01-01 RX ORDER — PROMETHAZINE HYDROCHLORIDE 25 MG/1
12.5-25 SUPPOSITORY RECTAL EVERY 4 HOURS PRN
Status: DISCONTINUED | OUTPATIENT
Start: 2025-01-01 | End: 2025-01-01

## 2025-01-01 RX ORDER — PHENYLEPHRINE HYDROCHLORIDE 10 MG/ML
INJECTION, SOLUTION INTRAMUSCULAR; INTRAVENOUS; SUBCUTANEOUS
Status: COMPLETED
Start: 2025-01-01 | End: 2025-01-01

## 2025-01-01 RX ORDER — INDOMETHACIN 25 MG/1
100 CAPSULE ORAL ONCE
Status: COMPLETED | OUTPATIENT
Start: 2025-01-01 | End: 2025-01-01

## 2025-01-01 RX ORDER — LIDOCAINE HYDROCHLORIDE 20 MG/ML
INJECTION, SOLUTION INFILTRATION; PERINEURAL
Status: COMPLETED
Start: 2025-01-01 | End: 2025-01-01

## 2025-01-01 RX ORDER — HEPARIN SODIUM 1000 [USP'U]/ML
3200 INJECTION, SOLUTION INTRAVENOUS; SUBCUTANEOUS PRN
Status: DISCONTINUED | OUTPATIENT
Start: 2025-01-01 | End: 2025-01-01

## 2025-01-01 RX ORDER — FUROSEMIDE 10 MG/ML
60 INJECTION INTRAMUSCULAR; INTRAVENOUS ONCE
Status: COMPLETED | OUTPATIENT
Start: 2025-01-01 | End: 2025-01-01

## 2025-01-01 RX ORDER — FLUDROCORTISONE ACETATE 0.1 MG/1
0.1 TABLET ORAL EVERY MORNING
Status: DISCONTINUED | OUTPATIENT
Start: 2025-01-01 | End: 2025-01-01

## 2025-01-01 RX ORDER — TRANEXAMIC ACID 100 MG/ML
3 INJECTION, SOLUTION INTRAVENOUS ONCE
Status: COMPLETED | OUTPATIENT
Start: 2025-01-01 | End: 2025-01-01

## 2025-01-01 RX ORDER — LINEZOLID 2 MG/ML
600 INJECTION, SOLUTION INTRAVENOUS EVERY 12 HOURS
Status: DISCONTINUED | OUTPATIENT
Start: 2025-01-01 | End: 2025-01-01

## 2025-01-01 RX ORDER — INSULIN LISPRO 100 [IU]/ML
1-6 INJECTION, SOLUTION INTRAVENOUS; SUBCUTANEOUS
Status: DISCONTINUED | OUTPATIENT
Start: 2025-01-01 | End: 2025-01-01

## 2025-01-01 RX ORDER — POTASSIUM CHLORIDE 14.9 MG/ML
20 INJECTION INTRAVENOUS ONCE
Status: CANCELLED | OUTPATIENT
Start: 2025-01-01 | End: 2025-01-01

## 2025-01-01 RX ORDER — FUROSEMIDE 10 MG/ML
100 INJECTION INTRAMUSCULAR; INTRAVENOUS 2 TIMES DAILY
Status: DISCONTINUED | OUTPATIENT
Start: 2025-01-01 | End: 2025-01-01

## 2025-01-01 RX ORDER — FAMOTIDINE 20 MG/1
20 TABLET, FILM COATED ORAL DAILY
Status: DISCONTINUED | OUTPATIENT
Start: 2025-01-01 | End: 2025-01-01

## 2025-01-01 RX ORDER — CLOPIDOGREL 300 MG/1
600 TABLET, FILM COATED ORAL ONCE
Status: DISCONTINUED | OUTPATIENT
Start: 2025-01-01 | End: 2025-01-01

## 2025-01-01 RX ORDER — FLUDROCORTISONE ACETATE 0.1 MG/1
0.1 TABLET ORAL EVERY MORNING
Status: DISCONTINUED | OUTPATIENT
Start: 2025-05-01 | End: 2025-01-01

## 2025-01-01 RX ORDER — DIPHENHYDRAMINE HYDROCHLORIDE 50 MG/ML
50 INJECTION, SOLUTION INTRAMUSCULAR; INTRAVENOUS ONCE
Status: COMPLETED | OUTPATIENT
Start: 2025-01-01 | End: 2025-01-01

## 2025-01-01 RX ORDER — HEPARIN SODIUM 5000 [USP'U]/100ML
INJECTION, SOLUTION INTRAVENOUS CONTINUOUS
Status: DISCONTINUED | OUTPATIENT
Start: 2025-01-01 | End: 2025-01-01

## 2025-01-01 RX ORDER — GABAPENTIN 100 MG/1
200 CAPSULE ORAL DAILY
Status: DISCONTINUED | OUTPATIENT
Start: 2025-01-01 | End: 2025-01-01

## 2025-01-01 RX ORDER — FUROSEMIDE 80 MG/1
40 TABLET ORAL
COMMUNITY
Start: 2024-01-01 | End: 2025-11-15

## 2025-01-01 RX ORDER — CEFAZOLIN SODIUM 1 G/3ML
INJECTION, POWDER, FOR SOLUTION INTRAMUSCULAR; INTRAVENOUS PRN
Status: DISCONTINUED | OUTPATIENT
Start: 2025-01-01 | End: 2025-01-01 | Stop reason: SURG

## 2025-01-01 RX ORDER — POLYETHYLENE GLYCOL 3350 17 G/17G
1 POWDER, FOR SOLUTION ORAL
Status: DISCONTINUED | OUTPATIENT
Start: 2025-01-01 | End: 2025-01-01

## 2025-01-01 RX ORDER — EPINEPHRINE HCL IN 0.9 % NACL 4MG/250ML
0-1 PLASTIC BAG, INJECTION (ML) INTRAVENOUS CONTINUOUS
Status: DISCONTINUED | OUTPATIENT
Start: 2025-01-01 | End: 2025-01-01

## 2025-01-01 RX ORDER — METOPROLOL SUCCINATE 50 MG/1
50 TABLET, EXTENDED RELEASE ORAL
COMMUNITY
Start: 2025-01-01 | End: 2026-03-06

## 2025-01-01 RX ORDER — SODIUM POLYSTYRENE SULFONATE 15 G/60ML
15 SUSPENSION ORAL; RECTAL SEE ADMIN INSTRUCTIONS
COMMUNITY
Start: 2025-01-01 | End: 2025-07-16

## 2025-01-01 RX ORDER — HEPARIN SODIUM 1000 [USP'U]/ML
4000 INJECTION, SOLUTION INTRAVENOUS; SUBCUTANEOUS ONCE
Status: DISCONTINUED | OUTPATIENT
Start: 2025-01-01 | End: 2025-01-01

## 2025-01-01 RX ORDER — PHENYLEPHRINE HCL IN 0.9% NACL 1 MG/10 ML
SYRINGE (ML) INTRAVENOUS
Status: COMPLETED
Start: 2025-01-01 | End: 2025-01-01

## 2025-01-01 RX ORDER — HYDROCORTISONE SODIUM SUCCINATE 100 MG/2ML
50 INJECTION INTRAMUSCULAR; INTRAVENOUS EVERY 6 HOURS
Status: DISCONTINUED | OUTPATIENT
Start: 2025-01-01 | End: 2025-01-01

## 2025-01-01 RX ORDER — PRASUGREL 10 MG/1
TABLET, FILM COATED ORAL
Status: COMPLETED
Start: 2025-01-01 | End: 2025-01-01

## 2025-01-01 RX ORDER — ACETAMINOPHEN 325 MG/1
650 TABLET ORAL EVERY 6 HOURS PRN
Status: DISCONTINUED | OUTPATIENT
Start: 2025-01-01 | End: 2025-01-01

## 2025-01-01 RX ORDER — HEPARIN SODIUM 5000 [USP'U]/100ML
0-30 INJECTION, SOLUTION INTRAVENOUS CONTINUOUS
Status: DISCONTINUED | OUTPATIENT
Start: 2025-01-01 | End: 2025-01-01

## 2025-01-01 RX ORDER — DEXTROSE MONOHYDRATE 25 G/50ML
12.5-25 INJECTION, SOLUTION INTRAVENOUS PRN
Status: DISCONTINUED | OUTPATIENT
Start: 2025-01-01 | End: 2025-01-01

## 2025-01-01 RX ORDER — ONDANSETRON 4 MG/1
4 TABLET, ORALLY DISINTEGRATING ORAL EVERY 4 HOURS PRN
Status: DISCONTINUED | OUTPATIENT
Start: 2025-01-01 | End: 2025-01-01

## 2025-01-01 RX ORDER — SODIUM CHLORIDE, SODIUM LACTATE, POTASSIUM CHLORIDE, AND CALCIUM CHLORIDE .6; .31; .03; .02 G/100ML; G/100ML; G/100ML; G/100ML
1000 INJECTION, SOLUTION INTRAVENOUS ONCE
Status: COMPLETED | OUTPATIENT
Start: 2025-01-01 | End: 2025-01-01

## 2025-01-01 RX ORDER — LOSARTAN POTASSIUM 100 MG/1
50 TABLET ORAL
Status: DISCONTINUED | OUTPATIENT
Start: 2025-01-01 | End: 2025-01-01

## 2025-01-01 RX ORDER — HEPARIN SODIUM 1000 [USP'U]/ML
INJECTION, SOLUTION INTRAVENOUS; SUBCUTANEOUS
Status: COMPLETED
Start: 2025-01-01 | End: 2025-01-01

## 2025-01-01 RX ORDER — PHENYLEPHRINE HYDROCHLORIDE 10 MG/ML
INJECTION, SOLUTION INTRAMUSCULAR; INTRAVENOUS; SUBCUTANEOUS PRN
Status: DISCONTINUED | OUTPATIENT
Start: 2025-01-01 | End: 2025-01-01 | Stop reason: SURG

## 2025-01-01 RX ORDER — ONDANSETRON 2 MG/ML
4 INJECTION INTRAMUSCULAR; INTRAVENOUS EVERY 4 HOURS PRN
Status: DISCONTINUED | OUTPATIENT
Start: 2025-01-01 | End: 2025-01-01

## 2025-01-01 RX ORDER — MYCOPHENOLATE MOFETIL 200 MG/ML
750 POWDER, FOR SUSPENSION ORAL 2 TIMES DAILY
Status: DISCONTINUED | OUTPATIENT
Start: 2025-01-01 | End: 2025-01-01

## 2025-01-01 RX ORDER — MAGNESIUM SULFATE HEPTAHYDRATE 40 MG/ML
2 INJECTION, SOLUTION INTRAVENOUS ONCE
Status: COMPLETED | OUTPATIENT
Start: 2025-01-01 | End: 2025-01-01

## 2025-01-01 RX ORDER — POTASSIUM CHLORIDE 1500 MG/1
60 TABLET, EXTENDED RELEASE ORAL ONCE
Status: COMPLETED | OUTPATIENT
Start: 2025-01-01 | End: 2025-01-01

## 2025-01-01 RX ORDER — SODIUM CHLORIDE 9 MG/ML
100 INJECTION, SOLUTION INTRAVENOUS
Status: DISCONTINUED | OUTPATIENT
Start: 2025-01-01 | End: 2025-01-01

## 2025-01-01 RX ORDER — NITROGLYCERIN 20 MG/100ML
0-200 INJECTION INTRAVENOUS CONTINUOUS
Status: DISCONTINUED | OUTPATIENT
Start: 2025-01-01 | End: 2025-01-01

## 2025-01-01 RX ORDER — DEXTROSE MONOHYDRATE 25 G/50ML
25 INJECTION, SOLUTION INTRAVENOUS
Status: DISCONTINUED | OUTPATIENT
Start: 2025-01-01 | End: 2025-01-01

## 2025-01-01 RX ORDER — ASPIRIN 81 MG/1
81 TABLET, CHEWABLE ORAL DAILY
Status: DISCONTINUED | OUTPATIENT
Start: 2025-01-01 | End: 2025-01-01

## 2025-01-01 RX ORDER — TAMSULOSIN HYDROCHLORIDE 0.4 MG/1
0.4 CAPSULE ORAL DAILY
COMMUNITY

## 2025-01-01 RX ORDER — METHYLPREDNISOLONE SODIUM SUCCINATE 125 MG/2ML
125 INJECTION, POWDER, LYOPHILIZED, FOR SOLUTION INTRAMUSCULAR; INTRAVENOUS ONCE
Status: COMPLETED | OUTPATIENT
Start: 2025-01-01 | End: 2025-01-01

## 2025-01-01 RX ORDER — FUROSEMIDE 10 MG/ML
40 INJECTION INTRAMUSCULAR; INTRAVENOUS ONCE
Status: COMPLETED | OUTPATIENT
Start: 2025-01-01 | End: 2025-01-01

## 2025-01-01 RX ORDER — FAMOTIDINE 20 MG/1
20 TABLET, FILM COATED ORAL EVERY 12 HOURS
Status: DISCONTINUED | OUTPATIENT
Start: 2025-01-01 | End: 2025-01-01

## 2025-01-01 RX ORDER — MAGNESIUM SULFATE HEPTAHYDRATE 40 MG/ML
4 INJECTION, SOLUTION INTRAVENOUS ONCE
Status: COMPLETED | OUTPATIENT
Start: 2025-01-01 | End: 2025-01-01

## 2025-01-01 RX ORDER — AMIODARONE HYDROCHLORIDE 150 MG/3ML
INJECTION, SOLUTION INTRAVENOUS
Status: COMPLETED
Start: 2025-01-01 | End: 2025-01-01

## 2025-01-01 RX ORDER — LIDOCAINE HYDROCHLORIDE 20 MG/ML
JELLY TOPICAL
Status: COMPLETED | OUTPATIENT
Start: 2025-01-01 | End: 2025-01-01

## 2025-01-01 RX ORDER — CALCIUM CHLORIDE 100 MG/ML
INJECTION INTRAVENOUS; INTRAVENTRICULAR
Status: DISCONTINUED | OUTPATIENT
Start: 2025-01-01 | End: 2025-01-01

## 2025-01-01 RX ORDER — EPINEPHRINE 0.1 MG/ML
SYRINGE (ML) INJECTION
Status: DISCONTINUED | OUTPATIENT
Start: 2025-01-01 | End: 2025-01-01

## 2025-01-01 RX ORDER — SODIUM CHLORIDE 9 MG/ML
INJECTION, SOLUTION INTRAVENOUS
Status: DISCONTINUED | OUTPATIENT
Start: 2025-01-01 | End: 2025-01-01

## 2025-01-01 RX ORDER — HEPARIN SODIUM,PORCINE 1000/ML
VIAL (ML) INJECTION
Status: DISCONTINUED | OUTPATIENT
Start: 2025-01-01 | End: 2025-01-01 | Stop reason: HOSPADM

## 2025-01-01 RX ORDER — ISOSORBIDE DINITRATE 10 MG/1
10 TABLET ORAL 3 TIMES DAILY
Status: DISCONTINUED | OUTPATIENT
Start: 2025-01-01 | End: 2025-01-01

## 2025-01-01 RX ORDER — NOREPINEPHRINE BITARTRATE 0.03 MG/ML
0-1 INJECTION, SOLUTION INTRAVENOUS CONTINUOUS
Status: DISCONTINUED | OUTPATIENT
Start: 2025-01-01 | End: 2025-01-01

## 2025-01-01 RX ORDER — SODIUM CHLORIDE, SODIUM LACTATE, POTASSIUM CHLORIDE, AND CALCIUM CHLORIDE .6; .31; .03; .02 G/100ML; G/100ML; G/100ML; G/100ML
500 INJECTION, SOLUTION INTRAVENOUS ONCE
Status: COMPLETED | OUTPATIENT
Start: 2025-01-01 | End: 2025-01-01

## 2025-01-01 RX ORDER — CLONIDINE 0.1 MG/24H
1 PATCH, EXTENDED RELEASE TRANSDERMAL
COMMUNITY
Start: 2024-01-01 | End: 2025-09-05

## 2025-01-01 RX ORDER — CLOPIDOGREL BISULFATE 75 MG/1
75 TABLET ORAL DAILY
Status: DISCONTINUED | OUTPATIENT
Start: 2025-01-01 | End: 2025-01-01

## 2025-01-01 RX ORDER — HYDRALAZINE HYDROCHLORIDE 20 MG/ML
10 INJECTION INTRAMUSCULAR; INTRAVENOUS EVERY 4 HOURS PRN
Status: DISCONTINUED | OUTPATIENT
Start: 2025-01-01 | End: 2025-01-01

## 2025-01-01 RX ORDER — DOBUTAMINE HYDROCHLORIDE 100 MG/100ML
INJECTION INTRAVENOUS
Status: COMPLETED
Start: 2025-01-01 | End: 2025-01-01

## 2025-01-01 RX ORDER — HYDROMORPHONE HYDROCHLORIDE 1 MG/ML
1 INJECTION, SOLUTION INTRAMUSCULAR; INTRAVENOUS; SUBCUTANEOUS
Status: DISCONTINUED | OUTPATIENT
Start: 2025-01-01 | End: 2025-01-01

## 2025-01-01 RX ORDER — CEFAZOLIN SODIUM 1 G/50ML
1 INJECTION, SOLUTION INTRAVENOUS EVERY 12 HOURS
Status: DISCONTINUED | OUTPATIENT
Start: 2025-01-01 | End: 2025-01-01

## 2025-01-01 RX ORDER — HYDRALAZINE HYDROCHLORIDE 10 MG/1
10 TABLET, FILM COATED ORAL EVERY 8 HOURS
Status: DISCONTINUED | OUTPATIENT
Start: 2025-01-01 | End: 2025-01-01

## 2025-01-01 RX ORDER — LORAZEPAM 2 MG/ML
1 CONCENTRATE ORAL
Status: DISCONTINUED | OUTPATIENT
Start: 2025-01-01 | End: 2025-01-01 | Stop reason: HOSPADM

## 2025-01-01 RX ORDER — DIPHENHYDRAMINE HYDROCHLORIDE 50 MG/ML
25 INJECTION, SOLUTION INTRAMUSCULAR; INTRAVENOUS ONCE
Status: DISCONTINUED | OUTPATIENT
Start: 2025-01-01 | End: 2025-01-01

## 2025-01-01 RX ORDER — HYDROMORPHONE HYDROCHLORIDE 1 MG/ML
0.25 INJECTION, SOLUTION INTRAMUSCULAR; INTRAVENOUS; SUBCUTANEOUS
Status: DISCONTINUED | OUTPATIENT
Start: 2025-01-01 | End: 2025-01-01

## 2025-01-01 RX ORDER — HYDROMORPHONE HYDROCHLORIDE 1 MG/ML
0.5 INJECTION, SOLUTION INTRAMUSCULAR; INTRAVENOUS; SUBCUTANEOUS
Status: DISCONTINUED | OUTPATIENT
Start: 2025-01-01 | End: 2025-01-01

## 2025-01-01 RX ORDER — HEPARIN SODIUM 1000 [USP'U]/ML
2000 INJECTION, SOLUTION INTRAVENOUS; SUBCUTANEOUS PRN
Status: DISCONTINUED | OUTPATIENT
Start: 2025-01-01 | End: 2025-01-01

## 2025-01-01 RX ORDER — AMLODIPINE BESYLATE 10 MG/1
10 TABLET ORAL DAILY
COMMUNITY
Start: 2024-01-01

## 2025-01-01 RX ORDER — MIDAZOLAM HYDROCHLORIDE 1 MG/ML
INJECTION INTRAMUSCULAR; INTRAVENOUS
Status: COMPLETED
Start: 2025-01-01 | End: 2025-01-01

## 2025-01-01 RX ORDER — FUROSEMIDE 10 MG/ML
100 INJECTION INTRAMUSCULAR; INTRAVENOUS ONCE
Status: COMPLETED | OUTPATIENT
Start: 2025-01-01 | End: 2025-01-01

## 2025-01-01 RX ORDER — ETOMIDATE 2 MG/ML
20 INJECTION INTRAVENOUS ONCE
Status: COMPLETED | OUTPATIENT
Start: 2025-01-01 | End: 2025-01-01

## 2025-01-01 RX ORDER — DOBUTAMINE HYDROCHLORIDE 100 MG/100ML
2.5 INJECTION INTRAVENOUS CONTINUOUS
Status: DISCONTINUED | OUTPATIENT
Start: 2025-01-01 | End: 2025-01-01

## 2025-01-01 RX ORDER — TAMSULOSIN HYDROCHLORIDE 0.4 MG/1
0.4 CAPSULE ORAL EVERY EVENING
Status: DISCONTINUED | OUTPATIENT
Start: 2025-01-01 | End: 2025-01-01

## 2025-01-01 RX ORDER — VERAPAMIL HYDROCHLORIDE 2.5 MG/ML
INJECTION INTRAVENOUS
Status: COMPLETED
Start: 2025-01-01 | End: 2025-01-01

## 2025-01-01 RX ORDER — LIDOCAINE HYDROCHLORIDE 20 MG/ML
INJECTION, SOLUTION EPIDURAL; INFILTRATION; INTRACAUDAL; PERINEURAL PRN
Status: DISCONTINUED | OUTPATIENT
Start: 2025-01-01 | End: 2025-01-01 | Stop reason: SURG

## 2025-01-01 RX ORDER — ASPIRIN 81 MG/1
81 TABLET ORAL DAILY
Status: DISCONTINUED | OUTPATIENT
Start: 2025-01-01 | End: 2025-01-01

## 2025-01-01 RX ORDER — INSULIN ASPART 100 [IU]/ML
14 INJECTION, SOLUTION INTRAVENOUS; SUBCUTANEOUS
COMMUNITY

## 2025-01-01 RX ORDER — PROMETHAZINE HYDROCHLORIDE 25 MG/1
12.5-25 TABLET ORAL EVERY 4 HOURS PRN
Status: DISCONTINUED | OUTPATIENT
Start: 2025-01-01 | End: 2025-01-01

## 2025-01-01 RX ORDER — METHYLPREDNISOLONE SODIUM SUCCINATE 125 MG/2ML
125 INJECTION, POWDER, LYOPHILIZED, FOR SOLUTION INTRAMUSCULAR; INTRAVENOUS ONCE
Status: DISCONTINUED | OUTPATIENT
Start: 2025-01-01 | End: 2025-01-01

## 2025-01-01 RX ORDER — MIDAZOLAM HYDROCHLORIDE 1 MG/ML
1 INJECTION INTRAMUSCULAR; INTRAVENOUS
Status: DISCONTINUED | OUTPATIENT
Start: 2025-01-01 | End: 2025-01-01

## 2025-01-01 RX ORDER — OXYCODONE HYDROCHLORIDE 5 MG/1
2.5 TABLET ORAL
Refills: 0 | Status: DISCONTINUED | OUTPATIENT
Start: 2025-01-01 | End: 2025-01-01

## 2025-01-01 RX ORDER — OXYCODONE HYDROCHLORIDE 5 MG/1
5 TABLET ORAL
Refills: 0 | Status: DISCONTINUED | OUTPATIENT
Start: 2025-01-01 | End: 2025-01-01

## 2025-01-01 RX ORDER — MYCOPHENOLATE MOFETIL 250 MG/1
750 CAPSULE ORAL 2 TIMES DAILY
COMMUNITY
Start: 2025-01-01 | End: 2026-04-04

## 2025-01-01 RX ORDER — EPINEPHRINE HCL IN 0.9 % NACL 4MG/250ML
0.03 PLASTIC BAG, INJECTION (ML) INTRAVENOUS CONTINUOUS
Status: DISCONTINUED | OUTPATIENT
Start: 2025-01-01 | End: 2025-01-01

## 2025-01-01 RX ORDER — ROCURONIUM BROMIDE 10 MG/ML
INJECTION, SOLUTION INTRAVENOUS PRN
Status: DISCONTINUED | OUTPATIENT
Start: 2025-01-01 | End: 2025-01-01 | Stop reason: SURG

## 2025-01-01 RX ORDER — ROSUVASTATIN CALCIUM 40 MG/1
40 TABLET, COATED ORAL
COMMUNITY

## 2025-01-01 RX ORDER — INSULIN GLARGINE 100 [IU]/ML
10 INJECTION, SOLUTION SUBCUTANEOUS EVERY EVENING
COMMUNITY

## 2025-01-01 RX ORDER — CLOPIDOGREL 300 MG/1
TABLET, FILM COATED ORAL
Status: COMPLETED
Start: 2025-01-01 | End: 2025-01-01

## 2025-01-01 RX ORDER — HYDRALAZINE HYDROCHLORIDE 25 MG/1
25 TABLET, FILM COATED ORAL EVERY 8 HOURS
Status: DISCONTINUED | OUTPATIENT
Start: 2025-01-01 | End: 2025-01-01

## 2025-01-01 RX ORDER — ETHYL ALCOHOL 62 %
1 SWAB, MEDICATED TOPICAL 2 TIMES DAILY
Status: DISCONTINUED | OUTPATIENT
Start: 2025-01-01 | End: 2025-01-01

## 2025-01-01 RX ORDER — SODIUM CHLORIDE, SODIUM LACTATE, POTASSIUM CHLORIDE, CALCIUM CHLORIDE 600; 310; 30; 20 MG/100ML; MG/100ML; MG/100ML; MG/100ML
INJECTION, SOLUTION INTRAVENOUS
Status: DISCONTINUED | OUTPATIENT
Start: 2025-01-01 | End: 2025-01-01 | Stop reason: SURG

## 2025-01-01 RX ORDER — TACROLIMUS 0.5 MG/1
.5-1 CAPSULE ORAL 2 TIMES DAILY
Status: DISCONTINUED | OUTPATIENT
Start: 2025-01-01 | End: 2025-01-01

## 2025-01-01 RX ORDER — CALCIUM CHLORIDE 100 MG/ML
INJECTION INTRAVENOUS; INTRAVENTRICULAR
Status: COMPLETED
Start: 2025-01-01 | End: 2025-01-01

## 2025-01-01 RX ORDER — TACROLIMUS 0.5 MG/1
.5-1 CAPSULE ORAL 2 TIMES DAILY
COMMUNITY
Start: 2024-01-01 | End: 2025-08-25

## 2025-01-01 RX ORDER — AMLODIPINE BESYLATE 5 MG/1
10 TABLET ORAL DAILY
Status: DISCONTINUED | OUTPATIENT
Start: 2025-01-01 | End: 2025-01-01

## 2025-01-01 RX ORDER — LOSARTAN POTASSIUM 100 MG/1
100 TABLET ORAL
COMMUNITY
Start: 2025-01-01 | End: 2026-03-11

## 2025-01-01 RX ORDER — DOCUSATE SODIUM 100 MG/1
100 CAPSULE, LIQUID FILLED ORAL 2 TIMES DAILY
COMMUNITY

## 2025-01-01 RX ORDER — MIDAZOLAM HYDROCHLORIDE 1 MG/ML
2 INJECTION INTRAMUSCULAR; INTRAVENOUS
Status: DISCONTINUED | OUTPATIENT
Start: 2025-01-01 | End: 2025-01-01 | Stop reason: HOSPADM

## 2025-01-01 RX ORDER — INSULIN LISPRO 100 [IU]/ML
0-14 INJECTION, SOLUTION INTRAVENOUS; SUBCUTANEOUS
Status: DISCONTINUED | OUTPATIENT
Start: 2025-01-01 | End: 2025-01-01

## 2025-01-01 RX ORDER — POLYETHYLENE GLYCOL 3350 17 G/17G
17 POWDER, FOR SOLUTION ORAL 2 TIMES DAILY
COMMUNITY

## 2025-01-01 RX ORDER — FUROSEMIDE 10 MG/ML
40 INJECTION INTRAMUSCULAR; INTRAVENOUS DAILY
Status: DISCONTINUED | OUTPATIENT
Start: 2025-01-01 | End: 2025-01-01

## 2025-01-01 RX ORDER — HEPARIN SODIUM 200 [USP'U]/100ML
INJECTION, SOLUTION INTRAVENOUS
Status: COMPLETED
Start: 2025-01-01 | End: 2025-01-01

## 2025-01-01 RX ORDER — ROSUVASTATIN CALCIUM 10 MG/1
40 TABLET, COATED ORAL DAILY
Status: DISCONTINUED | OUTPATIENT
Start: 2025-01-01 | End: 2025-01-01

## 2025-01-01 RX ORDER — MYCOPHENOLATE MOFETIL 250 MG/1
750 CAPSULE ORAL 2 TIMES DAILY
Status: DISCONTINUED | OUTPATIENT
Start: 2025-01-01 | End: 2025-01-01

## 2025-01-01 RX ORDER — THIAMINE HYDROCHLORIDE 100 MG/ML
200 INJECTION, SOLUTION INTRAMUSCULAR; INTRAVENOUS 2 TIMES DAILY
Status: COMPLETED | OUTPATIENT
Start: 2025-01-01 | End: 2025-01-01

## 2025-01-01 RX ORDER — ROCURONIUM BROMIDE 10 MG/ML
100 INJECTION, SOLUTION INTRAVENOUS ONCE
Status: COMPLETED | OUTPATIENT
Start: 2025-01-01 | End: 2025-01-01

## 2025-01-01 RX ORDER — PROCHLORPERAZINE EDISYLATE 5 MG/ML
5-10 INJECTION INTRAMUSCULAR; INTRAVENOUS EVERY 4 HOURS PRN
Status: DISCONTINUED | OUTPATIENT
Start: 2025-01-01 | End: 2025-01-01

## 2025-01-01 RX ORDER — MIDAZOLAM HYDROCHLORIDE 1 MG/ML
INJECTION INTRAMUSCULAR; INTRAVENOUS
Status: DISCONTINUED
Start: 2025-01-01 | End: 2025-01-01 | Stop reason: HOSPADM

## 2025-01-01 RX ORDER — BUPIVACAINE HYDROCHLORIDE AND EPINEPHRINE 5; 5 MG/ML; UG/ML
INJECTION, SOLUTION PERINEURAL
Status: DISCONTINUED | OUTPATIENT
Start: 2025-01-01 | End: 2025-01-01 | Stop reason: HOSPADM

## 2025-01-01 RX ORDER — TRANEXAMIC ACID 100 MG/ML
1000 INJECTION, SOLUTION INTRAVENOUS ONCE
Status: COMPLETED | OUTPATIENT
Start: 2025-01-01 | End: 2025-01-01

## 2025-01-01 RX ORDER — METOPROLOL SUCCINATE 50 MG/1
50 TABLET, EXTENDED RELEASE ORAL
Status: DISCONTINUED | OUTPATIENT
Start: 2025-01-01 | End: 2025-01-01

## 2025-01-01 RX ORDER — OXYMETAZOLINE HYDROCHLORIDE 0.05 G/100ML
2 SPRAY NASAL ONCE
Status: COMPLETED | OUTPATIENT
Start: 2025-01-01 | End: 2025-01-01

## 2025-01-01 RX ORDER — INDOMETHACIN 25 MG/1
CAPSULE ORAL
Status: COMPLETED
Start: 2025-01-01 | End: 2025-01-01

## 2025-01-01 RX ORDER — ROCURONIUM BROMIDE 10 MG/ML
INJECTION, SOLUTION INTRAVENOUS
Status: COMPLETED | OUTPATIENT
Start: 2025-01-01 | End: 2025-01-01

## 2025-01-01 RX ORDER — LIDOCAINE HYDROCHLORIDE AND EPINEPHRINE 10; 10 MG/ML; UG/ML
10 INJECTION, SOLUTION INFILTRATION; PERINEURAL ONCE
Status: COMPLETED | OUTPATIENT
Start: 2025-01-01 | End: 2025-01-01

## 2025-01-01 RX ORDER — POTASSIUM CHLORIDE 7.45 MG/ML
10 INJECTION INTRAVENOUS
Status: COMPLETED | OUTPATIENT
Start: 2025-01-01 | End: 2025-01-01

## 2025-01-01 RX ADMIN — ETOMIDATE 20 MG: 2 INJECTION, SOLUTION INTRAVENOUS at 16:45

## 2025-01-01 RX ADMIN — DOBUTAMINE HYDROCHLORIDE 2.5 MCG/KG/MIN: 100 INJECTION INTRAVENOUS at 11:43

## 2025-01-01 RX ADMIN — INSULIN GLARGINE-YFGN 10 UNITS: 100 INJECTION, SOLUTION SUBCUTANEOUS at 21:13

## 2025-01-01 RX ADMIN — DEXMEDETOMIDINE 1.5 MCG/KG/HR: 100 INJECTION, SOLUTION INTRAVENOUS at 04:00

## 2025-01-01 RX ADMIN — Medication 1 APPLICATOR: at 06:58

## 2025-01-01 RX ADMIN — EPINEPHRINE 0.03 MCG/KG/MIN: 1 INJECTION INTRAMUSCULAR; INTRAVENOUS; SUBCUTANEOUS at 19:13

## 2025-01-01 RX ADMIN — ISOSORBIDE DINITRATE 10 MG: 10 TABLET ORAL at 11:21

## 2025-01-01 RX ADMIN — MYCOPHENOLATE MOFETIL 750 MG: 200 POWDER, FOR SUSPENSION ORAL at 21:19

## 2025-01-01 RX ADMIN — SODIUM CHLORIDE, POTASSIUM CHLORIDE, SODIUM LACTATE AND CALCIUM CHLORIDE 500 ML: 600; 310; 30; 20 INJECTION, SOLUTION INTRAVENOUS at 21:25

## 2025-01-01 RX ADMIN — TRANEXAMIC ACID 300 MG: 100 INJECTION, SOLUTION INTRAVENOUS at 08:58

## 2025-01-01 RX ADMIN — AMPICILLIN AND SULBACTAM 3 G: 1; 2 INJECTION, POWDER, FOR SOLUTION INTRAMUSCULAR; INTRAVENOUS at 12:55

## 2025-01-01 RX ADMIN — POTASSIUM CHLORIDE 10 MEQ: 10 INJECTION, SOLUTION INTRAVENOUS at 20:28

## 2025-01-01 RX ADMIN — DEXMEDETOMIDINE 0.2 MCG/KG/HR: 100 INJECTION, SOLUTION INTRAVENOUS at 21:23

## 2025-01-01 RX ADMIN — AMIODARONE HYDROCHLORIDE 0.5 MG/MIN: 1.8 INJECTION, SOLUTION INTRAVENOUS at 14:00

## 2025-01-01 RX ADMIN — DEXMEDETOMIDINE 0.2 MCG/KG/HR: 100 INJECTION, SOLUTION INTRAVENOUS at 22:50

## 2025-01-01 RX ADMIN — VANCOMYCIN HYDROCHLORIDE 2500 MG: 5 INJECTION, POWDER, LYOPHILIZED, FOR SOLUTION INTRAVENOUS at 09:11

## 2025-01-01 RX ADMIN — HYDRALAZINE HYDROCHLORIDE 25 MG: 25 TABLET ORAL at 22:30

## 2025-01-01 RX ADMIN — EPINEPHRINE 1 MCG/KG/MIN: 1 INJECTION INTRAMUSCULAR; INTRAVENOUS; SUBCUTANEOUS at 09:29

## 2025-01-01 RX ADMIN — HYDROMORPHONE HYDROCHLORIDE 1 MG: 1 INJECTION, SOLUTION INTRAMUSCULAR; INTRAVENOUS; SUBCUTANEOUS at 05:01

## 2025-01-01 RX ADMIN — CLOPIDOGREL BISULFATE 75 MG: 75 TABLET, FILM COATED ORAL at 05:19

## 2025-01-01 RX ADMIN — AMIODARONE HYDROCHLORIDE 150 MG: 1.5 INJECTION, SOLUTION INTRAVENOUS at 07:58

## 2025-01-01 RX ADMIN — FUROSEMIDE 100 MG: 10 INJECTION, SOLUTION INTRAVENOUS at 16:51

## 2025-01-01 RX ADMIN — FENTANYL CITRATE 50 MCG: 50 INJECTION, SOLUTION INTRAMUSCULAR; INTRAVENOUS at 11:33

## 2025-01-01 RX ADMIN — HYDRALAZINE HYDROCHLORIDE 25 MG: 25 TABLET ORAL at 14:20

## 2025-01-01 RX ADMIN — SODIUM CHLORIDE 500 ML: 9 INJECTION, SOLUTION INTRAVENOUS at 00:50

## 2025-01-01 RX ADMIN — AMPICILLIN AND SULBACTAM 3 G: 1; 2 INJECTION, POWDER, FOR SOLUTION INTRAMUSCULAR; INTRAVENOUS at 11:06

## 2025-01-01 RX ADMIN — MIDAZOLAM HYDROCHLORIDE 1 MG: 1 INJECTION, SOLUTION INTRAMUSCULAR; INTRAVENOUS at 02:18

## 2025-01-01 RX ADMIN — AMIODARONE HYDROCHLORIDE 0.5 MG/MIN: 1.8 INJECTION, SOLUTION INTRAVENOUS at 07:38

## 2025-01-01 RX ADMIN — DOBUTAMINE HYDROCHLORIDE 2.5 MCG/KG/MIN: 100 INJECTION INTRAVENOUS at 01:40

## 2025-01-01 RX ADMIN — FENTANYL CITRATE 50 MCG: 50 INJECTION, SOLUTION INTRAMUSCULAR; INTRAVENOUS at 11:52

## 2025-01-01 RX ADMIN — DEXMEDETOMIDINE 1.5 MCG/KG/HR: 100 INJECTION, SOLUTION INTRAVENOUS at 04:07

## 2025-01-01 RX ADMIN — POTASSIUM CHLORIDE 10 MEQ: 10 INJECTION, SOLUTION INTRAVENOUS at 21:42

## 2025-01-01 RX ADMIN — ROCURONIUM BROMIDE 100 MG: 10 INJECTION INTRAVENOUS at 16:45

## 2025-01-01 RX ADMIN — FAMOTIDINE 20 MG: 20 TABLET, FILM COATED ORAL at 05:19

## 2025-01-01 RX ADMIN — DEXMEDETOMIDINE 0.8 MCG/KG/HR: 100 INJECTION, SOLUTION INTRAVENOUS at 22:56

## 2025-01-01 RX ADMIN — FUROSEMIDE 60 MG: 10 INJECTION, SOLUTION INTRAVENOUS at 11:37

## 2025-01-01 RX ADMIN — DEXMEDETOMIDINE 1.5 MCG/KG/HR: 100 INJECTION, SOLUTION INTRAVENOUS at 10:46

## 2025-01-01 RX ADMIN — PHENYLEPHRINE HYDROCHLORIDE 100 MCG: 10 INJECTION INTRAVENOUS at 12:13

## 2025-01-01 RX ADMIN — MAGNESIUM SULFATE HEPTAHYDRATE 2 G: 2 INJECTION, SOLUTION INTRAVENOUS at 10:28

## 2025-01-01 RX ADMIN — ONDANSETRON 4 MG: 2 INJECTION INTRAMUSCULAR; INTRAVENOUS at 09:48

## 2025-01-01 RX ADMIN — THIAMINE HYDROCHLORIDE 200 MG: 100 INJECTION, SOLUTION INTRAMUSCULAR; INTRAVENOUS at 06:00

## 2025-01-01 RX ADMIN — LIDOCAINE HYDROCHLORIDE AND EPINEPHRINE 10 ML: 10; 10 INJECTION, SOLUTION INFILTRATION; PERINEURAL at 13:15

## 2025-01-01 RX ADMIN — HYDROMORPHONE HYDROCHLORIDE 1 MG: 1 INJECTION, SOLUTION INTRAMUSCULAR; INTRAVENOUS; SUBCUTANEOUS at 19:11

## 2025-01-01 RX ADMIN — POLYETHYLENE GLYCOL 3350 1 PACKET: 17 POWDER, FOR SOLUTION ORAL at 16:59

## 2025-01-01 RX ADMIN — CLOPIDOGREL BISULFATE 75 MG: 75 TABLET, FILM COATED ORAL at 06:37

## 2025-01-01 RX ADMIN — FUROSEMIDE 40 MG: 10 INJECTION INTRAMUSCULAR; INTRAVENOUS at 05:04

## 2025-01-01 RX ADMIN — HEPARIN SODIUM 1200 UNITS/HR: 5000 INJECTION, SOLUTION INTRAVENOUS at 14:18

## 2025-01-01 RX ADMIN — INSULIN GLARGINE-YFGN 5 UNITS: 100 INJECTION, SOLUTION SUBCUTANEOUS at 20:36

## 2025-01-01 RX ADMIN — THIAMINE HYDROCHLORIDE 200 MG: 100 INJECTION, SOLUTION INTRAMUSCULAR; INTRAVENOUS at 17:46

## 2025-01-01 RX ADMIN — NITROGLYCERIN: 20 INJECTION INTRAVENOUS at 15:00

## 2025-01-01 RX ADMIN — AMIODARONE HYDROCHLORIDE 0.5 MG/MIN: 1.8 INJECTION, SOLUTION INTRAVENOUS at 22:35

## 2025-01-01 RX ADMIN — PHENYLEPHRINE HYDROCHLORIDE 40000 MCG: 10 INJECTION INTRAVENOUS at 15:53

## 2025-01-01 RX ADMIN — ISOSORBIDE DINITRATE 10 MG: 10 TABLET ORAL at 17:46

## 2025-01-01 RX ADMIN — CALCIUM CHLORIDE 1 G: 100 INJECTION INTRAVENOUS; INTRAVENTRICULAR at 08:48

## 2025-01-01 RX ADMIN — AMPICILLIN AND SULBACTAM 3 G: 1; 2 INJECTION, POWDER, FOR SOLUTION INTRAMUSCULAR; INTRAVENOUS at 01:18

## 2025-01-01 RX ADMIN — POTASSIUM CHLORIDE 60 MEQ: 1500 TABLET, EXTENDED RELEASE ORAL at 18:43

## 2025-01-01 RX ADMIN — FUROSEMIDE 40 MG: 10 INJECTION INTRAMUSCULAR; INTRAVENOUS at 14:52

## 2025-01-01 RX ADMIN — POLYETHYLENE GLYCOL 3350 1 PACKET: 17 POWDER, FOR SOLUTION ORAL at 18:40

## 2025-01-01 RX ADMIN — DOBUTAMINE HYDROCHLORIDE 2.5 MCG/KG/MIN: 100 INJECTION INTRAVENOUS at 13:14

## 2025-01-01 RX ADMIN — TRANEXAMIC ACID 1000 MG: 100 INJECTION, SOLUTION INTRAVENOUS at 12:37

## 2025-01-01 RX ADMIN — SODIUM BICARBONATE 100 MEQ: 84 INJECTION INTRAVENOUS at 21:17

## 2025-01-01 RX ADMIN — SENNOSIDES AND DOCUSATE SODIUM 2 TABLET: 50; 8.6 TABLET ORAL at 17:46

## 2025-01-01 RX ADMIN — METHYLPREDNISOLONE SODIUM SUCCINATE 125 MG: 125 INJECTION, POWDER, FOR SOLUTION INTRAMUSCULAR; INTRAVENOUS at 14:19

## 2025-01-01 RX ADMIN — NOREPINEPHRINE BITARTRATE 1 MCG/KG/MIN: 1 INJECTION, SOLUTION, CONCENTRATE INTRAVENOUS at 01:44

## 2025-01-01 RX ADMIN — DEXMEDETOMIDINE 1.5 MCG/KG/HR: 100 INJECTION, SOLUTION INTRAVENOUS at 14:43

## 2025-01-01 RX ADMIN — FAMOTIDINE 20 MG: 20 TABLET, FILM COATED ORAL at 05:04

## 2025-01-01 RX ADMIN — ROSUVASTATIN CALCIUM 40 MG: 20 TABLET, FILM COATED ORAL at 05:50

## 2025-01-01 RX ADMIN — TACROLIMUS 1 MG: 5 CAPSULE ORAL at 21:18

## 2025-01-01 RX ADMIN — CLEVIPIDINE 1 MG/HR: 0.5 EMULSION INTRAVENOUS at 09:03

## 2025-01-01 RX ADMIN — NOREPINEPHRINE BITARTRATE 0.2 MCG/KG/MIN: 1 INJECTION, SOLUTION, CONCENTRATE INTRAVENOUS at 19:17

## 2025-01-01 RX ADMIN — SODIUM CHLORIDE, POTASSIUM CHLORIDE, SODIUM LACTATE AND CALCIUM CHLORIDE 1000 ML: 600; 310; 30; 20 INJECTION, SOLUTION INTRAVENOUS at 02:04

## 2025-01-01 RX ADMIN — INSULIN LISPRO 5 UNITS: 100 INJECTION, SOLUTION INTRAVENOUS; SUBCUTANEOUS at 21:14

## 2025-01-01 RX ADMIN — DEXMEDETOMIDINE 1.5 MCG/KG/HR: 100 INJECTION, SOLUTION INTRAVENOUS at 07:41

## 2025-01-01 RX ADMIN — EPINEPHRINE 1 MG: 0.1 INJECTION, SOLUTION INTRAVENOUS at 00:53

## 2025-01-01 RX ADMIN — SENNOSIDES AND DOCUSATE SODIUM 2 TABLET: 50; 8.6 TABLET ORAL at 16:59

## 2025-01-01 RX ADMIN — MAGNESIUM SULFATE HEPTAHYDRATE 2 G: 2 INJECTION, SOLUTION INTRAVENOUS at 08:28

## 2025-01-01 RX ADMIN — Medication 2 SPRAY: at 03:41

## 2025-01-01 RX ADMIN — AMIODARONE HYDROCHLORIDE 150 MG: 50 INJECTION, SOLUTION INTRAVENOUS at 16:47

## 2025-01-01 RX ADMIN — NOREPINEPHRINE BITARTRATE 0.15 MCG/KG/MIN: 1 INJECTION, SOLUTION, CONCENTRATE INTRAVENOUS at 16:49

## 2025-01-01 RX ADMIN — IOHEXOL 160 ML: 350 INJECTION, SOLUTION INTRAVENOUS at 17:19

## 2025-01-01 RX ADMIN — EPINEPHRINE 1 MCG/KG/MIN: 1 INJECTION INTRAMUSCULAR; INTRAVENOUS; SUBCUTANEOUS at 03:17

## 2025-01-01 RX ADMIN — AMIODARONE HYDROCHLORIDE 0.5 MG/MIN: 1.8 INJECTION, SOLUTION INTRAVENOUS at 11:39

## 2025-01-01 RX ADMIN — NITROGLYCERIN 0.5 INCH: 20 OINTMENT TOPICAL at 13:22

## 2025-01-01 RX ADMIN — INSULIN LISPRO 4 UNITS: 100 INJECTION, SOLUTION INTRAVENOUS; SUBCUTANEOUS at 14:40

## 2025-01-01 RX ADMIN — AMIODARONE HYDROCHLORIDE 0.5 MG/MIN: 1.8 INJECTION, SOLUTION INTRAVENOUS at 16:50

## 2025-01-01 RX ADMIN — TACROLIMUS 0.5 MG: 5 CAPSULE ORAL at 05:12

## 2025-01-01 RX ADMIN — FAMOTIDINE 20 MG: 20 TABLET, FILM COATED ORAL at 06:37

## 2025-01-01 RX ADMIN — NOREPINEPHRINE BITARTRATE 1 MCG/KG/MIN: 1 INJECTION, SOLUTION, CONCENTRATE INTRAVENOUS at 08:40

## 2025-01-01 RX ADMIN — DEXMEDETOMIDINE 1 MCG/KG/HR: 100 INJECTION, SOLUTION INTRAVENOUS at 15:58

## 2025-01-01 RX ADMIN — FAMOTIDINE 20 MG: 20 TABLET, FILM COATED ORAL at 05:51

## 2025-01-01 RX ADMIN — MIDAZOLAM HYDROCHLORIDE 2 MG: 1 INJECTION, SOLUTION INTRAMUSCULAR; INTRAVENOUS at 16:47

## 2025-01-01 RX ADMIN — DEXMEDETOMIDINE 1.5 MCG/KG/HR: 100 INJECTION, SOLUTION INTRAVENOUS at 00:43

## 2025-01-01 RX ADMIN — DEXMEDETOMIDINE 1.5 MCG/KG/HR: 100 INJECTION, SOLUTION INTRAVENOUS at 18:20

## 2025-01-01 RX ADMIN — FUROSEMIDE 100 MG: 10 INJECTION, SOLUTION INTRAVENOUS at 08:08

## 2025-01-01 RX ADMIN — THIAMINE HYDROCHLORIDE 200 MG: 100 INJECTION, SOLUTION INTRAMUSCULAR; INTRAVENOUS at 09:03

## 2025-01-01 RX ADMIN — MYCOPHENOLATE MOFETIL 750 MG: 200 POWDER, FOR SUSPENSION ORAL at 05:05

## 2025-01-01 RX ADMIN — AMIODARONE HYDROCHLORIDE 0.5 MG/MIN: 1.8 INJECTION, SOLUTION INTRAVENOUS at 04:04

## 2025-01-01 RX ADMIN — CLOPIDOGREL BISULFATE 75 MG: 75 TABLET, FILM COATED ORAL at 05:11

## 2025-01-01 RX ADMIN — HYDROMORPHONE HYDROCHLORIDE 1 MG: 1 INJECTION, SOLUTION INTRAMUSCULAR; INTRAVENOUS; SUBCUTANEOUS at 18:39

## 2025-01-01 RX ADMIN — SODIUM CHLORIDE 5.5 UNITS/HR: 9 INJECTION, SOLUTION INTRAVENOUS at 13:22

## 2025-01-01 RX ADMIN — LIDOCAINE HYDROCHLORIDE: 20 INJECTION, SOLUTION INFILTRATION; PERINEURAL at 15:29

## 2025-01-01 RX ADMIN — FUROSEMIDE 100 MG: 10 INJECTION, SOLUTION INTRAVENOUS at 05:54

## 2025-01-01 RX ADMIN — CLOPIDOGREL BISULFATE 75 MG: 75 TABLET, FILM COATED ORAL at 05:04

## 2025-01-01 RX ADMIN — POLYETHYLENE GLYCOL 3350 1 PACKET: 17 POWDER, FOR SOLUTION ORAL at 16:51

## 2025-01-01 RX ADMIN — DEXMEDETOMIDINE 0.8 MCG/KG/HR: 100 INJECTION, SOLUTION INTRAVENOUS at 01:50

## 2025-01-01 RX ADMIN — TACROLIMUS 1 MG: 5 CAPSULE ORAL at 17:27

## 2025-01-01 RX ADMIN — VASOPRESSIN 0.04 UNITS/MIN: 20 INJECTION INTRAVENOUS at 03:39

## 2025-01-01 RX ADMIN — PIPERACILLIN AND TAZOBACTAM 4.5 G: 4; .5 INJECTION, POWDER, FOR SOLUTION INTRAVENOUS at 06:51

## 2025-01-01 RX ADMIN — DEXMEDETOMIDINE 1.5 MCG/KG/HR: 100 INJECTION, SOLUTION INTRAVENOUS at 01:06

## 2025-01-01 RX ADMIN — DEXMEDETOMIDINE 1 MCG/KG/HR: 100 INJECTION, SOLUTION INTRAVENOUS at 10:28

## 2025-01-01 RX ADMIN — FUROSEMIDE 40 MG: 10 INJECTION INTRAMUSCULAR; INTRAVENOUS at 20:31

## 2025-01-01 RX ADMIN — EPINEPHRINE 0.1 MG: 0.1 INJECTION, SOLUTION INTRAVENOUS at 01:03

## 2025-01-01 RX ADMIN — EPINEPHRINE 0.8 MCG/KG/MIN: 1 INJECTION INTRAMUSCULAR; INTRAVENOUS; SUBCUTANEOUS at 04:57

## 2025-01-01 RX ADMIN — AMIODARONE HYDROCHLORIDE 150 MG: 1.5 INJECTION, SOLUTION INTRAVENOUS at 20:30

## 2025-01-01 RX ADMIN — ROCURONIUM BROMIDE 100 MG: 10 INJECTION INTRAVENOUS at 16:17

## 2025-01-01 RX ADMIN — DOBUTAMINE HYDROCHLORIDE 7.5 MCG/KG/MIN: 100 INJECTION INTRAVENOUS at 09:52

## 2025-01-01 RX ADMIN — DOBUTAMINE HYDROCHLORIDE 5 MCG/KG/MIN: 100 INJECTION INTRAVENOUS at 16:43

## 2025-01-01 RX ADMIN — SODIUM BICARBONATE 100 MEQ: 84 INJECTION INTRAVENOUS at 06:29

## 2025-01-01 RX ADMIN — FAMOTIDINE 20 MG: 20 TABLET, FILM COATED ORAL at 05:11

## 2025-01-01 RX ADMIN — SODIUM CHLORIDE 4 UNITS/HR: 9 INJECTION, SOLUTION INTRAVENOUS at 17:29

## 2025-01-01 RX ADMIN — HEPARIN SODIUM 347.5 UNITS/HR: 1000 INJECTION, SOLUTION INTRAVENOUS; SUBCUTANEOUS at 20:07

## 2025-01-01 RX ADMIN — PIPERACILLIN AND TAZOBACTAM 4.5 G: 4; .5 INJECTION, POWDER, FOR SOLUTION INTRAVENOUS at 03:13

## 2025-01-01 RX ADMIN — HEPARIN SODIUM: 1000 INJECTION, SOLUTION INTRAVENOUS; SUBCUTANEOUS at 16:21

## 2025-01-01 RX ADMIN — EPINEPHRINE 1 MCG/KG/MIN: 1 INJECTION INTRAMUSCULAR; INTRAVENOUS; SUBCUTANEOUS at 05:49

## 2025-01-01 RX ADMIN — DEXMEDETOMIDINE 1.2 MCG/KG/HR: 100 INJECTION, SOLUTION INTRAVENOUS at 20:45

## 2025-01-01 RX ADMIN — NOREPINEPHRINE BITARTRATE 0.3 MCG/KG/MIN: 1 INJECTION, SOLUTION, CONCENTRATE INTRAVENOUS at 19:58

## 2025-01-01 RX ADMIN — SODIUM BICARBONATE 100 MEQ: 84 INJECTION INTRAVENOUS at 01:47

## 2025-01-01 RX ADMIN — FENTANYL CITRATE 100 MCG: 50 INJECTION, SOLUTION INTRAMUSCULAR; INTRAVENOUS at 12:06

## 2025-01-01 RX ADMIN — HYDRALAZINE HYDROCHLORIDE 10 MG: 10 TABLET ORAL at 11:44

## 2025-01-01 RX ADMIN — DOBUTAMINE HYDROCHLORIDE 2.5 MCG/KG/MIN: 100 INJECTION INTRAVENOUS at 03:58

## 2025-01-01 RX ADMIN — HYDROMORPHONE HYDROCHLORIDE 1 MG: 1 INJECTION, SOLUTION INTRAMUSCULAR; INTRAVENOUS; SUBCUTANEOUS at 23:14

## 2025-01-01 RX ADMIN — DEXMEDETOMIDINE 1.5 MCG/KG/HR: 100 INJECTION, SOLUTION INTRAVENOUS at 21:51

## 2025-01-01 RX ADMIN — ROSUVASTATIN CALCIUM 40 MG: 20 TABLET, FILM COATED ORAL at 06:37

## 2025-01-01 RX ADMIN — THIAMINE HYDROCHLORIDE 200 MG: 100 INJECTION, SOLUTION INTRAMUSCULAR; INTRAVENOUS at 16:51

## 2025-01-01 RX ADMIN — CALCIUM CHLORIDE 1000 MG: 100 INJECTION, SOLUTION INTRAVENOUS at 07:40

## 2025-01-01 RX ADMIN — LINEZOLID 600 MG: 600 INJECTION, SOLUTION INTRAVENOUS at 07:42

## 2025-01-01 RX ADMIN — EPINEPHRINE 0.01 MCG/KG/MIN: 1 INJECTION INTRAMUSCULAR; INTRAVENOUS; SUBCUTANEOUS at 01:11

## 2025-01-01 RX ADMIN — LIDOCAINE HYDROCHLORIDE 100 MG: 20 INJECTION, SOLUTION EPIDURAL; INFILTRATION; INTRACAUDAL; PERINEURAL at 11:27

## 2025-01-01 RX ADMIN — HYDROMORPHONE HYDROCHLORIDE 1 MG: 1 INJECTION, SOLUTION INTRAMUSCULAR; INTRAVENOUS; SUBCUTANEOUS at 03:39

## 2025-01-01 RX ADMIN — FUROSEMIDE 100 MG: 10 INJECTION, SOLUTION INTRAVENOUS at 17:52

## 2025-01-01 RX ADMIN — ROCURONIUM BROMIDE 20 MG: 10 INJECTION INTRAVENOUS at 11:34

## 2025-01-01 RX ADMIN — CALCIUM CHLORIDE 1 G: 100 INJECTION INTRAVENOUS; INTRAVENTRICULAR at 23:58

## 2025-01-01 RX ADMIN — FAMOTIDINE 20 MG: 10 INJECTION, SOLUTION INTRAVENOUS at 21:18

## 2025-01-01 RX ADMIN — SODIUM PHOSPHATE, MONOBASIC, MONOHYDRATE AND SODIUM PHOSPHATE, DIBASIC, ANHYDROUS 15 MMOL: 276; 142 INJECTION, SOLUTION INTRAVENOUS at 08:23

## 2025-01-01 RX ADMIN — HYDROMORPHONE HYDROCHLORIDE 1 MG: 1 INJECTION, SOLUTION INTRAMUSCULAR; INTRAVENOUS; SUBCUTANEOUS at 01:30

## 2025-01-01 RX ADMIN — VASOPRESSIN 0.03 UNITS/MIN: 20 INJECTION INTRAVENOUS at 09:07

## 2025-01-01 RX ADMIN — AMPICILLIN AND SULBACTAM 3 G: 1; 2 INJECTION, POWDER, FOR SOLUTION INTRAMUSCULAR; INTRAVENOUS at 17:50

## 2025-01-01 RX ADMIN — DOBUTAMINE HYDROCHLORIDE 2.5 MCG/KG/MIN: 100 INJECTION INTRAVENOUS at 07:18

## 2025-01-01 RX ADMIN — SODIUM CHLORIDE 4 UNITS: 9 INJECTION, SOLUTION INTRAVENOUS at 03:22

## 2025-01-01 RX ADMIN — NOREPINEPHRINE BITARTRATE 0.35 MCG/KG/MIN: 1 INJECTION, SOLUTION, CONCENTRATE INTRAVENOUS at 07:40

## 2025-01-01 RX ADMIN — ACETAMINOPHEN 650 MG: 325 TABLET ORAL at 19:27

## 2025-01-01 RX ADMIN — DEXMEDETOMIDINE 1 MCG/KG/HR: 100 INJECTION, SOLUTION INTRAVENOUS at 05:14

## 2025-01-01 RX ADMIN — AMPICILLIN AND SULBACTAM 3 G: 1; 2 INJECTION, POWDER, FOR SOLUTION INTRAMUSCULAR; INTRAVENOUS at 17:12

## 2025-01-01 RX ADMIN — NOREPINEPHRINE BITARTRATE 0.6 MCG/KG/MIN: 1 INJECTION, SOLUTION, CONCENTRATE INTRAVENOUS at 23:39

## 2025-01-01 RX ADMIN — HYDROMORPHONE HYDROCHLORIDE 1 MG: 1 INJECTION, SOLUTION INTRAMUSCULAR; INTRAVENOUS; SUBCUTANEOUS at 16:54

## 2025-01-01 RX ADMIN — SODIUM BICARBONATE 25 MEQ: 84 INJECTION, SOLUTION INTRAVENOUS at 14:47

## 2025-01-01 RX ADMIN — SODIUM CHLORIDE 4 UNITS/HR: 9 INJECTION, SOLUTION INTRAVENOUS at 03:24

## 2025-01-01 RX ADMIN — HYDROMORPHONE HYDROCHLORIDE 1 MG: 1 INJECTION, SOLUTION INTRAMUSCULAR; INTRAVENOUS; SUBCUTANEOUS at 03:22

## 2025-01-01 RX ADMIN — NOREPINEPHRINE BITARTRATE 0.5 MCG/KG/MIN: 1 INJECTION, SOLUTION, CONCENTRATE INTRAVENOUS at 19:11

## 2025-01-01 RX ADMIN — FUROSEMIDE 100 MG: 10 INJECTION, SOLUTION INTRAVENOUS at 10:27

## 2025-01-01 RX ADMIN — AMIODARONE HYDROCHLORIDE 1 MG/MIN: 1.8 INJECTION, SOLUTION INTRAVENOUS at 20:44

## 2025-01-01 RX ADMIN — Medication 100 MCG/HR: at 02:59

## 2025-01-01 RX ADMIN — DOBUTAMINE HYDROCHLORIDE 2.5 MCG/KG/MIN: 100 INJECTION INTRAVENOUS at 07:39

## 2025-01-01 RX ADMIN — HEPARIN SODIUM 660 UNITS/HR: 5000 INJECTION, SOLUTION INTRAVENOUS at 21:05

## 2025-01-01 RX ADMIN — DEXMEDETOMIDINE 0.6 MCG/KG/HR: 100 INJECTION, SOLUTION INTRAVENOUS at 13:11

## 2025-01-01 RX ADMIN — NOREPINEPHRINE BITARTRATE 0.26 MCG/KG/MIN: 1 INJECTION, SOLUTION, CONCENTRATE INTRAVENOUS at 11:36

## 2025-01-01 RX ADMIN — HYDROCORTISONE SODIUM SUCCINATE 50 MG: 100 INJECTION, POWDER, FOR SOLUTION INTRAMUSCULAR; INTRAVENOUS at 06:09

## 2025-01-01 RX ADMIN — MAGNESIUM SULFATE HEPTAHYDRATE 2 G: 2 INJECTION, SOLUTION INTRAVENOUS at 06:57

## 2025-01-01 RX ADMIN — HYDROMORPHONE HYDROCHLORIDE 1 MG: 1 INJECTION, SOLUTION INTRAMUSCULAR; INTRAVENOUS; SUBCUTANEOUS at 20:25

## 2025-01-01 RX ADMIN — SODIUM BICARBONATE 50 MEQ: 84 INJECTION INTRAVENOUS at 01:05

## 2025-01-01 RX ADMIN — DIPHENHYDRAMINE HYDROCHLORIDE 50 MG: 50 INJECTION, SOLUTION INTRAMUSCULAR; INTRAVENOUS at 14:20

## 2025-01-01 RX ADMIN — VERAPAMIL HYDROCHLORIDE 5 MG: 2.5 INJECTION, SOLUTION INTRAVENOUS at 15:29

## 2025-01-01 RX ADMIN — MYCOPHENOLATE MOFETIL 750 MG: 200 POWDER, FOR SUSPENSION ORAL at 17:28

## 2025-01-01 RX ADMIN — ROSUVASTATIN CALCIUM 40 MG: 20 TABLET, FILM COATED ORAL at 05:19

## 2025-01-01 RX ADMIN — FENTANYL CITRATE 50 MCG: 50 INJECTION, SOLUTION INTRAMUSCULAR; INTRAVENOUS at 17:23

## 2025-01-01 RX ADMIN — SODIUM CHLORIDE 10.5 UNITS/HR: 9 INJECTION, SOLUTION INTRAVENOUS at 13:30

## 2025-01-01 RX ADMIN — CALCIUM CHLORIDE 1 G: 100 INJECTION, SOLUTION INTRAVENOUS; INTRAVENTRICULAR at 00:51

## 2025-01-01 RX ADMIN — LIDOCAINE HYDROCHLORIDE 6 ML: 20 JELLY TOPICAL at 16:30

## 2025-01-01 RX ADMIN — NOREPINEPHRINE BITARTRATE 0.14 MCG/KG/MIN: 1 INJECTION, SOLUTION, CONCENTRATE INTRAVENOUS at 03:57

## 2025-01-01 RX ADMIN — SODIUM CHLORIDE, POTASSIUM CHLORIDE, SODIUM LACTATE AND CALCIUM CHLORIDE: 600; 310; 30; 20 INJECTION, SOLUTION INTRAVENOUS at 11:25

## 2025-01-01 RX ADMIN — Medication 1000 MCG: at 15:42

## 2025-01-01 RX ADMIN — AMPICILLIN AND SULBACTAM 3 G: 1; 2 INJECTION, POWDER, FOR SOLUTION INTRAMUSCULAR; INTRAVENOUS at 06:05

## 2025-01-01 RX ADMIN — TACROLIMUS 0.5 MG: 5 CAPSULE ORAL at 05:05

## 2025-01-01 RX ADMIN — CEFAZOLIN SODIUM 1 G: 1 INJECTION, SOLUTION INTRAVENOUS at 18:14

## 2025-01-01 RX ADMIN — MAGNESIUM SULFATE HEPTAHYDRATE 4 G: 4 INJECTION, SOLUTION INTRAVENOUS at 08:24

## 2025-01-01 RX ADMIN — AMPICILLIN AND SULBACTAM 3 G: 1; 2 INJECTION, POWDER, FOR SOLUTION INTRAMUSCULAR; INTRAVENOUS at 23:17

## 2025-01-01 RX ADMIN — EPINEPHRINE 1 MG: 0.1 INJECTION, SOLUTION INTRAVENOUS at 01:06

## 2025-01-01 RX ADMIN — MIDAZOLAM HYDROCHLORIDE 2 MG: 1 INJECTION, SOLUTION INTRAMUSCULAR; INTRAVENOUS at 16:46

## 2025-01-01 RX ADMIN — AMIODARONE HYDROCHLORIDE 0.5 MG/MIN: 1.8 INJECTION, SOLUTION INTRAVENOUS at 00:55

## 2025-01-01 RX ADMIN — THIAMINE HYDROCHLORIDE 200 MG: 100 INJECTION, SOLUTION INTRAMUSCULAR; INTRAVENOUS at 17:14

## 2025-01-01 RX ADMIN — DEXMEDETOMIDINE 0.2 MCG/KG/HR: 100 INJECTION, SOLUTION INTRAVENOUS at 03:03

## 2025-01-01 RX ADMIN — CALCIUM CHLORIDE 1 G: 100 INJECTION INTRAVENOUS; INTRAVENTRICULAR at 06:30

## 2025-01-01 RX ADMIN — ROSUVASTATIN CALCIUM 40 MG: 20 TABLET, FILM COATED ORAL at 05:05

## 2025-01-01 RX ADMIN — TAMSULOSIN HYDROCHLORIDE 0.4 MG: 0.4 CAPSULE ORAL at 21:18

## 2025-01-01 RX ADMIN — ASPIRIN 81 MG: 81 TABLET, CHEWABLE ORAL at 05:19

## 2025-01-01 RX ADMIN — AMIODARONE HYDROCHLORIDE 150 MG: 1.5 INJECTION, SOLUTION INTRAVENOUS at 09:53

## 2025-01-01 RX ADMIN — ASPIRIN 81 MG: 81 TABLET, CHEWABLE ORAL at 06:39

## 2025-01-01 RX ADMIN — PHENYLEPHRINE HYDROCHLORIDE 100 MCG: 10 INJECTION INTRAVENOUS at 12:08

## 2025-01-01 RX ADMIN — SODIUM CHLORIDE 3 UNITS/HR: 9 INJECTION, SOLUTION INTRAVENOUS at 14:36

## 2025-01-01 RX ADMIN — INDOMETHACIN 100 MEQ: 25 CAPSULE ORAL at 01:47

## 2025-01-01 RX ADMIN — HYDROMORPHONE HYDROCHLORIDE 1 MG: 1 INJECTION, SOLUTION INTRAMUSCULAR; INTRAVENOUS; SUBCUTANEOUS at 01:16

## 2025-01-01 RX ADMIN — HYDROMORPHONE HYDROCHLORIDE 1 MG: 1 INJECTION, SOLUTION INTRAMUSCULAR; INTRAVENOUS; SUBCUTANEOUS at 02:50

## 2025-01-01 RX ADMIN — AMIODARONE HYDROCHLORIDE 0.5 MG/MIN: 1.8 INJECTION, SOLUTION INTRAVENOUS at 15:12

## 2025-01-01 RX ADMIN — HYDROMORPHONE HYDROCHLORIDE 0.5 MG: 1 INJECTION, SOLUTION INTRAMUSCULAR; INTRAVENOUS; SUBCUTANEOUS at 22:31

## 2025-01-01 RX ADMIN — FAMOTIDINE 20 MG: 10 INJECTION, SOLUTION INTRAVENOUS at 17:12

## 2025-01-01 RX ADMIN — MYCOPHENOLATE MOFETIL 750 MG: 200 POWDER, FOR SUSPENSION ORAL at 05:12

## 2025-01-01 RX ADMIN — AMPICILLIN AND SULBACTAM 3 G: 1; 2 INJECTION, POWDER, FOR SOLUTION INTRAMUSCULAR; INTRAVENOUS at 08:26

## 2025-01-01 RX ADMIN — HYDROCORTISONE SODIUM SUCCINATE 50 MG: 100 INJECTION, POWDER, FOR SOLUTION INTRAMUSCULAR; INTRAVENOUS at 22:04

## 2025-01-01 RX ADMIN — ACETAMINOPHEN 650 MG: 325 TABLET ORAL at 03:47

## 2025-01-01 RX ADMIN — SENNOSIDES AND DOCUSATE SODIUM 2 TABLET: 50; 8.6 TABLET ORAL at 17:12

## 2025-01-01 RX ADMIN — HEPARIN SODIUM: 1000 INJECTION, SOLUTION INTRAVENOUS; SUBCUTANEOUS at 15:29

## 2025-01-01 RX ADMIN — SODIUM BICARBONATE 100 MEQ: 84 INJECTION INTRAVENOUS at 23:57

## 2025-01-01 RX ADMIN — SODIUM BICARBONATE 50 MEQ: 84 INJECTION INTRAVENOUS at 00:52

## 2025-01-01 RX ADMIN — CEFAZOLIN 2 G: 1 INJECTION, POWDER, FOR SOLUTION INTRAMUSCULAR; INTRAVENOUS at 11:27

## 2025-01-01 RX ADMIN — PROPOFOL 100 MG: 10 INJECTION, EMULSION INTRAVENOUS at 11:27

## 2025-01-01 RX ADMIN — EPINEPHRINE 1 MG: 0.1 INJECTION, SOLUTION INTRAVENOUS at 00:49

## 2025-01-01 RX ADMIN — CLOPIDOGREL BISULFATE 600 MG: 300 TABLET, FILM COATED ORAL at 17:40

## 2025-01-01 RX ADMIN — VASOPRESSIN 0.03 UNITS/MIN: 20 INJECTION INTRAVENOUS at 18:29

## 2025-01-01 RX ADMIN — THIAMINE HYDROCHLORIDE 200 MG: 100 INJECTION, SOLUTION INTRAMUSCULAR; INTRAVENOUS at 05:11

## 2025-01-01 RX ADMIN — INSULIN LISPRO 1 UNITS: 100 INJECTION, SOLUTION INTRAVENOUS; SUBCUTANEOUS at 07:06

## 2025-01-01 RX ADMIN — HUMAN ALBUMIN MICROSPHERES AND PERFLUTREN 3 ML: 10; .22 INJECTION, SOLUTION INTRAVENOUS at 11:00

## 2025-01-01 RX ADMIN — HEPARIN SODIUM 2000 UNITS: 200 INJECTION, SOLUTION INTRAVENOUS at 15:30

## 2025-01-01 RX ADMIN — FAMOTIDINE 20 MG: 20 TABLET, FILM COATED ORAL at 17:46

## 2025-01-01 RX ADMIN — ASPIRIN 81 MG: 81 TABLET, CHEWABLE ORAL at 05:11

## 2025-01-01 RX ADMIN — AMIODARONE HYDROCHLORIDE 0.5 MG/MIN: 1.8 INJECTION, SOLUTION INTRAVENOUS at 01:48

## 2025-01-01 RX ADMIN — CEFAZOLIN SODIUM 1 G: 1 INJECTION, SOLUTION INTRAVENOUS at 12:36

## 2025-01-01 RX ADMIN — FLUDROCORTISONE ACETATE 0.1 MG: 0.1 TABLET ORAL at 06:37

## 2025-01-01 RX ADMIN — AMPICILLIN AND SULBACTAM 3 G: 1; 2 INJECTION, POWDER, FOR SOLUTION INTRAMUSCULAR; INTRAVENOUS at 05:32

## 2025-01-01 RX ADMIN — METHYLENE BLUE 100 MG: 5 INJECTION INTRAVENOUS at 23:14

## 2025-01-01 RX ADMIN — MAGNESIUM SULFATE HEPTAHYDRATE 2 G: 2 INJECTION, SOLUTION INTRAVENOUS at 07:57

## 2025-01-01 RX ADMIN — HEPARIN SODIUM 738 UNITS/HR: 5000 INJECTION, SOLUTION INTRAVENOUS at 05:00

## 2025-01-01 RX ADMIN — HYDROMORPHONE HYDROCHLORIDE 1 MG: 1 INJECTION, SOLUTION INTRAMUSCULAR; INTRAVENOUS; SUBCUTANEOUS at 15:54

## 2025-01-01 RX ADMIN — HEPARIN SODIUM 362 UNITS/HR: 1000 INJECTION, SOLUTION INTRAVENOUS; SUBCUTANEOUS at 05:00

## 2025-01-01 RX ADMIN — ASPIRIN 81 MG: 81 TABLET, COATED ORAL at 05:04

## 2025-01-01 RX ADMIN — ROSUVASTATIN CALCIUM 40 MG: 20 TABLET, FILM COATED ORAL at 05:11

## 2025-01-01 RX ADMIN — EPINEPHRINE 1 MG: 0.1 INJECTION, SOLUTION INTRAVENOUS at 00:57

## 2025-01-01 RX ADMIN — EPINEPHRINE 0.1 MG: 0.1 INJECTION, SOLUTION INTRAVENOUS at 01:01

## 2025-01-01 SDOH — ECONOMIC STABILITY: TRANSPORTATION INSECURITY
IN THE PAST 12 MONTHS, HAS THE LACK OF TRANSPORTATION KEPT YOU FROM MEDICAL APPOINTMENTS OR FROM GETTING MEDICATIONS?: PATIENT UNABLE TO ANSWER

## 2025-01-01 SDOH — ECONOMIC STABILITY: TRANSPORTATION INSECURITY
IN THE PAST 12 MONTHS, HAS LACK OF RELIABLE TRANSPORTATION KEPT YOU FROM MEDICAL APPOINTMENTS, MEETINGS, WORK OR FROM GETTING THINGS NEEDED FOR DAILY LIVING?: PATIENT UNABLE TO ANSWER

## 2025-01-01 ASSESSMENT — PAIN DESCRIPTION - PAIN TYPE
TYPE: ACUTE PAIN
TYPE: ACUTE PAIN;CHRONIC PAIN
TYPE: ACUTE PAIN
TYPE: ACUTE PAIN;SURGICAL PAIN
TYPE: ACUTE PAIN

## 2025-01-01 ASSESSMENT — COGNITIVE AND FUNCTIONAL STATUS - GENERAL
MOVING FROM LYING ON BACK TO SITTING ON SIDE OF FLAT BED: TOTAL
STANDING UP FROM CHAIR USING ARMS: TOTAL
DRESSING REGULAR UPPER BODY CLOTHING: TOTAL
MOVING TO AND FROM BED TO CHAIR: TOTAL
CLIMB 3 TO 5 STEPS WITH RAILING: TOTAL
MOBILITY SCORE: 6
TOILETING: TOTAL
EATING MEALS: TOTAL
SUGGESTED CMS G CODE MODIFIER DAILY ACTIVITY: CN
DRESSING REGULAR LOWER BODY CLOTHING: TOTAL
PERSONAL GROOMING: TOTAL
SUGGESTED CMS G CODE MODIFIER MOBILITY: CN
HELP NEEDED FOR BATHING: TOTAL
WALKING IN HOSPITAL ROOM: TOTAL
DAILY ACTIVITIY SCORE: 6
TURNING FROM BACK TO SIDE WHILE IN FLAT BAD: TOTAL

## 2025-01-01 ASSESSMENT — SOCIAL DETERMINANTS OF HEALTH (SDOH)
WITHIN THE LAST YEAR, HAVE YOU BEEN KICKED, HIT, SLAPPED, OR OTHERWISE PHYSICALLY HURT BY YOUR PARTNER OR EX-PARTNER?: PATIENT UNABLE TO ANSWER
WITHIN THE LAST YEAR, HAVE TO BEEN RAPED OR FORCED TO HAVE ANY KIND OF SEXUAL ACTIVITY BY YOUR PARTNER OR EX-PARTNER?: PATIENT UNABLE TO ANSWER
WITHIN THE PAST 12 MONTHS, YOU WORRIED THAT YOUR FOOD WOULD RUN OUT BEFORE YOU GOT THE MONEY TO BUY MORE: PATIENT UNABLE TO ANSWER
WITHIN THE LAST YEAR, HAVE YOU BEEN HUMILIATED OR EMOTIONALLY ABUSED IN OTHER WAYS BY YOUR PARTNER OR EX-PARTNER?: PATIENT UNABLE TO ANSWER
IN THE PAST 12 MONTHS, HAS THE ELECTRIC, GAS, OIL, OR WATER COMPANY THREATENED TO SHUT OFF SERVICE IN YOUR HOME?: PATIENT UNABLE TO ANSWER
WITHIN THE LAST YEAR, HAVE YOU BEEN AFRAID OF YOUR PARTNER OR EX-PARTNER?: PATIENT UNABLE TO ANSWER
WITHIN THE PAST 12 MONTHS, THE FOOD YOU BOUGHT JUST DIDN'T LAST AND YOU DIDN'T HAVE MONEY TO GET MORE: PATIENT UNABLE TO ANSWER

## 2025-01-01 ASSESSMENT — ENCOUNTER SYMPTOMS
SPUTUM PRODUCTION: 0
EYE PAIN: 0
TINGLING: 0
VOMITING: 0
HEADACHES: 0
DIARRHEA: 0
LOSS OF CONSCIOUSNESS: 1
COUGH: 0
WEIGHT LOSS: 0
SENSORY CHANGE: 0
CHILLS: 0
FEVER: 0
SPEECH CHANGE: 0
CONSTIPATION: 0
PHOTOPHOBIA: 0
HALLUCINATIONS: 0
NECK PAIN: 0
MYALGIAS: 0
PALPITATIONS: 0
DOUBLE VISION: 0
DIZZINESS: 0
BLURRED VISION: 0
ABDOMINAL PAIN: 0
FOCAL WEAKNESS: 0
NAUSEA: 0
SHORTNESS OF BREATH: 1
BACK PAIN: 0
ORTHOPNEA: 0
TREMORS: 0

## 2025-01-01 ASSESSMENT — LIFESTYLE VARIABLES
ALCOHOL_USE: NO
AVERAGE NUMBER OF DAYS PER WEEK YOU HAVE A DRINK CONTAINING ALCOHOL: 0
DOES PATIENT WANT TO STOP DRINKING: NO
EVER FELT BAD OR GUILTY ABOUT YOUR DRINKING: NO
HOW MANY TIMES IN THE PAST YEAR HAVE YOU HAD 5 OR MORE DRINKS IN A DAY: 0
TOTAL SCORE: 0
HAVE PEOPLE ANNOYED YOU BY CRITICIZING YOUR DRINKING: NO
TOTAL SCORE: 0
TOTAL SCORE: 0
EVER HAD A DRINK FIRST THING IN THE MORNING TO STEADY YOUR NERVES TO GET RID OF A HANGOVER: NO
CONSUMPTION TOTAL: NEGATIVE
ON A TYPICAL DAY WHEN YOU DRINK ALCOHOL HOW MANY DRINKS DO YOU HAVE: 0
HAVE YOU EVER FELT YOU SHOULD CUT DOWN ON YOUR DRINKING: NO
SUBSTANCE_ABUSE: 0

## 2025-01-01 ASSESSMENT — PULMONARY FUNCTION TESTS
FVC: 1.5
FVC: 1.7
FVC: 1.48
FVC: 1.6

## 2025-01-01 ASSESSMENT — PATIENT HEALTH QUESTIONNAIRE - PHQ9
2. FEELING DOWN, DEPRESSED, IRRITABLE, OR HOPELESS: NOT AT ALL
SUM OF ALL RESPONSES TO PHQ9 QUESTIONS 1 AND 2: 0
1. LITTLE INTEREST OR PLEASURE IN DOING THINGS: NOT AT ALL

## 2025-01-01 ASSESSMENT — HEART SCORE
TROPONIN: GREATER THAN OR EQUAL TO 3 TIMES NORMAL LIMIT
RISK FACTORS: >2 RISK FACTORS OR HX OF ATHEROSCLEROTIC DISEASE
AGE: 45-64
ECG: SIGNIFICANT ST-DEPRESSION
HISTORY: HIGHLY SUSPICIOUS
HEART SCORE: 9

## 2025-01-01 ASSESSMENT — FIBROSIS 4 INDEX
FIB4 SCORE: 1.81
FIB4 SCORE: 3.53
FIB4 SCORE: 11.82
FIB4 SCORE: 9.86
FIB4 SCORE: 2.29
FIB4 SCORE: 15.45

## 2025-04-25 PROBLEM — R55 SYNCOPE: Status: ACTIVE | Noted: 2025-01-01

## 2025-04-25 PROBLEM — I46.9 CARDIAC ARREST (HCC): Status: ACTIVE | Noted: 2025-01-01

## 2025-04-25 PROBLEM — J96.01 ACUTE RESPIRATORY FAILURE WITH HYPOXIA (HCC): Status: ACTIVE | Noted: 2025-01-01

## 2025-04-25 PROBLEM — I21.4 NSTEMI (NON-ST ELEVATION MYOCARDIAL INFARCTION) (HCC): Status: ACTIVE | Noted: 2025-01-01

## 2025-04-25 PROBLEM — I48.91 ATRIAL FIBRILLATION (HCC): Status: ACTIVE | Noted: 2025-01-01

## 2025-04-25 PROBLEM — R57.0 CARDIOGENIC SHOCK (HCC): Status: ACTIVE | Noted: 2025-01-01

## 2025-04-25 PROBLEM — R93.0 ABNORMAL CT OF THE HEAD: Status: ACTIVE | Noted: 2025-01-01

## 2025-04-25 PROBLEM — K11.8 MASS OF PAROTID GLAND: Status: ACTIVE | Noted: 2025-01-01

## 2025-04-25 PROBLEM — Z94.0 KIDNEY TRANSPLANTED: Status: ACTIVE | Noted: 2025-01-01

## 2025-04-25 PROBLEM — Z78.9 FULL CODE STATUS: Status: ACTIVE | Noted: 2025-01-01

## 2025-04-25 PROBLEM — D72.829 LEUKOCYTOSIS: Status: ACTIVE | Noted: 2025-01-01

## 2025-04-25 NOTE — H&P
Critical Care History & Physical Note    Date of Service  4/25/2025    Code Status  Full Code    Chief Complaint  Chief Complaint   Patient presents with    Chest Pain    Syncope     Syncope with +loc. + thinners     Laceration     Forehead, right upper lip       History of Presenting Illness  Dariel Diamond is a 59 y.o. male with history of hypertension, hyperlipidemia, HFimpEF (most recent 50%), paroxysmal atrial fibrillation on apixaban, ESRD s/p DDKT 4/2023, who presented with chest pain, dyspnea and syncope this morning.  He was in his usual state of health prior to experiencing mild/moderate substernal chest pain.  About 30 minutes later he felt his vision become obscured and fell, striking his head.  He presented to the emergency department where he had a noncontrast head CT that was unrevealing.  His forehead laceration was repaired.    EKG demonstrated anterior ST elevations and dynamic EKG changes.  Given concern for malignant dysrhythmia he proceeded to the cath lab.    In the cath lab he sustained PEA cardiac arrest for ~10-30 seconds and was intubated.  He had Impella placed and PCI to LAD with PTCA of the circumflex.  He had escalating inotrope and vasoactive requirements.  Initial CI was 3.7 on dobutamine 5, epi 0.03.      He lives in [place] with his 2 sons and his wife.    Review of Systems  ROS    Past Medical History   has a past medical history of Depression, Dilated cardiomyopathy (HCC), Hyperlipidemia, Hypertension, and Type II or unspecified type diabetes mellitus without mention of complication, not stated as uncontrolled.    Surgical History   has a past surgical history that includes av fistula creation (5/1/2014) and toe amputation (Left, 8/23/2018).     Family History  family history includes Hypertension in his mother; Other in his father.   Family history reviewed with patient. There is no family history that is pertinent to the chief complaint.     Social History   reports that he has  never smoked. He has never used smokeless tobacco. He reports that he does not drink alcohol and does not use drugs.    Allergies  Allergies   Allergen Reactions    Contrast Media With Iodine [Iodine] Rash and Itching     Per patient       Medications  Prior to Admission Medications   Prescriptions Last Dose Informant Patient Reported? Taking?   Insulin Aspart PenFill 100 UNIT/ML Solution Cartridge 4/24/2025 Evening Family Member Yes Yes   Sig: Inject 14 Units under the skin 3 times a day before meals.   SPS, SODIUM POLYSTYRENE SULF, 15 GM/60ML Suspension 4/24/2025 Family Member Yes Yes   Sig: Take 15 g by mouth see administration instructions. Takes on Tuesday, Thursday, and Sunday   amLODIPine (NORVASC) 10 MG Tab 4/25/2025 Morning Family Member Yes Yes   Sig: Take 10 mg by mouth every day.   apixaban (ELIQUIS) 5mg Tab 4/25/2025 Morning Family Member Yes Yes   Sig: Take 5 mg by mouth 2 times a day.   aspirin EC 81 MG EC tablet 4/24/2025 Noon Family Member No Yes   Sig: Take 1 Tab by mouth every day.   cloNIDine (CATAPRES) 0.1 MG/24HR PATCH WEEKLY patch 4/19/2025 Family Member Yes Yes   Sig: Place 1 Patch on the skin every Saturday.   docusate sodium (COLACE) 100 MG Cap 4/25/2025 Morning Family Member Yes Yes   Sig: Take 100 mg by mouth 2 times a day.   furosemide (LASIX) 80 MG Tab 4/24/2025 Evening Family Member Yes Yes   Sig: Take 40 mg by mouth 1 time a day as needed (If needed for weight gain > 2).   gabapentin (NEURONTIN) 100 MG Cap 4/24/2025 Evening Family Member Yes Yes   Sig: Take 200 mg by mouth every day.   insulin glargine 100 UNIT/ML Solution Pen-injector injection 4/24/2025 Evening Family Member Yes Yes   Sig: Inject 10 Units under the skin every evening.   losartan (COZAAR) 100 MG Tab 4/25/2025 Morning Family Member Yes Yes   Sig: Take 100 mg by mouth every day.   metoprolol SR (TOPROL XL) 50 MG TABLET SR 24 HR 4/24/2025 Evening Family Member Yes Yes   Sig: Take 50 mg by mouth every day.   multivitamin  Tab 4/24/2025 Evening Family Member Yes Yes   Sig: Take 1 Tablet by mouth every day.   mycophenolate (CELLCEPT) 250 MG Cap 4/25/2025 Morning Family Member Yes Yes   Sig: Take 750 mg by mouth 2 times a day.   polyethylene glycol 3350 (MIRALAX) 17 GM/SCOOP Powder 4/25/2025 Morning Family Member Yes Yes   Sig: Take 17 g by mouth 2 times a day.   rosuvastatin (CRESTOR) 40 MG tablet 4/25/2025 Morning Family Member Yes Yes   Sig: Take 40 mg by mouth every day.   tacrolimus (PROGRAF) 0.5 MG Cap 4/25/2025 Morning Family Member Yes Yes   Sig: Take 0.5-1 mg by mouth 2 times a day. 0.5 mg in the AM  1 mg in the PM   tamsulosin (FLOMAX) 0.4 MG capsule 4/24/2025 Evening Family Member Yes Yes   Sig: Take 0.4 mg by mouth every day.      Facility-Administered Medications: None       Physical Exam  Temp:  [36.2 °C (97.2 °F)-36.4 °C (97.5 °F)] 36.2 °C (97.2 °F)  Pulse:  [] 127  Resp:  [18-30] 28  BP: (101-129)/(54-76) 106/57  SpO2:  [87 %-100 %] 100 %  Blood Pressure: 111/62   Temperature: 36.4 °C (97.5 °F)   Pulse: 93   Respiration: 20   Pulse Oximetry: 95 %       Physical Exam  Vitals reviewed.   Constitutional:       General: He is not in acute distress.     Appearance: He is obese.   HENT:      Head:      Comments: ETT in situ  Eyes:      General: No scleral icterus.  Cardiovascular:      Rate and Rhythm: Regular rhythm. Tachycardia present.   Pulmonary:      Effort: No respiratory distress.   Abdominal:      General: There is no distension.      Palpations: Abdomen is soft.   Musculoskeletal:         General: No swelling.   Skin:     General: Skin is warm.         Laboratory:  Recent Labs     04/25/25  1201   WBC 11.8*   RBC 5.15   HEMOGLOBIN 14.5   HEMATOCRIT 45.5   MCV 88.3   MCH 28.2   MCHC 31.9*   RDW 39.8   PLATELETCT 208   MPV 10.4     Recent Labs     04/25/25  1201   SODIUM 138   POTASSIUM 4.9   CHLORIDE 105   CO2 18*   GLUCOSE 213*   BUN 22   CREATININE 1.17   CALCIUM 9.9     Recent Labs     04/25/25  1201    ALTSGPT 15   ASTSGOT 31   ALKPHOSPHAT 105*   TBILIRUBIN 1.0   LIPASE 46   GLUCOSE 213*     Recent Labs     04/25/25  1201 04/25/25  1405   APTT 32.2 29.0   INR 1.40* 1.47*     Recent Labs     04/25/25  1201   NTPROBNP 2116*         Recent Labs     04/25/25  1201 04/25/25  1319   TROPONINT 63* 157*       Imaging:  EC-ECHOCARDIOGRAM LTD W/O CONT   Final Result      DX-CHEST-PORTABLE (1 VIEW)   Final Result      1.  Pulmonary arterial catheter tip projects over the right main pulmonary artery. No pneumothorax is identified.   2.  Increasing perihilar interstitial opacities are consistent with pulmonary edema.   3.  Left basilar opacification likely represents atelectasis.      EC-ECHOCARDIOGRAM LTD W/O CONT   Final Result      DX-CHEST-PORTABLE (1 VIEW)   Final Result      No acute cardiac or pulmonary abnormalities are identified.      DX-CHEST-PORTABLE (1 VIEW)   Final Result      No acute cardiopulmonary process.      CT-CSPINE WITHOUT PLUS RECONS   Final Result      1.  No acute fracture or traumatic malalignment of the cervical spine.   2.  Patchy airspace opacities in the visualized lung apices.         CT-HEAD W/O   Final Result      1.  No acute intracranial hemorrhage.   2.  A 2 to 3 mm faintly hyperdense focus in the right mesial occipital lobe could represent vascular calcification or cavernous malformation. This could be confirmed by brain MRI.   3.  Mildly displaced nasal bone fractures with surrounding soft tissue swelling and foci of air.   4.  There is a 2.4 cm solid mass within the superficial lobe of the left parotid gland with calcification. Although the parotid gland is incompletely visualized on this exam, benign and malignant entities cannot be reliably distinguished by imaging alone    and ultimately an ENT referral will be needed for consideration of tissue sampling. This can be done on a nonemergent basis.      CL-LEFT HEART CATHETERIZATION WITH POSSIBLE INTERVENTION    (Results Pending)    EC-ECHOCARDIOGRAM COMPLETE W/O CONT    (Results Pending)       Assessment/Plan:  Acute respiratory failure with hypoxia (HCC)  Assessment & Plan  Intubated on 4/25 in cath lab after PEA arrest.  titrate FiO2 to saturation 92-96%  Vt 6-8mL/kg IBW   GI prophlyaxis  Oral hygeine  daily SAT/SBT  HOB elevation      Atrial fibrillation with RVR ([facility])  Assessment & Plan  Heparin  Amiodarone    Cardiac arrest ([facility])  Assessment & Plan  Witnessed PEA arrest in the cath lab with CPR for <1min.  Sedation lightening for neuro exam  Avoid fever  Defer TTM with PEA, brief, witnessed.    Cardiogenic shock ([facility])  Assessment & Plan  Etiology likely ischemia.  Bedside echo demonstrated LVEF 20-25%, Impella appropriate depth but directed towards lateral wall.  Formerly had HFrEF with sanna LVEF 20% most recently improved to 50%  Titrate Impella, dobutamine, to CI>2.2 and end organ perfusion  Trend lactate and SvO2  Diuresis for PCWP 15  Norepi for MAP>65  Monitor I/O closely  GDMT per cardiology (beta blocker, ARB/ARNi, MRA, SGLT-2)       Kidney transplanted  Assessment & Plan  Baseline creatinine 1.4.    Monitor UOP and creatinine  Home tacrolimus and mycophenolate  Tac level in AM  Nephrology consultation    LAD stenosis- (present on admission)  Assessment & Plan  PCI to LAD with Dr. Bejarano    Type 2 diabetes mellitus with chronic kidney disease on chronic dialysis ([facility])- (present on admission)  Assessment & Plan  Insulin titrated for glycemic goal 140-180mg/dL      Abnormal CT of the head  Assessment & Plan  Patient found to have 2 to 3 mm faintly hyperdense focus in the right mesial occipital lobe could represent vascular calcification or cavernous malformation.  This could be confirmed by MRI brain.    MRI pending cardiovascular stabilization      Critical care time: 140 minutes, excluding procedure

## 2025-04-25 NOTE — ED NOTES
Med Rec completed per patient's family with med list (returned)   Allergies reviewed  No ORAL antibiotics in last 30 days  Dispense history available in EPIC? Yes    Patient takes Eliquis 5 mg twice a day   Last dose 4/25/2025 AM

## 2025-04-25 NOTE — ASSESSMENT & PLAN NOTE
Continue outpatient insulin glargine  on insulin sliding scale with hypoglycemia protocol  Follow-up HbA1c

## 2025-04-25 NOTE — ASSESSMENT & PLAN NOTE
I discussed CODE STATUS with him with the help of video  he would like to be full code.  As per patient wishes I placed full code orders.

## 2025-04-25 NOTE — ASSESSMENT & PLAN NOTE
Most likely secondary to NSTEMI and now he received IV steroid pretreatment due to contrast allergy.  No signs of active infection at this time  Ordered CBC for tomorrow and monitor for signs of infection.

## 2025-04-25 NOTE — ASSESSMENT & PLAN NOTE
Intubated date: 4/25 due to brief PEA arrest in cath lab  Extubated without incident morning of 4/29    Unfortunately was reintubated overnight due to rapidly progressive shock and acidemia which ultimately resulted in cardiac arrest     Goal saturation > 92%  Monitor ventilator waveforms and titrate flow/peep and volumes according.   Lung protective ventilation strategy w/ A-F bundle  CXR: monitor lung volumes and tube/line placement  VAP bundle prevention, oral care, post pyloric feeding  Head of bed > 30 degree  GI prophylaxis  Daily awakening and SBT trials unless contraindicated  Monitor for liberation  Respiratory treatments: prn

## 2025-04-25 NOTE — ASSESSMENT & PLAN NOTE
Current BUN is 22 and creatinine of 1.17  Cr worse, but no compeling indication for HD today  dose all meds for GFR< 15  abx for bacteremia, immunosuppression on hold due to severe infection  keep strict I/O  due to maximal vasopressor use and continued low Bps, it was explained to the family that he would unlikely tolerate any form of RRT due to his severe, and worsening critical illness  After long conversation family decided to go with comfortable care.

## 2025-04-25 NOTE — ASSESSMENT & PLAN NOTE
Patient found to significantly with troponin of 157 with active chest pain.  continuing antiplatelet heparin as per APTT levels and monitor for signs of bleeding.  He is on Eliquis that is why he is on APTT protocol.  Impella removed 25  Amiodarone gtt   gtt  Norepi gtt  Vasopression add

## 2025-04-25 NOTE — ED NOTES
from Lab called with critical result of Troponin 157 at 1402. Critical lab result read back to .   Dr. Gardner notified of critical lab result at 1403.  Critical lab result read back by Dr. Gardner.

## 2025-04-25 NOTE — CONSULTS
Reason for Consult:  Asked by Dr Cesar Gardner M.D. to see this patient with chest pain.    CC:   Chief Complaint   Patient presents with    Chest Pain    Syncope     Syncope with +loc. + thinners     Laceration     Forehead, right upper lip       HPI:      59 year old man PMH hx VT nos, HFrEF recovered 20->50%, ICM, CAD nonobstructive, paroxysmal AF, DM2, ESRD s/p renal transplant 4/2023, PAD presents with angina found NSTEMI. Patient describes suffered chest pain followed by loss of consciousness in shower suffering facial trauma. At the time 4/10 pain now improved to 1/10. Underwent head and neck imaging in ER found stable. Last took his eliquis this morning. Compliant with medications and denies adverse effects. No toxic social habits.     Medications / Drug list prior to admission:  No current facility-administered medications on file prior to encounter.     Current Outpatient Medications on File Prior to Encounter   Medication Sig Dispense Refill    aspirin EC 81 MG EC tablet Take 1 Tab by mouth every day. 30 Tab 2    sevelamer carbonate (RENVELA) 800 MG Tab tablet Take 1 Tab by mouth 3 times a day, with meals. 270 Tab 0    insulin lispro (HUMALOG) 100 UNIT/ML Solution Inject 4 Units as instructed 3 times a day before meals. 10 mL 1    vitamin D, Ergocalciferol, (DRISDOL) 90126 units Cap capsule Take 50,000 Units by mouth every 7 days.      atorvastatin (LIPITOR) 20 MG Tab TAKE 1 TABLET EVERY EVENING 90 Tab 1    lisinopril (PRINIVIL) 10 MG Tab Take 1 Tab by mouth every 12 hours. 180 Tab 3    insulin glargine (LANTUS) 100 UNIT/ML Solution Inject 12 Units as instructed every evening.      carvedilol (COREG) 25 MG TABS Take 1 Tab by mouth 2 times a day, with meals. 60 Tab 5    torsemide (DEMADEX) 20 MG TABS Take 1 Tab by mouth every day. 30 Tab 3       Current list of administered Medications:    Current Facility-Administered Medications:     nitroglycerin 50 mg in D5W 250 ml infusion, 0-200 mcg/min,  Intravenous, Continuous, David Choe M.D.    heparin injection 3,200 Units, 3,200 Units, Intravenous, PRN **AND** heparin infusion 25,000 Units in 500 mL 0.45% NACL, , Intravenous, Continuous **AND** Protocol 440 Heparin Weight Based DO NOT GIVE ANY HEPARIN BOLUS TO STROKE PATIENT, , , CONTINUOUS **AND** Protocol 440 Heparin Weight Based Discontinue Enoxaparin (Lovenox), Dabigatran (Pradaxa), Rivaroxaban (Xarelto), Apixaban (Eliquis), Edoxaban (Savaysa, Lixiana), Fondaparinux (Arixtra) and Argatroban prior to heparin administration, , , Once **AND** Protocol 440 Heparin Weight Based Draw baseline aPTT, PT, and platelet count if not already done, , , CONTINUOUS **AND** Protocol 440 Heparin Weight Based Draw aPTT 6 hours after beginning infusion. , , , CONTINUOUS **AND** Protocol 440 Heparin Weight Based Record Patient Data, , , CONTINUOUS **AND** Protocol 440 Heparin Weight Based INSTRUCTIONS, , , CONTINUOUS **AND** Protocol 440 Heparin Weight Based Review aPTT results 6 hours after infusion is begun as detailed, , , CONTINUOUS **AND** Protocol 440 Heparin Weight Based Draw Platelet count every three days. Contact MD if platelet is 50% lower than baseline count., , , CONTINUOUS **AND** Protocol 440 Heparin Weight Based Adjust heparin to maintain aPTT between 55-96 sec, , , CONTINUOUS **AND** Protocol 440 Heparin Weight Based Order aPTT 6 hours after any rate change or hold until aPTT is therapeutic (55-96 seconds), , , CONTINUOUS **AND** Protocol 440 Heparin Weight Based Documentation and verification, , , CONTINUOUS, David Choe MEMELI.    VERAPAMIL HCL 2.5 MG/ML IV SOLN, , , ,     HEPARIN SODIUM (PORCINE) 1000 UNIT/ML INJ SOLN, , , ,     LIDOCAINE HCL 2 % INJ SOLN, , , ,     methylPREDNISolone sod succ (Solu-Medrol) 250 mg in NS 50 mL ivpb, 250 mg, Intravenous, Once, David Дмитрий M.D.    diphenhydrAMINE (Benadryl) injection 50 mg, 50 mg, Intravenous, Once, David Choe MEdilbertoD.    HEPARIN (PORCINE) IN NACL  2000-0.9 UNIT/L-% IV SOLN, , , ,     NITROGLYCERIN 2 MG IV SOLN, , , ,     Current Outpatient Medications:     aspirin EC 81 MG EC tablet, Take 1 Tab by mouth every day., Disp: 30 Tab, Rfl: 2    sevelamer carbonate (RENVELA) 800 MG Tab tablet, Take 1 Tab by mouth 3 times a day, with meals., Disp: 270 Tab, Rfl: 0    insulin lispro (HUMALOG) 100 UNIT/ML Solution, Inject 4 Units as instructed 3 times a day before meals., Disp: 10 mL, Rfl: 1    vitamin D, Ergocalciferol, (DRISDOL) 17248 units Cap capsule, Take 50,000 Units by mouth every 7 days., Disp: , Rfl:     atorvastatin (LIPITOR) 20 MG Tab, TAKE 1 TABLET EVERY EVENING, Disp: 90 Tab, Rfl: 1    lisinopril (PRINIVIL) 10 MG Tab, Take 1 Tab by mouth every 12 hours., Disp: 180 Tab, Rfl: 3    insulin glargine (LANTUS) 100 UNIT/ML Solution, Inject 12 Units as instructed every evening., Disp: , Rfl:     carvedilol (COREG) 25 MG TABS, Take 1 Tab by mouth 2 times a day, with meals., Disp: 60 Tab, Rfl: 5    torsemide (DEMADEX) 20 MG TABS, Take 1 Tab by mouth every day., Disp: 30 Tab, Rfl: 3    Past Medical History:   Diagnosis Date    Depression     Dilated cardiomyopathy (HCC)     Hyperlipidemia     Hypertension     Type II or unspecified type diabetes mellitus without mention of complication, not stated as uncontrolled        Past Surgical History:   Procedure Laterality Date    TOE AMPUTATION Left 8/23/2018    Procedure: TOE AMPUTATION L 2ND TOE;  Surgeon: Luiz Ma M.D.;  Location: SURGERY [facility];  Service: Orthopedics    AV FISTULA CREATION  5/1/2014    Performed by Beau Cowart M.D. at SURGERY [facility]       Family History   Problem Relation Age of Onset    Hypertension Mother     Other Father         DM     Patient family history was personally reviewed, no pertinent family history to current presentation    Social History     Socioeconomic History    Marital status:      Spouse name: Not on file    Number of children: Not on file     Years of education: Not on file    Highest education level: Not on file   Occupational History    Not on file   Tobacco Use    Smoking status: Never    Smokeless tobacco: Never   Substance and Sexual Activity    Alcohol use: No    Drug use: No    Sexual activity: Not on file   Other Topics Concern    Not on file   Social History Narrative    Not on file     Social Drivers of Health     Financial Resource Strain: Not on File (8/24/2019)    Received from [facility]    Financial Resource Strain     Financial Resource Strain: 0   Food Insecurity: No Food Insecurity (8/11/2023)    Received from [employer] (and [facility] prior to 7/1/2021)    Food Insecurity     Social/Environmental Concerns: No concerns   Transportation Needs: No Transportation Needs (8/11/2023)    Received from [employer] (and [facility] prior to 7/1/2021)    Transportation Needs     Social/Environmental Concerns: No concerns   Physical Activity: Not on File (8/24/2019)    Received from [facility]    Physical Activity     Physical Activity: 0   Stress: Not on File (8/24/2019)    Received from [facility]    Stress     Stress: 0   Social Connections: Not on File (8/24/2019)    Received from [facility]    Social Connections     Social Connections and Isolation: 0   Intimate Partner Violence: Not At Risk (11/2/2023)    Received from [employer] (and [facility] prior to 7/1/2021)    Feeling Safe      Are you in a relationship with someone who hurts you emotionally and/or physically?: No   Housing Stability: Low Risk  (8/11/2023)    Received from [employer] (and [facility] prior to 7/1/2021)    Housing Stability     Social/Environmental Concerns: No concerns       ALLERGIES:  Allergies   Allergen Reactions    Contrast Media With Iodine [Iodine] Rash and Itching     Per  patient       Review of systems:  A complete review of symptoms was reviewed with patient [  ]. This is reviewed in H&P and PMH. ALL OTHERS reviewed and negative    Physical exam:  Patient Vitals for the past 24 hrs:   BP Temp Temp src Pulse Resp SpO2 Height Weight   25 1324 122/58 -- -- 93 20 92 % 1.829 m (6') --   25 1300 114/59 -- -- 88 (!) 22 93 % -- --   25 1225 114/61 -- -- 91 20 94 % -- --   25 1149 116/62 -- -- 98 18 (!) 87 % -- 93.6 kg (206 lb 6.4 oz)   25 1134 101/63 36.4 °C (97.5 °F) Temporal (!) 121 18 97 % -- --     General: No acute distress.   EYES: no jaundice  HEENT: OP clear   Neck: No bruits No JVD.   CVS:  [   ] RRR. S1 + S2. No M/R/G. [   ] edema.  Resp: CTAB. No wheezing or crackles/rhonchi.  Abdomen: Soft, NT, ND,  Skin: Grossly nothing acute no obvious rashes  Neurological: Alert, Moves all extremities, no cranial nerve defects on limited exam  Extremities: Pulse 2+ in b/l LE. No cyanosis.     Data:  Laboratory studies personally reviewed by me:  Recent Results (from the past 24 hours)   EKG (NOW)    Collection Time: 25 11:31 AM   Result Value Ref Range    Report       [facility] Emergency Dept.    Test Date:  2025  Pt Name:    ROSANNE ROSS                  Department: ER  MRN:        [id number]                      Room:       TRAUMA - EXAM 1  Gender:     Male                         Technician:  :        1965                   Requested By:CLARY CAMPBELL  Order #:    [id number]                    Reading MD:    Measurements  Intervals                                Axis  Rate:       96                           P:          -56  MT:         220                          QRS:        -40  QRSD:       101                          T:          139  QT:         344  QTc:        435    Interpretive Statements  Sinus or ectopic atrial rhythm  Ventricular premature complex  Prolonged MT interval  Inferior infarct, old  Repol abnrm,  severe global ischemia (LM/MVD)  Compared to ECG 2016 07:31:43  Ectopic atrial rhythm now present  Ventricular premature complex(es) now present  First degree AV block now present  Myocardial in farct finding now present  Possible ischemia now present  Sinus rhythm no longer present  Atrial premature complex(es) no longer present     EKG    Collection Time: 25 11:52 AM   Result Value Ref Range    Report       [facility] Emergency Dept.    Test Date:  2025  Pt Name:    ROSANNE ROSS                  Department: ER  MRN:        [id number]                      Room:       [facility]  Gender:     Male                         Technician: [id number]  :        1965                   Requested By:CLARY CAMPBELL  Order #:    [id number]                    Reading MD:    Measurements  Intervals                                Axis  Rate:       101                          P:          -14  NY:         215                          QRS:        -62  QRSD:       86                           T:          156  QT:         349  QTc:        453    Interpretive Statements  Sinus tachycardia  Ventricular premature complex  Prolonged NY interval  Abnormal R-wave progression, early transition  Inferior infarct, old  Lateral leads are also involved  Compared to ECG 2025 11:31:50  Ectopic atrial rhythm no longer present  Early repolarization no longer present  Possible ischemia no longer pres ent  Myocardial infarct finding still present     CBC WITH DIFFERENTIAL    Collection Time: 25 12:01 PM   Result Value Ref Range    WBC 11.8 (H) 4.8 - 10.8 K/uL    RBC 5.15 4.70 - 6.10 M/uL    Hemoglobin 14.5 14.0 - 18.0 g/dL    Hematocrit 45.5 42.0 - 52.0 %    MCV 88.3 81.4 - 97.8 fL    MCH 28.2 27.0 - 33.0 pg    MCHC 31.9 (L) 32.3 - 36.5 g/dL    RDW 39.8 35.9 - 50.0 fL    Platelet Count 208 164 - 446 K/uL    MPV 10.4 9.0 - 12.9 fL    Neutrophils-Polys 83.60 (H) 44.00 - 72.00 %    Lymphocytes 10.60 (L) 22.00 -  41.00 %    Monocytes 4.70 0.00 - 13.40 %    Eosinophils 0.30 0.00 - 6.90 %    Basophils 0.40 0.00 - 1.80 %    Immature Granulocytes 0.40 0.00 - 0.90 %    Nucleated RBC 0.00 0.00 - 0.20 /100 WBC    Neutrophils (Absolute) 9.85 (H) 1.82 - 7.42 K/uL    Lymphs (Absolute) 1.25 1.00 - 4.80 K/uL    Monos (Absolute) 0.55 0.00 - 0.85 K/uL    Eos (Absolute) 0.04 0.00 - 0.51 K/uL    Baso (Absolute) 0.05 0.00 - 0.12 K/uL    Immature Granulocytes (abs) 0.05 0.00 - 0.11 K/uL    NRBC (Absolute) 0.00 K/uL   COMP METABOLIC PANEL    Collection Time: 04/25/25 12:01 PM   Result Value Ref Range    Sodium 138 135 - 145 mmol/L    Potassium 4.9 3.6 - 5.5 mmol/L    Chloride 105 96 - 112 mmol/L    Co2 18 (L) 20 - 33 mmol/L    Anion Gap 15.0 7.0 - 16.0    Glucose 213 (H) 65 - 99 mg/dL    Bun 22 8 - 22 mg/dL    Creatinine 1.17 0.50 - 1.40 mg/dL    Calcium 9.9 8.5 - 10.5 mg/dL    Correct Calcium 10.0 8.5 - 10.5 mg/dL    AST(SGOT) 31 12 - 45 U/L    ALT(SGPT) 15 2 - 50 U/L    Alkaline Phosphatase 105 (H) 30 - 99 U/L    Total Bilirubin 1.0 0.1 - 1.5 mg/dL    Albumin 3.9 3.2 - 4.9 g/dL    Total Protein 6.9 6.0 - 8.2 g/dL    Globulin 3.0 1.9 - 3.5 g/dL    A-G Ratio 1.3 g/dL   LIPASE    Collection Time: 04/25/25 12:01 PM   Result Value Ref Range    Lipase 46 11 - 82 U/L   TROPONIN    Collection Time: 04/25/25 12:01 PM   Result Value Ref Range    Troponin T 63 (H) 6 - 19 ng/L   proBrain Natriuretic Peptide, NT    Collection Time: 04/25/25 12:01 PM   Result Value Ref Range    NT-proBNP 2116 (H) 0 - 125 pg/mL   PROTHROMBIN TIME (INR)    Collection Time: 04/25/25 12:01 PM   Result Value Ref Range    PT 17.2 (H) 12.0 - 14.6 sec    INR 1.40 (H) 0.87 - 1.13   APTT    Collection Time: 04/25/25 12:01 PM   Result Value Ref Range    APTT 32.2 24.7 - 36.0 sec   ESTIMATED GFR    Collection Time: 04/25/25 12:01 PM   Result Value Ref Range    GFR (CKD-EPI) 71 >60 mL/min/1.73 m 2   LACTIC ACID    Collection Time: 04/25/25 12:03 PM   Result Value Ref Range    Lactic  Acid 3.1 (H) 0.5 - 2.0 mmol/L   TROPONIN    Collection Time: 25  1:19 PM   Result Value Ref Range    Troponin T 157 (H) 6 - 19 ng/L   EKG (NOW)    Collection Time: 25  1:23 PM   Result Value Ref Range    Report       [facility] Emergency Dept.    Test Date:  2025  Pt Name:    ROSANNE ROSS                  Department: ER  MRN:        [id number]                      Room:       [facility]  Gender:     Male                         Technician: [id number]  :        1965                   Requested By:CLARY CAMPBELL  Order #:    [id number]                    Reading MD:    Measurements  Intervals                                Axis  Rate:       94                           P:          52  MA:         197                          QRS:        -40  QRSD:       110                          T:          136  QT:         370  QTc:        463    Interpretive Statements  Sinus rhythm  Borderline prolonged MA interval  Inferior infarct, old  Borderline ST elevation, anterior leads  Lateral leads are also involved  Compared to ECG 2025 11:52:16  ST (T wave) deviation now present  Sinus tachycardia no longer present  Ventricular premature complex(es) no longer present  Myocardial infarct f inding still present           All pertinent features of laboratory and imaging reviewed including primary images where applicable    Cardiac catheterization 3/15/2022:  Summary   1. RHC - right heart pressures as below.  RA 10 mmHg, RV 41/6/15 mmHg, PA 36/17/24 mmHg, mPCWP 13 mmHg with 14 v waves.  [place] CO/CI 12.45/5.72 TD CO/CI 6.53/3.0. PVR <1.00 woods units with a transpulmonary gradient of 9mmHg.  2. Right dominant epicardial system.  3. Coronary Angiography as per below:  LMx: Large caliber, moderate/severe calcified vessel with mild luminal irregularities.  LAD: Moderate sized, moderately calcified vessel with 40% stenosis of proximal segment and mild diffuse luminal irregularities in mid/distal  segments. Provides a 1st diagonal branch which is <2mm in size with 50% ostial stenosis.  LCx: Moderate sized, moderately calcified vessel with diffuse mild luminal irregularities through all segment. Provides an 1st marginal branch that is <2mm in size with 50% stenosis, 2nd marginal branch with mild diffuse luminal irregularities. L-JOSE branches with mild diffuse luminal irregularities.  RCA: large caliber, moderately calcified vessel with 60% stenosis of the proximal segment, 40% stenosis of the middle segment, mild luminal irregularities of the distal segment. Provides r-PDA branch which is <2mm in size with 80% stenosis and r-JOSE branch with 60% that is <2mm in size.  Ramus: small sized (<2mm) with 50% stenosis in proximal segment.  4. Diagnostic intervention: iFr performed for the proximal RCA lesion with iFr 0.92, demonstrating not hemodynamically significant lesion.  5. LHC - LVEDP 24 mmHg. No significant gradient on manual LV-Ao pullback.     TTE 10/2024  SUMMARY:    1. Left ventricular ejection fraction, by Garcia's Biplane, is moderately reduced at 35 %.    2. There is no evidence of pericardial effusion.    3. Left ventricular diastolic function is indeterminate.    4. Mild aortic valve stenosis.    5. Mild concentric left ventricular hypertrophy.    6. Normal right ventricular size, wall thickness, and systolic function.    7. The left atrium is moderately dilated by LA volume.    8. The right atrium is normal in size and structure.    9. Moderate to severe mitral annular calcification.   10. Severely decreased posterior mitral leaflet mobility.     Active Problems:    * No active hospital problems. *  Resolved Problems:    * No resolved hospital problems. *      Assessment / Plan:  59 year old man PMH hx VT nos, HFrEF recovered 20->50%, ICM, CAD nonobstructive, paroxysmal AF, DM2, ESRD s/p renal transplant 4/2023, PAD presents with angina found NSTEMI.     -aspirin and heparin gtt  -nitro gtt for  angina  -urgent LHC. Delayed due to premedication for iodonated contrast allergy.   -TTE  -GDMT as tolerated  -further recommendations pending results    I personally discussed his case with Dr Gardner.    It is my pleasure to participate in the care of Mr. Diamond.  Please do not hesitate to contact me with questions or concerns.    David Choe MD  Cardiologist [facility]    I spent 65 minutes with Dariel Diamond, over fifty percent was spent counseling the patient on their condition, best management practices, reviewing test results, risks and benefits of treatment and coordinating care.

## 2025-04-25 NOTE — ED NOTES
Assumed care of patient. Bedside report done. Vs stable. No acute distress noted. Call light in place. Will continue to monitor.

## 2025-04-25 NOTE — ASSESSMENT & PLAN NOTE
Patient found to have acute respiratory failure with hypoxia most likely secondary to pulmonary edema he received a dose of IV Lasix Titrate down oxygen as tolerated.

## 2025-04-25 NOTE — ASSESSMENT & PLAN NOTE
CT head showed 2.4 cm solid mass within the superficial lobe of the left parotid gland with calcification.  Patient will need outpatient ENT referral.

## 2025-04-25 NOTE — ASSESSMENT & PLAN NOTE
Patient had a syncopal episode and he developed a laceration on his face.  She underwent suture placement by ER.  His syncopal episode could be secondary to severe chest pain as she has been started prior to syncopal episode or could be due to underlying possible arrhythmia.

## 2025-04-25 NOTE — ED NOTES
Bedside report given to the cathlab. Pt to the cathlab with trauma RN and cathlab staff. ERP aware of labs

## 2025-04-25 NOTE — H&P
Hospital Medicine History & Physical Note    Date of Service  4/25/2025    Primary Care Physician  Jacqueline Cueto P.A.-C.    Consultants  cardiology    Specialist Names: Dr. Choe    Code Status  Full Code    Chief Complaint  Chief Complaint   Patient presents with    Chest Pain    Syncope     Syncope with +loc. + thinners     Laceration     Forehead, right upper lip       History of Presenting Illness    I used video  to obtain below information from patient.    Dariel Diamond is a 59 y.o. male with past medical history of HFrEF, ICM, coronary artery disease nonobstructive, paroxysmal atrial fibrillation on Eliquis, diabetes mellitus type 2 on insulin and end-stage renal disease status posttransplant on immunosuppressive who presented to the hospital on 4/25/2025 with chest pain and syncopal episode.  Patient reported that he started having chest pain this morning when he went to shower and then he and he developed injury on his face.  He described chest pain located center of his chest and no radiation of the chest pain.  He also reported associated symptoms of shortness of breath.  There is no specific aggravating or alleviating factors.  He was seen by his cardiologist 2 weeks ago.  He has been taking his medication as prescribed.  He denies smoking or drinking alcohol.  Patient's son at the bedside.  At the time of evaluation he also reported mild symptoms of chest pain.    ER course: Cardiology evaluated him and he was started on heparin and nitroglycerin infusion.  He also premedicated due to contrast allergy.  She is emergently going to cardiac Cath Lab.    I discussed about this admission with ER physician Dr. Gardner.    I discussed the plan of care with patient, family, and bedside RN.    Review of Systems  Review of Systems   Constitutional:  Negative for chills, fever and weight loss.   HENT:  Negative for hearing loss and tinnitus.    Eyes:  Negative for blurred vision, double  vision, photophobia and pain.   Respiratory:  Positive for shortness of breath. Negative for cough and sputum production.    Cardiovascular:  Positive for chest pain. Negative for palpitations, orthopnea and leg swelling.   Gastrointestinal:  Negative for abdominal pain, constipation, diarrhea, nausea and vomiting.   Genitourinary:  Negative for dysuria, frequency and urgency.   Musculoskeletal:  Negative for back pain, joint pain, myalgias and neck pain.   Skin:  Negative for rash.   Neurological:  Positive for loss of consciousness. Negative for dizziness, tingling, tremors, sensory change, speech change, focal weakness and headaches.   Psychiatric/Behavioral:  Negative for hallucinations and substance abuse.    All other systems reviewed and are negative.      Past Medical History   has a past medical history of Depression, Dilated cardiomyopathy (HCC), Hyperlipidemia, Hypertension, and Type II or unspecified type diabetes mellitus without mention of complication, not stated as uncontrolled.    Surgical History   has a past surgical history that includes av fistula creation (5/1/2014) and toe amputation (Left, 8/23/2018).     Family History  family history includes Hypertension in his mother; Other in his father.   Family history reviewed with patient. There is no family history that is pertinent to the chief complaint.     Social History   reports that he has never smoked. He has never used smokeless tobacco. He reports that he does not drink alcohol and does not use drugs.    Allergies  Allergies   Allergen Reactions    Contrast Media With Iodine [Iodine] Rash and Itching     Per patient       Medications  Prior to Admission Medications   Prescriptions Last Dose Informant Patient Reported? Taking?   Insulin Aspart PenFill 100 UNIT/ML Solution Cartridge 4/24/2025 Evening Family Member Yes Yes   Sig: Inject 14 Units under the skin 3 times a day before meals.   SPS, SODIUM POLYSTYRENE SULF, 15 GM/60ML Suspension  4/24/2025 Family Member Yes Yes   Sig: Take 15 g by mouth see administration instructions. Takes on Tuesday, Thursday, and Sunday   amLODIPine (NORVASC) 10 MG Tab 4/25/2025 Morning Family Member Yes Yes   Sig: Take 10 mg by mouth every day.   apixaban (ELIQUIS) 5mg Tab 4/25/2025 Morning Family Member Yes Yes   Sig: Take 5 mg by mouth 2 times a day.   aspirin EC 81 MG EC tablet 4/24/2025 Noon Family Member No Yes   Sig: Take 1 Tab by mouth every day.   cloNIDine (CATAPRES) 0.1 MG/24HR PATCH WEEKLY patch 4/19/2025 Family Member Yes Yes   Sig: Place 1 Patch on the skin every Saturday.   docusate sodium (COLACE) 100 MG Cap 4/25/2025 Morning Family Member Yes Yes   Sig: Take 100 mg by mouth 2 times a day.   furosemide (LASIX) 80 MG Tab 4/24/2025 Evening Family Member Yes Yes   Sig: Take 40 mg by mouth 1 time a day as needed (If needed for weight gain > 2).   gabapentin (NEURONTIN) 100 MG Cap 4/24/2025 Evening Family Member Yes Yes   Sig: Take 200 mg by mouth every day.   insulin glargine 100 UNIT/ML Solution Pen-injector injection 4/24/2025 Evening Family Member Yes Yes   Sig: Inject 10 Units under the skin every evening.   losartan (COZAAR) 100 MG Tab 4/25/2025 Morning Family Member Yes Yes   Sig: Take 100 mg by mouth every day.   metoprolol SR (TOPROL XL) 50 MG TABLET SR 24 HR 4/24/2025 Evening Family Member Yes Yes   Sig: Take 50 mg by mouth every day.   multivitamin Tab 4/24/2025 Evening Family Member Yes Yes   Sig: Take 1 Tablet by mouth every day.   mycophenolate (CELLCEPT) 250 MG Cap 4/25/2025 Morning Family Member Yes Yes   Sig: Take 750 mg by mouth 2 times a day.   polyethylene glycol 3350 (MIRALAX) 17 GM/SCOOP Powder 4/25/2025 Morning Family Member Yes Yes   Sig: Take 17 g by mouth 2 times a day.   rosuvastatin (CRESTOR) 40 MG tablet 4/25/2025 Morning Family Member Yes Yes   Sig: Take 40 mg by mouth every day.   tacrolimus (PROGRAF) 0.5 MG Cap 4/25/2025 Morning Family Member Yes Yes   Sig: Take 0.5-1 mg by  mouth 2 times a day. 0.5 mg in the AM  1 mg in the PM   tamsulosin (FLOMAX) 0.4 MG capsule 4/24/2025 Evening Family Member Yes Yes   Sig: Take 0.4 mg by mouth every day.      Facility-Administered Medications: None       Physical Exam  Temp:  [36.4 °C (97.5 °F)] 36.4 °C (97.5 °F)  Pulse:  [] 93  Resp:  [18-22] 20  BP: (101-122)/(58-63) 111/62  SpO2:  [87 %-97 %] 95 %  Blood Pressure: 111/62   Temperature: 36.4 °C (97.5 °F)   Pulse: 93   Respiration: 20   Pulse Oximetry: 95 %       Physical Exam  Vitals reviewed.   Constitutional:       General: He is in acute distress.      Appearance: He is obese. He is ill-appearing.   HENT:      Head: Normocephalic and atraumatic. No contusion.      Right Ear: External ear normal.      Left Ear: External ear normal.      Nose: Nose normal.      Comments: Oxygen nasal cannula     Mouth/Throat:      Mouth: Mucous membranes are dry.      Pharynx: No oropharyngeal exudate.   Eyes:      General:         Right eye: No discharge.         Left eye: No discharge.      Pupils: Pupils are equal, round, and reactive to light.   Cardiovascular:      Rate and Rhythm: Normal rate and regular rhythm.      Heart sounds: No murmur heard.     No friction rub. No gallop.   Pulmonary:      Effort: Respiratory distress present.      Breath sounds: Wheezing present. No rhonchi.   Abdominal:      General: Bowel sounds are normal. There is no distension.      Palpations: Abdomen is soft.      Tenderness: There is no abdominal tenderness. There is no rebound.   Musculoskeletal:         General: No swelling or tenderness. Normal range of motion.      Cervical back: No rigidity. No muscular tenderness.   Skin:     General: Skin is warm and dry.      Coloration: Skin is not jaundiced.   Neurological:      General: No focal deficit present.      Mental Status: He is alert and oriented to person, place, and time.      Cranial Nerves: No cranial nerve deficit.      Sensory: No sensory deficit.    Psychiatric:         Mood and Affect: Mood normal.         Laboratory:  Recent Labs     04/25/25  1201   WBC 11.8*   RBC 5.15   HEMOGLOBIN 14.5   HEMATOCRIT 45.5   MCV 88.3   MCH 28.2   MCHC 31.9*   RDW 39.8   PLATELETCT 208   MPV 10.4     Recent Labs     04/25/25  1201   SODIUM 138   POTASSIUM 4.9   CHLORIDE 105   CO2 18*   GLUCOSE 213*   BUN 22   CREATININE 1.17   CALCIUM 9.9     Recent Labs     04/25/25  1201   ALTSGPT 15   ASTSGOT 31   ALKPHOSPHAT 105*   TBILIRUBIN 1.0   LIPASE 46   GLUCOSE 213*     Recent Labs     04/25/25  1201 04/25/25  1405   APTT 32.2 29.0   INR 1.40* 1.47*     Recent Labs     04/25/25  1201   NTPROBNP 2116*         Recent Labs     04/25/25  1201 04/25/25  1319   TROPONINT 63* 157*       Imaging:  DX-CHEST-PORTABLE (1 VIEW)   Final Result      No acute cardiac or pulmonary abnormalities are identified.      DX-CHEST-PORTABLE (1 VIEW)   Final Result      No acute cardiopulmonary process.      CT-CSPINE WITHOUT PLUS RECONS   Final Result      1.  No acute fracture or traumatic malalignment of the cervical spine.   2.  Patchy airspace opacities in the visualized lung apices.         CT-HEAD W/O   Final Result      1.  No acute intracranial hemorrhage.   2.  A 2 to 3 mm faintly hyperdense focus in the right mesial occipital lobe could represent vascular calcification or cavernous malformation. This could be confirmed by brain MRI.   3.  Mildly displaced nasal bone fractures with surrounding soft tissue swelling and foci of air.   4.  There is a 2.4 cm solid mass within the superficial lobe of the left parotid gland with calcification. Although the parotid gland is incompletely visualized on this exam, benign and malignant entities cannot be reliably distinguished by imaging alone    and ultimately an ENT referral will be needed for consideration of tissue sampling. This can be done on a nonemergent basis.      CL-LEFT HEART CATHETERIZATION WITH POSSIBLE INTERVENTION    (Results Pending)    EC-ECHOCARDIOGRAM COMPLETE W/O CONT    (Results Pending)       X-Ray:  My impression is: Chest x-ray on my interpretation did not show any acute abnormalities including pleural effusion.    Assessment/Plan:  Justification for Admission Status  I anticipate this patient will require at least two midnights for appropriate medical management, necessitating inpatient admission because for NSTEMI that required cardiac transition and close monitoring    Patient will need a Intermediate Care (Adult and Pediatrics) bed on MEDICAL service .  The need is secondary to NSTEMI on heparin infusion and nitroglycerin infusion.    * NSTEMI (non-ST elevation myocardial infarction) (HCC)- (present on admission)  Assessment & Plan  Patient found to significantly with troponin of 157 with active chest pain.  Cardiology consulted and he is going for emergent cardiac catheterization.  I am continuing antiplatelet heparin as per APTT levels and monitor for signs of bleeding.  He is on Eliquis that is why he is on APTT protocol.  I am also continuing nitroglycerin infusion and continue with titrating for chest pain free or systolic blood pressure of less than 100 mmHg  Intensivist consulted and plan is to admit him to IMCU.    Mass of parotid gland  Assessment & Plan  CT head showed 2.4 cm solid mass within the superficial lobe of the left parotid gland with calcification.  Patient will need outpatient ENT referral.    Abnormal CT of the head  Assessment & Plan  Patient found to have 2 to 3 mm faintly hyperdense focus in the right mesial occipital lobe could represent vascular calcification or cavernous malformation.  This could be confirmed by MRI brain.  Patient will need MRI after he is stable from his acute NSTEMI      Leukocytosis  Assessment & Plan  Most likely secondary to NSTEMI and now he received IV steroid pretreatment due to contrast allergy.  No signs of active infection at this time  Ordered CBC for tomorrow and monitor for  signs of infection.    Kidney transplanted  Assessment & Plan  Current BUN is 22 and creatinine of 1.17  I am resuming outpatient immunosuppressive medication.      Acute respiratory failure with hypoxia (HCC)  Assessment & Plan  Patient found to have acute respiratory failure with hypoxia most likely secondary to pulmonary edema he received a dose of IV Lasix while I was evaluating him.  Admit to IMCU  Titrate down oxygen as tolerated.      Syncope  Assessment & Plan  Patient had a syncopal episode and he developed a laceration on his face.  She underwent suture placement by ER.  His syncopal episode could be secondary to severe chest pain as she has been started prior to syncopal episode or could be due to underlying possible arrhythmia.  Admit to IMCU on telemetry.    Full code status  Assessment & Plan  I discussed CODE STATUS with him with the help of video  he would like to be full code.  As per patient wishes I placed full code orders.    Ventricular tachycardia (HCC)- (present on admission)  Assessment & Plan  History of    HTN (hypertension)- (present on admission)  Assessment & Plan  Continue outpatient medications.    Dyslipidemia- (present on admission)  Assessment & Plan  Continue statin    Troponin level elevated- (present on admission)  Assessment & Plan  Secondary to NSTEMI    Type 2 diabetes mellitus with chronic kidney disease on chronic dialysis (HCC)- (present on admission)  Assessment & Plan  Continue outpatient insulin glargine  I started him on insulin sliding scale with hypoglycemia protocol  I did not resume his outpatient as scheduled short-acting insulin as currently is NPO.  Ordered HbA1c for tomorrow.  Order lipid profile for tomorrow.        I discussed about this admission with ER physician Dr. Gardner.    I reviewed cardiology note Dr. Choe     Patient is critically ill.   The patient continues to have: Acute coronary syndrome  The vital organ system that is  affected is the: Cardiovascular system  If untreated there is a high chance of deterioration into: Cardiovascular collapse  And eventually death.   The critical care that I am providing today is: I am continuing a titrating heparin as per APTT level and monitor for signs of bleeding.  I am also continuing and titrating nitroglycerin infusion for chest pain free or systolic pressure of less than 100 mmHg.  The critical that has been undertaken is medically complex.   There has been no overlap in critical care time.   Critical Care Time not including procedures: 41 minutes      VTE prophylaxis: therapeutic anticoagulation with heparin

## 2025-04-25 NOTE — ED PROVIDER NOTES
ED Provider Note  CHIEF COMPLAINT  Chief Complaint   Patient presents with    Chest Pain    Syncope     Syncope with +loc. + thinners     Laceration     Forehead, right upper lip       HPI  Dariel Diamond is a Portuguese only speaking 59 y.o. male who presents after ground-level fall.  Patient apparently had an episode of passing out while walking hitting his forehead on a wall.  According to EMS and the patient's son, who accompanies the patient and translates for him, patient has been having chest pain since around 9 or 930 this morning.  The fall occurred while he was walking and he apparently started passing out while ambulating.  He fell against the wall hitting his forehead on the wall sustaining a laceration to the central forehead.  He was briefly unconscious according to his son who witnessed it, but woke up fairly quickly.  He continued to have chest pain after EMS arrival and they gave him nitroglycerin with some relief.  His blood pressure went from the 120s to the 100s and he was oxygenating well.  He arrives still having chest pain but with EKG signs of ischemia in the lateral leads.  Patient is notably on Eliquis for atrial fibrillation, and only has 1 kidney.  EXTERNAL RECORDS REVIEWED  Reviewed office visit to cardiologist April 1, 2025 at [facility].  Patient has a history of heart failure with reduced ejection fraction, paroxysmal atrial fibrillation, and ischemic cardiomyopathy.  Additionally, he has neuropathy and nephropathy and has been on hemodialysis from 2014 to April 5, 2023 when he received a renal transplant.  He had an angiogram in 2014 which did not show obstructive coronary artery disease.  Repeat angiogram in 2016 showed coronary artery disease with a distal distribution without the need for revascularization.  His last cardiac catheterization was apparently around 2021 which showed normal right and left filling pressures and mild to moderate diffuse coronary artery  disease.  ROS  Constitutional: No fevers or chills.  Skin: No rashes  HEENT: Laceration of forehead and right upper lip.  No sore throat, or runny nose  Neck: No neck pain  Chest: Central chest pain noted  Pulm: No shortness of breath, cough, wheezing, stridor, or pain with inspiration/expiration  Gastrointestinal: No nausea, vomiting, diarrhea, constipation, bloating, melena, hematochezia or abdominal pain.  Genitourinary: No dysuria or hematuria  Musculoskeletal: No pain, swelling, or focal weakness  Neurologic: No sensory or focal motor changes to extremities. No confusion or disorientation.  Heme: Bruises and bleeds easily due to Eliquis use.  Immuno: No hx of recurrent infections        LIMITATION TO HISTORY   Vietnamese-speaking only  OUTSIDE HISTORIAN(S):  EMS transport crew and the patient's son who arrives with the patient        PAST FAM HISTORY  Family History   Problem Relation Age of Onset    Hypertension Mother     Other Father         DM       PAST MEDICAL HISTORY   has a past medical history of Depression, Dilated cardiomyopathy (HCC), Hyperlipidemia, Hypertension, and Type II or unspecified type diabetes mellitus without mention of complication, not stated as uncontrolled.    SOCIAL HISTORY  Social History     Tobacco Use    Smoking status: Never    Smokeless tobacco: Never   Substance and Sexual Activity    Alcohol use: No    Drug use: No    Sexual activity: Not on file       SURGICAL HISTORY   has a past surgical history that includes av fistula creation (5/1/2014) and toe amputation (Left, 8/23/2018).    CURRENT MEDICATIONS  Home Medications       Reviewed by Joaquin Peng (Pharmacy Tech) on 04/25/25 at 1433  Med List Status: Complete     Medication Last Dose Status   amLODIPine (NORVASC) 10 MG Tab 4/25/2025 Active   apixaban (ELIQUIS) 5mg Tab 4/25/2025 Active   aspirin EC 81 MG EC tablet 4/24/2025 Active   cloNIDine (CATAPRES) 0.1 MG/24HR PATCH WEEKLY patch 4/19/2025 Active   docusate sodium  (COLACE) 100 MG Cap 4/25/2025 Active   furosemide (LASIX) 80 MG Tab 4/24/2025 Active   gabapentin (NEURONTIN) 100 MG Cap 4/24/2025 Active   Insulin Aspart PenFill 100 UNIT/ML Solution Cartridge 4/24/2025 Active   insulin glargine 100 UNIT/ML Solution Pen-injector injection 4/24/2025 Active   losartan (COZAAR) 100 MG Tab 4/25/2025 Active   metoprolol SR (TOPROL XL) 50 MG TABLET SR 24 HR 4/24/2025 Active   multivitamin Tab 4/24/2025 Active   mycophenolate (CELLCEPT) 250 MG Cap 4/25/2025 Active   polyethylene glycol 3350 (MIRALAX) 17 GM/SCOOP Powder 4/25/2025 Active   rosuvastatin (CRESTOR) 40 MG tablet 4/25/2025 Active   SPS, SODIUM POLYSTYRENE SULF, 15 GM/60ML Suspension 4/24/2025 Active   tacrolimus (PROGRAF) 0.5 MG Cap 4/25/2025 Active   tamsulosin (FLOMAX) 0.4 MG capsule 4/24/2025 Active                  Audit from Redirected Encounters    **Home medications have not yet been reviewed for this encounter**          ALLERGIES  Allergies   Allergen Reactions    Contrast Media With Iodine [Iodine] Rash and Itching     Per patient       PHYSICAL EXAM  VITAL SIGNS: /57   Pulse (!) 127   Temp 36.2 °C (97.2 °F) (Core)   Resp (!) 28   Ht 1.829 m (6')   Wt 93.6 kg (206 lb 6.4 oz)   SpO2 100%   BMI 27.99 kg/m²    Gen: Calm, alert  HEENT: Laceration to central forehead and right upper lip.  PERRLA, EOMI.  Bilateral external ears normal, Nose normal. Conjunctiva normal, Non-icteric.   Neck:  No tenderness, Supple, No masses  Lymphatic: No cervical lymphadenopathy noted.   Cardiovascular: Regular rate and rhythm.  Capillary refill less than 3 seconds to all extremities, 2+ distal pulses.  Thorax & Lungs: Normal breath sounds, No respiratory distress, No wheezing bilateral chest rise  Abdomen: Bowel sounds normal, Soft, No tenderness, No masses, No pulsatile masses. No Guarding or rebound  Skin: Warm, Dry, No erythema, No rash noted to exposed areas.   Back: No bony tenderness, No CVA tenderness.   Extremities:  Intact distal pulses, No edema.  Dialysis fistula noted to the left upper extremity.  Neurologic: Alert , no facial droop, grossly normal coordination and strength  Psychiatric: Affect pleasant    INITIAL IMPRESSION  Patient was seen and evaluated the charge as per TBI protocol however he was rapidly moved into trauma 3 due to the chest pain and concerning EKG changes.  Patient has confounding problems including the fact that he is anticoagulated and will need a head CT before any other evaluation can take place.  He does have EKG concerning for ischemic changes and could have a posterior wall MI.  Regardless, CT needs to be done first to rule out a head bleed.  Will plan on contacting the cardiologist to review the EKG.    ED observation? No    LABS  Results for orders placed or performed during the hospital encounter of 25   EKG (NOW)    Collection Time: 25 11:31 AM   Result Value Ref Range    Report       [facility] Emergency Dept.    Test Date:  2025  Pt Name:    ROSANNE ROSS                  Department: ER  MRN:        [id number]                      Room:       TRAUMA - EXAM 1  Gender:     Male                         Technician:  :        1965                   Requested By:CLARY CAMPBELL  Order #:    [id number]                    Reading MD:    Measurements  Intervals                                Axis  Rate:       96                           P:          -56  ND:         220                          QRS:        -40  QRSD:       101                          T:          139  QT:         344  QTc:        435    Interpretive Statements  Sinus or ectopic atrial rhythm  Ventricular premature complex  Prolonged ND interval  Inferior infarct, old  Repol abnrm, severe global ischemia (LM/MVD)  Compared to ECG 2016 07:31:43  Ectopic atrial rhythm now present  Ventricular premature complex(es) now present  First degree AV block now present  Myocardial in farct finding  now present  Possible ischemia now present  Sinus rhythm no longer present  Atrial premature complex(es) no longer present     EKG    Collection Time: 25 11:52 AM   Result Value Ref Range    Report       [facility] Emergency Dept[facility]    Test Date:  2025  Pt Name:    ROSANNE ROSS                  Department: ER  MRN:        [id number]                      Room:       [facility]  Gender:     Male                         Technician: [id number]  :        1965                   Requested By:CLARY CAMPBELL  Order #:    [id number]                    Reading MD:    Measurements  Intervals                                Axis  Rate:       101                          P:          -14  WA:         215                          QRS:        -62  QRSD:       86                           T:          156  QT:         349  QTc:        453    Interpretive Statements  Sinus tachycardia  Ventricular premature complex  Prolonged WA interval  Abnormal R-wave progression, early transition  Inferior infarct, old  Lateral leads are also involved  Compared to ECG 2025 11:31:50  Ectopic atrial rhythm no longer present  Early repolarization no longer present  Possible ischemia no longer pres ent  Myocardial infarct finding still present     CBC WITH DIFFERENTIAL    Collection Time: 25 12:01 PM   Result Value Ref Range    WBC 11.8 (H) 4.8 - 10.8 K/uL    RBC 5.15 4.70 - 6.10 M/uL    Hemoglobin 14.5 14.0 - 18.0 g/dL    Hematocrit 45.5 42.0 - 52.0 %    MCV 88.3 81.4 - 97.8 fL    MCH 28.2 27.0 - 33.0 pg    MCHC 31.9 (L) 32.3 - 36.5 g/dL    RDW 39.8 35.9 - 50.0 fL    Platelet Count 208 164 - 446 K/uL    MPV 10.4 9.0 - 12.9 fL    Neutrophils-Polys 83.60 (H) 44.00 - 72.00 %    Lymphocytes 10.60 (L) 22.00 - 41.00 %    Monocytes 4.70 0.00 - 13.40 %    Eosinophils 0.30 0.00 - 6.90 %    Basophils 0.40 0.00 - 1.80 %    Immature Granulocytes 0.40 0.00 - 0.90 %    Nucleated RBC 0.00 0.00 - 0.20 /100 WBC    Neutrophils  (Absolute) 9.85 (H) 1.82 - 7.42 K/uL    Lymphs (Absolute) 1.25 1.00 - 4.80 K/uL    Monos (Absolute) 0.55 0.00 - 0.85 K/uL    Eos (Absolute) 0.04 0.00 - 0.51 K/uL    Baso (Absolute) 0.05 0.00 - 0.12 K/uL    Immature Granulocytes (abs) 0.05 0.00 - 0.11 K/uL    NRBC (Absolute) 0.00 K/uL   COMP METABOLIC PANEL    Collection Time: 04/25/25 12:01 PM   Result Value Ref Range    Sodium 138 135 - 145 mmol/L    Potassium 4.9 3.6 - 5.5 mmol/L    Chloride 105 96 - 112 mmol/L    Co2 18 (L) 20 - 33 mmol/L    Anion Gap 15.0 7.0 - 16.0    Glucose 213 (H) 65 - 99 mg/dL    Bun 22 8 - 22 mg/dL    Creatinine 1.17 0.50 - 1.40 mg/dL    Calcium 9.9 8.5 - 10.5 mg/dL    Correct Calcium 10.0 8.5 - 10.5 mg/dL    AST(SGOT) 31 12 - 45 U/L    ALT(SGPT) 15 2 - 50 U/L    Alkaline Phosphatase 105 (H) 30 - 99 U/L    Total Bilirubin 1.0 0.1 - 1.5 mg/dL    Albumin 3.9 3.2 - 4.9 g/dL    Total Protein 6.9 6.0 - 8.2 g/dL    Globulin 3.0 1.9 - 3.5 g/dL    A-G Ratio 1.3 g/dL   LIPASE    Collection Time: 04/25/25 12:01 PM   Result Value Ref Range    Lipase 46 11 - 82 U/L   TROPONIN    Collection Time: 04/25/25 12:01 PM   Result Value Ref Range    Troponin T 63 (H) 6 - 19 ng/L   proBrain Natriuretic Peptide, NT    Collection Time: 04/25/25 12:01 PM   Result Value Ref Range    NT-proBNP 2116 (H) 0 - 125 pg/mL   PROTHROMBIN TIME (INR)    Collection Time: 04/25/25 12:01 PM   Result Value Ref Range    PT 17.2 (H) 12.0 - 14.6 sec    INR 1.40 (H) 0.87 - 1.13   APTT    Collection Time: 04/25/25 12:01 PM   Result Value Ref Range    APTT 32.2 24.7 - 36.0 sec   ESTIMATED GFR    Collection Time: 04/25/25 12:01 PM   Result Value Ref Range    GFR (CKD-EPI) 71 >60 mL/min/1.73 m 2   LACTIC ACID    Collection Time: 04/25/25 12:03 PM   Result Value Ref Range    Lactic Acid 3.1 (H) 0.5 - 2.0 mmol/L   TROPONIN    Collection Time: 04/25/25  1:19 PM   Result Value Ref Range    Troponin T 157 (H) 6 - 19 ng/L   EKG (NOW)    Collection Time: 04/25/25  1:23 PM   Result Value Ref  Range    Report       [facility] Emergency Dept.    Test Date:  2025  Pt Name:    ROSANNE ROSS                  Department: ER  MRN:        [id number]                      Room:       RD 05  Gender:     Male                         Technician: [id number]  :        1965                   Requested By:CLARY CAMPBELL  Order #:    [id number]                    Reading MD:    Measurements  Intervals                                Axis  Rate:       94                           P:          52  NJ:         197                          QRS:        -40  QRSD:       110                          T:          136  QT:         370  QTc:        463    Interpretive Statements  Sinus rhythm  Borderline prolonged NJ interval  Inferior infarct, old  Borderline ST elevation, anterior leads  Lateral leads are also involved  Compared to ECG 2025 11:52:16  ST (T wave) deviation now present  Sinus tachycardia no longer present  Ventricular premature complex(es) no longer present  Myocardial infarct f inding still present     aPTT    Collection Time: 25  2:05 PM   Result Value Ref Range    APTT 29.0 24.7 - 36.0 sec   Prothrombin Time    Collection Time: 25  2:05 PM   Result Value Ref Range    PT 17.9 (H) 12.0 - 14.6 sec    INR 1.47 (H) 0.87 - 1.13   Heparin Xa (Unfractionated)    Collection Time: 25  2:05 PM   Result Value Ref Range    Heparin Xa (UFH) >1.10 (HH) IU/mL   POCT activated clotting time device results    Collection Time: 25  3:57 PM   Result Value Ref Range    Istat Activated Clotting Time 268 (H) 74 - 137 sec   POCT activated clotting time device results    Collection Time: 25  4:06 PM   Result Value Ref Range    Istat Activated Clotting Time 279 (H) 74 - 137 sec   POCT arterial blood gas device results    Collection Time: 25  4:31 PM   Result Value Ref Range    Ph 7.330 (L) 7.350 - 7.450    Pco2 63.8 (HH) 32.0 - 48.0 mmHg    Po2 63 (L) 83 - 108 mmHg    Tco2 36  32 - 48 mmol/L    S02 89 (L) 93 - 99 %    Hco3 33.7 (H) 21.0 - 28.0 mmol/L    BE 5 (H) -4 - 3 mmol/L    Body Temp see below degrees    Specimen Arterial    POCT lactate device results    Collection Time: 25  4:31 PM   Result Value Ref Range    iStat Lactate 3.5 (H) 0.5 - 2.0 mmol/L   POCT activated clotting time device results    Collection Time: 25  4:34 PM   Result Value Ref Range    Istat Activated Clotting Time 291 (H) 74 - 137 sec   POCT activated clotting time device results    Collection Time: 25  5:15 PM   Result Value Ref Range    Istat Activated Clotting Time 216 (H) 74 - 137 sec   EKG STAT    Collection Time: 25  6:05 PM   Result Value Ref Range    Report       [facility]    Test Date:  2025  Pt Name:    ROSANNE ROSS                  Department: [facility]  MRN:        [id number]                      Room:       [facility]  Gender:     Male                         Technician: SERGIO  :        1965                   Requested By:CARLINE ARGUETA  Order #:    [id number]                    Reading MD:    Measurements  Intervals                                Axis  Rate:       139                          P:          0  MA:         86                           QRS:        -92  QRSD:       125                          T:          7  QT:         373  QTc:        568    Interpretive Statements  Sinus tachycardia  Right bundle branch block  Consider inferior infarct  Compared to ECG 2025 13:23:02  Right bundle-branch block now present  Sinus rhythm no longer present  ST (T wave) deviation no longer present  Myocardial infarct finding still present       I have independently interpreted all EKGs performed in the emergency department.  EKG #1 performed on arrival: Sinus rhythm rate of 96, MA interval prolonged.  There are diffuse ST depressions in the lateral leads suggestive of ischemia.  Compared to an EKG performed 2016, ischemia appears new.  EKG #2, performed after head CT,  posterior leads placed: There are no convincing ST or T wave changes to suggest a posterior MI, and notably there are no tall R waves.  EKG #3, performed at 1:23 PM: Lateral ischemic changes appear to be resolving although there is some nonspecific ST elevation in the anterior leads now.    I have independently interpreted the diagnostic imaging associated with this visit and am waiting the final reading from the radiologist.   My preliminary interpretation is a follows: Syncope chest x-ray: There are no pulmonary opacities to suggest infiltrates or effusions, cardiomediastinal silhouette appears to be within normal limits.  RADIOLOGY  EC-ECHOCARDIOGRAM LTD W/O CONT         DX-CHEST-PORTABLE (1 VIEW)   Final Result      1.  Pulmonary arterial catheter tip projects over the right main pulmonary artery. No pneumothorax is identified.   2.  Increasing perihilar interstitial opacities are consistent with pulmonary edema.   3.  Left basilar opacification likely represents atelectasis.      EC-ECHOCARDIOGRAM LTD W/O CONT   Final Result      DX-CHEST-PORTABLE (1 VIEW)   Final Result      No acute cardiac or pulmonary abnormalities are identified.      DX-CHEST-PORTABLE (1 VIEW)   Final Result      No acute cardiopulmonary process.      CT-CSPINE WITHOUT PLUS RECONS   Final Result      1.  No acute fracture or traumatic malalignment of the cervical spine.   2.  Patchy airspace opacities in the visualized lung apices.         CT-HEAD W/O   Final Result      1.  No acute intracranial hemorrhage.   2.  A 2 to 3 mm faintly hyperdense focus in the right mesial occipital lobe could represent vascular calcification or cavernous malformation. This could be confirmed by brain MRI.   3.  Mildly displaced nasal bone fractures with surrounding soft tissue swelling and foci of air.   4.  There is a 2.4 cm solid mass within the superficial lobe of the left parotid gland with calcification. Although the parotid gland is incompletely  visualized on this exam, benign and malignant entities cannot be reliably distinguished by imaging alone    and ultimately an ENT referral will be needed for consideration of tissue sampling. This can be done on a nonemergent basis.      CL-LEFT HEART CATHETERIZATION WITH POSSIBLE INTERVENTION    (Results Pending)   EC-ECHOCARDIOGRAM COMPLETE W/O CONT    (Results Pending)       Laceration Repair Procedure Note  Indication: Laceration  Procedure: The patient was placed in the appropriate position and anesthesia around the laceration was obtained by infiltration using 5.0 cc of 1% Lidocaine with epinephrine.  Both lacerations were then cleansed using chlorhexidine and irrigated with normal saline. The laceration was closed with 5-0 Ethilon using interrupted sutures. A second laceration was closed with 5-0 Ethilon using interrupted sutures.    Total repaired wound length: 3.5 cm.   Other Items: Suture count: 12  The patient tolerated the procedure well.  Complications: None     Critical Care Note  Upon my evaluation, this patient had high probability of imminent and life-threatening deterioration due to head trauma in the setting of anticoagulation, as well as EKG signs and symptoms consistent with an NSTEMI, which required my direct attention, intervention, and personal management. I personally provided 90 minutes of critical care time exclusive of time spent on separately billable procedures. Time includes review of laboratory data, radiology results, discussion with cardiologist and intensivist, and monitoring for potential decompensation.         ASSESSMENT, COURSE AND PLAN  Care Narrative:   1:30 PM  Patient's repeat EKG shows some dynamic changes including interval resolution of much of the ST depression seen in the lateral leads on the first EKG.  There are borderline ST elevations in V1 and V2 however.  This is a change from the initial EKG as well.  Patient appears hemodynamically stable and states he only has  "\"a little\" chest pain.  Nitropatch is applied.  Will attempt to discuss the case with the cardiologist and plan on repair of the patient's lacerations    1:35 PM  Case discussed with cardiologist who will evaluate the patient in the emergency department.    1:45 PM  At bedside performing laceration repairs, cardiologist discussing options with the patient and his son.  Given the nature of the event today, the patient's past medical history, and the current labs, it was felt that the patient would benefit from cardiac catheterization urgently.  The patient will be started on a heparin and nitroglycerin drip and will be taken to the catheterization lab.  Likely he will need the IMCU or ICU afterwards.  Currently he is staying hemodynamically stable and has very little discomfort.  Regarding the patient's head injury, he does have a nasal fracture, but no evidence for head bleed.  Incidental findings in the occipital lobe and parathyroid gland were noted.  The source of both of these issues is unclear however the patient will likely need outpatient follow-up with ENT for the parathyroid gland issue, and will need to follow-up with his primary care physician likely for an outpatient MRI of the head to further define the lesion in his occipital lobe.  I do not feel these consultations need to take place emergently.    2:20 PM  Suturing completed.  Patient will be taken directly to the Cath Lab.  He will be started on heparin and nitroglycerin drips.  Notable that patient had incidental findings on his CT including a mass on his parathyroid gland which will need to be evaluated as an outpatient as it is likely nonemergent and an incidental finding.  Patient's son was told about this.    2:40 PM  Patient now becoming more short of breath.  Nursing is increased oxygen to 4 L from 2 L.  I evaluated patient at bedside.  He has coarse rhonchi bilaterally and a slightly increased respiratory rate.  His mental status has not " changed.  His blood pressure is normal as is his heart rate.  I suspect the patient is developing pulmonary edema.  He did just receive Solu-Medrol and Benadryl although I am unclear if these are related.  Texted the cardiologist who agreed with continuing the nitroglycerin drip and adding on 40 mg of Lasix.  Patient will still be taken to the Cath Lab.  Repeat chest x-ray was ordered.    2:52 PM  Repeat chest x-ray shows some small amount of cephalization but does not indicate any significant opacification of the lung spaces to suggest severe pulmonary edema.    CHEST PAIN:   HEART Score for Major Cardiac Events  HEART Score     History: Highly suspicious  ECG: Significant ST-depression  Age: 45-64  Risk Factors: >2 risk factors or hx of atherosclerotic disease  Troponin: Greater than or equal to 3 times normal limit    Heart Score: 9    Total Score   0-3 Points = Low Score, risk of MACE 0.9-1.7%.  4-6 Points = Moderate Score, risk of MACE 12-16.6%  7-10 Points = High Score, risk of MACE 50-65%              I have discussed management of the patient with the following physicians and JOHN's: Cardiologist, intensivist    Escalation of care considered, and ultimately not performed: None    Barriers to care at this time, including but not limited to: Language barrier.     Decision tools and Rx drugs considered including, but not limited to : Nitroglycerin, heparin    Discussion of management with other Q or appropriate source(s): None        FINAL IMPRESSION  1. NSTEMI (non-ST elevated myocardial infarction) ([facility])    2. Closed head injury, initial encounter    3. Syncope and collapse    4. Forehead laceration, initial encounter    5. Lip laceration, initial encounter    6. Parathyroid abnormality ([facility])    7. Closed fracture of nasal bone, initial encounter    8. S/P drug eluting coronary stent placement        Electronically signed by: Cesar Gardner M.D., 4/25/2025 11:37 AM

## 2025-04-25 NOTE — ASSESSMENT & PLAN NOTE
Patient found to have 2 to 3 mm faintly hyperdense focus in the right mesial occipital lobe could represent vascular calcification or cavernous malformation.  This could be confirmed by MRI brain.  Patient will need MRI after he is stable from his acute NSTEMI
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
## 2025-04-25 NOTE — ASSESSMENT & PLAN NOTE
Insulin titrated for glycemic goal 140-180mg/dL stress hyperglycemia   Beta hydroxy 2 not in DKA    Able to transition to SQ insulin 4/29    Now worsening iso of shock ad steroids and critical illness  Will reinitiate insulin infusion until more clinically stabilized

## 2025-04-26 NOTE — PROGRESS NOTES
3 cc removed from TR band on right radial artery. Bleeding noted immediately under band, 3 cc replaced into TR band for a total of 13 cc. Bleeding subsided.

## 2025-04-26 NOTE — PROGRESS NOTES
SWAN and Seema number 4/26 day shift    SEEMA (@0930)  CO 5.7   CI: 2.6  SV: 67  SvO2: 71  SaO2: 97    Gtts: dobutamine 2.5 mcg/kg/min, Cleviprex: 1mg/hr, Amiodarone: 0.5 mg/min   Impella P level 5    SWAN (@0930)  CO: 4.4  CI: 2.0  SVR: 1191  PVR: 145  CVP: 13  Wedge: 29  SV: 50    Gtts: dobutamine 2.5 mcg/kg/min, Cleviprex: 1mg/hr, Amiodarone: 0.5 mg/min   Impella P level 5    SWAN (@1230)  CO: 5.4  CI: 2.5  SVR: 887  PVR: 164  CVP: 10  Wedge: 20  SV: 62    Gtts: dobutamine 2.5 mcg/kg/min, Cleviprex: 1mg/hr, Amiodarone: 0.5 mg/min   Impella P level 5    SWAN (@1700)   CO: 4.7  CI: 2.1  SVR: 1082  PVR: 258  CVP: 11  Wedge: 17  SV: 51    Gtts: dobutamine 2.5 mcg/kg/min, Amiodarone: 0.5 mg/min   Impella P level 5  Numbers taken 30 minutes after completion of dialysis with removal of 2L fluid

## 2025-04-26 NOTE — PROGRESS NOTES
Intubation Procedure Note    Date of Service: 04/25/25    Intubation Procedure Note    Indication: cardiac arrest    Consent: Unable to be obtained due to the emergent nature of this procedure.    Medications Used: rocuronium and etomidate intravenously    Procedure: The patient was placed in the appropriate position.  Cricoid pressure was not utilized. glidescope was used. Intubation was performed by video laryngoscopy, using a glidescope a 7.5 cuffed endotracheal tube.  The cuff was then inflated and the tube was secured appropriately at a distance of 24 cm to the dental ridge.  Initial confirmation of placement included bilateral breath sounds, an end tidal CO2 detector, absence of sounds over the stomach, and tube fogging.  A chest x-ray to verify correct placement of the tube showed appropriate tube position.    The patient tolerated the procedure well.     Complications:   None           Electronically signed by: Anand Quiles M.D., 4/25/2025 8:17 PM

## 2025-04-26 NOTE — ASSESSMENT & PLAN NOTE
Witnessed PEA arrest in the cath lab with CPR for <1min. At initial admission during PCU  Follow commands no signs of neurologic injury      4/30 overnight had prolonged PEA arrest (~20 minutes) due to intractable vasoplegic shock and acidemia    Hemodynamic:   Goal SBP >90 mmHg, MAP >65 mmHg; fluid rescucitation and vasoactives as guided by bedside exam, ultrasound and hemodynamics  Consider stress dose steroids and invasive hemodynamic monitoring if vasopressor refractory shock    Cardiac:   Post arrest EKG AF no STEMI  Intractable vasoplegic shock with low SVR, relatively preserved CI in spite of multiple vasoactives and inotropes. Not a candidate for MCS per cardiology service and given vasoplegia and low systemic vascular resistance unlikely to benefit from [facility] ECMO. Continue to support medically to achieve MAP goals    Pulmonary:  Empiric Unasyn written for two days for VAP prophylaxis (ATHARTIC and PROPHY-VAP trials)  LPV/LTV  Target normocapnea/normoxia  Goal PCO2 40-45, PaO2 ~100    Metabolic: Serial lactate, goal blood glucose <180, maintain euvolemia, monitor urine output, maintain potassium greater than 3.5, maintain magnesium greater than 2      Neurologic:   Following post arrest, though encephalopathic on IMV  Will provide some degree of analgosedation given discomfort and need to achieve ventilatory goals    Serial neurologic exams, repeat NCHCT PRN for neuro changes, stat EEG to rule out nonconvulsive status, targeted temperature management,   Strict normothermia, fever avoidance  Antipyretics and analgosedation if needed to control shivering or ventilator dyssynchrony, avoid long acting sedatives if able    Neuro prognostication in 48-72 hours, will consider neurology consult and MRI if survives current shock state    Cardiology following

## 2025-04-26 NOTE — WOUND TEAM
[facility]  Inpatient Services  Initial Wound and Skin Care Evaluation    Admission Date: 4/25/2025     Last order of IP CONSULT TO WOUND CARE was found on 4/25/2025 from Hospital Encounter on 4/25/2025     HPI, PMH, SH: Reviewed    Past Surgical History:   Procedure Laterality Date    TOE AMPUTATION Left 8/23/2018    Procedure: TOE AMPUTATION L 2ND TOE;  Surgeon: Luiz Ma M.D.;  Location: SURGERY [facility];  Service: Orthopedics    AV FISTULA CREATION  5/1/2014    Performed by Beau Cowart M.D. at SURGERY [facility]     Social History     Tobacco Use    Smoking status: Never    Smokeless tobacco: Never   Substance Use Topics    Alcohol use: No     Chief Complaint   Patient presents with    Chest Pain    Syncope     Syncope with +loc. + thinners     Laceration     Forehead, right upper lip     Diagnosis: NSTEMI (non-ST elevation myocardial infarction) (HCC) [I21.4]    Unit where seen by Wound Team: T627/00     WOUND CONSULT RELATED TO:  Forehead and right lip    WOUND TEAM PLAN OF CARE - Frequency of Follow-up:   Nursing to follow dressing orders written for wound care. Contact wound team if area fails to progress, deteriorates or with any questions/concerns if something comes up before next scheduled follow up (See below as to whether wound is following and frequency of wound follow up)     Not following, consult as needed  - Forehead and right lip    WOUND HISTORY:     Ana Diamond is a 59 y.o. male with history of hypertension, hyperlipidemia, HFimpEF (most recent 50%), paroxysmal atrial fibrillation on apixaban, ESRD s/p DDKT 4/2023, who presented with chest pain, dyspnea and syncope this morning.  He was in his usual state of health prior to experiencing mild/moderate substernal chest pain.  About 30 minutes later he felt his vision become obscured and fell, striking his head.  He presented to the emergency department where he had a noncontrast head CT that was  unrevealing.  His forehead laceration was repaired.'       WOUND ASSESSMENT/LDA  Wound 04/25/25 Laceration Forehead Anterior sutured (Active)   Date First Assessed/Time First Assessed: 04/25/25 1223   Present on Original Admission: Yes  Hand Hygiene Completed: Yes  Primary Wound Type: Laceration  Location: Forehead  Wound Orientation: Anterior  Wound Description (Comments): sutured      Assessments 4/26/2025 12:00 PM   Wound Image     Site Assessment Red;Crusted;Edema   Periwound Assessment Clean;Dry;Intact;Purple;Ecchymosis;Edema   Margins Defined edges   Closure Sutures   Drainage Amount None   Treatments Cleansed;Nonselective debridement;Site care   Wound Cleansing Approved Wound Cleanser   Periwound Protectant Not Applicable   Dressing Status Open to Air   NEXT Weekly Photo (Inpatient Only) 04/30/25   Wound Team Following Not following   Non-staged Wound Description Full thickness   Wound Length (cm) 5 cm   Wound Width (cm) 0.1 cm   Wound Surface Area (cm^2) 0.5 cm^2   Shape linear   Wound Odor None   WOUND NURSE ONLY - Time Spent with Patient (mins) 15       Wound 04/25/25 Laceration Lip Superior Right (Active)   Date First Assessed/Time First Assessed: 04/25/25 2000   Present on Original Admission: Yes  Primary Wound Type: Laceration  Location: Lip  Wound Orientation: Superior  Laterality: Right      Assessments 4/26/2025 12:00 PM   Wound Image      Site Assessment Red;Bleeding;Crusted;Edema   Periwound Assessment Dry;Purple;Edema   Margins Attached edges;Defined edges   Closure Other (Comment) (xeroform)   Drainage Amount Small   Drainage Description Sanguineous   Treatments Cleansed;Nonselective debridement;Site care;Offloading   Wound Cleansing Hydrogen Peroxide   Dressing Status Removed   Dressing Changed Changed   Dressing Cleansing/Solutions Hydrogen Peroxide   Dressing Options Petrolatum Gauze (yellow)   Dressing Change/Treatment Frequency Daily, and As Needed   NEXT Dressing Change/Treatment Date  04/27/25   NEXT Weekly Photo (Inpatient Only) 04/30/25   Wound Team Following Not following   Non-staged Wound Description Full thickness   Wound Length (cm) 0.5 cm   Wound Width (cm) 0.5 cm   Wound Surface Area (cm^2) 0.25 cm^2   Shape irregular   Wound Odor None   WOUND NURSE ONLY - Time Spent with Patient (mins) 15        Vascular:    TING:   No results found.    Lab Values:    Lab Results   Component Value Date/Time    WBC 10.5 04/26/2025 03:10 AM    RBC 4.87 04/26/2025 03:10 AM    HEMOGLOBIN 13.9 (L) 04/26/2025 07:27 AM    HEMATOCRIT 41.7 (L) 04/26/2025 07:27 AM    CREACTPROT 7.74 (H) 08/21/2018 10:38 PM    SEDRATEWES 51 (H) 08/21/2018 10:38 PM    HBA1C 7.7 (H) 04/26/2025 03:10 AM    PLATELETCT 186 04/26/2025 03:10 AM         Culture Results show:  No results found for this or any previous visit (from the past 720 hours).    Pain Level/Medicated:  None, Tolerated without pain medication       INTERVENTIONS BY WOUND TEAM:  Chart and images reviewed. Discussed with bedside RN. All areas of concern (based on picture review, LDA review and discussion with bedside RN) have been thoroughly assessed. Documentation of areas based on significant findings. This RN in to assess patient. Performed standard wound care which includes appropriate positioning, dressing removal and non-selective debridement. Pictures and measurements obtained weekly if/when required.    Wound:  Forehead  - laceration with intact sutures  Preparation for Dressing removal: Open to air  Cleansed/Non-selectively Debrided with:  Wound cleanser and Gauze  Kathleen wound: Cleansed with Wound cleanser and Gauze, Prepped with N/A  Primary Dressing:  open to air     Wound:  Right lip - laceration  Preparation for Dressing removal: Open to air  Cleansed/Non-selectively Debrided with:  Hydrogen Peroxide and Gauze  Kathleen wound: Cleansed with Hydrogen Peroxide and Gauze, to be cleansed with normal saline for next dressing changes, Prepped with N/A  Primary  Dressing:  xeroform    Advanced Wound Care Discharge Planning  Number of Clinicians necessary to complete wound care: 1  Is patient requiring IV pain medications for dressing changes:  No   Length of time for dressing change 30 min. (This does not include chart review, pre-medication time, set up, clean up or time spent charting.)    Interdisciplinary consultation: Patient, Bedside RN (Jl)    EVALUATION / RATIONALE FOR TREATMENT:     Date:  04/26/25  Wound Status:  Initial evaluation    The patient has a sutured laceration to his anterior forehead with crusted sanguinous drainage and purple bruising, no s/s of infection noted at this time. Cleansed with wound cleanser and left open to air. Laceration to his right upper lip with dry crusted sanguinous drainage. Cleansed with hydrogen peroxide and gently dried, there is small amount of sanguinous drainage after cleansing. The periwound is edematous with purple bruising. xeroform (yellow) applied to provide an occlusive dressing that incorporates bacteriostatic protection.          Goals: Steady decrease in wound area and depth weekly.    NURSING PLAN OF CARE ORDERS:  Dressing changes: See Dressing Care orders    NUTRITION RECOMMENDATIONS   Wound Team Recommendations:  N/A    DIET ORDERS (From admission to next 24h)       Start     Ordered    04/25/25 2023  Diet NPO Restrict to: Strict (okay to receive meds through the NG/OG tube)  ALL MEALS        Question Answer Comment   Type: Now    Diet NPO Restrict to: Strict okay to receive meds through the NG/OG tube       04/25/25 2023                    PREVENTATIVE INTERVENTIONS:    Q shift Montez - performed per nursing policy  Q shift pressure point assessments - performed per nursing policy    Surface/Positioning  ICU Low Airloss - Currently in Place  Reposition q 2 hours with pillows - Currently in Place  Move and Transfer System (MATs) - Currently in Place    Offloading/Redistribution  Heel offloading dressing  (Silicone dressing) - Currently in Place  Heel float boots (Prevalon boot) - Currently in Place      Containment/Moisture Prevention    Rodríguez Catheter - Currently in Place    Anticipated discharge plans:  TBD        Vac Discharge Needs:  Vac Discharge plan is purely a recommendation from wound team and not a requirement for discharge unless otherwise stated by physician.  Not Applicable Pt not on a wound vac

## 2025-04-26 NOTE — PROGRESS NOTES
Ashkan   CO CI SV   1950 15.1 6.9 118       Chemult Numbers    Time 2055 0006 0412 0527   CO 6.8 5.1 4.1 4.8   CI 3.1 2.4 1.9 2.2    016 5803 1227    298 213 202   CVP 16 16 18 15   Wedge 26 29 40 (PA diastolic, unable to wedge due to high PA pressure)  34   SV  55 53 47 52   PA 55/36 65/35 75/40 67/34   Impella P5 P3  P3 P5   Gtts Levo @ 0.1  Dobut @ 2.5  Amio @1 Levo @ 0.03  Dobut @ 2.5  Amio @ 1 Levo - off  Dobut @ 2.5  Amio @ 0.5  Dobut @ 2.5  Amio @ 0.5    Per Dr. Frost decrease to P3 Per Dr. Hamilton remain at P3 Per Dr. Hamilton increase to P5  Per Dr. Hamilton remain at P5

## 2025-04-26 NOTE — PROCEDURES
Arterial Line Insertion    Date/Time: 4/25/2025 7:44 PM    Performed by: Daniel Frost M.D.  Authorized by: Daniel Frost M.D.  Consent: The procedure was performed in an emergent situation.  Indications: multiple ABGs, respiratory failure and hemodynamic monitoring  Location: R axillary.  Anesthesia: local infiltration    Anesthesia:  Local Anesthetic: lidocaine 1% without epinephrine  Seldinger technique: Seldinger technique used  Number of attempts: 1  Post-procedure: line sutured  Patient tolerance: patient tolerated the procedure well with no immediate complications  Comments: Easily accessed on 1 attempt.  Pressure held for 5 minutes prior to advancing catheter.  High risk for hematoma/brachial plexus compression given DAPT, DOAC, and heparin.    Selected R axillary due to:  LUE AV fistula  Cardiology already accessed R Radial  L femoral occupied by Impella  R femoral acceptable but body habitus not favorable.

## 2025-04-26 NOTE — PROGRESS NOTES
4 Eyes Skin Assessment Completed by HEATHER Swanson and HEATHER Donato.    Head Laceration to for head -sutures in place, lacerations to bilateral eyebrows, bruising to eyes  Ears WDL  Nose Redness and Scab, laceration   Mouth Bleeding, laceration with sutures to the right upper lip, swollen  Neck WDL RIJ swan  Breast/Chest WDL  Shoulder Blades WDL  Spine WDL  (R) Arm/Elbow/Hand Bruising, TR band right radial   (L) Arm/Elbow/Hand Upper left fistula  Abdomen WDL  Groin Impella to left fem   Scrotum/Coccyx/Buttocks Discoloration  (R) Leg Bruising  (L) Leg Bruising  (R) Heel/Foot/Toe Redness and Blanching, discolored  (L) Heel/Foot/Toe Redness and Blanching, amputated toes-discolored          Devices In Places ECG, Blood Pressure Cuff, Pulse Ox, Rodríguez, Arterial Line, ET Tube, OG/NG, Impella, Leg Immobilizer, and Central Line      Interventions In Place Heel Mepilex, TAP System, Pillows, Q2 Turns, Low Air Loss Mattress, ZFlo Pillow, and Dri-Sunil Pads    Possible Skin Injury Yes    Pictures Uploaded Into Epic Yes  Wound Consult Placed Yes  RN Wound Prevention Protocol Ordered Yes

## 2025-04-26 NOTE — PROGRESS NOTES
"20\" knee immobilizer delivered to bedside RN. Contact traction for any further help or DME needs for this patient.  "

## 2025-04-26 NOTE — DISCHARGE INSTR - DIET
Heart-Healthy Nutrition Therapy    A heart-healthy diet is recommended to reduce your unhealthy blood cholesterol levels, manage high blood pressure, and lower your risk for heart disease.    To follow a heart-healthy diet,    Eat a balanced diet with whole grains, fruits and vegetables, and lean protein sources.  Achieve and maintain a healthy weight.  Choose heart-healthy unsaturated fats. Limit saturated fats, trans fats, and cholesterol intake. Eat more plant-based or vegetarian meals using beans and soy foods for protein.  Eat whole, unprocessed foods to limit the amount of sodium (salt) you eat.  Limit refined carbohydrates especially sugar, sweets and sugar-sweetened beverages.  If you drink alcohol, do so in moderation: one serving per day (women) and two servings per day (men).  One serving is equivalent to 12 ounces beer, 5 ounces wine, or 1.5 ounces distilled spirits    Tips for Choosing Heart-Healthy Fats    Choose lean protein and low-fat dairy foods to reduce saturated fat intake.    Saturated fat is usually found in animal-based protein and is associated with certain health risks. Saturated fat is the biggest contributor to raised low-density lipoprotein (LDL) cholesterol levels in the diet. Research shows that limiting saturated fat lowers unhealthy cholesterol levels. Eat no more than 5-6% of your total calories each day from saturated fat. Ask your registered dietitian nutritionist (RDN) to help you determine how much saturated fat is right for you.  There are many foods that do not contain large amounts of saturated fats. Swapping these foods to replace foods high in saturated fats will help you limit the saturated fat you eat and improve your cholesterol levels. You can also try eating more plant-based or vegetarian meals.        Avoid trans fats    Trans fats increase levels of LDL-cholesterol. Hydrogenated fat in processed foods is the main source of trans fats in foods.   Trans fats can be  found in stick margarine, shortening, processed sweets, baked goods, some fried foods, and packaged foods made with hydrogenated oils. Avoid foods with “partially hydrogenated oil” on the ingredient list such as: cookies, pastries, baked goods, biscuits, crackers, microwave popcorn, and frozen dinners.    Choose foods with heart healthy fats    Polyunsaturated and monounsaturated fat are unsaturated fats that may help lower your blood cholesterol level when used in place of saturated fat in your diet.  Ask your RDN about taking a dietary supplement with plant sterols and stanols to help lower your cholesterol level.  Research shows that substituting saturated fats with unsaturated fats is beneficial to cholesterol levels. Try these easy swaps:      Limit the amount of cholesterol you eat to less than 200 milligrams per day.    Cholesterol is a substance carried through the bloodstream via lipoproteins, which are known as “transporters” of fat. Some body functions need cholesterol to work properly, but too much cholesterol in the bloodstream can damage arteries and build up blood vessel linings (which can lead to heart attack and stroke). You should eat less than 200 milligrams cholesterol per day.  People respond differently to eating cholesterol. There is no test available right now that can figure out which people will respond more to dietary cholesterol and which will respond less. For individuals with high intake of dietary cholesterol, different types of increase (none, small, moderate, large) in LDL-cholesterol levels are all possible.    Food sources of cholesterol include egg yolks and organ meats such as liver, gizzards.  Limit egg yolks to two to four per week and avoid organ meats like liver and gizzards to control cholesterol intake.    Tips for Choosing Heart-Healthy Carbohydrates    Consume foods rich in viscous (soluble) fiber    Viscous, or soluble, fiber is found in the walls of plant cells. Viscous  fiber is found only in plant-based foods--animal-based foods like meat or dairy products do not contain fiber. In the stomach, viscous fibers absorb water and swell to form a thick, jelly-like mass. This helps to lower your unhealthy cholesterol  Rich sources of viscous fiber include asparagus, [place]arger sprouts, sweet potatoes, turnips, apricots, mangoes, oranges, legumes, barley, oats, and oat bran.  Eat at least 5 to 10 grams of viscous fiber each day. As you increase your fiber intake gradually, also increase the amount of water you drink. This will help prevent constipation.  If you have difficulty achieving this goal, ask your RDN about fiber laxatives. Choose fiber supplements made with viscous fibers such as psyllium seed husks or methylcellulose to help lower unhealthy cholesterol.    Limit refined carbohydrates    There are three types of carbohydrates: starches, sugar, and fiber. Some carbohydrates occur naturally in food, like the starches in rice or corn or the sugars in fruits and milk. Refined carbohydrates--foods with high amounts of simple sugars--can raise triglyceride levels. High triglyceride levels are associated with coronary heart disease.  Some examples of refined carbohydrate foods are table sugar, sweets, and beverages sweetened with added sugar.    Tips for Reducing Sodium (Salt)  Although sodium is important for your body to function, too much sodium can be harmful for people with high blood pressure.  As sodium and fluid buildup in your tissues and bloodstream, your blood pressure increases. High blood pressure may cause damage to other organs and increase your risk for a stroke.    Keep your salt intake to 2300 milligrams or less per day. Even if you take a pill for blood pressure or a water pill (diuretic) to remove fluid, it is still important to have less salt in your diet. Ask your RDN what amount of sodium is right for you.    Avoid processed foods.  Eat more fresh foods.  Fresh  "fruits and vegetables are naturally low in sodium, as well as frozen vegetables and fruits that have no added juices or sauces.  Fresh meats are lower in sodium than processed meats, such as maloney, sausage, and hotdogs.  Read the nutrition label or ask your  to help you find a fresh meat that is low in sodium.  Eat less salt--at the table and when cooking.  A single teaspoon of table salt has 2,300 mg of sodium.  Leave the salt out of recipes for pasta, casseroles, and soups.  Ask your RDN how to cook your favorite recipes without sodium  Be a smart .  Look for food packages that say “salt-free” or “sodium-free.” These items contain less than 5 milligrams of sodium per serving.  “Very low-sodium” products contain less than 35 milligrams of sodium per serving.  “Low-sodium” products contain less than 140 milligrams of sodium per serving.   Beware of “reduced salt” or \"reduced sodium\" products.  These items may still be high in sodium. Check the nutrition label.   Add flavors to your food without adding sodium.  Try lemon juice, lime juice, fruit juice or vinegar.    Dry or fresh herbs add flavor. Try basil, bay leaf, dill, rosemary, parsley, cynthia, dry mustard, nutmeg, thyme, and paprika.  Pepper, red pepper flakes, and cayenne pepper can add spice to your meals without adding sodium. Hot sauce contains sodium, but if you use just a drop or two, it will not add up to much.  Buy a sodium-free seasoning blend or make your own at home.     Additional Lifestyle Tips    Achieve and maintain a healthy weight.  Talk with your RDN or your doctor about what is a healthy weight for you.  Set goals to reach and maintain that weight.   To lose weight, reduce your calorie intake along with increasing your physical activity. A weight loss of 10 to 15 pounds could reduce LDL-cholesterol by 5 milligrams per deciliter.  Participate in physical activity.    Talk with your health care team to find out what types of " physical activity are best for you. Set a plan to get about 30 minutes of exercise on most days.          Nutrition Counseling  Our expert team offers:   Medical Nutrition Therapy for Chronic Conditions   Weight Management   Diabetes Education and Management   Wellness Services   Body Composition Measurements   Gastrointestinal Health    N[facility]ing Services are located at:  [address], [place] [address]  For more information and to schedule a consultation, please call [phone].  A physician referral may be required by your insurance for coverage.

## 2025-04-26 NOTE — PROGRESS NOTES
Critical Care Progress Note    Date of admission  4/25/2025    Chief Complaint  59 y.o. male admitted 4/25/2025 with history of hypertension, hyperlipidemia, HFimpEF (most recent 50%), paroxysmal atrial fibrillation on apixaban, ESRD s/p DDKT 4/2023, who presented with chest pain, dyspnea and syncope this morning.  He was in his usual state of health prior to experiencing mild/moderate substernal chest pain.  About 30 minutes later he felt his vision become obscured and fell, striking his head.  He presented to the emergency department where he had a noncontrast head CT that was unrevealing.  His forehead laceration was repaired.     EKG demonstrated anterior ST elevations and dynamic EKG changes.  Given concern for malignant dysrhythmia he proceeded to the cath lab.     In the cath lab he sustained PEA cardiac arrest for ~10-30 seconds and was intubated.  He had Impella placed and PCI to LAD with PTCA of the circumflex.  He had escalating inotrope and vasoactive requirements.  Initial CI was 3.7 on dobutamine 5, epi 0.03.  Taken from Dr Margot delatorre.     Hospital Course  4/25 admitted post cath lab brief arrest, impella, cardiogenic shock on vent support.     Interval Problem Update  Reviewed last 24 hour events:  Neuro: awakes following commands, moving all ext, dilaudidx2, dex 0.6  HR: 's nsr amio gtt  SBP: 100-130's pa 50-60/28-55 cvp 13-18 PCWP 26-29   Tmax: 38.6  GI: NPO, NG clamped bloody emesis, BM 4/25  UOP: 395ml  Lines: swan, arterial line, evans  Resp: VD2  470 24 8 40% SBT -40 force vital   Vte: heparin gtt   PPI/H2:pepcid  Antibx: none  Minimal urine output  Lasix 100mg IV  Clevi gtt SBP > 120 or SVR > 1200 but map > 65   Sputum and blood cx, mrsa nares low threshold for starting antibiotics  Beta hydroxy 2  LDH 1100  Likely will need nephrology consult and CRRT vs iHD Dr Rolando Hackett   Glucose poorly controlled in insulin gtt    Review of Systems  Review of Systems   Unable to perform ROS: Intubated         Vital Signs for last 24 hours   Temp:  [36.2 °C (97.2 °F)-38.6 °C (101.5 °F)] 37.8 °C (100 °F)  Pulse:  [] 87  Resp:  [18-51] 25  BP: ()/(46-76) 115/65  SpO2:  [87 %-100 %] 99 %    Hemodynamic parameters for last 24 hours  CVP:  [9 MM HG-20 MM HG] 13 MM HG  PCWP:  [26 MM HG-29 MM HG] 29 MM HG  CO:  [4.1-8] 4.8  CI:  [1.9-3.7] 2.2    Respiratory Information for the last 24 hours  Vent Mode: APVCMV  Rate (breaths/min): 24  Vt Target (mL): 470  PEEP/CPAP: 8  P Support (PS + PEEP): 5  MAP: 12  Length of Weaning Trial (Hours): 1  Control VTE (exp VT): 431    Physical Exam   Physical Exam  Vitals and nursing note reviewed.   Constitutional:       General: He is not in acute distress.     Appearance: He is obese. He is ill-appearing.      Comments: Ill appearing male on ventilator with son at bedside   HENT:      Mouth/Throat:      Mouth: Mucous membranes are moist.      Comments: ET in place, OG clamped  Eyes:      Pupils: Pupils are equal, round, and reactive to light.   Cardiovascular:      Rate and Rhythm: Normal rate.      Heart sounds: No murmur heard.  Pulmonary:      Effort: No respiratory distress.      Breath sounds: No stridor. Rales present. No wheezing or rhonchi.   Abdominal:      General: There is no distension.      Palpations: There is no mass.      Tenderness: There is no abdominal tenderness.      Hernia: No hernia is present.   Musculoskeletal:         General: No swelling or tenderness.      Comments: Left upper arm fistula with thrill   Neurological:      Mental Status: He is alert.      Comments: He is awake and answering with yes and no while on ventilator, moving all ext   Psychiatric:         Mood and Affect: Mood normal.         Medications  Current Facility-Administered Medications   Medication Dose Route Frequency Provider Last Rate Last Admin    insulin regular (HumuLIN R/NovoLIN R) 100 Units in  mL infusion premix  0-29 Units/hr Intravenous Continuous Ken Sims  D.OEdilberto 12.5 mL/hr at 04/26/25 0930 12.5 Units/hr at 04/26/25 0930    dextrose 50 % (D50W) injection 12.5-25 g  12.5-25 g Intravenous PRN ASHELY ArthurOEdilberto        magnesium sulfate IVPB premix 4 g  4 g Intravenous Once Cresencio Pratt M.D. 25 mL/hr at 04/26/25 0824 4 g at 04/26/25 0824    sodium phosphate 15 mmol in dextrose 5% 250 mL ivpb  15 mmol Intravenous Once Cresencio Pratt M.D. 62.5 mL/hr at 04/26/25 0823 15 mmol at 04/26/25 0823    clevidipine (Cleviprex) IV emulsion  0-21 mg/hr Intravenous Continuous Cresencio Pratt M.D. 2 mL/hr at 04/26/25 0903 1 mg/hr at 04/26/25 0903    [START ON 4/27/2025] aspirin (Asa) chewable tab 81 mg  81 mg Enteral Tube DAILY Cresencio Pratt M.D.        [START ON 4/27/2025] clopidogrel (Plavix) tablet 75 mg  75 mg Enteral Tube DAILY Cresencio Pratt M.D.        senna-docusate (Pericolace Or Senokot S) 8.6-50 MG per tablet 2 Tablet  2 Tablet Enteral Tube Q EVENING Cresencio Pratt M.D.        And    polyethylene glycol/lytes (Miralax) Packet 1 Packet  1 Packet Enteral Tube QDAY PRN Cresencio Pratt M.D.        acetaminophen (Tylenol) tablet 650 mg  650 mg Enteral Tube Q6HRS PRN Cresencio Pratt M.D.        ondansetron (Zofran ODT) dispertab 4 mg  4 mg Enteral Tube Q4HRS PRN Cresencio Pratt M.D.        Pharmacy Consult: Enteral tube insertion - review meds/change route/product selection   Other PHARMACY TO DOSE Cresencio Pratt M.D.        thiamine (B-1) injection 200 mg  200 mg Intravenous BID Cresencio Pratt M.D.   200 mg at 04/26/25 0903    NS (Bolus) 0.9 % infusion 100 mL  100 mL Intravenous DIALYSIS PRN Rolando Hackett D.O.        hydrALAZINE (Apresoline) injection 10 mg  10 mg Intravenous Q4HRS PRN Frederick Edwards M.D.        ondansetron (Zofran) syringe/vial injection 4 mg  4 mg Intravenous Q4HRS PRN Frederick Edwards M.D.        [Held by provider] gabapentin (Neurontin) capsule 200 mg  200 mg Oral DAILY Frederick Edwards M.D.        rosuvastatin  (Crestor) tablet 40 mg  40 mg Enteral Tube DAILY Frederick Edwards M.D.   40 mg at 04/26/25 0505    dexmedetomidine (Precedex) 400 MCG/100ML infusion  0.1-1.5 mcg/kg/hr (Ideal) Intravenous Continuous Daniel Frost M.D.   Paused at 04/26/25 0459    heparin infusion 25,000 Units in 500 mL 0.45% NACL  0-1,000 Units/hr Intravenous Continuous Angel Blanco M.D. 14.8 mL/hr at 04/26/25 1006 740 Units/hr at 04/26/25 1006    And    heparin 25 units/mL in 500mL D5W infusion (for use with IMPELLA Pump)   Units/hr Other Continuous Angel Blanco M.D. 14.4 mL/hr at 04/26/25 1006 360 Units/hr at 04/26/25 1006    mycophenolate (Cellcept) 200 MG/ML suspension 750 mg  750 mg Enteral Tube BID Daniel Frost M.D.   750 mg at 04/26/25 0505    tacrolimus (Prograf) 1 mg/mL oral suspension 0.5 mg  0.5 mg Enteral Tube QAM Daniel Frost M.D.   0.5 mg at 04/26/25 0505    And    tacrolimus (Prograf) 1 mg/mL oral suspension 1 mg  1 mg Enteral Tube Q EVENING Daniel Frost M.D.   1 mg at 04/25/25 2118    DOBUTamine (Dobutrex) 1 mg/1 mL premix infusion  2.5 mcg/kg/min (Ideal) Intravenous Continuous Daniel Frost M.D. 11.6 mL/hr at 04/26/25 0718 2.5 mcg/kg/min at 04/26/25 0718    norepinephrine (Levophed) 8 mg in 250 mL NS infusion (premix)  0-1 mcg/kg/min (Ideal) Intravenous Continuous Daniel Frost M.D.   Stopped at 04/26/25 0105    amiodarone (Nexterone) 360 mg/200 mL infusion  0.5 mg/min Intravenous Continuous Daniel Frost M.D. 16.7 mL/hr at 04/26/25 0715 0.5 mg/min at 04/26/25 0715    Respiratory Therapy Consult   Nebulization Continuous RT Daniel Frost M.D.        famotidine (Pepcid) tablet 20 mg  20 mg Enteral Tube Q12HRS Daniel Frost M.D.   20 mg at 04/26/25 0504    Or    famotidine (Pepcid) injection 20 mg  20 mg Intravenous Q12HRS Daniel Frost M.D.   20 mg at 04/25/25 2112    MD Alert...ICU Electrolyte Replacement per Pharmacy   Other PHARMACY TO DOSE Daniel Frost M.D.        lidocaine (Xylocaine) 1 % injection 2  mL  2 mL Tracheal Tube Q30 MIN PRN Daniel Frost M.D.        HYDROmorphone (Dilaudid) injection 0.5 mg  0.5 mg Intravenous Q HOUR PRN Daniel Frost M.D.   0.5 mg at 04/25/25 2231    Or    HYDROmorphone (Dilaudid) injection 1 mg  1 mg Intravenous Q HOUR PRN Daniel Frost M.D.   1 mg at 04/26/25 0339       Fluids    Intake/Output Summary (Last 24 hours) at 4/26/2025 1025  Last data filed at 4/26/2025 1000  Gross per 24 hour   Intake 1197.19 ml   Output 1330 ml   Net -132.81 ml       Laboratory  Recent Labs     04/25/25 1956 04/25/25 1959 04/26/25  0358 04/26/25  0917 04/26/25  0919   ISTATAPH 7.243*  --  7.435  --  7.502*   ISTATAPCO2 29.1*  --  20.6*  --  22.0*   ISTATAPO2 171*  --  132*  --  78*   ISTATATCO2 13*  --  14*  --  18*   MHAKCKM3ESI 99  --  99  --  97   ISTATARTHCO3 12.6*  --  13.8*  --  17.2*   ISTATARTBE -13*  --  -8*  --  -4   ISTATTEMP 36.3 C   < > 38.4 C 37.6 C 37.6 C   ISTATFIO2 70   < > 50 40 40   ISTATSPEC Arterial   < > Arterial Venous Arterial   ISTATAPHTC 7.253*  --  7.414  --  7.492*   WDYXWPJT4RD 167*  --  141*  --  81*    < > = values in this interval not displayed.         Recent Labs     04/25/25  1945 04/25/25  2330 04/26/25  0310   SODIUM 137 137 135   POTASSIUM 3.7 4.6 5.5   CHLORIDE 100 100 103   CO2 9* 11* 12*   BUN 27* 32* 34*   CREATININE 1.57* 1.75* 1.81*   MAGNESIUM 1.6  --  1.4*   PHOSPHORUS  --   --  2.1*   CALCIUM 9.4 9.0 9.2     Recent Labs     04/25/25  1201 04/25/25 1945 04/25/25 2330 04/26/25 0310   ALTSGPT 15 39  --  44   ASTSGOT 31 261*  --  323*   ALKPHOSPHAT 105* 113*  --  97   TBILIRUBIN 1.0 2.0*  --  3.1*   DBILIRUBIN  --   --   --  0.8*   LIPASE 46  --   --   --    GLUCOSE 213* 374* 422* 474*     Recent Labs     04/25/25  1201 04/25/25 1945 04/26/25 0310   WBC 11.8* 15.4* 10.5   NEUTSPOLYS 83.60*  --   --    LYMPHOCYTES 10.60*  --   --    MONOCYTES 4.70  --   --    EOSINOPHILS 0.30  --   --    BASOPHILS 0.40  --   --    ASTSGOT 31 261* 323*   ALTSGPT 15 39  44   ALKPHOSPHAT 105* 113* 97   TBILIRUBIN 1.0 2.0* 3.1*     Recent Labs     04/25/25  1201 04/25/25  1405 04/25/25  1945 04/26/25  0310 04/26/25  0727   RBC 5.15  --  5.28 4.87  --    HEMOGLOBIN 14.5  --  15.0 13.6* 13.9*   HEMATOCRIT 45.5  --  46.5 42.0 41.7*   PLATELETCT 208  --  250 186  --    PROTHROMBTM 17.2* 17.9* 19.7*  --   --    APTT 32.2 29.0  --   --   --    INR 1.40* 1.47* 1.67*  --   --        Imaging  X-Ray:  I have personally reviewed the images and compared with prior images.  Echo:   Reviewed    Assessment/Plan  Cardiogenic shock (Formerly [facility])  Assessment & Plan  Cardiogenic shock due to acute ischemia a post arrest.   PA with pulmonary venous hypertension with good Christian but reduced LV function and elevated left sided filling pressures.   Impella P5, dobutamine 2.5, clevidipine gtt for elevated map and high SVR  Force diuresis and likely with need iHD  Cardiology consulting  Monitor end organ perfusion with neurologic status, skin, lactate, renal function and output, trend serial markers LFT's, BMP, lactate, ScVO2      Atrial fibrillation with RVR (Former[facility])  Assessment & Plan  Heparin  Amiodarone    Cardiac arrest (Former[facility])  Assessment & Plan  Witnessed PEA arrest in the cath lab with CPR for <1min.  Follow commands no signs of neurologic injury  Avoid fever    Abnormal CT of the head  Assessment & Plan  Patient found to have 2 to 3 mm faintly hyperdense focus in the right mesial occipital lobe could represent vascular calcification or cavernous malformation.  This could be confirmed by MRI brain.    MRI pending cardiovascular stabilization    Kidney transplanted  Assessment & Plan  Baseline creatinine 1.4.    Monitor UOP and creatinine  Home tacrolimus and mycophenolate  Tac level in AM  Nephrology consultation Dr juan j Hackett 4/26  Worsening renal function attempt iHD via left upper arm fistula if not working will need temporary catheter  Due to tiana, cardiogenic shock, pigmented hemolysis via impella and s/p  contrast die load      Acute respiratory failure with hypoxia (HCC)  Assessment & Plan  Intubated date: 4/25 due to brief PEA arrest in cath lab  Goal saturation > 92%  Monitor ventilator waveforms and titrate flow/peep and volumes according.   Lung protective ventilation strategy w/ A-F bundle  CXR: monitor lung volumes and tube/line placement  VAP bundle prevention, oral care, post pyloric feeding  Head of bed > 30 degree  GI prophylaxis  Daily awakening and SBT trials unless contraindicated  Monitor for liberation  Respiratory treatments: prn        LAD stenosis- (present on admission)  Assessment & Plan  PCI to LAD with Dr. 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 In review of yesterday's note (4/25/2025), there are no changes except as documented above.     Discussed patient condition and risk of morbidity and/or mortality with Family, RN, RT, Pharmacy, Charge nurse / hot rounds, Patient, and cardiology and nephrology    The patient remains critically ill titration of dobutamine, clevidipine, impella and ventilator.  Critical care time = 118 minutes in directly providing and coordinating critical care and extensive data review.  No time overlap and excludes procedures.

## 2025-04-26 NOTE — PROCEDURES
Performed by: Jaime Singletary M.D.   Consent: Verbal consent obtained.  Risks and benefits: risks, benefits and alternatives were discussed  Consent given by: patient  Patient understanding: patient states understanding of the procedure being performed  Required items: required blood products, implants, devices, and special equipment available  Patient identity confirmed: arm band    Treatment site: left anterior  Repair method: nasal packing  Post-procedure assessment: bleeding stopped  Treatment complexity: simple  Patient tolerance: Patient tolerated the procedure well with no immediate complications    __________  Jaime Singletary MD  Pulmonary and Critical Care Medicine  [facility]

## 2025-04-26 NOTE — PROGRESS NOTES
Cath lab RN and Cath lab tech transported pt to T-[id number] on zoll. Cath lab RN, Cath lab tech, and bedside RN assessed L groin site. Groin site soft and non-tender without evidence of hematoma. Dressing is clean, dry, and intact. Pedal pulses are 1+ bilaterally. Cath lab RN reviewed groin management protocol with bedside RN. Pt to remain on bedrest until further notice d/t impella placement. No further questions per bedside RN.

## 2025-04-26 NOTE — PROGRESS NOTES
Monitor Summary:  Rhythm & Rate: Afib/SR 80-130bpm  Ectopy: Frequent PVCs

## 2025-04-26 NOTE — DIETARY
Nutrition services: Brief Update    Consult received for CAD education and pt subsequently intubated.     Will add CAD education to discharge instructions at this time and will monitor for extubation and appropriateness for diet education.     RD following.

## 2025-04-26 NOTE — CARE PLAN
The patient is Watcher - Medium risk of patient condition declining or worsening    Shift Goals  Clinical Goals: Hemodynamic stability, Impella Management  Patient Goals: SCHUYLER  Family Goals: SCHUYLER    Progress made toward(s) clinical / shift goals:    Problem: Safety - Medical Restraint  Goal: Remains free of injury from restraints (Restraint for Interference with Medical Device)  Outcome: Progressing     Problem: Pain - Standard  Goal: Alleviation of pain or a reduction in pain to the patient’s comfort goal  Outcome: Progressing

## 2025-04-26 NOTE — ASSESSMENT & PLAN NOTE
Patient found to have 2 to 3 mm faintly hyperdense focus in the right mesial occipital lobe could represent vascular calcification or cavernous malformation.      This could be confirmed by MRI brain once clinically stable

## 2025-04-26 NOTE — ASSESSMENT & PLAN NOTE
Worsening oliguria starting 4/30 in the setting of rapidly progressive shock state, multifactorial    Will need KRT if within GOC  Monitor UOP and creatinine  Continue to hold tacrolimus and mycophenolate    Nephrology consultation Dr juan j Hackett 4/26  Due to tiana, cardiogenic shock, pigmented hemolysis via impella    Will resume IS meds and tac as soon as cultures clear

## 2025-04-26 NOTE — ASSESSMENT & PLAN NOTE
Cardiogenic shock due to acute ischemia a post arrest.   MCS with Impella removed 4/28, off dobutamine 4/29    His physiology shifted into a more vasoplegic shock state with intractably low SVR state non responsive to adrenergic vasopressors  Methylene blue infusion given with minimal effect  Requiring multiple vasopressors and is minimally responsive to them    Broaden antibiotics for now to include antifungals  Reculture  Will need urgent KRT if within GOC  Monitor end organ perfusion with neurologic status, skin, lactate, renal function and output, trend serial markers LFT's, BMP, lactate, ScVO2

## 2025-04-26 NOTE — PROGRESS NOTES
Patient arrived to [facility] at 1755 on levo at 0.5, epi at 0.03, and dobutamine at 2.5. /59.     Impella at P9, placed at 91 to the left groin. Site soft, no hematoma present. [place] Mauricio to the RIJ at 59cm, unable to obtain appropriate PA waveform. [place] pulled back 5 cm by RN to 54cm with appropriate PA waveform, wedging properly. STAT CXR ordered to confirm placement. Patient intubated and unresponsive at this time. 13cc in TR band noted. Site not oozing. All of the above verified with cath lab RN at bedside.    Initial [place] Numbers:  CO 8.0  CI 3.7  CVP 15  PCWP 26

## 2025-04-26 NOTE — CONSULTS
"[facility] -  CONSULTATION NOTE    Date & Time:   4/26/2025  1:18 PM    Author:   Rolando Hackett D.O.      REASON FOR CONSULTATION:   - CAMPBELL/Management of acute and chronic conditions related to Nephrology      CHIEF COMPLAINT:   -  \"Chest pain  \"      HISTORY OF PRESENT ILLNESS:    59Y  M w hx of ESRD s/p Renal Transplant (DDKT at [facility] on 4/3/2023), CAD, ICM, paroxysmal A-fib presented with chest pain and syncope on 4/25. Pt found to have NSTEMI and taken to cath lab.   Pt had PCI done to circumflex with impella support. Pt also suffered PEA arrest during cath lab with ROSC achieved.   Pt in ICU and Cr lucas from 1.1->1.8 since admission. Poor UOP noted and ICU team concerned for worsening volume overload/cardiorenal syndrome.   Pt currently intubated    REVIEW OF SYSTEMS:    UTO due to intubation      PAST MEDICAL HISTORY:   Past Medical History:   Diagnosis Date    Depression     Dilated cardiomyopathy (HCC)     Hyperlipidemia     Hypertension     Type II or unspecified type diabetes mellitus without mention of complication, not stated as uncontrolled           PAST SURGICAL HISTORY:   Past Surgical History:   Procedure Laterality Date    TOE AMPUTATION Left 8/23/2018    Procedure: TOE AMPUTATION L 2ND TOE;  Surgeon: Luiz Ma M.D.;  Location: SURGERY [facility];  Service: Orthopedics    AV FISTULA CREATION  5/1/2014    Performed by Beau Cowart M.D. at SURGERY [facility]          FAMILY HISTORY:   Family History   Problem Relation Age of Onset    Hypertension Mother     Other Father         DM       SOCIAL HISTORY:   Social History     Tobacco Use    Smoking status: Never    Smokeless tobacco: Never   Substance Use Topics    Alcohol use: No    Drug use: No            HOME MEDICATIONS: reviewed  Home Medications    Medication Sig Taking? Last Dose Authorizing Provider   apixaban (ELIQUIS) 5mg Tab Take 5 mg by mouth 2 times a day. Yes 4/25/2025 Morning " Physician Outpatient   amLODIPine (NORVASC) 10 MG Tab Take 10 mg by mouth every day. Yes 4/25/2025 Morning Physician Outpatient   cloNIDine (CATAPRES) 0.1 MG/24HR PATCH WEEKLY patch Place 1 Patch on the skin every Saturday. Yes 4/19/2025 Physician Outpatient   furosemide (LASIX) 80 MG Tab Take 40 mg by mouth 1 time a day as needed (If needed for weight gain > 2). Yes 4/24/2025 Evening Physician Outpatient   gabapentin (NEURONTIN) 100 MG Cap Take 200 mg by mouth every day. Yes 4/24/2025 Evening Physician Outpatient   Insulin Aspart PenFill 100 UNIT/ML Solution Cartridge Inject 14 Units under the skin 3 times a day before meals. Yes 4/24/2025 Evening Physician Outpatient   losartan (COZAAR) 100 MG Tab Take 100 mg by mouth every day. Yes 4/25/2025 Morning Physician Outpatient   metoprolol SR (TOPROL XL) 50 MG TABLET SR 24 HR Take 50 mg by mouth every day. Yes 4/24/2025 Evening Physician Outpatient   mycophenolate (CELLCEPT) 250 MG Cap Take 750 mg by mouth 2 times a day. Yes 4/25/2025 Morning Physician Outpatient   polyethylene glycol 3350 (MIRALAX) 17 GM/SCOOP Powder Take 17 g by mouth 2 times a day. Yes 4/25/2025 Morning Physician Outpatient   rosuvastatin (CRESTOR) 40 MG tablet Take 40 mg by mouth every day. Yes 4/25/2025 Morning Physician Outpatient   SPS, SODIUM POLYSTYRENE SULF, 15 GM/60ML Suspension Take 15 g by mouth see administration instructions. Takes on Tuesday, Thursday, and Sunday Yes 4/24/2025 Physician Outpatient   tacrolimus (PROGRAF) 0.5 MG Cap Take 0.5-1 mg by mouth 2 times a day. 0.5 mg in the AM  1 mg in the PM Yes 4/25/2025 Morning Physician Outpatient   insulin glargine 100 UNIT/ML Solution Pen-injector injection Inject 10 Units under the skin every evening. Yes 4/24/2025 Evening Physician Outpatient   tamsulosin (FLOMAX) 0.4 MG capsule Take 0.4 mg by mouth every day. Yes 4/24/2025 Evening Physician Outpatient   multivitamin Tab Take 1 Tablet by mouth every day. Yes 4/24/2025 Evening Physician  Outpatient   docusate sodium (COLACE) 100 MG Cap Take 100 mg by mouth 2 times a day. Yes 4/25/2025 Morning Physician Outpatient   aspirin EC 81 MG EC tablet Take 1 Tab by mouth every day. Yes 4/24/2025 Delmi Quiroz M.D.       ALLERGIES:  Contrast media with iodine [iodine]      VITAL SIGNS:  /56   Pulse 90   Temp 37.4 °C (99.3 °F) (Core)   Resp (!) 23   Ht 1.829 m (6')   Wt 96.8 kg (213 lb 6.5 oz)   SpO2 99%   BMI 28.94 kg/m²     Physical Exam  Constitutional:       General: He is not in acute distress.  HENT:      Head: Normocephalic and atraumatic.      Mouth/Throat:      Comments: ET tube in place  Cardiovascular:      Rate and Rhythm: Normal rate and regular rhythm.   Pulmonary:      Comments: Ventilator breath sounds  Abdominal:      General: There is no distension.      Palpations: Abdomen is soft.   Musculoskeletal:      Right lower leg: Edema present.      Left lower leg: Edema present.   Neurological:      General: No focal deficit present.      Mental Status: He is alert.       Access: L AVF with good thrill and bruit      FLUID BALANCE:  In: 1096 [I.V.:1096]  Out: 1150       LABS:  Recent Labs     04/25/25 1945 04/25/25 2330 04/26/25  0310   SODIUM 137 137 135   POTASSIUM 3.7 4.6 5.5   CHLORIDE 100 100 103   CO2 9* 11* 12*   GLUCOSE 374* 422* 474*   BUN 27* 32* 34*   CREATININE 1.57* 1.75* 1.81*   CALCIUM 9.4 9.0 9.2      Recent Labs     04/25/25 1945 04/25/25 2330 04/26/25  0310   SODIUM 137 137 135   POTASSIUM 3.7 4.6 5.5   CHLORIDE 100 100 103   CO2 9* 11* 12*   GLUCOSE 374* 422* 474*   BUN 27* 32* 34*   CREATININE 1.57* 1.75* 1.81*          IMAGING:  - Imaging studies reviewed      ASSESSMENTS:    -CAMPBELL, likely 2/2 cardiorenal syndrome  -Fluid overload  -Acidosis  -Hyperkalemia  -Hx of Renal Transplant     JOSIAS Bledsoe DDKT on 4/5/2023    Mycophenolate 750/750    Tacrolimus 0.5 AM and 1 mg PM (per [facility] 3/10/25 note)  -NSTEMI      s/p PCI with impella support     On dobutamine  -s/p PEA  arrest in cath lab  -Hx of HFrEF     EF recovered 20->50%  -CAD  -Paroxysmal A-fib      PLAN    -pt with minimal UOP and worsening fluid overload  -discussed risks/benefits of dialysis and son understands  -AVF appears functional with good bruit and thrill, will have HD RN verify and attempt HD via AVF  -may need temp HD catheter if AVF issues arise  -please continue current immunosuppression for transplant  -dose all meds for dialysis  -keep strict I/O  -appreciate ICU and cardiology mgmt of other issues    Please page nephrology with any questions or concerns  Discussed with ICU team    Rolando Hackett DO  Nephrologist   [facility]  [phone]        Thank you for this consult and allowing us to participate in the patient's care.

## 2025-04-26 NOTE — CARE PLAN
Problem: Ventilation  Goal: Ability to achieve and maintain unassisted ventilation or tolerate decreased levels of ventilator support  Description: Target End Date:  4 days Document on Vent flowsheet1.  Support and monitor invasive and noninvasive mechanical ventilation2.  Monitor ventilator weaning response3.  Perform ventilator associated pneumonia prevention interventions4.  Manage ventilation therapy by monitoring diagnostic test results  Outcome: Progressing     Ventilator Daily Summary    Vent Day #2  Airway: 7.5@24    Ventilator settings:24/470/8/40   Weaning trials: yes  Respiratory Procedures:no      Plan: Continue current ventilator settings and wean mechanical ventilation as tolerated per physician orders.

## 2025-04-26 NOTE — PROCEDURES
Cardiac Catheterization Procedure    DATE: 4/25/2025    : Angel Blanco MD, MPH    PROCEDURES PERFORMED:  Left heart catheterization  Right heart catheterization  [place] catheter placement  Coronary angiography  Ultrasound-guided right internal jugular venous access  Ultrasound-guided left common femoral arterial access  Iliofemoral angiography  Impella CP placement  Percutaneous coronary intervention of left main-LAD  IVUS of the left main  IVUS of the LAD  Coronary intravascular lithotripsy  PTCA of the ostial left circumflex  Moderate conscious sedation supervision  Intubation by ER provider    INDICATIONS:  High risk NSTEMI, cardiogenic shock, acute HFrEF, acute hypoxic respiratory failure.    Due to syncope with facial trauma, head CT was obtained without any evidence of bleed preprocedurally.    Underlying history of cardiomyopathy, chronic HFrEF, A-fib on apixaban,  type 2 diabetes, end-stage renal disease status post renal transplant 2023.  Reported history of VT as well per chart review.    CONSENT:  The complete alternatives, risks, and benefits of the procedure were explained to the patient. Informed consent was obtained prior to the procedure.    MEDICATIONS:  Lidocaine  Fentanyl  Midazolam  Nitroglycerin  Verapamil  Heparin  Phenylephrine  Norepinephrine  Epinephrine  Dobutamine  Amiodarone 150mg    MODERATE CONSCIOUS SEDATION:  I personally supervised the administration of moderate conscious sedation by the nursing staff for 120 minutes.  Start time: 1520  End time: 1720    CONTRAST: Omnipaque 160 cc    ESTIMATED BLOOD LOSS: < 50 cc    COMPLICATIONS: None apparent    PROCEDURE OVERVIEW:  The patient was brought to the cardiac catheterization laboratory in the fasting state. The skin over the right wrist was prepped and draped in the usual sterile fashion. Lidocaine infiltration was used to anesthetize the tissue over the right radial artery. Using the micropuncture technique, a 6-Tamazight  Glidesheath was inserted in the right radial artery. A 5 [place] Terumo [place] diagnostic catheter was then advanced over a standard J-wire into the left ventricular cavity where it was gently aspirated, flushed, and then withdrawn across the aortic valve with sequential pressures measured. This catheter was then used to engage the ostium of the right coronary artery and cineangiograms were obtained in multiple projections for complete evaluation of the right coronary system. This catheter was then exchanged over a J-wire to a JL 4 diagnostic catheter which was unable to engage the left coronary artery hence exchanged over J-wire to 6 [place] EBU 3.5 guide catheter which was used to engage the left coronary artery and cineangiograms were obtained in multiple projections for complete evaluation of the left coronary system.  This catheter was exchanged over J-wire to 6 [place] pigtail catheter which was used for the iliofemoral angiography in preparation for high likelihood for needing mechanical support .  Then the pigtail catheter was exchanged again to the 6 [place] EBU 3.5 guide catheter which was used to engage the left coronary artery .  Under ultrasound guidance and cineangiographic confirmation, a 6 [place] sheath was inserted in the left common femoral artery.  The left artery was chosen instead of the right given history of renal transplantation.  Then the patient start developing worsening hypotension and chest pain following with a very brief episode of PEA cardiac arrest with less than 1 minute of CPR achieving ROSC .  Impella CP was placed emergently per protocol (was planning to use  preclose but not performed due to emergent situation ); after obtaining access into the left common femoral artery under ultrasound guidance .  Then we proceeded with the PCI as detailed below.  See below for more details.  Following PCI, we proceeded with placing a [place] catheter through the right IJ with hemodynamics  detail below.    HEMODYNAMICS:  Aortic pressure: 97 /55/70 mmHg  LVEDP: 40 mmHg  No significant aortic gradient on pullback      CORONARY ANGIOGRAPHY:  The left main coronary artery: Large-caliber vessel with severe 80% distal stenosis, gives rise to an LAD and left circumflex  The left anterior descending coronary artery: Large-caliber transapical vessel that tapers into a small in caliber distal and apical LAD.  Gives rise to two medium caliber diagonal branches with severe proximal/mid disease.  The LAD appears to be a diffusely diseased vessel with significant atherosclerosis most notably throughout its ostium/proximal and part of the midportion status post PCI as detailed below.  The left circumflex coronary artery: Large-caliber vessel that gives rise to 2 medium-large OM branches.  OM1 has severe diffuse disease, OM 2 had at least diffuse moderate disease.  The right coronary artery: Large-caliber dominant vessel with severe 80-90% proximal disease, 80% mid disease and diffuse nonobstructive disease throughout its distal portion into the PDA.    IMPELLA PLACEMENT:  The left groin was prepped and draped in the usual sterile fashion. After fluoroscopic localization a single front wall puncture was used to place a 6 Vincentian [place] sheath in the left common femoral artery which was then angiographically confirmed. A heavyweight wire was then used to exchange over sequential dilators for the Implla sheath. Following this 6 Vincentian pigtail catheter catheter was used to cross the aortic valve. The Impella wire was then passed through this catheter and looped without difficulty at the apex of the LV. The crossing catheter was backed out over the wire. The Impella was then positioned over this wire into the LV under direct fluoroscopic guidance. Impella placement, and positioning were good. Flows were recorded and found to be acceptable. At the conclusion of the procedure the placement sheath was then exchanged for  the positioning sheath without difficulty in the usual fashion. At the conclusion of the procedure there was no hematoma noted.     after the sheath exchange, cineangiogram was obtained confirming adequate distal flow into the left leg through the left femoral artery.        PERCUTANEOUS CORONARY INTERVENTION of left main into the LAD and PTCA of the ostial left circumflex through stent struts:  Pre: 80-90% stenosis and ILIANA III flow  Post: 0% residual stenosis and ILIANA III flow.   Guide Catheter(s): 6 [place] EBU 3.5  Guidewire(s): Run-through,  50  Anticoagulation:  Heparin to maintain ACT > 250  Antiplatelet(s): Aspirin, Plavix  Pre-dilation balloon(s): 3 x 15 mm NC, 4 x 12 mm NC  IVUS or OCT used: IVUS guided PCI examining the LAD and the left main  Intracoronary Atherectomy / Lithotripsy: Coronary intravascular lithotripsy of the distal left main and proximal LAD using 4 x 12 mm shockwave balloon catheter  Stent: Synergy 3 x 32 mm AMINA  Post-dilation balloon(s): 3 x 15 mm NC, 4 x 12 mm NC, 4.5 x 6 mm NC in the left main     50 guidewire was used to wire the left circumflex through the stent struts in the left main, PTCA of its ostium was performed using 2 x 12 mm NC and 3 x 12 mm NC with excellent results.    No dissection, signs of no-reflow, distal embolization or perforation post PCI.    The ultrasound guidance, a 9 [place] sheath was placed in the right internal jugular vein and 7.5 [place] catheter was advanced with the balloon inflated at the state into the SVC, RA, RV, PA and hemodynamics recorded as detailed below.    [facility] hemodynamics:  MAP 80mmHg  RA 15mmHg  RV 54/19/22  PA 32/38/45  PCWP 32  TPG 13  PA sat 76.8%  Ao sat 94.9%  [place] cardiac output 8.09, CI 3.75  TD CO 6.85, CI 3.17  SVR ~ 760 dsc-5   1.06  Christian 0.93    Towards the end of the case, norepinephrine drip was turned off, dobutamine decreased to 2.5 and epinephrine drip decreased to 0.03.  Remains on norepinephrine.    Closing:  At completion of the procedure the relevant equipment was removed from the body and hemostasis achieved by Radial band for the right radial arteriotomy site.    The patient left the cath lab intubated and sedated, hemodynamically stable.  Neurologically intact upon arrival to the Cath Lab.      IMPRESSION:  1.  Severe distal left main-mid LAD disease status post successful Impella supported IVUS guided IVL and PCI deploying Synergy 3 x 32 mm AMINA postdilated to ~ 4.5mm in the LM and ~ 4mm in the LAD; with subsequent PTCA of the ostial left circumflex  2.  Residual severe disease throughout the RCA  3.  Significant elevated right and left-sided filling pressures in the setting of ischemic cardiomyopathy, cardiogenic shock with improvement in hemodynamics on norepinephrine, dobutamine, epinephrine and Impella CP support.  Given overall stability towards the end of the case with weaning down on pressor support, we decided not to proceed with formal ECMO consultation at this time.      RECOMMENDATIONS:  Given patient on apixaban, recommend Plavix for at least 12 months, aspirin at least 2 weeks on discharge (with PPI) and resume apixaban when deemed safe after Impella CP removal as currently will be on heparin drip.  TR band protocol  Hemodynamics guided cardiogenic shock management: target CVP < 15, PCWP < 18,  >= 0.6, Christian >= 1, -1000 and MAP > 60.  HI statin / PCSK9-I: Target LDL < 55 and TG < 150  GDMT and lifestyle modifications for secondary ASCVD prevention  Cardiac Rehab referral  Impella CP order set per protocol    NOTIFICATION:  The patient's 2 sons and spouse were updated in the waiting area    Intensivist- Dr. Frost - was notified.    Cardiologist - Dr. Choe -was notified.    Angel Blanco MD, MPH [facility]  Interventional Cardiologist  [facility]   of Clinical Internal Medicine - [facility]

## 2025-04-26 NOTE — CARE PLAN
Sleep Hygiene: The healthy habits of Good sleep    [email or web address].[employer]    1. Don't go to bed unless you are sleepy: If you are not sleepy at bedtime , then do something else. Read a book, listen to soft music or browse through a magazine. Find something relaxing , but not stimulating to take your mind off worries about sleep, this will relax your body and distract your mind.    2. If you are not asleep after 20 minutes, then get out of bed.  Find something else to do that will make you feel relaxed. If you can, do this in another room. Your bedroom should be where you go to sleep. It is not a place to go when you are bored. Once you feel sleepy again, go back to bed.    3. Begin rituals than help you relax each night before bed.  This can include such things like a warm bath, light snack or a few minutes of reading.    4. Get up at the same time every morning.  Do this even on weekends and holidays.    5. Get a full night's sleep on a regular basis.  Get enough sleep so that you feel well rested nearly every day.    6. Avoid taking naps if you can.  If you must take a nap, try to keep it short (less than one hour). Never take a nap after 3 pm.    7. Keep a regular schedule.  Regular times for meals, medications, chores and other activities help keep the inner body clock running smoothly.    8. Don't read, write, eat, watch TV, talk on the phone, play with electronics or play cards in bed.    9. Do not have any caffeine after lunch.    10. Do not have a beer, a glass of wine, or any other alcohol within six hours of your bedtime.    11. Do not have a cigarette or any other source of nicotine before bedtime.    12. Do not go to bed hungry, but don't eat a big meal near bedtime either.    13. Avoid any tough exercise within six hours of your bedtime.  You should exercise on a regular basis, but do it earlier in the day,(talk to your doctor before you begin an exercise program)    14.  Avoid sleeping pills, or use  The patient is Watcher - Medium risk of patient condition declining or worsening    Shift Goals  Clinical Goals: Wean impella, Improve CO and CI  Patient Goals: SCHUYLER  Family Goals: Updates    Progress made toward(s) clinical / shift goals:    Problem: Knowledge Deficit - Standard  Goal: Patient and family/care givers will demonstrate understanding of plan of care, disease process/condition, diagnostic tests and medications  Outcome: Progressing     Problem: Safety - Medical Restraint  Goal: Remains free of injury from restraints (Restraint for Interference with Medical Device)  Outcome: Progressing  Goal: Free from restraint(s) (Restraint for Interference with Medical Device)  Outcome: Progressing     Problem: Pain - Standard  Goal: Alleviation of pain or a reduction in pain to the patient’s comfort goal  Outcome: Progressing     Problem: Skin Integrity  Goal: Skin integrity is maintained or improved  Outcome: Progressing     Problem: Fall Risk  Goal: Patient will remain free from falls  Outcome: Progressing          them cautiously.  Most doctors do not prescribe sleeping pills for periods of more than hree weeks. Do not drink alcohol while taking sleeping pills.    15. Try to get rid of or deal with things that make you worry.  If you are unable to do this, then find a time during the day to get all your worries out of your system. Your bed is a place to rest, not a place to worry.    16. Make you bedroom quiet, dark and a little bit cool.  An easy way to remember this; it should remind you of a cave, while this may not sound romantic, it seems to work for bats. Bats are champion sleepers. They get about 16 hours of sleep each day. Maybe it's because they sleep in dark , cool caves.    Https://www.iMPath Networksube.com/watch?v=9RplGgUy7rU    Https://www.iMPath Networksube.com/watch?v=EgKMrXp8rpp    Https://www.you8Tripube.com/watch?v=_wXgyZvkHmU    Https://www.[employer]MPath [employer]ube.com/watch?v=G9ZbDudK7zA    https://www.you8Tripube.com/watch?v=pwNMvUXTgDY    Thank you for using [facility] and your visit to our facility.  To rate you experience with Dr Martin Machuca please click on link below    [email or web address]    Rate your Physician        Your provider has scheduled you for a sleep study.   You should be receiving a phone call from the sleep lab shortly after your study has been approved by your insurance. Please make sure you have your current phone numbers in the [facility] system. If you do not hear from anyone in the next 10 business days, please call the sleep lab at [phone] to schedule your sleep study. The sleep studies are performed at [facility] seven nights a week.  When you are scheduling your sleep study, they will also make you a follow up appointment with your provider. This follow up appointment will be 10-14 days after your sleep study to review the results. If it is noted that you do have sleep apnea on your initial sleep study, you may receive a call back for a second night  study with the CPAP before you come back to the office.

## 2025-04-27 PROBLEM — R78.81 BACTEREMIA DUE TO GRAM-POSITIVE BACTERIA: Status: ACTIVE | Noted: 2025-01-01

## 2025-04-27 PROBLEM — D72.829 LEUKOCYTOSIS: Status: RESOLVED | Noted: 2025-01-01 | Resolved: 2025-01-01

## 2025-04-27 PROBLEM — R04.0 EPISTAXIS DUE TO TRAUMA: Status: ACTIVE | Noted: 2025-01-01

## 2025-04-27 PROBLEM — Z78.9 FULL CODE STATUS: Status: RESOLVED | Noted: 2025-01-01 | Resolved: 2025-01-01

## 2025-04-27 NOTE — CARE PLAN
The patient is Watcher - Medium risk of patient condition declining or worsening    Shift Goals  Clinical Goals: hemodynamic stability, wean impella, dialysis  Patient Goals: SCHUYLER  Family Goals: updates    Progress made toward(s) clinical / shift goals:    Problem: Safety - Medical Restraint  Goal: Remains free of injury from restraints (Restraint for Interference with Medical Device)  Outcome: Progressing     Problem: Pain - Standard  Goal: Alleviation of pain or a reduction in pain to the patient’s comfort goal  Outcome: Progressing     Problem: Skin Integrity  Goal: Skin integrity is maintained or improved  Outcome: Progressing       Patient is not progressing towards the following goals:

## 2025-04-27 NOTE — PROGRESS NOTES
Time 2000 0000 0400   CO 4.8 5.3 4.4   CI 2.2 2.5 2.0   SVR 1000 1184 1179   CVP 9 9 12   PCWP 21 20 20   PA   36/19  (26) 39/22  (30) 42/22  (30)   Christian 1.89 1.89 1.67     Ashkan #'s      CO - 6.3  CI - 2.8  SV - 58        Drips:   Levophed - 0.02  Dobutamine - 2.5  Amio - 0.5 Dobutamine - 2.5  Amio - 0.5 Dobutamine - 2.5  Amio - 0.5       Device:   Impella - P5 Impella - P5 Impella - P5

## 2025-04-27 NOTE — PROGRESS NOTES
Monitor Summary:    Rhythm: Sinus rhythm - Sinus tach    Rate: 90's - 120's    Ectopy: Frequent PVC's, Bigeminal, Multifocal, Couplet    W[place] - 0.17 QRS - 0.07 QT - 0.19

## 2025-04-27 NOTE — PROGRESS NOTES
Lab called with critical result of Positive blood cultures at 2305. Critical lab result read back.   Dr. Frost notified of critical lab result at 2310.  Critical lab result read back by Dr. Frost.

## 2025-04-27 NOTE — PROGRESS NOTES
[facility] Progress Note      Admit Date: 4/25/2025    Resident(s): Navin Ardon M.D.   Attending:  DAMON CHAVIRA/ Dr. Cresencio Pratt M.D.    Patient ID:    Name:  Dariel Diamond     YOB: 1965  Age:  59 y.o.  male   MRN:  [id number]    Hospital Course (carried forward and updated):  59 y.o. male admitted 4/25/2025 with history of hypertension, hyperlipidemia, HFimpEF (most recent 50%), paroxysmal atrial fibrillation on apixaban, ESRD s/p DDKT 4/2023, who presented with chest pain, dyspnea and syncope this morning.  He was in his usual state of health prior to experiencing mild/moderate substernal chest pain.  About 30 minutes later he felt his vision become obscured and fell, striking his head.  He presented to the emergency department where he had a noncontrast head CT that was unrevealing.  His forehead laceration was repaired.     EKG demonstrated anterior ST elevations and dynamic EKG changes.  Given concern for malignant dysrhythmia he proceeded to the cath lab.     In the cath lab he sustained PEA cardiac arrest for ~10-30 seconds and was intubated.  He had Impella placed and PCI to LAD with PTCA of the circumflex.  He had escalating inotrope and vasoactive requirements.  Initial CI was 3.7 on dobutamine 5, epi 0.03.  Taken from Dr Margot delatorre.      Consultants:  Critical Care  Cardiology  Nephrology    Interval Events:    Patient is afebrile  HR: 's NSR, amio 0.5  SBP: 's PA 30-40/19-23 CVP 9-11, Christian >1, PCWP 20-22 CI 2.0-2.4 SVR levophed off at midnight SEEMA this am CI 2.6      Antibx: gpc 2/2 in blood hx of enterococcus amp resistant, Unasyn and vanco, MRSA nares neg  TXA soaked rhino rocket placed  Hold immunosuppression with bacteremia  Cardiology wants to start iso and hydral   Nutrition start today  SEEMA well supported        Vitals Range last 24h:  Temp:  [37.1 °C (98.8 °F)-37.7 °C (99.9 °F)] 37.5 °C (99.5 °F)  Pulse:  [] 95  Resp:  [12-42] 15  BP: ()/(43-81)  117/60  SpO2:  [92 %-100 %] 98 %      Intake/Output Summary (Last 24 hours) at 4/27/2025 1131  Last data filed at 4/27/2025 1100  Gross per 24 hour   Intake 2519.72 ml   Output 3727 ml   Net -1207.28 ml        ROS  Unable to perform ROS: Critical illness  PHYSICAL EXAM:  Vitals:    04/27/25 1010 04/27/25 1030 04/27/25 1100 04/27/25 1121   BP:    117/60   Pulse: 96 98 95    Resp: (!) 25 (!) 23 15    Temp:   37.5 °C (99.5 °F)    TempSrc:   Core    SpO2:   98%    Weight:       Height:        Body mass index is 28.94 kg/m².    O2 therapy: Pulse Oximetry: 98 %, O2 Delivery Device: Ventilator    Date 04/27/25 0700 - 04/28/25 0659   Shift 6546-6568 8105-8338 7856-4679 24 Hour Total   INTAKE   I.V. 369.4   369.4     Clevidipine Volume 0   0     Volume (mL) Insulin 43.1   43.1     Magnesium Sulfate Volume 0.3   0.3     Precedex Volume 38.9   38.9     Heparin Volume 200.6   200.6     Amiodarone Volume 33.3   33.3     Norepinephrine Volume 0   0     Heparin Volume 30   30     Dobutamine Volume 23.2   23.2   Shift Total 369.4   369.4   OUTPUT   Urine 430   430     Indwelling Cathether 215   215     Output (mL) (Urethral Catheter) 215   215   Shift Total 430   430   NET -60.6   -60.6        Physical Exam  Constitutional:       Appearance: He is obese. He is ill-appearing.      Comments: Ill appearing male on ventilator with son at bedside    HENT:      Head: Normocephalic and atraumatic.      Comments: Forehead laceration     Mouth/Throat:      Comments: ET in place, OG clamped  Eyes:      Pupils: Pupils are equal, round, and reactive to light.   Cardiovascular:      Rate and Rhythm: Normal rate and regular rhythm.      Pulses: Normal pulses.      Heart sounds: Normal heart sounds.   Pulmonary:      Effort: Pulmonary effort is normal.      Breath sounds: Normal breath sounds.   Abdominal:      General: Abdomen is flat. Bowel sounds are normal.      Palpations: Abdomen is soft.   Musculoskeletal:      Comments:  Left upper arm  fistula with thrill, cold hands and feet, left toe prior amputation, left leg impella    Neurological:      Comments: He is awake and answering with yes and no while on ventilator, moving all ext          Recent Labs     04/26/25 2211 04/26/25 2215 04/27/25  0419 04/27/25  0746 04/27/25  0748   ISTATAPH 7.651*  --  7.605* 7.626*  --    ISTATAPCO2 24.3*  --  25.1* 24.5*  --    ISTATAPO2 74*  --  81* 81*  --    ISTATATCO2 28*  --  26* 26*  --    [id number] 98  --  98 98  --    ISTATARTHCO3 26.8  --  24.9 25.6  --    ISTATARTBE 7*  --  5* 6*  --    ISTATTEMP 37.4 C   < > 37.5 C 37.7 C 37.7 C   ISTATFIO2 30   < > 30 30 30   ISTATSPEC Arterial   < > Arterial Arterial Venous   ISTATAPHTC 7.644*  --  7.597* 7.615*  --    [id number] 76*  --  84 84  --     < > = values in this interval not displayed.     Recent Labs     04/25/25 1945 04/25/25 2330 04/26/25 0310 04/27/25  0508 04/27/25  0746   SODIUM 137 137 135  --  135   POTASSIUM 3.7 4.6 5.5  --  3.7   CHLORIDE 100 100 103  --  97   CO2 9* 11* 12*  --  21   BUN 27* 32* 34*  --  36*   CREATININE 1.57* 1.75* 1.81*  --  2.42*   MAGNESIUM 1.6  --  1.4* 1.9  --    PHOSPHORUS  --   --  2.1* 3.3  --    CALCIUM 9.4 9.0 9.2  --  8.9     Recent Labs     04/25/25  1201 04/25/25 1945 04/25/25 2330 04/26/25 0310 04/27/25  0508 04/27/25  0746   ALTSGPT 15 39  --  44 77*  --    ASTSGOT 31 261*  --  323* 181*  --    ALKPHOSPHAT 105* 113*  --  97 89  --    TBILIRUBIN 1.0 2.0*  --  3.1* 2.2*  --    DBILIRUBIN  --   --   --  0.8* 0.8*  --    LIPASE 46  --   --   --   --   --    GLUCOSE 213* 374* 422* 474*  --  185*     Recent Labs     04/25/25  1201 04/25/25  1405 04/25/25  1945 04/26/25  0310 04/26/25  0727 04/26/25 2000 04/26/25  2100 04/27/25  0508 04/27/25  0746   RBC 5.15  --  5.28 4.87  --  5.00  --  4.58*  --    HEMOGLOBIN 14.5  --  15.0 13.6*   < > 14.3  --  12.9* 12.9*   HEMATOCRIT 45.5  --  46.5 42.0   < > 40.9*  --  38.4* 38.1*   PLATELETCT 208  --  250 186  --  172  --   141*  --    PROTHROMBTM 17.2* 17.9* 19.7*  --   --   --  20.2*  --   --    APTT 32.2 29.0  --   --   --   --   --   --   --    INR 1.40* 1.47* 1.67*  --   --   --  1.72*  --   --     < > = values in this interval not displayed.     Recent Labs     04/25/25  1201 04/25/25  1945 04/26/25 0310 04/26/25 2000 04/27/25  0508   WBC 11.8* 15.4* 10.5 21.7* 18.6*   NEUTSPOLYS 83.60*  --   --   --   --    LYMPHOCYTES 10.60*  --   --   --   --    MONOCYTES 4.70  --   --   --   --    EOSINOPHILS 0.30  --   --   --   --    BASOPHILS 0.40  --   --   --   --    ASTSGOT 31 261* 323*  --  181*   ALTSGPT 15 39 44  --  77*   ALKPHOSPHAT 105* 113* 97  --  89   TBILIRUBIN 1.0 2.0* 3.1*  --  2.2*       Meds:   ampicillin-sulbactam (UNASYN) IV  3 g Stopped (04/27/25 0856)    vancomycin  25 mg/kg Stopped (04/27/25 1211)    hydrALAZINE  10 mg      isosorbide dinitrate  10 mg      furosemide  100 mg      insulin regular (NovoLIN R/HumuLIN R) 100 units in  mL infusion  0-29 Units/hr 4 Units/hr (04/27/25 1108)    dextrose bolus  12.5-25 g      clevidipine  0-21 mg/hr Stopped (04/26/25 1332)    aspirin  81 mg      clopidogrel  75 mg      senna-docusate  2 Tablet      And    polyethylene glycol/lytes  1 Packet      acetaminophen  650 mg      ondansetron  4 mg      Pharmacy        thiamine  200 mg      NS  100 mL      hydrALAZINE  10 mg      ondansetron  4 mg      [Held by provider] gabapentin  200 mg      rosuvastatin  40 mg      dexmedetomidine (Precedex) infusion  0.1-1.5 mcg/kg/hr (Ideal) 1 mcg/kg/hr (04/27/25 1028)    heparin  0-1,000 Units/hr 752 Units/hr (04/27/25 1111)    And    heparin in D5W   Units/hr 348 Units/hr (04/27/25 1109)    DOBUTamine-Dextrose  2.5 mcg/kg/min (Ideal) 2.5 mcg/kg/min (04/27/25 0700)    NORepinephrine  0-1 mcg/kg/min (Ideal) Stopped (04/26/25 9845)    amiodarone infusion  0.5 mg/min 0.5 mg/min (04/27/25 0055)    Respiratory Therapy Consult        famotidine  20 mg      Or    famotidine  20 mg       MD Alert...Adult ICU Electrolyte Replacement per Pharmacy        lidocaine  2 mL      HYDROmorphone  0.5 mg      Or    HYDROmorphone  1 mg          Procedures:  None    Imaging:  EC-ECHOCARDIOGRAM LTD W/O CONT         DX-CHEST-PORTABLE (1 VIEW)   Final Result      Mild interval improvement in aeration of the left lung base.      EC-ECHOCARDIOGRAM LTD W/O CONT   Final Result      DX-CHEST-PORTABLE (1 VIEW)   Final Result         1. Lines and tubes as above.   2. Interval improvement in bilateral pulmonary opacities. No pleural effusions.         DX-ABDOMEN FOR TUBE PLACEMENT   Final Result      NG tube tip projects over the stomach.      EC-ECHOCARDIOGRAM LTD W/O CONT   Final Result      DX-CHEST-PORTABLE (1 VIEW)   Final Result      1.  Pulmonary arterial catheter tip projects over the right main pulmonary artery. No pneumothorax is identified.   2.  Increasing perihilar interstitial opacities are consistent with pulmonary edema.   3.  Left basilar opacification likely represents atelectasis.      EC-ECHOCARDIOGRAM LTD W/O CONT   Final Result      DX-CHEST-PORTABLE (1 VIEW)   Final Result      No acute cardiac or pulmonary abnormalities are identified.      DX-CHEST-PORTABLE (1 VIEW)   Final Result      No acute cardiopulmonary process.      CT-CSPINE WITHOUT PLUS RECONS   Final Result      1.  No acute fracture or traumatic malalignment of the cervical spine.   2.  Patchy airspace opacities in the visualized lung apices.         CT-HEAD W/O   Final Result      1.  No acute intracranial hemorrhage.   2.  A 2 to 3 mm faintly hyperdense focus in the right mesial occipital lobe could represent vascular calcification or cavernous malformation. This could be confirmed by brain MRI.   3.  Mildly displaced nasal bone fractures with surrounding soft tissue swelling and foci of air.   4.  There is a 2.4 cm solid mass within the superficial lobe of the left parotid gland with calcification. Although the parotid gland is  incompletely visualized on this exam, benign and malignant entities cannot be reliably distinguished by imaging alone    and ultimately an ENT referral will be needed for consideration of tissue sampling. This can be done on a nonemergent basis.      CL-LEFT HEART CATHETERIZATION WITH POSSIBLE INTERVENTION    (Results Pending)       ASSESSEMENT and PLAN:    * NSTEMI (non-ST elevation myocardial infarction) ([facility])- (present on admission)  Assessment & Plan  Patient found to significantly with troponin of 157 with active chest pain.  continuing antiplatelet heparin as per APTT levels and monitor for signs of bleeding.  He is on Eliquis that is why he is on APTT protocol.      Cardiogenic shock ([facility])  Assessment & Plan  Cardiogenic shock due to acute ischemia a post arrest.   PA with pulmonary venous hypertension with good Christian but reduced LV function and elevated left sided filling pressures.   Impella P5, dobutamine 2.5, clevidipine gtt for elevated map and high SVR  need iHD 4/26, improving urine output ongoing lasix challenge to achieve euvolemic  Monitor end organ perfusion with neurologic status, skin, lactate, renal function and output, trend serial markers LFT's, BMP, lactate, ScVO2    Mass of parotid gland  Assessment & Plan  CT head showed 2.4 cm solid mass within the superficial lobe of the left parotid gland with calcification.  Patient will need outpatient ENT referral.    Abnormal CT of the head  Assessment & Plan  Patient found to have 2 to 3 mm faintly hyperdense focus in the right mesial occipital lobe could represent vascular calcification or cavernous malformation.  This could be confirmed by MRI brain.  Patient will need MRI after he is stable from his acute NSTEMI      Kidney transplanted  Assessment & Plan  Current BUN is 22 and creatinine of 1.17  I am resuming outpatient immunosuppressive medication.      Acute respiratory failure with hypoxia ([facility])  Assessment & Plan  Patient found to have acute  respiratory failure with hypoxia most likely secondary to pulmonary edema he received a dose of IV Lasix Titrate down oxygen as tolerated.      Syncope  Assessment & Plan  Patient had a syncopal episode and he developed a laceration on his face.  She underwent suture placement by ER.  His syncopal episode could be secondary to severe chest pain as she has been started prior to syncopal episode or could be due to underlying possible arrhythmia.      Ventricular tachycardia ([facility]CC)- (present on admission)  Assessment & Plan  History of    HTN (hypertension)- (present on admission)  Assessment & Plan  Continue outpatient medications.    Dyslipidemia- (present on admission)  Assessment & Plan  Continue statin    Troponin level elevated- (present on admission)  Assessment & Plan  Secondary to NSTEMI    Type 2 diabetes mellitus with chronic kidney disease on chronic dialysis (HCC)- (present on admission)  Assessment & Plan  Continue outpatient insulin glargine  on insulin sliding scale with hypoglycemia protocol  Follow-up HbA1c             DISPO: Pending  VTE: Heparin  Ulcer: H2 blocker  CODE STATUS: Full

## 2025-04-27 NOTE — PROGRESS NOTES
Interval:  No acute overnight events. Hypotensive episode for which cleviprex was stopped and vasopressor temporarily added.     Medications / Drug list prior to admission:  No current facility-administered medications on file prior to encounter.     Current Outpatient Medications on File Prior to Encounter   Medication Sig Dispense Refill    apixaban (ELIQUIS) 5mg Tab Take 5 mg by mouth 2 times a day.      amLODIPine (NORVASC) 10 MG Tab Take 10 mg by mouth every day.      cloNIDine (CATAPRES) 0.1 MG/24HR PATCH WEEKLY patch Place 1 Patch on the skin every Saturday.      furosemide (LASIX) 80 MG Tab Take 40 mg by mouth 1 time a day as needed (If needed for weight gain > 2).      gabapentin (NEURONTIN) 100 MG Cap Take 200 mg by mouth every day.      Insulin Aspart PenFill 100 UNIT/ML Solution Cartridge Inject 14 Units under the skin 3 times a day before meals.      losartan (COZAAR) 100 MG Tab Take 100 mg by mouth every day.      metoprolol SR (TOPROL XL) 50 MG TABLET SR 24 HR Take 50 mg by mouth every day.      mycophenolate (CELLCEPT) 250 MG Cap Take 750 mg by mouth 2 times a day.      polyethylene glycol 3350 (MIRALAX) 17 GM/SCOOP Powder Take 17 g by mouth 2 times a day.      rosuvastatin (CRESTOR) 40 MG tablet Take 40 mg by mouth every day.      SPS, SODIUM POLYSTYRENE SULF, 15 GM/60ML Suspension Take 15 g by mouth see administration instructions. Takes on Tuesday, Thursday, and Sunday      tacrolimus (PROGRAF) 0.5 MG Cap Take 0.5-1 mg by mouth 2 times a day. 0.5 mg in the AM  1 mg in the PM      insulin glargine 100 UNIT/ML Solution Pen-injector injection Inject 10 Units under the skin every evening.      tamsulosin (FLOMAX) 0.4 MG capsule Take 0.4 mg by mouth every day.      multivitamin Tab Take 1 Tablet by mouth every day.      docusate sodium (COLACE) 100 MG Cap Take 100 mg by mouth 2 times a day.      aspirin EC 81 MG EC tablet Take 1 Tab by mouth every day. 30 Tab 2       Current list of administered  Medications:    Current Facility-Administered Medications:     magnesium sulfate IVPB premix 2 g, 2 g, Intravenous, Once, Cresencio Pratt M.D., Stopped at 04/27/25 0957    ampicillin/sulbactam (Unasyn) 3 g in  mL IVPB, 3 g, Intravenous, Q6HRS, Cresencio Pratt M.D., Stopped at 04/27/25 0856    vancomycin (Vancocin) 2,500 mg in  mL IVPB, 25 mg/kg, Intravenous, Once, Cresencio Pratt M.D.    tranexamic acid (CYKLOKAPRON) 1000 MG/10ML for nasal packing 300 mg, 3 mL, Nasal, Once, Cresencio Pratt M.D.    hydrALAZINE (Apresoline) tablet 10 mg, 10 mg, Oral, Q8HRS, David Choe M.D.    isosorbide dinitrate (Isordil) tablet 10 mg, 10 mg, Oral, TID, David Choe M.D.    insulin regular (HumuLIN R/NovoLIN R) 100 Units in  mL infusion premix, 0-29 Units/hr, Intravenous, Continuous, Ken Sims D.OEdilberto, Last Rate: 3 mL/hr at 04/27/25 0813, 3 Units/hr at 04/27/25 0813    dextrose 50 % (D50W) injection 12.5-25 g, 12.5-25 g, Intravenous, PRN, Ken Sims D.OEdilberto    clevidipine (Cleviprex) IV emulsion, 0-21 mg/hr, Intravenous, Continuous, Cresencio Pratt M.D., Stopped at 04/26/25 1332    aspirin (Asa) chewable tab 81 mg, 81 mg, Enteral Tube, DAILY, Cresencio Pratt M.D., 81 mg at 04/27/25 0511    clopidogrel (Plavix) tablet 75 mg, 75 mg, Enteral Tube, DAILY, Cresencio Pratt M.D., 75 mg at 04/27/25 0511    senna-docusate (Pericolace Or Senokot S) 8.6-50 MG per tablet 2 Tablet, 2 Tablet, Enteral Tube, Q EVENING, 2 Tablet at 04/26/25 1712 **AND** polyethylene glycol/lytes (Miralax) Packet 1 Packet, 1 Packet, Enteral Tube, QDAY PRN, Cresencio Pratt M.D.    acetaminophen (Tylenol) tablet 650 mg, 650 mg, Enteral Tube, Q6HRS PRN, Cresencio Pratt M.D.    ondansetron (Zofran ODT) dispertab 4 mg, 4 mg, Enteral Tube, Q4HRS PRN, Cresencio Pratt M.D.    Pharmacy Consult: Enteral tube insertion - review meds/change route/product selection, , Other, PHARMACY TO DOSE, Cresencio Pratt M.D.    thiamine (B-1)  injection 200 mg, 200 mg, Intravenous, BID, Cresencio Pratt M.D., 200 mg at 04/27/25 0511    NS (Bolus) 0.9 % infusion 100 mL, 100 mL, Intravenous, DIALYSIS PRN, Rolando Hackett D.O.    hydrALAZINE (Apresoline) injection 10 mg, 10 mg, Intravenous, Q4HRS PRN, Frederick Edwards M.D.    ondansetron (Zofran) syringe/vial injection 4 mg, 4 mg, Intravenous, Q4HRS PRN, Frederick Edwards M.D.    [Held by provider] gabapentin (Neurontin) capsule 200 mg, 200 mg, Oral, DAILY, Frederick Edwards M.D.    rosuvastatin (Crestor) tablet 40 mg, 40 mg, Enteral Tube, DAILY, Frederick Edwards M.D., 40 mg at 04/27/25 0511    dexmedetomidine (Precedex) 400 MCG/100ML infusion, 0.1-1.5 mcg/kg/hr (Ideal), Intravenous, Continuous, Daniel Frost M.D., Last Rate: 19.4 mL/hr at 04/27/25 0514, 1 mcg/kg/hr at 04/27/25 0514    heparin infusion 25,000 Units in 500 mL 0.45% NACL, 0-1,000 Units/hr, Intravenous, Continuous, Last Rate: 15.1 mL/hr at 04/27/25 0811, 753 Units/hr at 04/27/25 0811 **AND** heparin 25 units/mL in 500mL D5W infusion (for use with IMPELLA Pump),  Units/hr, Other, Continuous, Last Rate: 13.9 mL/hr at 04/27/25 0800, 347 Units/hr at 04/27/25 0800 **AND** Notify Provider and Pharmacy if heparin dose is LESS than the amount being delivered in purge solution alone., , , CONTINUOUS, Angel Blanco M.D.    DOBUTamine (Dobutrex) 1 mg/1 mL premix infusion, 2.5 mcg/kg/min (Ideal), Intravenous, Continuous, Daniel Frost M.D., Last Rate: 11.6 mL/hr at 04/26/25 1910, 2.5 mcg/kg/min at 04/26/25 1910    norepinephrine (Levophed) 8 mg in 250 mL NS infusion (premix), 0-1 mcg/kg/min (Ideal), Intravenous, Continuous, Daniel Frost M.D., Stopped at 04/26/25 2175    [COMPLETED] amiodarone (Nexterone) 360 mg/200 mL infusion, 1 mg/min, Intravenous, Once, Stopped at 04/26/25 0148 **FOLLOWED BY** amiodarone (Nexterone) 360 mg/200 mL infusion, 0.5 mg/min, Intravenous, Continuous, Daniel Frost M.D., Last Rate: 16.7 mL/hr at  04/27/25 0055, 0.5 mg/min at 04/27/25 0055    Respiratory Therapy Consult, , Nebulization, Continuous RT, Daniel Frost M.D.    famotidine (Pepcid) tablet 20 mg, 20 mg, Enteral Tube, Q12HRS, 20 mg at 04/27/25 0511 **OR** famotidine (Pepcid) injection 20 mg, 20 mg, Intravenous, Q12HRS, Daniel Frost M.D., 20 mg at 04/26/25 1712    MD Alert...ICU Electrolyte Replacement per Pharmacy, , Other, PHARMACY TO DOSE, Daniel Frost M.D.    lidocaine (Xylocaine) 1 % injection 2 mL, 2 mL, Tracheal Tube, Q30 MIN PRN, Daniel Frost M.D.    HYDROmorphone (Dilaudid) injection 0.5 mg, 0.5 mg, Intravenous, Q HOUR PRN, 0.5 mg at 04/25/25 2231 **OR** HYDROmorphone (Dilaudid) injection 1 mg, 1 mg, Intravenous, Q HOUR PRN, Daniel Frost M.D., 1 mg at 04/26/25 2025    Past Medical History:   Diagnosis Date    Depression     Dilated cardiomyopathy (HCC)     Hyperlipidemia     Hypertension     Type II or unspecified type diabetes mellitus without mention of complication, not stated as uncontrolled        Past Surgical History:   Procedure Laterality Date    TOE AMPUTATION Left 8/23/2018    Procedure: TOE AMPUTATION L 2ND TOE;  Surgeon: Luiz Ma M.D.;  Location: SURGERY [facility];  Service: Orthopedics    AV FISTULA CREATION  5/1/2014    Performed by Beau Cowart M.D. at [facility]       Family History   Problem Relation Age of Onset    Hypertension Mother     Other Father         DM     Patient family history was personally reviewed, no pertinent family history to current presentation    Social History     Socioeconomic History    Marital status:      Spouse name: Not on file    Number of children: Not on file    Years of education: Not on file    Highest education level: Not on file   Occupational History    Not on file   Tobacco Use    Smoking status: Never    Smokeless tobacco: Never   Substance and Sexual Activity    Alcohol use: No    Drug use: No    Sexual activity: Not on file   Other  Topics Concern    Not on file   Social History Narrative    Not on file     Social Drivers of Health     Financial Resource Strain: Not on File (8/24/2019)    Received from [facility]    Financial Resource Strain     Financial Resource Strain: 0   Food Insecurity: Patient Unable To Answer (4/25/2025)    Hunger Vital Sign     Worried About Running Out of Food in the Last Year: Patient unable to answer     Ran Out of Food in the Last Year: Patient unable to answer   Transportation Needs: Patient Unable To Answer (4/25/2025)    PRAPARE - Transportation     Lack of Transportation (Medical): Patient unable to answer     Lack of Transportation (Non-Medical): Patient unable to answer   Physical Activity: Not on File (8/24/2019)    Received from [facility]    Physical Activity     Physical Activity: 0   Stress: Not on File (8/24/2019)    Received from [facility]    Stress     Stress: 0   Social Connections: Not on File (8/24/2019)    Received from [facility]    Social Connections     Social Connections and Isolation: 0   Intimate Partner Violence: Patient Unable To Answer (4/25/2025)    Humiliation, Afraid, Rape, and Kick questionnaire     Fear of Current or Ex-Partner: Patient unable to answer     Emotionally Abused: Patient unable to answer     Physically Abused: Patient unable to answer     Sexually Abused: Patient unable to answer   Housing Stability: Patient Unable To Answer (4/25/2025)    Housing Stability Vital Sign     Unable to Pay for Housing in the Last Year: Patient unable to answer     Number of Times Moved in the Last Year: 0     Homeless in the Last Year: Patient unable to answer       ALLERGIES:  Allergies   Allergen Reactions    Contrast Media With Iodine [Iodine] Rash and Itching     Per patient       Review of systems:  intubated    Physical exam:  Vitals:    04/27/25 0745 04/27/25 0800 04/27/25 0815 04/27/25 0830   BP:  108/80     Pulse: 97 98 (!) 101 (!) 104   Resp: 19 15 14 18   Temp:        TempSrc:       SpO2:  100% 100% 100%   Weight:       Height:             General: No acute distress.   EYES: no jaundice  HEENT: intubated  Neck: No bruits No JVD.   CVS: RRR. S1 + S2. No M/R/G. No edema.  Resp: CTAB. No wheezing or crackles/rhonchi.  Abdomen: Soft, NT, ND,  Skin: Grossly nothing acute no obvious rashes  Neurological: intubated  Extremities: Pulse 2+ in b/l LE. No cyanosis.     Data:  Laboratory studies personally reviewed by me:  Recent Results (from the past 24 hours)   POCT venous blood gas device results    Collection Time: 04/26/25  9:17 AM   Result Value Ref Range    Ph 7.499 (H) 7.310 - 7.450    Pco2 26.4 (L) 38.0 - 54.0 mmHg    Po2 33 23 - 48 mmHg    Tco2 21 20 - 33 mmol/L    SO2 71 60 - 85 %    Hco3 20.6 (L) 22.0 - 29.0 mmol/L    BE -1 -2 - 3 mmol/L    Body Temp 37.6 C degrees    O2 Therapy 40 %    iPF Ratio 83     Ph Temp Correc 7.490 (H) 7.310 - 7.450    Pco2 Temp Walter 27.1 (L) 38.0 - 54.0 mmHg    Po2 Temp Corre 35 23 - 48 mmHg    Specimen Venous     DelSys Vent     End Tidal Carbon Dioxide 24 mmhg    Tidal Volume 470 mL    Peep End Expiratory Pressure 8 cmh20    Set Rate 24     Mode APV-CMV    POCT lactate device results    Collection Time: 04/26/25  9:17 AM   Result Value Ref Range    iStat Lactate 1.7 0.5 - 2.0 mmol/L   POCT glucose device results    Collection Time: 04/26/25  9:18 AM   Result Value Ref Range    POC Glucose, Blood 307 (H) 65 - 99 mg/dL   POCT arterial blood gas device results    Collection Time: 04/26/25  9:19 AM   Result Value Ref Range    Ph 7.502 (H) 7.350 - 7.450    Pco2 22.0 (L) 32.0 - 48.0 mmHg    Po2 78 (L) 83 - 108 mmHg    Tco2 18 (L) 32 - 48 mmol/L    S02 97 93 - 99 %    Hco3 17.2 (L) 21.0 - 28.0 mmol/L    BE -4 -4 - 3 mmol/L    Body Temp 37.6 C degrees    O2 Therapy 40 %    iPF Ratio 195     Ph Temp Walter 7.492 (H) 7.350 - 7.450    Pco2 Temp Co 22.6 (L) 32.0 - 48.0 mmHg    Po2 Temp Cor 81 (L) 83 - 108 mmHg    Specimen Arterial     Perez Test N/A     [place]ons  Vent     End Tidal Carbon Dioxide 24 mmhg    Tidal Volume 470 mL    Peep End Expiratory Pressure 8 cmh20    Set Rate 24     Mode APV-CMV    POCT lactate device results    Collection Time: 04/26/25  9:19 AM   Result Value Ref Range    iStat Lactate 1.8 0.5 - 2.0 mmol/L   BLOOD CULTURE    Collection Time: 04/26/25 10:35 AM    Specimen: Peripheral; Blood   Result Value Ref Range    Significant Indicator POS (POS)     Source BLD     Site PERIPHERAL     Culture Result (A)      Growth detected by automated blood culture system. 04/26/2025  23:04  Gram Stain: Gram positive cocci in pairs/chains.     POCT glucose device results    Collection Time: 04/26/25 10:50 AM   Result Value Ref Range    POC Glucose, Blood 261 (H) 65 - 99 mg/dL   LACTIC ACID    Collection Time: 04/26/25 10:53 AM   Result Value Ref Range    Lactic Acid 2.3 (H) 0.5 - 2.0 mmol/L   Hep B Surface AB    Collection Time: 04/26/25 10:53 AM   Result Value Ref Range    Hep B Surface Antibody Quant 381.00 (H) 0.00 - 10.00 mIU/mL   Hep B Surface Antigen    Collection Time: 04/26/25 10:53 AM   Result Value Ref Range    Hepatitis B Surface Antigen Non-Reactive Non-Reactive   Hep B Core AB Total    Collection Time: 04/26/25 10:53 AM   Result Value Ref Range    Hepatitis B Core Ab, Total NonReactive Non-Reactive   MRSA By PCR (Amp)    Collection Time: 04/26/25 11:00 AM    Specimen: Nares; Respirate   Result Value Ref Range    MRSA by PCR Negative Negative   BLOOD CULTURE    Collection Time: 04/26/25 11:18 AM    Specimen: Peripheral; Blood   Result Value Ref Range    Significant Indicator POS (POS)     Source BLD     Site PERIPHERAL     Culture Result (A)      Growth detected by automated blood culture system. 04/27/2025  05:21  Gram Stain: Gram positive cocci in pairs/chains.     POCT glucose device results    Collection Time: 04/26/25 12:01 PM   Result Value Ref Range    POC Glucose, Blood 219 (H) 65 - 99 mg/dL   POCT activated clotting time device results     Collection Time: 04/26/25 12:08 PM   Result Value Ref Range    Istat Activated Clotting Time 164 (H) 74 - 137 sec   POCT glucose device results    Collection Time: 04/26/25  1:00 PM   Result Value Ref Range    POC Glucose, Blood 189 (H) 65 - 99 mg/dL   POCT glucose device results    Collection Time: 04/26/25  2:07 PM   Result Value Ref Range    POC Glucose, Blood 151 (H) 65 - 99 mg/dL   POCT glucose device results    Collection Time: 04/26/25  3:07 PM   Result Value Ref Range    POC Glucose, Blood 127 (H) 65 - 99 mg/dL   POCT glucose device results    Collection Time: 04/26/25  3:44 PM   Result Value Ref Range    POC Glucose, Blood 141 (H) 65 - 99 mg/dL   POCT activated clotting time device results    Collection Time: 04/26/25  4:02 PM   Result Value Ref Range    Istat Activated Clotting Time 175 (H) 74 - 137 sec   POCT glucose device results    Collection Time: 04/26/25  5:01 PM   Result Value Ref Range    POC Glucose, Blood 140 (H) 65 - 99 mg/dL   POCT glucose device results    Collection Time: 04/26/25  5:58 PM   Result Value Ref Range    POC Glucose, Blood 127 (H) 65 - 99 mg/dL   LACTIC ACID    Collection Time: 04/26/25  5:59 PM   Result Value Ref Range    Lactic Acid 1.3 0.5 - 2.0 mmol/L   POCT glucose device results    Collection Time: 04/26/25  7:00 PM   Result Value Ref Range    POC Glucose, Blood 122 (H) 65 - 99 mg/dL   PLATELET MAPPING WITH BASIC TEG    Collection Time: 04/26/25  8:00 PM   Result Value Ref Range    Reaction Time Initial-R See Comment 4.6 - 9.1 min    React Time Initial Hep See Comment 4.3 - 8.3 min    Clot Kinetics-K See Comment 0.8 - 2.1 min    Clot Angle-Angle See Comment 63.0 - 78.0 degrees    Maximum Clot Strength-MA See Comment 52.0 - 69.0 mm    TEG Functional Fibrinogen(MA) See Comment 15.0 - 32.0 mm    Lysis 30 minutes-LY30 See Comment 0.0 - 2.6 %    % Inhibition ADP 96.6 (H) 0.0 - 17.0 %    % Inhibition AA 51.2 (H) 0.0 - 11.0 %    TEG Algorithm Link Algorithm    CBC WITHOUT  DIFFERENTIAL    Collection Time: 04/26/25  8:00 PM   Result Value Ref Range    WBC 21.7 (H) 4.8 - 10.8 K/uL    RBC 5.00 4.70 - 6.10 M/uL    Hemoglobin 14.3 14.0 - 18.0 g/dL    Hematocrit 40.9 (L) 42.0 - 52.0 %    MCV 81.8 81.4 - 97.8 fL    MCH 28.6 27.0 - 33.0 pg    MCHC 35.0 32.3 - 36.5 g/dL    RDW 36.9 35.9 - 50.0 fL    Platelet Count 172 164 - 446 K/uL    MPV 11.0 9.0 - 12.9 fL   POCT glucose device results    Collection Time: 04/26/25  8:00 PM   Result Value Ref Range    POC Glucose, Blood 130 (H) 65 - 99 mg/dL   Prothrombin Time    Collection Time: 04/26/25  9:00 PM   Result Value Ref Range    PT 20.2 (H) 12.0 - 14.6 sec    INR 1.72 (H) 0.87 - 1.13   POCT glucose device results    Collection Time: 04/26/25  9:00 PM   Result Value Ref Range    POC Glucose, Blood 135 (H) 65 - 99 mg/dL   POCT glucose device results    Collection Time: 04/26/25 10:04 PM   Result Value Ref Range    POC Glucose, Blood 132 (H) 65 - 99 mg/dL   LACTIC ACID    Collection Time: 04/26/25 10:10 PM   Result Value Ref Range    Lactic Acid 1.3 0.5 - 2.0 mmol/L   POCT arterial blood gas device results    Collection Time: 04/26/25 10:11 PM   Result Value Ref Range    Ph 7.651 (HH) 7.350 - 7.450    Pco2 24.3 (L) 32.0 - 48.0 mmHg    Po2 74 (L) 83 - 108 mmHg    Tco2 28 (L) 32 - 48 mmol/L    S02 98 93 - 99 %    Hco3 26.8 21.0 - 28.0 mmol/L    BE 7 (H) -4 - 3 mmol/L    Body Temp 37.4 C degrees    O2 Therapy 30 %    iPF Ratio 247     Ph Temp Walter 7.644 (H) 7.350 - 7.450    Pco2 Temp Co 24.7 (L) 32.0 - 48.0 mmHg    Po2 Temp Cor 76 (L) 83 - 108 mmHg    Specimen Arterial     DelSys Vent     End Tidal Carbon Dioxide 24 mmhg    Tidal Volume 470 mL    Peep End Expiratory Pressure 8 cmh20    Set Rate 24     Mode APV-CMV    POCT lactate device results    Collection Time: 04/26/25 10:11 PM   Result Value Ref Range    iStat Lactate 1.2 0.5 - 2.0 mmol/L   POCT activated clotting time device results    Collection Time: 04/26/25 10:14 PM   Result Value Ref Range     Istat Activated Clotting Time 164 (H) 74 - 137 sec   POCT venous blood gas device results    Collection Time: 04/26/25 10:15 PM   Result Value Ref Range    Ph 7.613 (HH) 7.310 - 7.450    Pco2 26.6 (L) 38.0 - 54.0 mmHg    Po2 32 23 - 48 mmHg    Tco2 28 20 - 33 mmol/L    SO2 75 60 - 85 %    Hco3 26.9 22.0 - 29.0 mmol/L    BE 7 (H) -2 - 3 mmol/L    Body Temp 37.4 C degrees    O2 Therapy 30 %    iPF Ratio 107     Ph Temp Correc 7.606 (HH) 7.310 - 7.450    Pco2 Temp Walter 27.1 (L) 38.0 - 54.0 mmHg    Po2 Temp Corre 33 23 - 48 mmHg    Specimen Venous     DelSys Vent     End Tidal Carbon Dioxide 24 mmhg    Tidal Volume 470 mL    Peep End Expiratory Pressure 8 cmh20    Set Rate 24     Mode APV-CMV    POCT lactate device results    Collection Time: 04/26/25 10:15 PM   Result Value Ref Range    iStat Lactate 1.1 0.5 - 2.0 mmol/L   POCT glucose device results    Collection Time: 04/26/25 11:03 PM   Result Value Ref Range    POC Glucose, Blood 139 (H) 65 - 99 mg/dL   POCT glucose device results    Collection Time: 04/27/25 12:05 AM   Result Value Ref Range    POC Glucose, Blood 139 (H) 65 - 99 mg/dL   POCT glucose device results    Collection Time: 04/27/25  1:00 AM   Result Value Ref Range    POC Glucose, Blood 156 (H) 65 - 99 mg/dL   LACTIC ACID    Collection Time: 04/27/25  2:10 AM   Result Value Ref Range    Lactic Acid 1.6 0.5 - 2.0 mmol/L   POCT arterial blood gas device results    Collection Time: 04/27/25  4:19 AM   Result Value Ref Range    Ph 7.605 (H) 7.350 - 7.450    Pco2 25.1 (L) 32.0 - 48.0 mmHg    Po2 81 (L) 83 - 108 mmHg    Tco2 26 (L) 32 - 48 mmol/L    S02 98 93 - 99 %    Hco3 24.9 21.0 - 28.0 mmol/L    BE 5 (H) -4 - 3 mmol/L    Body Temp 37.5 C degrees    O2 Therapy 30 %    iPF Ratio 270     Ph Temp Walter 7.597 (H) 7.350 - 7.450    Pco2 Temp Co 25.6 (L) 32.0 - 48.0 mmHg    Po2 Temp Cor 84 83 - 108 mmHg    Specimen Arterial     DelSys Vent     End Tidal Carbon Dioxide 28 mmhg    Tidal Volume 470 mL    Peep End  Expiratory Pressure 8 cmh20    Set Rate 18     Mode APV-CMV    POCT lactate device results    Collection Time: 25  4:19 AM   Result Value Ref Range    iStat Lactate 1.1 0.5 - 2.0 mmol/L   POCT activated clotting time device results    Collection Time: 25  4:19 AM   Result Value Ref Range    Istat Activated Clotting Time 164 (H) 74 - 137 sec   CBC without Differential    Collection Time: 25  5:08 AM   Result Value Ref Range    WBC 18.6 (H) 4.8 - 10.8 K/uL    RBC 4.58 (L) 4.70 - 6.10 M/uL    Hemoglobin 12.9 (L) 14.0 - 18.0 g/dL    Hematocrit 38.4 (L) 42.0 - 52.0 %    MCV 83.8 81.4 - 97.8 fL    MCH 28.2 27.0 - 33.0 pg    MCHC 33.6 32.3 - 36.5 g/dL    RDW 37.7 35.9 - 50.0 fL    Platelet Count 141 (L) 164 - 446 K/uL    MPV 11.6 9.0 - 12.9 fL   Hepatic Function Panel    Collection Time: 25  5:08 AM   Result Value Ref Range    Alkaline Phosphatase 89 30 - 99 U/L    AST(SGOT) 181 (H) 12 - 45 U/L    ALT(SGPT) 77 (H) 2 - 50 U/L    Total Bilirubin 2.2 (H) 0.1 - 1.5 mg/dL    Direct Bilirubin 0.8 (H) 0.1 - 0.5 mg/dL    Indirect Bilirubin 1.4 (H) 0.0 - 1.0 mg/dL    Albumin 3.1 (L) 3.2 - 4.9 g/dL    Total Protein 5.8 (L) 6.0 - 8.2 g/dL   Phosphorus    Collection Time: 25  5:08 AM   Result Value Ref Range    Phosphorus 3.3 2.5 - 4.5 mg/dL   Magnesium    Collection Time: 25  5:08 AM   Result Value Ref Range    Magnesium 1.9 1.5 - 2.5 mg/dL   LACTIC ACID    Collection Time: 25  5:08 AM   Result Value Ref Range    Lactic Acid 1.5 0.5 - 2.0 mmol/L   EKG    Collection Time: 25  7:41 AM   Result Value Ref Range    Report       [facility]    Test Date:  2025  Pt Name:    ROSANNE ROSS                  Department: 161  MRN:        [id number]                      Room:       T627  Gender:     Male                         Technician: RUPERTO  :        1965                   Requested By:CARLINE ARGUETA  Order #:    [id number]                    Reading MD:    Measurements  Intervals                                 Axis  Rate:       103                          P:          25  ME:         82                           QRS:        -13  QRSD:       105                          T:          148  QT:         458  QTc:        600    Interpretive Statements  Sinus tachycardia  Ventricular premature complex  Inferior infarct, old  Lateral leads are also involved  Prolonged QT interval  Baseline wander in lead(s) I,V2  Compared to ECG 04/26/2025 07:39:55  Ventricular premature complex(es) now present  Myocardial infarct finding now present  Sinus rhythm no longer present     IONIZED CALCIUM    Collection Time: 04/27/25  7:46 AM   Result Value Ref Range    Ionized Calcium 1.1 1.1 - 1.3 mmol/L   POCT arterial blood gas device results    Collection Time: 04/27/25  7:46 AM   Result Value Ref Range    Ph 7.626 (H) 7.350 - 7.450    Pco2 24.5 (L) 32.0 - 48.0 mmHg    Po2 81 (L) 83 - 108 mmHg    Tco2 26 (L) 32 - 48 mmol/L    S02 98 93 - 99 %    Hco3 25.6 21.0 - 28.0 mmol/L    BE 6 (H) -4 - 3 mmol/L    Body Temp 37.7 C degrees    O2 Therapy 30 %    iPF Ratio 270     Ph Temp Walter 7.615 (H) 7.350 - 7.450    Pco2 Temp Co 25.3 (L) 32.0 - 48.0 mmHg    Po2 Temp Cor 84 83 - 108 mmHg    Specimen Arterial     Perez Test N/A     DelSys Vent     End Tidal Carbon Dioxide 25 mmhg    Tidal Volume 470 mL    Peep End Expiratory Pressure 8 cmh20    Set Rate 18     Mode APV-CMV    POCT lactate device results    Collection Time: 04/27/25  7:46 AM   Result Value Ref Range    iStat Lactate 1.2 0.5 - 2.0 mmol/L   HEMOGLOBIN AND HEMATOCRIT    Collection Time: 04/27/25  7:46 AM   Result Value Ref Range    Hemoglobin 12.9 (L) 14.0 - 18.0 g/dL    Hematocrit 38.1 (L) 42.0 - 52.0 %   POCT venous blood gas device results    Collection Time: 04/27/25  7:48 AM   Result Value Ref Range    Ph 7.583 (H) 7.310 - 7.450    Pco2 29.2 (L) 38.0 - 54.0 mmHg    Po2 31 23 - 48 mmHg    Tco2 28 20 - 33 mmol/L    SO2 71 60 - 85 %    Hco3 27.6 22.0 - 29.0 mmol/L     BE 6 (H) -2 - 3 mmol/L    Body Temp 37.7 C degrees    O2 Therapy 30 %    iPF Ratio 103     Ph Temp Correc 7.572 (H) 7.310 - 7.450    Pco2 Temp Walter 30.1 (L) 38.0 - 54.0 mmHg    Po2 Temp Corre 32 23 - 48 mmHg    Specimen Venous     DelSys Vent     End Tidal Carbon Dioxide 25 mmhg    Tidal Volume 470 mL    Peep End Expiratory Pressure 8 cmh20    Set Rate 18     Mode APV-CMV    POCT lactate device results    Collection Time: 04/27/25  7:48 AM   Result Value Ref Range    iStat Lactate 1.1 0.5 - 2.0 mmol/L         All pertinent features of laboratory and imaging reviewed including primary images where applicable    Cardiac catheterization 3/15/2022:  Summary   1. RHC - right heart pressures as below.  RA 10 mmHg, RV 41/6/15 mmHg, PA 36/17/24 mmHg, mPCWP 13 mmHg with 14 v waves.  [place] CO/CI 12.45/5.72 TD CO/CI 6.53/3.0. PVR <1.00 woods units with a transpulmonary gradient of 9mmHg.  2. Right dominant epicardial system.  3. Coronary Angiography as per below:  LMx: Large caliber, moderate/severe calcified vessel with mild luminal irregularities.  LAD: Moderate sized, moderately calcified vessel with 40% stenosis of proximal segment and mild diffuse luminal irregularities in mid/distal segments. Provides a 1st diagonal branch which is <2mm in size with 50% ostial stenosis.  LCx: Moderate sized, moderately calcified vessel with diffuse mild luminal irregularities through all segment. Provides an 1st marginal branch that is <2mm in size with 50% stenosis, 2nd marginal branch with mild diffuse luminal irregularities. L-JOSE branches with mild diffuse luminal irregularities.  RCA: large caliber, moderately calcified vessel with 60% stenosis of the proximal segment, 40% stenosis of the middle segment, mild luminal irregularities of the distal segment. Provides r-PDA branch which is <2mm in size with 80% stenosis and r-JOSE branch with 60% that is <2mm in size.  Ramus: small sized (<2mm) with 50% stenosis in proximal segment.  4.  Diagnostic intervention: iFr performed for the proximal RCA lesion with iFr 0.92, demonstrating not hemodynamically significant lesion.  5. LHC - LVEDP 24 mmHg. No significant gradient on manual LV-Ao pullback.     TTE 10/2024  SUMMARY:    1. Left ventricular ejection fraction, by Garcia's Biplane, is moderately reduced at 35 %.    2. There is no evidence of pericardial effusion.    3. Left ventricular diastolic function is indeterminate.    4. Mild aortic valve stenosis.    5. Mild concentric left ventricular hypertrophy.    6. Normal right ventricular size, wall thickness, and systolic function.    7. The left atrium is moderately dilated by LA volume.    8. The right atrium is normal in size and structure.    9. Moderate to severe mitral annular calcification.   10. Severely decreased posterior mitral leaflet mobility.     IMPRESSION:  1.  Severe distal left main-mid LAD disease status post successful Impella supported IVUS guided IVL and PCI deploying Synergy 3 x 32 mm AMINA postdilated to ~ 4.5mm in the LM and ~ 4mm in the LAD; with subsequent PTCA of the ostial left circumflex  2.  Residual severe disease throughout the RCA  3.  Significant elevated right and left-sided filling pressures in the setting of ischemic cardiomyopathy, cardiogenic shock with improvement in hemodynamics on norepinephrine, dobutamine, epinephrine and Impella CP support.  Given overall stability towards the end of the case with weaning down on pressor support, we decided not to proceed with formal ECMO consultation at this time.       RECOMMENDATIONS:  Given patient on apixaban, recommend Plavix for at least 12 months, aspirin at least 2 weeks on discharge (with PPI) and resume apixaban when deemed safe after Impella CP removal as currently will be on heparin drip.  TR band protocol  Hemodynamics guided cardiogenic shock management: target CVP < 15, PCWP < 18,  >= 0.6, Christian >= 1, -1000 and MAP > 60.  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 ST at origin of very       small PDA. 90% mid JOSE. 90% origin of large ISA. 30% prox LAD. 30-40%       lLAD immediately distal to LADD1 origin.100% small OM 2 with L to L       filling. LV EF45% with akinetic anterior wall.    Full code status (POA: Unknown)    Syncope (POA: Unknown)    Acute respiratory failure with hypoxia (HCC) (POA: Unknown)    Kidney transplanted (POA: Unknown)    Leukocytosis (POA: Unknown)    Abnormal CT of the head (POA: Unknown)    Mass of parotid gland (POA: Unknown)    Cardiogenic shock (HCC) (POA: Unknown)    Cardiac arrest (HCC) (POA: Unknown)    Atrial fibrillation with RVR (HCC) (POA: Unknown)    Bacteremia due to Gram-positive bacteria (POA: Unknown)    Dyslipidemia (POA: Yes)    HTN (hypertension) (POA: Yes)  Resolved Problems:    * No resolved hospital problems. *      Assessment / Plan:  59 year old man PMH hx VT nos, HFrEF recovered 20->50%, ICM, CAD nonobstructive, paroxysmal AF, DM2, ESRD s/p renal transplant 4/2023, PAD presents with angina found NSTEMI. S/p R/LHC found cardiogenic shock requiring impella support s/p PCI AMINA LM, LAD and PTCA to Lcx and residual p-mRCA    -impella P5 wean down as tolerated  -dobutamine fixed at 2.5 mcg/kg/min  -aspirin, plavix and heparin gtt  -afterload reduction MAP goal 65-70. Attempt hydralazine/isordil.   -TTE for daily impella placement  -GDMT as tolerated. On hold due to cardiogenic shock.  -IV lasix to euvolemic. Low  threshold to start CRRT if inadequate urine output.  -eventually full revascularization RCA    I personally discussed his case with Dr Pratt.    It is my pleasure to participate in the care of Mr. Diamond.  Please do not hesitate to contact me with questions or concerns.    David Choe MD  Cardiologist [facility]    Dariel Diamond is critically ill and he is at high risk for clinical deterioration, worsening vital organ dysfunction, and death without the above critical care interventions.  Critical care time 51 minutes. This time was spent at the bedside evaluating the patient's chart, their labs,   imaging, physical exam and discussion with Dr Pratt as well as the bedside nurse, family and formulating an assessment and plan.

## 2025-04-27 NOTE — ASSESSMENT & PLAN NOTE
2/2 Strep Salivarius in Blood in chains/pairs changed to cefazolin  Broadened antibiotics 4/30 in the setting of rapid and fulmnat deterioration in hemodynamic status, including empiric antifungals    Reculture  Will bronch once feasible  WBCT once clinically stable enough to take to CT scanner    Continue to hold immunosuppression with bacteremia  Repeat blood cx 4/28 NGTD  ID following

## 2025-04-27 NOTE — PROGRESS NOTES
[facility] Progress Note         HD today x 3 hours per Dr. Hackett.  Tx initiated at 1333 and ended at 1633    Pt is intubated, on restraints x 2, and impella. With bipedal edema +1 and bleeding from nose and mouth. LUE AVF (+) bruit and thrill (+) consent for treatment.    NET UF: 2000 ml    Patient tolerated tx but had episodes of hypotension and hypertension. Pt was started back on levophed d/t SBP of 70's to 80's and MAP <60 mmhg. Saline bolus was given for hypotension management (total of 100 ml) and UF was paused. Ectopies were also seen during the treatment.     All blood was returned aseptically. HD needles were removed from RUE AVF. Dry gauze applied and changed without bleeding issue.(+) Bruit and thrill pre/post tx. Should breakthrough bleeding occur, staff RN to apply pressure to access sites until bleeding resolved. Notify Dialysis and Nephrologist for follow-up.See eflow sheets for further details.    Report given to ANAND May RN

## 2025-04-27 NOTE — PROGRESS NOTES
SBT started after completion of hemodialysis  Passing SBT parameters but still continuing to ooze from L nare despite rhinorocket placement  Cannot sit up to clear secretions due to impella  Will hold on extubation this evening due to these concerns and to monitor if oozing improves overnight  Potential for impella wean/removal tomorrow as well  Check CBC, TEG    POC discussed with bedside RN and patient/family  __________  Jaime Singletary MD  Pulmonary and Critical Care Medicine  [facility]

## 2025-04-27 NOTE — PROGRESS NOTES
Critical Care Progress Note    Date of admission  4/25/2025    Chief Complaint  59 y.o. male admitted 4/25/2025 with history of hypertension, hyperlipidemia, HFimpEF (most recent 50%), paroxysmal atrial fibrillation on apixaban, ESRD s/p DDKT 4/2023, who presented with chest pain, dyspnea and syncope this morning.  He was in his usual state of health prior to experiencing mild/moderate substernal chest pain.  About 30 minutes later he felt his vision become obscured and fell, striking his head.  He presented to the emergency department where he had a noncontrast head CT that was unrevealing.  His forehead laceration was repaired.     EKG demonstrated anterior ST elevations and dynamic EKG changes.  Given concern for malignant dysrhythmia he proceeded to the cath lab.     In the cath lab he sustained PEA cardiac arrest for ~10-30 seconds and was intubated.  He had Impella placed and PCI to LAD with PTCA of the circumflex.  He had escalating inotrope and vasoactive requirements.  Initial CI was 3.7 on dobutamine 5, epi 0.03.  Taken from Dr Frost note.     Hospital Course  4/25 admitted post cath lab brief arrest, impella, cardiogenic shock on vent support.  4/26 ongoing cardiogenic shock, impella P5, dobutamine 2.5, iHD, low vent support, nasal bleeding     Interval Problem Update  Reviewed last 24 hour events:  Neuro: awake following commands on ventilator, dex 1 rass +1 -2  HR:  nsr, amio 0.5  SBP: 's PA 30-40/19-23 CVP 9-11, Christian >1, PCWP 20-22 CI 2.0-2.4 SVR levophed off at midnight SEEMA this am CI 2.6  Tmax: afebrile  GI: NPO, BM 4/25  UOP: 650ml, iHD 2.6L  Lines: right Ij landy vargas foley, impella  Resp: VD3 14 470 8 30% SBT after rounds  Vte: heparin  PPI/H2:n/a  Antibx: gpc 2/2 in blood hx of enterococcus amp resistant, Unasyn and vanco, MRSA nares neg  -1.2L net - 972ml  TXA soaked rhino rocket placed  Repeat cx tomorrow  Hold immunosuppression with bacteremia  Cardiology wants to start iso and  hydral   Nutrition start today  Follow up on am chemistry  SEEMA well supported  Likely wean impella today    Review of Systems  Review of Systems   Unable to perform ROS: Intubated        Vital Signs for last 24 hours   Temp:  [37.1 °C (98.8 °F)-37.7 °C (99.9 °F)] 37.6 °C (99.7 °F)  Pulse:  [] 108  Resp:  [13-42] 13  BP: ()/(43-81) 139/52  SpO2:  [92 %-100 %] 100 %    Hemodynamic parameters for last 24 hours  CVP:  [3 MM HG-305 MM HG] 10 MM HG  PCWP:  [15 MM HG-29 MM HG] 15 MM HG  CO:  [4.37-5.4] 4.9  CI:  [2-2.5] 2.27    Respiratory Information for the last 24 hours  Vent Mode: APVCMV  Rate (breaths/min): 18  Vt Target (mL): 470  PEEP/CPAP: 8  P Support (PS + PEEP): 5  MAP: 11  Control VTE (exp VT): 380    Physical Exam   Physical Exam  Vitals and nursing note reviewed.   Constitutional:       General: He is not in acute distress.     Appearance: He is obese. He is ill-appearing.      Comments: Ill appearing male on ventilator with son at bedside   HENT:      Head:      Comments: Forehead laceration     Mouth/Throat:      Mouth: Mucous membranes are moist.      Comments: ET in place, OG clamped  Eyes:      Pupils: Pupils are equal, round, and reactive to light.      Comments: Left upper eye lid bruising, left nasal bleeding   Cardiovascular:      Rate and Rhythm: Normal rate.      Heart sounds: No murmur heard.  Pulmonary:      Effort: No respiratory distress.      Breath sounds: No stridor. Rales present. No wheezing or rhonchi.   Abdominal:      General: There is no distension.      Palpations: There is no mass.      Tenderness: There is no abdominal tenderness.      Hernia: No hernia is present.   Musculoskeletal:         General: No swelling or tenderness.      Comments: Left upper arm fistula with thrill, cold hands and feet, left toe prior amputation, left leg impella   Neurological:      Mental Status: He is alert.      Comments: He is awake and answering with yes and no while on ventilator,  moving all ext   Psychiatric:         Mood and Affect: Mood normal.         Medications  Current Facility-Administered Medications   Medication Dose Route Frequency Provider Last Rate Last Admin    magnesium sulfate IVPB premix 2 g  2 g Intravenous Once Cresencio Pratt M.D.   Stopped at 04/27/25 0957    ampicillin/sulbactam (Unasyn) 3 g in  mL IVPB  3 g Intravenous Q6HRS Cresencio Pratt M.D.   Stopped at 04/27/25 0856    vancomycin (Vancocin) 2,500 mg in  mL IVPB  25 mg/kg Intravenous Once Cresencio Pratt M.D. 166.7 mL/hr at 04/27/25 0911 2,500 mg at 04/27/25 0911    hydrALAZINE (Apresoline) tablet 10 mg  10 mg Enteral Tube Q8HRS David Choe M.D.        isosorbide dinitrate (Isordil) tablet 10 mg  10 mg Enteral Tube TID David Choe M.D.        insulin regular (HumuLIN R/NovoLIN R) 100 Units in  mL infusion premix  0-29 Units/hr Intravenous Continuous Ken iSms D.O. 3 mL/hr at 04/27/25 0905 3 Units/hr at 04/27/25 0905    dextrose 50 % (D50W) injection 12.5-25 g  12.5-25 g Intravenous PRN Ken Sims D.OEdilberto        clevidipine (Cleviprex) IV emulsion  0-21 mg/hr Intravenous Continuous Cresencio Pratt M.D.   Stopped at 04/26/25 1332    aspirin (Asa) chewable tab 81 mg  81 mg Enteral Tube DAILY Cresencio Pratt M.D.   81 mg at 04/27/25 0511    clopidogrel (Plavix) tablet 75 mg  75 mg Enteral Tube DAILY Cresencio Pratt M.D.   75 mg at 04/27/25 0511    senna-docusate (Pericolace Or Senokot S) 8.6-50 MG per tablet 2 Tablet  2 Tablet Enteral Tube Q EVENING Cresencio Pratt M.D.   2 Tablet at 04/26/25 1712    And    polyethylene glycol/lytes (Miralax) Packet 1 Packet  1 Packet Enteral Tube QDAY PRN Cresencio Pratt M.D.        acetaminophen (Tylenol) tablet 650 mg  650 mg Enteral Tube Q6HRS PRN Cresencio Pratt M.D.        ondansetron (Zofran ODT) dispertab 4 mg  4 mg Enteral Tube Q4HRS PRN Cresencio Pratt M.D.        Pharmacy Consult: Enteral tube insertion - review meds/change  route/product selection   Other PHARMACY TO DOSE Cresencio Pratt M.D.        thiamine (B-1) injection 200 mg  200 mg Intravenous BID Cresencio Pratt M.D.   200 mg at 04/27/25 0511    NS (Bolus) 0.9 % infusion 100 mL  100 mL Intravenous DIALYSIS PRN Rolando Hackett D.O.        hydrALAZINE (Apresoline) injection 10 mg  10 mg Intravenous Q4HRS PRN Frederick Edwards M.D.        ondansetron (Zofran) syringe/vial injection 4 mg  4 mg Intravenous Q4HRS PRN Frederick Edwards M.D.        [Held by provider] gabapentin (Neurontin) capsule 200 mg  200 mg Oral DAILY Frederick Edwards M.D.        rosuvastatin (Crestor) tablet 40 mg  40 mg Enteral Tube DAILY Frederick Edwards M.D.   40 mg at 04/27/25 0511    dexmedetomidine (Precedex) 400 MCG/100ML infusion  0.1-1.5 mcg/kg/hr (Ideal) Intravenous Continuous Daniel Frost M.D. 19.4 mL/hr at 04/27/25 0514 1 mcg/kg/hr at 04/27/25 0514    heparin infusion 25,000 Units in 500 mL 0.45% NACL  0-1,000 Units/hr Intravenous Continuous Angel Blanco M.D. 15 mL/hr at 04/27/25 0905 750 Units/hr at 04/27/25 0905    And    heparin 25 units/mL in 500mL D5W infusion (for use with IMPELLA Pump)   Units/hr Other Continuous Angel Blanco M.D. 14 mL/hr at 04/27/25 0905 350 Units/hr at 04/27/25 0905    DOBUTamine (Dobutrex) 1 mg/1 mL premix infusion  2.5 mcg/kg/min (Ideal) Intravenous Continuous Daniel Frost M.D. 11.6 mL/hr at 04/26/25 1910 2.5 mcg/kg/min at 04/26/25 1910    norepinephrine (Levophed) 8 mg in 250 mL NS infusion (premix)  0-1 mcg/kg/min (Ideal) Intravenous Continuous Cresencio Pratt M.D.   Stopped at 04/26/25 6236    amiodarone (Nexterone) 360 mg/200 mL infusion  0.5 mg/min Intravenous Continuous Daniel Frost M.D. 16.7 mL/hr at 04/27/25 0055 0.5 mg/min at 04/27/25 0055    Respiratory Therapy Consult   Nebulization Continuous RT Daniel Frost M.D.        famotidine (Pepcid) tablet 20 mg  20 mg Enteral Tube Q12HRS Daniel Frost M.D.   20 mg at 04/27/25  0511    Or    famotidine (Pepcid) injection 20 mg  20 mg Intravenous Q12HRS Daniel Frost M.D.   20 mg at 04/26/25 1712    MD Alert...ICU Electrolyte Replacement per Pharmacy   Other PHARMACY TO DOSE Daniel Frost M.D.        lidocaine (Xylocaine) 1 % injection 2 mL  2 mL Tracheal Tube Q30 MIN PRN Daniel Frost M.D.        HYDROmorphone (Dilaudid) injection 0.5 mg  0.5 mg Intravenous Q HOUR PRN Daniel Frost M.D.   0.5 mg at 04/25/25 2231    Or    HYDROmorphone (Dilaudid) injection 1 mg  1 mg Intravenous Q HOUR PRN Daniel Frost M.D.   1 mg at 04/26/25 2025       Fluids    Intake/Output Summary (Last 24 hours) at 4/27/2025 0912  Last data filed at 4/27/2025 0900  Gross per 24 hour   Intake 2461.87 ml   Output 3462 ml   Net -1000.13 ml       Laboratory  Recent Labs     04/26/25  2211 04/26/25  2215 04/27/25  0419 04/27/25  0746 04/27/25  0748   ISTATAPH 7.651*  --  7.605* 7.626*  --    ISTATAPCO2 24.3*  --  25.1* 24.5*  --    ISTATAPO2 74*  --  81* 81*  --    ISTATATCO2 28*  --  26* 26*  --    [id number] 98  --  98 98  --    ISTATARTHCO3 26.8  --  24.9 25.6  --    ISTATARTBE 7*  --  5* 6*  --    ISTATTEMP 37.4 C   < > 37.5 C 37.7 C 37.7 C   ISTATFIO2 30   < > 30 30 30   ISTATSPEC Arterial   < > Arterial Arterial Venous   ISTATAPHTC 7.644*  --  7.597* 7.615*  --    [id number] 76*  --  84 84  --     < > = values in this interval not displayed.         Recent Labs     04/25/25  1945 04/25/25  2330 04/26/25  0310 04/27/25  0508 04/27/25  0746   SODIUM 137 137 135  --  135   POTASSIUM 3.7 4.6 5.5  --  3.7   CHLORIDE 100 100 103  --  97   CO2 9* 11* 12*  --  21   BUN 27* 32* 34*  --  36*   CREATININE 1.57* 1.75* 1.81*  --  2.42*   MAGNESIUM 1.6  --  1.4* 1.9  --    PHOSPHORUS  --   --  2.1* 3.3  --    CALCIUM 9.4 9.0 9.2  --  8.9     Recent Labs     04/25/25  1201 04/25/25  1945 04/25/25  2330 04/26/25  0310 04/27/25  0508 04/27/25  0746   ALTSGPT 15 39  --  44 77*  --    ASTSGOT 31 261*  --  323* 181*  --     ALKPHOSPHAT 105* 113*  --  97 89  --    TBILIRUBIN 1.0 2.0*  --  3.1* 2.2*  --    DBILIRUBIN  --   --   --  0.8* 0.8*  --    LIPASE 46  --   --   --   --   --    GLUCOSE 213* 374* 422* 474*  --  185*     Recent Labs     04/25/25  1201 04/25/25  1945 04/26/25  0310 04/26/25 2000 04/27/25  0508   WBC 11.8* 15.4* 10.5 21.7* 18.6*   NEUTSPOLYS 83.60*  --   --   --   --    LYMPHOCYTES 10.60*  --   --   --   --    MONOCYTES 4.70  --   --   --   --    EOSINOPHILS 0.30  --   --   --   --    BASOPHILS 0.40  --   --   --   --    ASTSGOT 31 261* 323*  --  181*   ALTSGPT 15 39 44  --  77*   ALKPHOSPHAT 105* 113* 97  --  89   TBILIRUBIN 1.0 2.0* 3.1*  --  2.2*     Recent Labs     04/25/25  1201 04/25/25  1405 04/25/25  1945 04/26/25 0310 04/26/25  0727 04/26/25 2000 04/26/25 2100 04/27/25  0508 04/27/25  0746   RBC 5.15  --  5.28 4.87  --  5.00  --  4.58*  --    HEMOGLOBIN 14.5  --  15.0 13.6*   < > 14.3  --  12.9* 12.9*   HEMATOCRIT 45.5  --  46.5 42.0   < > 40.9*  --  38.4* 38.1*   PLATELETCT 208  --  250 186  --  172  --  141*  --    PROTHROMBTM 17.2* 17.9* 19.7*  --   --   --  20.2*  --   --    APTT 32.2 29.0  --   --   --   --   --   --   --    INR 1.40* 1.47* 1.67*  --   --   --  1.72*  --   --     < > = values in this interval not displayed.       Imaging  X-Ray:  I have personally reviewed the images and compared with prior images.  Echo:   Reviewed    Assessment/Plan  Cardiogenic shock (HCC)  Assessment & Plan  Cardiogenic shock due to acute ischemia a post arrest.   PA with pulmonary venous hypertension with good Christian but reduced LV function and elevated left sided filling pressures.   Impella P5, dobutamine 2.5, clevidipine gtt for elevated map and high SVR  need iHD 4/26, improving urine output ongoing lasix challenge to achieve euvolemic  Cardiology consulting  Monitor end organ perfusion with neurologic status, skin, lactate, renal function and output, trend serial markers LFT's, BMP, lactate,  ScVO2      Epistaxis due to trauma  Assessment & Plan  Due to syncope and facial trauma worsened by heparin and DAPT  Rhinorocket placed on antibiotics, monitor need for ENT    Bacteremia due to Gram-positive bacteria  Assessment & Plan  2/2 GPC in chains/pair start unasyn and vanco  Hx of amp resistant enterococcus  Hold immunosuppression with bacteremia  Repeat blood cx 4/28 am    Atrial fibrillation with RVR (HCC)  Assessment & Plan  Heparin  Amiodarone    Cardiac arrest (HCC)  Assessment & Plan  Witnessed PEA arrest in the cath lab with CPR for <1min.  Follow commands no signs of neurologic injury  Avoid fever    Abnormal CT of the head  Assessment & Plan  Patient found to have 2 to 3 mm faintly hyperdense focus in the right mesial occipital lobe could represent vascular calcification or cavernous malformation.  This could be confirmed by MRI brain.    MRI pending cardiovascular stabilization    Kidney transplanted  Assessment & Plan  Baseline creatinine 1.4.    Monitor UOP and creatinine  Home tacrolimus and mycophenolate  Tac level in AM  Nephrology consultation Dr juan j Hackett 4/26  Worsening renal function attempt iHD via left upper arm fistula if not working will need temporary catheter  Due to tiana, cardiogenic shock, pigmented hemolysis via impella and s/p contrast die load      Acute respiratory failure with hypoxia (HCC)  Assessment & Plan  Intubated date: 4/25 due to brief PEA arrest in cath lab  Goal saturation > 92%  Monitor ventilator waveforms and titrate flow/peep and volumes according.   Lung protective ventilation strategy w/ A-F bundle  CXR: monitor lung volumes and tube/line placement  VAP bundle prevention, oral care, post pyloric feeding  Head of bed > 30 degree  GI prophylaxis  Daily awakening and SBT trials unless contraindicated  Monitor for liberation  Respiratory treatments: prn        LAD stenosis- (present on admission)  Assessment & Plan  PCI to LAD with Dr. Blanco  DAPT  Statin  Follow  up ECHO 4/26    Type 2 diabetes mellitus with chronic kidney disease on chronic dialysis (HCC)- (present on admission)  Assessment & Plan  Insulin titrated for glycemic goal 140-180mg/dL stress hyperglycemia   Beta hydroxy 2 not in DKA           VTE:  Heparin  Ulcer: H2 Antagonist  Lines: Central Line  Ongoing indication addressed, Arterial Line  Ongoing indication addressed, and Rodríguez Catheter  Ongoing indication addressed    I have performed a physical exam and reviewed and updated ROS and Plan today (4/27/2025). In review of yesterday's note (4/26/2025), there are no changes except as documented above.     Discussed patient condition and risk of morbidity and/or mortality with Family, RN, RT, Pharmacy, Charge nurse / hot rounds, Patient, and cardiology and nephrology    The patient remains critically ill titration of dobutamine, impella and ventilator.  Critical care time = 87 minutes in directly providing and coordinating critical care and extensive data review.  No time overlap and excludes procedures.

## 2025-04-27 NOTE — PROGRESS NOTES
"[facility] -  PROGRESS NOTE    Date & Time:   4/27/2025  1:30 PM    Author:   Rolando Hackett D.O.      REASON FOR CONSULTATION:   - CAMPBELL/Management of acute and chronic conditions related to Nephrology      CHIEF COMPLAINT:   -  \"Chest pain  \"      HISTORY OF PRESENT ILLNESS:    59Y  M w hx of ESRD s/p Renal Transplant (DDKT at [facility] on 4/3/2023), CAD, ICM, paroxysmal A-fib presented with chest pain and syncope on 4/25. Pt found to have NSTEMI and taken to cath lab.   Pt had PCI done to circumflex with impella support. Pt also suffered PEA arrest during cath lab with ROSC achieved.   Pt in ICU and Cr lucas from 1.1->1.8 since admission. Poor UOP noted and ICU team concerned for worsening volume overload/cardiorenal syndrome.   Pt currently intubated    DAILY NEPHROLOGY SUMMARY:  4/26: consult done, pt had HD  4/27: pt tolerated HD yesterday via AVF. I/O 2500/3520. On minimal vent settings. BP not requiring any pressor support. Son at bedside. Pt on abx due to gram+ bacteremia. Immunosuppression on hold.    REVIEW OF SYSTEMS:    UTO due to intubation      PAST MEDICAL HISTORY:   Past Medical History:   Diagnosis Date    Depression     Dilated cardiomyopathy (HCC)     Hyperlipidemia     Hypertension     Type II or unspecified type diabetes mellitus without mention of complication, not stated as uncontrolled           PAST SURGICAL HISTORY:   Past Surgical History:   Procedure Laterality Date    TOE AMPUTATION Left 8/23/2018    Procedure: TOE AMPUTATION L 2ND TOE;  Surgeon: Luiz Ma M.D.;  Location: [facility];  Service: Orthopedics    AV FISTULA CREATION  5/1/2014    Performed by Beau Cowart M.D. at [facility]          FAMILY HISTORY:   Family History   Problem Relation Age of Onset    Hypertension Mother     Other Father         DM       SOCIAL HISTORY:   Social History     Tobacco Use    Smoking status: Never    Smokeless tobacco: Never "   Substance Use Topics    Alcohol use: No    Drug use: No            HOME MEDICATIONS: reviewed  Home Medications    Medication Sig Taking? Last Dose Authorizing Provider   apixaban (ELIQUIS) 5mg Tab Take 5 mg by mouth 2 times a day. Yes 4/25/2025 Morning Physician Outpatient   amLODIPine (NORVASC) 10 MG Tab Take 10 mg by mouth every day. Yes 4/25/2025 Morning Physician Outpatient   cloNIDine (CATAPRES) 0.1 MG/24HR PATCH WEEKLY patch Place 1 Patch on the skin every Saturday. Yes 4/19/2025 Physician Outpatient   furosemide (LASIX) 80 MG Tab Take 40 mg by mouth 1 time a day as needed (If needed for weight gain > 2). Yes 4/24/2025 Evening Physician Outpatient   gabapentin (NEURONTIN) 100 MG Cap Take 200 mg by mouth every day. Yes 4/24/2025 Evening Physician Outpatient   Insulin Aspart PenFill 100 UNIT/ML Solution Cartridge Inject 14 Units under the skin 3 times a day before meals. Yes 4/24/2025 Evening Physician Outpatient   losartan (COZAAR) 100 MG Tab Take 100 mg by mouth every day. Yes 4/25/2025 Morning Physician Outpatient   metoprolol SR (TOPROL XL) 50 MG TABLET SR 24 HR Take 50 mg by mouth every day. Yes 4/24/2025 Evening Physician Outpatient   mycophenolate (CELLCEPT) 250 MG Cap Take 750 mg by mouth 2 times a day. Yes 4/25/2025 Morning Physician Outpatient   polyethylene glycol 3350 (MIRALAX) 17 GM/SCOOP Powder Take 17 g by mouth 2 times a day. Yes 4/25/2025 Morning Physician Outpatient   rosuvastatin (CRESTOR) 40 MG tablet Take 40 mg by mouth every day. Yes 4/25/2025 Morning Physician Outpatient   SPS, SODIUM POLYSTYRENE SULF, 15 GM/60ML Suspension Take 15 g by mouth see administration instructions. Takes on Tuesday, Thursday, and Sunday Yes 4/24/2025 Physician Outpatient   tacrolimus (PROGRAF) 0.5 MG Cap Take 0.5-1 mg by mouth 2 times a day. 0.5 mg in the AM  1 mg in the PM Yes 4/25/2025 Morning Physician Outpatient   insulin glargine 100 UNIT/ML Solution Pen-injector injection Inject 10 Units under the skin  every evening. Yes 4/24/2025 Evening Physician Outpatient   tamsulosin (FLOMAX) 0.4 MG capsule Take 0.4 mg by mouth every day. Yes 4/24/2025 Evening Physician Outpatient   multivitamin Tab Take 1 Tablet by mouth every day. Yes 4/24/2025 Evening Physician Outpatient   docusate sodium (COLACE) 100 MG Cap Take 100 mg by mouth 2 times a day. Yes 4/25/2025 Morning Physician Outpatient   aspirin EC 81 MG EC tablet Take 1 Tab by mouth every day. Yes 4/24/2025 Delmi Quiroz M.D.       ALLERGIES:  Contrast media with iodine [iodine]      VITAL SIGNS:  /54   Pulse 97   Temp 37.3 °C (99.1 °F) (Core)   Resp 14   Ht 1.829 m (6')   Wt 96.8 kg (213 lb 6.5 oz)   SpO2 100%   BMI 28.94 kg/m²     Physical Exam  Constitutional:       General: He is not in acute distress.  HENT:      Head: Normocephalic and atraumatic.      Mouth/Throat:      Comments: ET tube in place  Cardiovascular:      Rate and Rhythm: Normal rate and regular rhythm.   Pulmonary:      Comments: Ventilator breath sounds  Abdominal:      General: There is no distension.      Palpations: Abdomen is soft.   Musculoskeletal:      Right lower leg: Edema present.      Left lower leg: Edema present.   Neurological:      General: No focal deficit present.      Mental Status: He is alert.       Access: L AVF with good thrill and bruit      FLUID BALANCE:  In: 2563.1 [I.V.:1725.3; Dialysis:600]  Out: 3517       LABS:  Recent Labs     04/25/25 2330 04/26/25 0310 04/27/25  0746   SODIUM 137 135 135   POTASSIUM 4.6 5.5 3.7   CHLORIDE 100 103 97   CO2 11* 12* 21   GLUCOSE 422* 474* 185*   BUN 32* 34* 36*   CREATININE 1.75* 1.81* 2.42*   CALCIUM 9.0 9.2 8.9      Recent Labs     04/25/25 2330 04/26/25 0310 04/27/25  0746   SODIUM 137 135 135   POTASSIUM 4.6 5.5 3.7   CHLORIDE 100 103 97   CO2 11* 12* 21   GLUCOSE 422* 474* 185*   BUN 32* 34* 36*   CREATININE 1.75* 1.81* 2.42*          IMAGING:  - Imaging studies reviewed      ASSESSMENTS:    -CAMPBELL, likely 2/2  cardiorenal syndrome  -Fluid overload, improved  -Acidosis  -Hyperkalemia  -Hx of Renal Transplant     [facility] DDKT on 4/5/2023    Mycophenolate 750/750    Tacrolimus 0.5 AM and 1 mg PM (per [facility] 3/10/25 note)  -Gram Positive Bacteremia/Sepsis     Abx per primary team     Immunosuppression on hold  -NSTEMI      s/p PCI with impella support     On dobutamine  -s/p PEA arrest in cath lab  -Hx of HFrEF     EF recovered 20->50%  -CAD  -Paroxysmal A-fib      PLAN    -pt tolerated HD yesterday via L AVF  -no compeling indication for more HD today  -will trial diuretics for volume mgmt, ordered lasix 100mg IV BID  -dose all meds for GFR< 15  -agree with abx for bacteremia, immunosuppression on hold due to infection  -keep strict I/O  -appreciate ICU and cardiology mgmt of other issues    Please page nephrology with any questions or concerns  Discussed with ICU team    Rolando Hackett DO  Nephrologist   [facility]  [phone]

## 2025-04-27 NOTE — ASSESSMENT & PLAN NOTE
Due to syncope and facial trauma worsened by heparin and DAPT  Rhinorocket placed on antibiotics, monitor need for ENT    Rhino rocket removed 4/29, bleeding under control for now

## 2025-04-27 NOTE — ASSESSMENT & PLAN NOTE
Cardiogenic shock due to acute ischemia a post arrest.   PA with pulmonary venous hypertension with good Christian but reduced LV function and elevated left sided filling pressures.   Impella P5, dobutamine 2.5, clevidipine gtt for elevated map and high SVR  need iHD , improving urine output ongoing lasix challenge to achieve euvolemic  Monitor end organ perfusion with neurologic status, skin, lactate, renal function and output, trend serial markers LFT's, BMP, lactate, ScVO2  Switch Unaysn to cefazolin 1gm every 12 hours  Follow Repeat blood culture report   Trend Procalcitonin   Impella removed 25  Amiodarone gtt   gtt  Norepi gtt  Vasopression   Patient developed vasoplegic shock with low SVR.  Not a candidate for MCS per cardiology and he is unlikely to benefit from [facility] ECMO given his current condition.  Discussed with family and chose comfortable care

## 2025-04-27 NOTE — PROGRESS NOTES
4 Eyes Skin Assessment Completed by HEATHER Musa and HEATHER Quintana.    Head Bruising and Redness, Bruising around left eye; Head laceration with stitches to forehead    Ears WDL  Nose Bruising and bleeding from left nostril with packing in place  Mouth Bleeding, Bruising and swelling to right side of lip w/ laceration to upper lip  Neck WDL, right IJ swan   Breast/Chest WDL  Shoulder Blades WDL  Spine WDL  (R) Arm/Elbow/Hand Bruising  (L) Arm/Elbow/Hand Fistula to upper arm  Abdomen WDL  Groin Impella to left groin  Scrotum/Coccyx/Buttocks Discoloration  (R) Leg Bruising  (L) Leg Bruising  (R) Heel/Foot/Toe Redness, Blanching, and Discoloration  (L) Heel/Foot/Toe Redness and Blanching, amputated toes - discolored to top of foot          Devices In Places ECG, Blood Pressure Cuff, Pulse Ox, Rodríguez, Arterial Line, ET Tube, OG/NG, Impella, Leg Immobilizer, and Central Line      Interventions In Place Heel Mepilex, Sacral Mepilex, TAP System, Pillows, Q2 Turns, Low Air Loss Mattress, and ZFlo Pillow    Possible Skin Injury Yes    Pictures Uploaded Into Epic Yes  Wound Consult Placed N/A, Already consulted  RN Wound Prevention Protocol Ordered No , Already ordered

## 2025-04-27 NOTE — CARE PLAN
The patient is Watcher - Medium risk of patient condition declining or worsening    Shift Goals  Clinical Goals: Hemodynamic stability  Patient Goals: SCHUYLER  Family Goals: Updates    Progress made toward(s) clinical / shift goals:    Problem: Knowledge Deficit - Standard  Goal: Patient and family/care givers will demonstrate understanding of plan of care, disease process/condition, diagnostic tests and medications  Outcome: Progressing  Note: Patient alert, able to follow commands and use written communication. Educated patient and family on plan of care for shift.      Problem: Safety - Medical Restraint  Goal: Remains free of injury from restraints (Restraint for Interference with Medical Device)  Outcome: Progressing  Note: Restraints in place to prevent patient from pulling out lines and tubes. Will remove when appropriate     Problem: Pain - Standard  Goal: Alleviation of pain or a reduction in pain to the patient’s comfort goal  Outcome: Progressing  Note: Patient getting Dilaudid PRN for pain and comfort while intubated. Will continue to monitor for signs of discomfort.

## 2025-04-27 NOTE — DIETARY
Nutrition Services: Initial Assessment     Day 2 of admit. Dariel Diamond is a 59 y.o. male with admitting DX of NSTEMI (non-ST elevation myocardial infarction)     Consult Received for: Enteral Nutrition    Current Hospital Problems List:    NSTEMI (non-ST elevation myocardial infarction)   Type 2 diabetes mellitus with chronic kidney disease on chronic dialysis   Troponin level elevated  Dyslipidemia  HTN (hypertension)  Ventricular tachycardia   LAD stenosis   Syncope   Acute respiratory failure with hypoxia   Kidney transplanted   Abnormal CT of the head   Mass of parotid gland  Cardiogenic shock   Cardiac arrest  Atrial fibrillation with RVR   Bacteremia due to Gram-positive bacteria   Epistaxis due to trauma     Nutrition Assessment:      Height: 182.9 cm (6')  Weight: 96.8 kg (213 lb 6.5 oz)  Weight taken via bed scale  Body mass index is 28.94 kg/m².  BMI Classification: Overweight   Ideal Body Weight: 80.7 kg (178 lb)  Percent Ideal Body Weight: 119.9    Wt Readings from Last 6 Encounters:   25 96.8 kg (213 lb 6.5 oz)   18 99 kg (218 lb 4.1 oz)   17 98 kg (216 lb)   17 98.9 kg (218 lb)   16 98.4 kg (217 lb)   16 96 kg (211 lb 10.3 oz)     Calculation/Equation: MSJ x 1.1 - 1.2 =  -  kcals/day; RMR per [employer] 2003b (Vent L/min: 4.6, Tmax/24 hrs: 37.7) = 1975 kcals/day  Total Calories / day:  - 6 (Calories / k - 23)  Total Grams Protein / day: 116 - 136 (Grams Protein / k.2 - 1.4)    Increased kcal and protein needs 2' HD:  Total Calories / day: 2421 - 2825 (Calories / kg IBW: 30 - 35)  Total Grams Protein / day: 97+ grams (Grams Protein / kg IBW: 1.2 +)  Will feed pt closer to estimated nutrition needs while intubated to avoid overfeeding and prolonging time on the vent.     Objective:   Admitted with chest pain, syncope, laceration to forehead and right upper lip.  Pertinent Medical Hx:  Depression, Dilated cardiomyopathy, Hyperlipidemia, Hypertension,  and Type II or unspecified type diabetes mellitus without mention of complication, not stated as uncontrolled, ESRD s/p Renal transplant 4/5/2023 at [facility].   Cardiac cath, Impella placement 4/25. Pt suffered PEA arrest during cath lab.   Nephrology saw pt 4/26. Pt with CAMPBELL, minimal urine output, worsening fluid overload. Pt received HD 4/26.  Pt remains intubated.  Indication for Nutrition Support: Intubation  Enteral tube placed and verified (gastric)  Pertinent Labs: glucose 185, BUN 36, Creatinine 2.42, , ALT 77, A1c 7.7 (4/26)  Pertinent Meds: Aspirin, Pepcid, Lasix, Pericolace, Thiamine, SSI  Levo off, no propofol currently running.  Skin/Wounds:  no pressure injuries noted. Per flowsheet, pt with sutured laceration to anterior forehead; full thickness wound to anterior, right cheek; laceration to right, superior lip.  Food Allergies: none reported  Last BM: 04/25/25 per flowsheets  Formula based on RD Eval: Vital AF 1.2    Current Diet Order/Intake:   NPO    Subjective:   Patient is intubated.    Nutrition Focused Physical Exam (NFPE)  Weight Loss: no clinically noted wt loss per chart review  Muscle Mass: Well Nourished  Subcutaneous Fat: Well Nourished  Fluid Accumulation: none per flow sheet  Reduced  Strength: N/A in acute care setting.    Nutrition Diagnosis:      Inadequate Oral Intake related to intubation status as evidenced by need for nutrition support.     ,   Unable to fully assess for malnutrition at this time    Nutrition Interventions:      Initiate Vital AF 1.2 at 25 ml/hr continuous and advance per protocol to goal rate of 70 ml/hr.  Goal tube feed volume provides 2016 kcals, 126 g protein (1.3 g/kg), and 1362 ml free water daily.   Patient aware of active plan of care as appropriate.     Nutrition Monitoring and Evaluation:      Monitor nutrition POC.   Additional fluids per MD/DO  Monitor vital signs pertinent to nutrition.    RD following and will provide updated  recommendations as indicated.    Bere Blood R.D.                                       ASPEN/AND CRITERIA FOR MALNUTRITION

## 2025-04-28 NOTE — PROGRESS NOTES
4 Eyes Skin Assessment Completed by HEATHER Valdovinos and HEATHER Duran.    Head forehead and right cheek laceration with sutures  Ears WDL  Nose Scab, rhino rocket in place on left side  Mouth right lip laceration with sutures          Neck Right IJ  Breast/Chest WDL  Shoulder Blades WDL  Spine WDL  (R) Arm/Elbow/Hand Bruising  (L) Arm/Elbow/Hand WDL, fistula noted  Abdomen Scar on right side pannus, discoloration  Groin WDL  Scrotum/Coccyx/Buttocks Discoloration  (R) Leg Bruising, non-blanching spot on right leg    (L) Leg Bruising  (R) Heel/Foot/Toe Redness, Blanching, and Discoloration, slow to liya          (L) Heel/Foot/Toe Redness, Blanching, and Discoloration, slow to liya          Devices In Places ECG, Blood Pressure Cuff, Pulse Ox, Rodríguez, Arterial Line, SCD's, ET Tube, OG/NG, Impella, Leg Immobilizer, and Central Line      Interventions In Place Heel Mepilex, Sacral Mepilex, TAP System, Pillows, Q2 Turns, Low Air Loss Mattress, ZFlo Pillow, Dri-Sunil Pads, and Heels Loaded W/Pillows    Possible Skin Injury No    Pictures Uploaded Into Epic N/A  Wound Consult Placed N/A  RN Wound Prevention Protocol Ordered Yes

## 2025-04-28 NOTE — PROGRESS NOTES
SRST   fPVC   PA .165  QRS .095  Qtc .552    Brief paroxysmal afib, reviewed with cardiologist Dr Hernandez, on amiodarone gtt, self converted to SR.

## 2025-04-28 NOTE — OP REPORT
VASCULAR SURGERY SERVICE                       Operative Note  _____________________________________________________    Date: 2025    Patient: Dariel Diamond  : 1965  MRN: [id number]  _____________________________________________________      Preoperative Diagnosis:  -Cardiogenic shock  -Impella pump no longer needed    Postoperative Diagnosis:  Same    Procedure:  34363 left femoral cutdown  68812 removal of Impella ventricular assist device  _____________________________________________________    Surgeon:                                 Kapil Dobson MD    Assistant:   none    Anesthesia:                             General anesthesia plus local anesthetic    EBL:                                        minimal     Complications:                        none    Disposition:                             Remained intubated and sent back to ICU in guarded condition      Procedure Summary:  Following informed consent, the patient was placed supine on the operating table, and general anesthesia was administered.  The patient was prepped and draped sterile in the usual fashion. Surgical time-out was called, and everyone was in agreement.  The patient did receive preoperative prophylactic antibiotics.      Local anesthetic was infiltrated and then an oblique incision was made overlying the left common femoral artery.  We dissected down to the common femoral artery and we identified the sheath insertion site on the anterior surface of the common femoral artery.  We dissected proximally to this and encircled the femoral artery with a vessel loop.  The patient was systemically heparinized.  We dissected distally to the sheath insertion site and we clamped the femoral artery with a profunda clamp.  The sheath and Impella were then removed and the proximal femoral artery was clamped.  Of note there was good pulsatile inflow upon removing the sheath.  The distal clamp was flashed and there was good  backbleeding.  There was no evidence of thrombus or disruption of the lumen of the artery.  The arteriotomy was closed with interrupted Prolene sutures.  The artery was flushed and irrigated prior to completing the closure and once complete it was pressurized and found to be hemostatic.  Then distal flow was restored and there was a palpable pulse distal to the closure.    The wound was irrigated and topical hemostatic was applied.  The incision was closed with multiple layers of absorbable suture and a dressing was applied.    All counts were correct at the conclusion of the case.    Patient remained intubated and was sent back to the ICU in guarded condition.        Kapil Dobson MD  [facility]

## 2025-04-28 NOTE — PROGRESS NOTES
2000 P3 Support  CI/CO 2.5/5.3    PCWP 11   CVP 5  Gtts dobutamine fixed @ 2.5 mcg/kg/min    0000 P3 Support  CI/CO 2.2/4.8  SVR 1146  PCWP 15  CVP 6  Gtts dobutamine fixed @ 2.5 mcg/kg/min    0400 P3 Support  CI/CO 2.7/5.7    PCWP 20  CVP 8  Gtts dobutamine fixed @ 2.5 mcg/kg/min    0500 P2 Support  CI/CO 2.5/5.4    PCWP 19  CVP 5  Gtts dobutamine fixed @ 2.5 mcg/kg/min    0600 P1 Support  CI/CO 1.9/4.1    PCWP 18  CVP 6  Gtts dobutamine fixed @ 2.5 mcg/kg/min

## 2025-04-28 NOTE — ANESTHESIA POSTPROCEDURE EVALUATION
Patient: Dariel Diamond    Procedure Summary       Date: 04/28/25 Room / Location: [place] [address] / SURGERY [facility]    Anesthesia Start: 1125 Anesthesia Stop: 1242    Procedure: LEFT FEMORAL CUTDOWN REMOVAL IMPELLA (Left: Groin) Diagnosis: (CARDIOGENIC SHOCK)    Surgeons: Kapil Dobson M.D. Responsible Provider: Sharla Ward D.O.    Anesthesia Type: general ASA Status: 4            Final Anesthesia Type: general  Last vitals  BP   Blood Pressure: (!) 171/64, Arterial BP: (!) 83/49    Temp   36.7 °C (98.1 °F)    Pulse   (!) 123   Resp   18    SpO2   99 %      Anesthesia Post Evaluation    Patient location during evaluation: ICU  Patient participation: complete - patient cannot participate  Level of consciousness: responsive to painful stimuli    Airway patency: patent  Anesthetic complications: no  Cardiovascular status: hemodynamically stable and tachycardic  Respiratory status: acceptable and ETT  Hydration status: euvolemic    PONV: none          No notable events documented.     Nurse Pain Score: 0 (NPRS)

## 2025-04-28 NOTE — CONSULTS
VASCULAR SURGERY SERVICE  CONSULT NOTE      Date: 4/28/2025    Referring Provider: Bridger Arias M.d.    Consulting Physician: Kapil Dobson MD    -------------------------------------------------------------------------------------------------    Reason for consultation:  Impella removal    HPI:  This is a 59 y.o. male with acute cardiogenic shock requiring placement of a right femoral impella. Hemodynamics have been improving and removal of the impella has been requested. Patient is intubated but alert and able to communicate through non-verbal means and follow commands    Past Medical History:   Diagnosis Date    Depression     Dilated cardiomyopathy (HCC)     Hyperlipidemia     Hypertension     Type II or unspecified type diabetes mellitus without mention of complication, not stated as uncontrolled        Past Surgical History:   Procedure Laterality Date    TOE AMPUTATION Left 8/23/2018    Procedure: TOE AMPUTATION L 2ND TOE;  Surgeon: Luiz Ma M.D.;  Location: [facility];  Service: Orthopedics    AV FISTULA CREATION  5/1/2014    Performed by Beau Cowart M.D. at [facility]       Current Facility-Administered Medications   Medication Dose Route Frequency Provider Last Rate Last Admin    magnesium sulfate IVPB premix 2 g  2 g Intravenous Once Bridger Arias M.D.   Stopped at 04/28/25 1028    ampicillin/sulbactam (Unasyn) 3 g in  mL IVPB  3 g Intravenous Q6HRS Cresencio Pratt M.D.   Stopped at 04/28/25 0635    isosorbide dinitrate (Isordil) tablet 10 mg  10 mg Enteral Tube TID David Choe M.D.   10 mg at 04/27/25 1746    furosemide (Lasix) injection 100 mg  100 mg Intravenous BID Rolando Hackett D.KEENAN   100 mg at 04/28/25 0554    hydrALAZINE (Apresoline) tablet 25 mg  25 mg Enteral Tube Q8HRS David Choe M.D.   25 mg at 04/27/25 2230    sodium bicarbonate 8.4 % 25 mEq in dextrose 5% 1,000 mL infusion (Impella Purge Solution)  25 mEq Other Continuous  David Choe M.D. 14 mL/hr at 04/28/25 0000 Rate Verify at 04/28/25 0000    famotidine (Pepcid) tablet 20 mg  20 mg Enteral Tube DAILY Cresencio Pratt M.D.   20 mg at 04/28/25 0551    Or    famotidine (Pepcid) injection 20 mg  20 mg Intravenous DAILY Cresencio Pratt M.D.        insulin regular (HumuLIN R/NovoLIN R) 100 Units in  mL infusion premix  0-29 Units/hr Intravenous Continuous Ken Sims D.OEdilberto 5 mL/hr at 04/28/25 0931 5 Units/hr at 04/28/25 0931    dextrose 50 % (D50W) injection 12.5-25 g  12.5-25 g Intravenous PRN Ken Sims D.OEdilberto        aspirin (Asa) chewable tab 81 mg  81 mg Enteral Tube DAILY Cresencio Pratt M.D.   81 mg at 04/27/25 0511    clopidogrel (Plavix) tablet 75 mg  75 mg Enteral Tube DAILY Cresencio Pratt M.D.   75 mg at 04/27/25 0511    senna-docusate (Pericolace Or Senokot S) 8.6-50 MG per tablet 2 Tablet  2 Tablet Enteral Tube Q EVENING Cresencio Pratt M.D.   2 Tablet at 04/27/25 1746    And    polyethylene glycol/lytes (Miralax) Packet 1 Packet  1 Packet Enteral Tube QDAY PRN Cresencio Pratt M.D.   1 Packet at 04/27/25 1840    acetaminophen (Tylenol) tablet 650 mg  650 mg Enteral Tube Q6HRS PRN Cresencio Pratt M.D.        ondansetron (Zofran ODT) dispertab 4 mg  4 mg Enteral Tube Q4HRS PRN Cresencio Pratt M.D.        Pharmacy Consult: Enteral tube insertion - review meds/change route/product selection   Other PHARMACY TO DOSE Cresencio Pratt M.D.        thiamine (B-1) injection 200 mg  200 mg Intravenous BID Cresencio Pratt M.D.   200 mg at 04/28/25 0600    NS (Bolus) 0.9 % infusion 100 mL  100 mL Intravenous DIALYSIS PRN Rolando Hackett D.O.        hydrALAZINE (Apresoline) injection 10 mg  10 mg Intravenous Q4HRS PRN Frederick Edwards M.D.        ondansetron (Zofran) syringe/vial injection 4 mg  4 mg Intravenous Q4HRS PRN Frederick Edwards M.D.   4 mg at 04/27/25 0948    [Held by provider] gabapentin (Neurontin) capsule 200 mg  200 mg Oral DAILY  Frederick Edwards M.D.        rosuvastatin (Crestor) tablet 40 mg  40 mg Enteral Tube DAILY Frederick Edwards M.D.   40 mg at 04/28/25 0550    dexmedetomidine (Precedex) 400 MCG/100ML infusion  0.1-1.5 mcg/kg/hr (Ideal) Intravenous Continuous Daniel Frost M.D. 29.1 mL/hr at 04/28/25 0741 1.5 mcg/kg/hr at 04/28/25 0741    heparin infusion 25,000 Units in 500 mL 0.45% NACL  0-1,000 Units/hr Intravenous Continuous Angel Blanco M.D.   Stopped. (Insulin or Heparin) at 04/28/25 0404    DOBUTamine (Dobutrex) 1 mg/1 mL premix infusion  2.5 mcg/kg/min (Ideal) Intravenous Continuous Daniel Frost M.D. 11.6 mL/hr at 04/28/25 0739 2.5 mcg/kg/min at 04/28/25 0739    norepinephrine (Levophed) 8 mg in 250 mL NS infusion (premix)  0-1 mcg/kg/min (Ideal) Intravenous Continuous Cresencio Pratt M.D. 50.9 mL/hr at 04/28/25 0740 0.35 mcg/kg/min at 04/28/25 0740    amiodarone (Nexterone) 360 mg/200 mL infusion  0.5 mg/min Intravenous Continuous Daniel Frost M.D. 16.7 mL/hr at 04/28/25 0738 0.5 mg/min at 04/28/25 0738    Respiratory Therapy Consult   Nebulization Continuous RT Daniel Frost M.D. MD Alert...ICU Electrolyte Replacement per Pharmacy   Other PHARMACY TO DOSE Daniel Frost M.D.        lidocaine (Xylocaine) 1 % injection 2 mL  2 mL Tracheal Tube Q30 MIN PRN Daniel Frost M.D.        HYDROmorphone (Dilaudid) injection 0.5 mg  0.5 mg Intravenous Q HOUR PRN Daniel Frost M.D.   0.5 mg at 04/25/25 2231    Or    HYDROmorphone (Dilaudid) injection 1 mg  1 mg Intravenous Q HOUR PRN Daniel Frost M.D.   1 mg at 04/28/25 0501       Social History     Socioeconomic History    Marital status:      Spouse name: Not on file    Number of children: Not on file    Years of education: Not on file    Highest education level: Not on file   Occupational History    Not on file   Tobacco Use    Smoking status: Never    Smokeless tobacco: Never   Substance and Sexual Activity    Alcohol use: No    Drug use: No     Sexual activity: Not on file   Other Topics Concern    Not on file   Social History Narrative    Not on file     Social Drivers of Health     Financial Resource Strain: Not on File (8/24/2019)    Received from [facility]    Financial Resource Strain     Financial Resource Strain: 0   Food Insecurity: Patient Unable To Answer (4/25/2025)    Hunger Vital Sign     Worried About Running Out of Food in the Last Year: Patient unable to answer     Ran Out of Food in the Last Year: Patient unable to answer   Transportation Needs: Patient Unable To Answer (4/25/2025)    PRAPARE - Transportation     Lack of Transportation (Medical): Patient unable to answer     Lack of Transportation (Non-Medical): Patient unable to answer   Physical Activity: Not on File (8/24/2019)    Received from [facility]    Physical Activity     Physical Activity: 0   Stress: Not on File (8/24/2019)    Received from [facility]    Stress     Stress: 0   Social Connections: Not on File (8/24/2019)    Received from [facility]    Social Connections     Social Connections and Isolation: 0   Intimate Partner Violence: Patient Unable To Answer (4/25/2025)    Humiliation, Afraid, Rape, and Kick questionnaire     Fear of Current or Ex-Partner: Patient unable to answer     Emotionally Abused: Patient unable to answer     Physically Abused: Patient unable to answer     Sexually Abused: Patient unable to answer   Housing Stability: Patient Unable To Answer (4/25/2025)    Housing Stability Vital Sign     Unable to Pay for Housing in the Last Year: Patient unable to answer     Number of Times Moved in the Last Year: 0     Homeless in the Last Year: Patient unable to answer       Family History   Problem Relation Age of Onset    Hypertension Mother     Other Father         DM       Allergies:  Contrast media with iodine [iodine]    Review of Systems:    Pertinent review of systems was performed and is unremarkable except as per HPI    Physical Exam:  BP (!)  171/64   Pulse (!) 121   Temp 36.9 °C (98.4 °F)   Resp 14   Ht 1.829 m (6')   Wt 100 kg (220 lb 14.4 oz)   SpO2 99%     Constitutional: Intubated, alert, no acute distress  HEENT:  Normocephalic and atraumatic, no icterus  Neck:   Supple, no JVD,   Cardiovascular: Regular rate and rhythm,   Pulmonary:  Good air entry bilaterally,    Abdominal:  Soft, non-distended  Musculoskeletal: Mild-moderate diffuse edema, no gross deformity  Neurological:  Intubated, alert following commands  Skin:   Skin is cool and dry. No rash noted.  Vascular:  Extremities warm and adequately perfused. Moderate DP doppler signals bilaterally     left femoral sheath site CDI    Labs:  Recent Labs     04/27/25  0508 04/27/25  0746 04/27/25  1734 04/28/25  0039 04/28/25  0426   WBC 18.6*  --   --  13.7* 12.5*   RBC 4.58*  --   --  3.66* 3.10*   HEMOGLOBIN 12.9*   < > 11.0* 10.3* 8.9*   HEMATOCRIT 38.4*   < > 33.4* 31.9* 27.2*   MCV 83.8  --   --  87.2 87.7   MCH 28.2  --   --  28.1 28.7   MCHC 33.6  --   --  32.3 32.7   RDW 37.7  --   --  39.6 39.8   PLATELETCT 141*  --   --  103* 109*   MPV 11.6  --   --  11.4 11.4    < > = values in this interval not displayed.     Recent Labs     04/27/25  1300 04/27/25  1726 04/28/25  0220   SODIUM 135 137 134*   POTASSIUM 3.7 3.8 3.6   CHLORIDE 98 99 98   CO2 22 24 21   GLUCOSE 167* 159* 183*   BUN 39* 46* 53*   CREATININE 2.49* 2.61* 2.75*   CALCIUM 8.9 8.9 8.4*     Recent Labs     04/25/25  1201 04/25/25  1405 04/25/25  1945 04/26/25  2100   APTT 32.2 29.0  --   --    INR 1.40* 1.47* 1.67* 1.72*     Recent Labs     04/25/25  1201 04/25/25  1405 04/25/25  1945 04/26/25  0310 04/26/25  2100 04/27/25  0508   ASTSGOT 31  --  261* 323*  --  181*   ALTSGPT 15  --  39 44  --  77*   TBILIRUBIN 1.0  --  2.0* 3.1*  --  2.2*   IBILIRUBIN  --   --   --  2.3*  --  1.4*   DBILIRUBIN  --   --   --  0.8*  --  0.8*   ALKPHOSPHAT 105*  --  113* 97  --  89   GLOBULIN 3.0  --  3.2 2.6  --   --    INR 1.40* 1.47*  1.67*  --  1.72*  --          Assessment/Plan:  -  Cardiogenic shock  -  Impella pump no longer needed    Impella removal requested. Will schedule based on OR availability.  NPO except for medications      Kapil Dobson MD  [facility]   [place] preferred or call my office [phone]  __________________________________________________________________  Patient:Dariel Lobo   MRN:[id number]   CSN:[id number]

## 2025-04-28 NOTE — CARE PLAN
The patient is Unstable - High likelihood or risk of patient condition declining or worsening    Shift Goals  Clinical Goals: MAP > 65, SBP < 120 or SVR < 1200, RASS -1 to 1, maintain insulin and heparin gtts, impella support  Patient Goals: comfort  Family Goals: updates    Problem: Safety - Medical Restraint  Goal: Remains free of injury from restraints (Restraint for Interference with Medical Device)  Outcome: Progressing  Note: Removed and performed ROM q2h. No s/sx of injury. Pt demonstrates understanding of need for restraints.      Problem: Pain - Standard  Goal: Alleviation of pain or a reduction in pain to the patient’s comfort goal  Outcome: Progressing  Note: Assessed q2h and PRN using CPOT. Gave dilaudid 1 mg x2 for elevated CPOT. Titrated precedex to maintain RASS -1 to 1.      Problem: Skin Integrity  Goal: Skin integrity is maintained or improved  Outcome: Progressing  Note: Assessed head-to-toe with 4 eyes skin assessment and ensured that appropriate interventions are in place including sacral mepilex, heel mepilex, heels floated, Z flow pillow deployed appropriately, facial lacerations offloaded, ETT repositioned q2h, q2h turns with wedges, and pillows in place to offload elbows.     Problem: Hemodynamics  Goal: Patient's hemodynamics, fluid balance and neurologic status will be stable or improve  Outcome: Progressing  Note: Titrated levophed in attempt to keep MAP > 65. Pt converted to prolonged periods of atrial fibrillation mid-shift which led to declining and unstable BP.      Progress made toward(s) clinical / shift goals:  See above.

## 2025-04-28 NOTE — PROGRESS NOTES
Monitor summary:  SR/ST 90s-100s w/ frequent PVCs and bigeminal ectopy  Converted to sustained periods of a fib mid-shift, rate 110s-140s with frequent PVCs and periods of reversion to sinus rhythm

## 2025-04-28 NOTE — CARE PLAN
The patient is Stable - Low risk of patient condition declining or worsening    Shift Goals  Clinical Goals:  hemodynamics, impella support  Family Goals: updates    Progress made toward(s) clinical / shift goals:      Problem: Knowledge Deficit - Standard  Goal: Patient and family/care givers will demonstrate understanding of plan of care, disease process/condition, diagnostic tests and medications  Outcome: Progressing     Problem: Safety - Medical Restraint  Goal: Remains free of injury from restraints (Restraint for Interference with Medical Device)  Outcome: Progressing  Goal: Free from restraint(s) (Restraint for Interference with Medical Device)  Outcome: Progressing     Problem: Pain - Standard  Goal: Alleviation of pain or a reduction in pain to the patient’s comfort goal  Outcome: Progressing     Problem: Hemodynamics  Goal: Patient's hemodynamics, fluid balance and neurologic status will be stable or improve  Outcome: Progressing       Patient is not progressing towards the following goals:

## 2025-04-28 NOTE — PROGRESS NOTES
Notified MD of profuse bleeding from Impella site 30 min after restarting heparin. Directed to stop heparin. After reviewing [place] numbers, MD advised to turn Impella down to P2 from P3 and to recheck [place] in 1 hour. CBC sent to lab.

## 2025-04-28 NOTE — CARE PLAN
The patient is Watcher - Medium risk of patient condition declining or worsening    Shift Goals  Clinical Goals: hemodynamics, impella support,  Patient Goals: SCHUYLER  Family Goals: Updates    Progress made toward(s) clinical / shift goals:    Problem: Safety - Medical Restraint  Goal: Remains free of injury from restraints (Restraint for Interference with Medical Device)  Outcome: Progressing  Goal: Free from restraint(s) (Restraint for Interference with Medical Device)  Outcome: Progressing     Problem: Pain - Standard  Goal: Alleviation of pain or a reduction in pain to the patient’s comfort goal  Outcome: Progressing     Problem: Skin Integrity  Goal: Skin integrity is maintained or improved  Outcome: Progressing     Problem: Fall Risk  Goal: Patient will remain free from falls  Outcome: Progressing       Patient is not progressing towards the following goals:

## 2025-04-28 NOTE — PROGRESS NOTES
Cardiology Follow-up Consult Note    Date of Service:    2025      Consulting Physician: Bridger Arias M.D.    Name:   Dariel Diamond     YOB: 1965  Age:   59 y.o.  male   MRN:   [id number]      Assessment/Recommendations:    Loss of consciousness, ? Arrhythmia.  Monitor rhythm.  May need Lifevest upon discharge.  NSTEMI.  LM stent into LAD , untreated RCA.  ASA and plavix held  for bleed, but would restart today after impella removed.  HFrEF shock.  Likely now both hypovolemic from blood loss and cardiogenic as SVR 700s and CVP 6.  Although on pressors, not able to control bleed from left groin.   Consult vascular surgery to remove impella and control  left groin bleed  Once done restart ASA and plavix today   Stop hydralazine and isordil  Continue with  and can increase to get of norepi  A. Fib  sustained.  Heparin held for bleed.  Continue amiodarone IV should improve with blood.   5.  Renal insufficiency, sp transplant on immunosuppression.  Cr 2.75    Critically ill, discussed plan with son.     Patient Identifier/Subjective:59 year old man with hx VT, recovered HFrEF 20-50%, ICM, CAD, p. Afib, DM, ESRD sp renal transplant 2023, admitted for loss of consciousness.     Interim History: Intubated and sedated.  Significant bleed from left groin site where Impella is.  Still soaking dressing.  Lab down so not sure where hgb drop to.  Hemodynamics 6 am CI 1.9, SVR in 700s, CVP 6.  Went into sustained a. Fib now on amiodarone gtt.     Heparin gtt stopped, ASA and plavix held  for bleed.  Did receive isordil and hydralazine last night held this am.       2.5 mcg/kg/min  Noepi  Amiodarone gtt    Pertinent Labs/Studies:  Hgb 10.3 at midnight  Cr 2.75    Echo 25:  EF 15%    Cath 25:   HEMODYNAMICS:  Aortic pressure: 97 /55/70 mmHg  LVEDP: 40 mmHg  No significant aortic gradient on pullback        CORONARY ANGIOGRAPHY:  The left main coronary artery: Large-caliber  vessel with severe 80% distal stenosis, gives rise to an LAD and left circumflex 3 x 32 AMINA into LAD  The left anterior descending coronary artery: Large-caliber transapical vessel that tapers into a small in caliber distal and apical LAD.  Gives rise to two medium caliber diagonal branches with severe proximal/mid disease.  The LAD appears to be a diffusely diseased vessel with significant atherosclerosis most notably throughout its ostium/proximal and part of the midportion status post PCI as detailed below.  The left circumflex coronary artery: Large-caliber vessel that gives rise to 2 medium-large OM branches.  OM1 has severe diffuse disease, OM 2 had at least diffuse moderate disease.  The right coronary artery: Large-caliber dominant vessel with severe 80-90% proximal disease, 80% mid disease and diffuse nonobstructive disease throughout its distal portion into the PDA. To be addressed     IMPELLA PLACEMENT:  The left groin was prepped and draped in the usual sterile fashion. After fluoroscopic localization a single front wall puncture was used to place a 6 [place] Newton sheath in the left common femoral artery which was then angiographically confirmed. A heavyweight wire was then used to exchange over sequential dilators for the Implla sheath. Following this 6 [place] pigtail catheter catheter was used to cross the aortic valve. The Impella wire was then passed through this catheter and looped without difficulty at the apex of the LV. The crossing catheter was backed out over the wire. The Impella was then positioned over this wire into the LV under direct fluoroscopic guidance. Impella placement, and positioning were good. Flows were recorded and found to be acceptable. At the conclusion of the procedure the placement sheath was then exchanged for the positioning sheath without difficulty in the usual fashion. At the conclusion of the procedure there was no hematoma noted.        Telemetry Reviewed  (personally reviewed) atrial fib 120s-130s    All other review of systems reviewed and negative.    Past medical, surgical, social, and family history reviewed and unchanged from admission except as noted in assessment and plan.    Medications Prior to Admission   Medication Sig Dispense Refill Last Dose/Taking    apixaban (ELIQUIS) 5mg Tab Take 5 mg by mouth 2 times a day.   4/25/2025 Morning    amLODIPine (NORVASC) 10 MG Tab Take 10 mg by mouth every day.   4/25/2025 Morning    cloNIDine (CATAPRES) 0.1 MG/24HR PATCH WEEKLY patch Place 1 Patch on the skin every Saturday.   4/19/2025    furosemide (LASIX) 80 MG Tab Take 40 mg by mouth 1 time a day as needed (If needed for weight gain > 2).   4/24/2025 Evening    gabapentin (NEURONTIN) 100 MG Cap Take 200 mg by mouth every day.   4/24/2025 Evening    Insulin Aspart PenFill 100 UNIT/ML Solution Cartridge Inject 14 Units under the skin 3 times a day before meals.   4/24/2025 Evening    losartan (COZAAR) 100 MG Tab Take 100 mg by mouth every day.   4/25/2025 Morning    metoprolol SR (TOPROL XL) 50 MG TABLET SR 24 HR Take 50 mg by mouth every day.   4/24/2025 Evening    mycophenolate (CELLCEPT) 250 MG Cap Take 750 mg by mouth 2 times a day.   4/25/2025 Morning    polyethylene glycol 3350 (MIRALAX) 17 GM/SCOOP Powder Take 17 g by mouth 2 times a day.   4/25/2025 Morning    rosuvastatin (CRESTOR) 40 MG tablet Take 40 mg by mouth every day.   4/25/2025 Morning    SPS, SODIUM POLYSTYRENE SULF, 15 GM/60ML Suspension Take 15 g by mouth see administration instructions. Takes on Tuesday, Thursday, and Sunday 4/24/2025    tacrolimus (PROGRAF) 0.5 MG Cap Take 0.5-1 mg by mouth 2 times a day. 0.5 mg in the AM  1 mg in the PM   4/25/2025 Morning    insulin glargine 100 UNIT/ML Solution Pen-injector injection Inject 10 Units under the skin every evening.   4/24/2025 Evening    tamsulosin (FLOMAX) 0.4 MG capsule Take 0.4 mg by mouth every day.   4/24/2025 Evening    multivitamin Tab  Take 1 Tablet by mouth every day.   4/24/2025 Evening    docusate sodium (COLACE) 100 MG Cap Take 100 mg by mouth 2 times a day.   4/25/2025 Morning    aspirin EC 81 MG EC tablet Take 1 Tab by mouth every day. 30 Tab 2 4/24/2025 Noon       Inpatient Medications Reviewed    Allergies   Allergen Reactions    Contrast Media With Iodine [Iodine] Rash and Itching     Per patient         Intake/Output Summary (Last 24 hours) at 4/28/2025 0830  Last data filed at 4/28/2025 0800  Gross per 24 hour   Intake 4551.05 ml   Output 1790 ml   Net 2761.05 ml        Physical Exam  Body mass index is 29.96 kg/m².  /50   Pulse (!) 132   Temp 36.9 °C (98.4 °F)   Resp 14   Ht 1.829 m (6')   Wt 100 kg (220 lb 14.4 oz)   SpO2 99%   Vitals:    04/28/25 0635 04/28/25 0645 04/28/25 0700 04/28/25 0715   BP:   110/50    Pulse: (!) 122 (!) 124 (!) 128 (!) 132   Resp: 14 14 15 14   Temp:       TempSrc:       SpO2: 99%      Weight:       Height:         Oxygen Therapy:  Pulse Oximetry: 99 %, O2 Delivery Device: Ventilator    Physical Exam  Constitutional:       Appearance: He is obese.      Interventions: He is intubated.   Neck:      Vascular: No JVD.   Cardiovascular:      Rate and Rhythm: Tachycardia present. Rhythm irregular.      Pulses: Normal pulses and intact distal pulses.      Heart sounds: S1 normal and S2 normal. Heart sounds are distant. No murmur heard.     No friction rub. No S3 or S4 sounds.   Pulmonary:      Effort: Pulmonary effort is normal. No respiratory distress. He is intubated.      Breath sounds: Normal breath sounds. No wheezing or rales.   Skin:     General: Skin is cool and dry.      Comments: Left groin impella with blood soaked dressing.   Neurological:      Mental Status: He is alert.         Labs (personally reviewed and notable for):   Lab Results   Component Value Date/Time    SODIUM 134 (L) 04/28/2025 02:20 AM    POTASSIUM 3.6 04/28/2025 02:20 AM    CHLORIDE 98 04/28/2025 02:20 AM    CO2 21  04/28/2025 02:20 AM    GLUCOSE 183 (H) 04/28/2025 02:20 AM    BUN 53 (H) 04/28/2025 02:20 AM    CREATININE 2.75 (H) 04/28/2025 02:20 AM      Lab Results   Component Value Date/Time    WBC 13.7 (H) 04/28/2025 12:39 AM    RBC 3.66 (L) 04/28/2025 12:39 AM    HEMOGLOBIN 10.3 (L) 04/28/2025 12:39 AM    HEMATOCRIT 31.9 (L) 04/28/2025 12:39 AM    MCV 87.2 04/28/2025 12:39 AM    MCH 28.1 04/28/2025 12:39 AM    MCHC 32.3 04/28/2025 12:39 AM    MPV 11.4 04/28/2025 12:39 AM    NEUTSPOLYS 83.60 (H) 04/25/2025 12:01 PM    LYMPHOCYTES 10.60 (L) 04/25/2025 12:01 PM    MONOCYTES 4.70 04/25/2025 12:01 PM    EOSINOPHILS 0.30 04/25/2025 12:01 PM    BASOPHILS 0.40 04/25/2025 12:01 PM      Lab Results   Component Value Date/Time    CHOLSTRLTOT 72 (L) 04/26/2025 03:10 AM    LDL 32 04/26/2025 03:10 AM    HDL 29 (A) 04/26/2025 03:10 AM    TRIGLYCERIDE 56 04/26/2025 03:10 AM       Lab Results   Component Value Date/Time    TROPONINT 157 (H) 04/25/2025 1319     Lab Results   Component Value Date/Time    NTPROBNP 5387 (H) 04/25/2025 1945       I personally reviewed all blood test results, EKG tracings and images of his cardiac testings.       Michelle Silva MD  Cardiologist, [facility]    Please note that this dictation was created using voice recognition software. I have made every reasonable attempt to correct obvious errors, but it is possible there are errors of grammar and possibly content that I did not discover before finalizing the note.

## 2025-04-28 NOTE — PROGRESS NOTES
Notified MD of intractable significant oozing from Impella site. Directed to check H&H in 4 hours. Subsequently, pt's HR increased to 120s and BP dropped to MAP in 50s, so notified MD and received ok to recheck CBC now. Hgb came back 10.3, no significant drop from previous levels. Notified MD that pt is now in and out of a fib, causing his pressures to swing and necessitating restarting levo.     Pt c/o R arm and neck tingling (communicated to son via writing). Notified MD and obtained EKG. EKG showed a fib, otherwise unremarkable. Gave pt 1 mg Dilaudid for CPOT.

## 2025-04-28 NOTE — PROGRESS NOTES
"[facility] -  PROGRESS NOTE    Date & Time:   4/28/2025  3:02 PM    Author:   Rolando Hackett D.O.      REASON FOR CONSULTATION:   - CAMPBELL/Management of acute and chronic conditions related to Nephrology      CHIEF COMPLAINT:   -  \"Chest pain  \"      HISTORY OF PRESENT ILLNESS:    59Y  M w hx of ESRD s/p Renal Transplant (DDKT at [facility] on 4/3/2023), CAD, ICM, paroxysmal A-fib presented with chest pain and syncope on 4/25. Pt found to have NSTEMI and taken to cath lab.   Pt had PCI done to circumflex with impella support. Pt also suffered PEA arrest during cath lab with ROSC achieved.   Pt in ICU and Cr lucas from 1.1->1.8 since admission. Poor UOP noted and ICU team concerned for worsening volume overload/cardiorenal syndrome.   Pt currently intubated    DAILY NEPHROLOGY SUMMARY:  4/26: consult done, pt had HD  4/27: pt tolerated HD yesterday via AVF. I/O 2500/3520. On minimal vent settings. BP not requiring any pressor support. Son at bedside. Pt on abx due to gram+ bacteremia. Immunosuppression on hold.  4/28: Pt remains intubated this AM. To have impella removed. 1815cc UOP noted with diuretics.      REVIEW OF SYSTEMS:    UTO due to intubation      PAST MEDICAL HISTORY:   Past Medical History:   Diagnosis Date    Depression     Dilated cardiomyopathy (HCC)     Hyperlipidemia     Hypertension     Type II or unspecified type diabetes mellitus without mention of complication, not stated as uncontrolled           PAST SURGICAL HISTORY:   Past Surgical History:   Procedure Laterality Date    TOE AMPUTATION Left 8/23/2018    Procedure: TOE AMPUTATION L 2ND TOE;  Surgeon: Luiz Ma M.D.;  Location: [facility];  Service: Orthopedics    AV FISTULA CREATION  5/1/2014    Performed by Beau Cowart M.D. at [facility]          FAMILY HISTORY:   Family History   Problem Relation Age of Onset    Hypertension Mother     Other Father         DM       SOCIAL " HISTORY:   Social History     Tobacco Use    Smoking status: Never    Smokeless tobacco: Never   Substance Use Topics    Alcohol use: No    Drug use: No            HOME MEDICATIONS: reviewed  Home Medications    Medication Sig Taking? Last Dose Authorizing Provider   apixaban (ELIQUIS) 5mg Tab Take 5 mg by mouth 2 times a day. Yes 4/25/2025 Morning Physician Outpatient   amLODIPine (NORVASC) 10 MG Tab Take 10 mg by mouth every day. Yes 4/25/2025 Morning Physician Outpatient   cloNIDine (CATAPRES) 0.1 MG/24HR PATCH WEEKLY patch Place 1 Patch on the skin every Saturday. Yes 4/19/2025 Physician Outpatient   furosemide (LASIX) 80 MG Tab Take 40 mg by mouth 1 time a day as needed (If needed for weight gain > 2). Yes 4/24/2025 Evening Physician Outpatient   gabapentin (NEURONTIN) 100 MG Cap Take 200 mg by mouth every day. Yes 4/24/2025 Evening Physician Outpatient   Insulin Aspart PenFill 100 UNIT/ML Solution Cartridge Inject 14 Units under the skin 3 times a day before meals. Yes 4/24/2025 Evening Physician Outpatient   losartan (COZAAR) 100 MG Tab Take 100 mg by mouth every day. Yes 4/25/2025 Morning Physician Outpatient   metoprolol SR (TOPROL XL) 50 MG TABLET SR 24 HR Take 50 mg by mouth every day. Yes 4/24/2025 Evening Physician Outpatient   mycophenolate (CELLCEPT) 250 MG Cap Take 750 mg by mouth 2 times a day. Yes 4/25/2025 Morning Physician Outpatient   polyethylene glycol 3350 (MIRALAX) 17 GM/SCOOP Powder Take 17 g by mouth 2 times a day. Yes 4/25/2025 Morning Physician Outpatient   rosuvastatin (CRESTOR) 40 MG tablet Take 40 mg by mouth every day. Yes 4/25/2025 Morning Physician Outpatient   SPS, SODIUM POLYSTYRENE SULF, 15 GM/60ML Suspension Take 15 g by mouth see administration instructions. Takes on Tuesday, Thursday, and Sunday Yes 4/24/2025 Physician Outpatient   tacrolimus (PROGRAF) 0.5 MG Cap Take 0.5-1 mg by mouth 2 times a day. 0.5 mg in the AM  1 mg in the PM Yes 4/25/2025 Morning Physician Outpatient    insulin glargine 100 UNIT/ML Solution Pen-injector injection Inject 10 Units under the skin every evening. Yes 4/24/2025 Evening Physician Outpatient   tamsulosin (FLOMAX) 0.4 MG capsule Take 0.4 mg by mouth every day. Yes 4/24/2025 Evening Physician Outpatient   multivitamin Tab Take 1 Tablet by mouth every day. Yes 4/24/2025 Evening Physician Outpatient   docusate sodium (COLACE) 100 MG Cap Take 100 mg by mouth 2 times a day. Yes 4/25/2025 Morning Physician Outpatient   aspirin EC 81 MG EC tablet Take 1 Tab by mouth every day. Yes 4/24/2025 Delmi Quiroz M.D.       ALLERGIES:  Contrast media with iodine [iodine]      VITAL SIGNS:  BP (!) 171/64   Pulse (!) 114   Temp 36.7 °C (98.1 °F) (Core)   Resp 15   Ht 1.829 m (6')   Wt 100 kg (220 lb 14.4 oz)   SpO2 100%   BMI 29.96 kg/m²     Physical Exam  Constitutional:       General: He is not in acute distress.  HENT:      Head: Normocephalic and atraumatic.      Mouth/Throat:      Comments: ET tube in place  Cardiovascular:      Rate and Rhythm: Normal rate and regular rhythm.   Pulmonary:      Comments: Ventilator breath sounds  Abdominal:      General: There is no distension.      Palpations: Abdomen is soft.   Musculoskeletal:      Right lower leg: Edema present.      Left lower leg: Edema present.   Neurological:      General: No focal deficit present.      Mental Status: He is alert.       Access: L AVF with good thrill and bruit      FLUID BALANCE:  In: 4755.2 [I.V.:3154.2; Other:90]  Out: 1815       LABS:  Recent Labs     04/27/25  1726 04/28/25  0220 04/28/25  1345   SODIUM 137 134* 137   POTASSIUM 3.8 3.6 3.9   CHLORIDE 99 98 101   CO2 24 21 23   GLUCOSE 159* 183* 137*   BUN 46* 53* 54*   CREATININE 2.61* 2.75* 2.94*   CALCIUM 8.9 8.4* 8.8      Recent Labs     04/27/25  1726 04/28/25  0220 04/28/25  1345   SODIUM 137 134* 137   POTASSIUM 3.8 3.6 3.9   CHLORIDE 99 98 101   CO2 24 21 23   GLUCOSE 159* 183* 137*   BUN 46* 53* 54*   CREATININE 2.61* 2.75*  2.94*          IMAGING:  - Imaging studies reviewed      ASSESSMENTS:    -CAMPBELL, likely 2/2 cardiorenal syndrome  -Fluid overload, improved  -Acidosis  -Hyperkalemia  -Hx of Renal Transplant      [facility] DDKT on 4/5/2023    Mycophenolate 750/750    Tacrolimus 0.5 AM and 1 mg PM (per UC 3/10/25 note)  -Gram Positive Bacteremia/Sepsis     Abx per primary team     Immunosuppression on hold  -NSTEMI      s/p PCI with impella support     On dobutamine  -s/p PEA arrest in cath lab  -Hx of HFrEF     EF recovered 20->50%  -CAD  -Paroxysmal A-fib      PLAN    -Cr worse, but no compeling indication for HD today  -cont lasix 100mg IV BID  -dose all meds for GFR< 15  -agree with abx for bacteremia, immunosuppression on hold due to severe infection  -will consider restarting IS if repeat cultures remain negative  -appreciate ID recommendations for abx  -keep strict I/O  -appreciate ICU and cardiology mgmt of other issues    Please page nephrology with any questions or concerns    Rolando Hackett DO  Nephrologist   [facility]  [phone]

## 2025-04-28 NOTE — ANESTHESIA PREPROCEDURE EVALUATION
Case: [id number] Anesthesia Start Date/Time: 04/28/25 1125    Procedure: REMOVAL, CARDIAC ASSIST DEVICE, IMPELLA (Groin)    Anesthesia type: general    Location: [place] [id number] / SURGERY [facility]    Surgeons: Kapil Dobson M.D.            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 Bleeding at impella site, transfused, stbale on P2, plan to remove impella.     Physical Exam    Airway - unable to assess       Cardiovascular - normal exam  Rhythm: irregular  Rate: abnormal  (-) murmur     Dental - normal exam           Pulmonary - normal exam  Breath sounds clear to auscultation  Comments: intubated   Abdominal   (+) obese     Neurological - sedated/unconcious                   Anesthesia Plan    ASA 4   ASA physical status 4 criteria: ventilator dependence, uncorrected/decompensated heart disease, severe reduction of ejection fractions, ESRD not undergoing regularly scheduled dialysis and MI - recent (< 3 months)    Plan - general       Airway plan will be ETT          Induction: intravenous    Postoperative Plan: Postoperative administration of opioids is intended.    Pertinent diagnostic labs and testing reviewed    Informed Consent:    Anesthetic plan and risks discussed with healthcare power of .    Use of blood products discussed with: whom consented to blood  products.

## 2025-04-28 NOTE — DIETARY
Nutrition Services Brief Update:    Problem: Nutritional:  Goal: Nutrition support tolerated and meeting greater than 85% of estimated needs  Outcome: Met    TF Vital AF 1.2 advanced to goal rate 70 mL/hr this a.m.  Last BM 4/25. Senna-docusate and miralax given yesterday. Abdomen documented as soft, rounded. Levophed 0.35 mcg/kg/min, dobutamine at 2.5 mcg/kg/min. Monitor for s/sx of TF intolerance. Off floor to OR for removal of impella. HD 4/26, held yesterday. RD monitoring.      RD continues to follow.

## 2025-04-28 NOTE — PROGRESS NOTES
Bronson almanza for discussion with patient's son Husam regarding surgical removal of impella. Labs and cultures sent. Bilateral pedal pulses dopplered. Neurovascular intact.

## 2025-04-28 NOTE — ANESTHESIA TIME REPORT
Anesthesia Start and Stop Event Times       Date Time Event    4/28/2025 1120 Ready for Procedure     1125 Anesthesia Start     1242 Anesthesia Stop          Responsible Staff  04/28/25      Name Role Begin End    Sharla Ward D.O. Anesth 1125 1242          Overtime Reason:  no overtime (within assigned shift)    Comments:

## 2025-04-28 NOTE — PROGRESS NOTES
0824  P5 Support  CI/CO 2.3/4.9  SVR 1124  PA 39/20  CVP 10  Gtts Dobutamine @ 2.5mcg/kg/min  SEEMA 2.7/5.8    1200  P5 Support  CI/CO 2.4/5.2  SVR 1065  PA 43/20  CVP 7  Gtts Dobutamine @ 2.5mcg/kg/min    1600 P4 Support  CI/CO 3.0/6.5    PA 38/18  CVP 8  Gtts Dobutamine @ 2.5mcg/kg/min  Weaned to P3 support per Dr Choe

## 2025-04-28 NOTE — DISCHARGE PLANNING
Case Management Discharge Planning    Admission Date: 4/25/2025  GMLOS: 5.1  ALOS: 3    6-Clicks ADL Score: 6  6-Clicks Mobility Score: 6    Anticipated Discharge Dispo: Discharge Disposition: Disch to a long term care facility (63)    Action(s) Taken:   Referral sent to [facility].    Medically Clear: No    Next Steps:   Follow up on referral.    Barriers to Discharge: Medical clearance and Pending Procedures

## 2025-04-28 NOTE — PROGRESS NOTES
Notified MD that lab is unable to run samples at this time (per convo with lab, reason unknown) and that respiratory unable to run on I Stat. MD ordered unit of PRBCs based on clinical exam.

## 2025-04-28 NOTE — CONSULTS
[facility] INFECTIOUS DISEASES INPATIENT CONSULT NOTE     Date of Service: 4/28/2025    Consult Requested By: Bridger Arias M.D.    Reason for Consultation: Bacteremia    Chief Complaint: Syncope and chest pain    History of Present Illness:   Dariel Diamond is a 59 y.o. male admitted on 04/25/2025 with pertinent medical history of HFrEF, CAD nonobstructive, paroxysmal atrial fibrillation on Eliquis, type 2 diabetes mellitus on insulin and end-stage renal disease s/p transplant 04/2023 on immunosuppressive agents -mycophenolate and tacrolimus presenting with chest pain and syncopal episode was found to have NSTEMI and underwent cardiac procedure during which he sustained PEA cardiac arrest for which he was intubated. PCI to LAD with PTCA of circumflex was done and was initially placed on Impella ventricular assist device, which was removed on 04/28 due to profuse bleeding. Currently, he is on ventilatory support and requiring vasopressor- levophed.  He was found to have gram-positive bacteremia with Streptococcus salivarius/vestibularis, for which ID was consulted.     Review of Systems:  Patient is intubated and alert, able to communicate through nonverbal means with the help of his son.  When asked about chest pain, shortness of breath or any other acute issues, he mentioned having mild abdominal pain/discomfort on periumbilical area and denies having any other issues.     Past Medical History:   Diagnosis Date    Depression     Dilated cardiomyopathy (HCC)     Hyperlipidemia     Hypertension     Type II or unspecified type diabetes mellitus without mention of complication, not stated as uncontrolled        Past Surgical History:   Procedure Laterality Date    TOE AMPUTATION Left 8/23/2018    Procedure: TOE AMPUTATION L 2ND TOE;  Surgeon: Luiz Ma M.D.;  Location: [facility];  Service: Orthopedics    AV FISTULA CREATION  5/1/2014    Performed by Beau Cowart M.D. at [facility]        Family History   Problem Relation Age of Onset    Hypertension Mother     Other Father         DM       Social History     Socioeconomic History    Marital status:      Spouse name: Not on file    Number of children: Not on file    Years of education: Not on file    Highest education level: Not on file   Occupational History    Not on file   Tobacco Use    Smoking status: Never    Smokeless tobacco: Never   Substance and Sexual Activity    Alcohol use: No    Drug use: No    Sexual activity: Not on file   Other Topics Concern    Not on file   Social History Narrative    Not on file     Social Drivers of Health     Financial Resource Strain: Not on File (8/24/2019)    Received from [facility]    Financial Resource Strain     Financial Resource Strain: 0   Food Insecurity: Patient Unable To Answer (4/25/2025)    Hunger Vital Sign     Worried About Running Out of Food in the Last Year: Patient unable to answer     Ran Out of Food in the Last Year: Patient unable to answer   Transportation Needs: Patient Unable To Answer (4/25/2025)    PRAPARE - Transportation     Lack of Transportation (Medical): Patient unable to answer     Lack of Transportation (Non-Medical): Patient unable to answer   Physical Activity: Not on File (8/24/2019)    Received from [facility]    Physical Activity     Physical Activity: 0   Stress: Not on File (8/24/2019)    Received from [facility]    Stress     Stress: 0   Social Connections: Not on File (8/24/2019)    Received from [facility]    Social Connections     Social Connections and Isolation: 0   Intimate Partner Violence: Patient Unable To Answer (4/25/2025)    Humiliation, Afraid, Rape, and Kick questionnaire     Fear of Current or Ex-Partner: Patient unable to answer     Emotionally Abused: Patient unable to answer     Physically Abused: Patient unable to answer     Sexually Abused: Patient unable to answer   Housing Stability: Patient Unable To Answer (4/25/2025)     Housing Stability Vital Sign     Unable to Pay for Housing in the Last Year: Patient unable to answer     Number of Times Moved in the Last Year: 0     Homeless in the Last Year: Patient unable to answer       Allergies   Allergen Reactions    Contrast Media With Iodine [Iodine] Rash and Itching     Per patient       Medications:    Current Facility-Administered Medications:     magnesium sulfate IVPB premix 2 g, 2 g, Intravenous, Once, Bridger Arias M.D., Stopped at 04/28/25 1028    ampicillin/sulbactam (Unasyn) 3 g in  mL IVPB, 3 g, Intravenous, Q6HRS, Cresencio Pratt M.D., Stopped at 04/28/25 0635    isosorbide dinitrate (Isordil) tablet 10 mg, 10 mg, Enteral Tube, TID, David Choe M.D., 10 mg at 04/27/25 1746    furosemide (Lasix) injection 100 mg, 100 mg, Intravenous, BID, Rolando Hackett D.OEdilberto, 100 mg at 04/28/25 0554    hydrALAZINE (Apresoline) tablet 25 mg, 25 mg, Enteral Tube, Q8HRS, David Choe M.D., 25 mg at 04/27/25 2230    sodium bicarbonate 8.4 % 25 mEq in dextrose 5% 1,000 mL infusion (Impella Purge Solution), 25 mEq, Other, Continuous, David Choe M.D., Last Rate: 14 mL/hr at 04/28/25 0000, Rate Verify at 04/28/25 0000    famotidine (Pepcid) tablet 20 mg, 20 mg, Enteral Tube, DAILY, 20 mg at 04/28/25 0551 **OR** famotidine (Pepcid) injection 20 mg, 20 mg, Intravenous, DAILY, Cresencio Pratt M.D.    insulin regular (HumuLIN R/NovoLIN R) 100 Units in  mL infusion premix, 0-29 Units/hr, Intravenous, Continuous, Ken Sims D.OEdilberto, Last Rate: 5 mL/hr at 04/28/25 0931, 5 Units/hr at 04/28/25 0931    dextrose 50 % (D50W) injection 12.5-25 g, 12.5-25 g, Intravenous, PRN, Ken Sims D.O.    aspirin (Asa) chewable tab 81 mg, 81 mg, Enteral Tube, DAILY, Cresencio Pratt M.D., 81 mg at 04/27/25 0511    clopidogrel (Plavix) tablet 75 mg, 75 mg, Enteral Tube, DAILY, Cresencio Pratt M.D., 75 mg at 04/27/25 0511    senna-docusate (Pericolace Or Senokot S) 8.6-50 MG per tablet 2  Tablet, 2 Tablet, Enteral Tube, Q EVENING, 2 Tablet at 04/27/25 1746 **AND** polyethylene glycol/lytes (Miralax) Packet 1 Packet, 1 Packet, Enteral Tube, QDAY PRN, Cresencio Pratt M.D., 1 Packet at 04/27/25 1840    acetaminophen (Tylenol) tablet 650 mg, 650 mg, Enteral Tube, Q6HRS PRN, Cresencio Pratt M.D.    ondansetron (Zofran ODT) dispertab 4 mg, 4 mg, Enteral Tube, Q4HRS PRN, Cresencio Pratt M.D.    Pharmacy Consult: Enteral tube insertion - review meds/change route/product selection, , Other, PHARMACY TO DOSE, Cresencio Pratt M.D.    thiamine (B-1) injection 200 mg, 200 mg, Intravenous, BID, Cresencio Pratt M.D., 200 mg at 04/28/25 0600    NS (Bolus) 0.9 % infusion 100 mL, 100 mL, Intravenous, DIALYSIS PRN, Rolando Hackett D.O.    hydrALAZINE (Apresoline) injection 10 mg, 10 mg, Intravenous, Q4HRS PRN, Frederick Edwards M.D.    ondansetron (Zofran) syringe/vial injection 4 mg, 4 mg, Intravenous, Q4HRS PRN, Frederick Edwards M.D., 4 mg at 04/27/25 0948    [Held by provider] gabapentin (Neurontin) capsule 200 mg, 200 mg, Oral, DAILY, Frederick Edwards M.D.    rosuvastatin (Crestor) tablet 40 mg, 40 mg, Enteral Tube, DAILY, Frederick Edwards M.D., 40 mg at 04/28/25 0550    dexmedetomidine (Precedex) 400 MCG/100ML infusion, 0.1-1.5 mcg/kg/hr (Ideal), Intravenous, Continuous, Daniel Frost M.D., Last Rate: 29.1 mL/hr at 04/28/25 0741, 1.5 mcg/kg/hr at 04/28/25 0741    heparin infusion 25,000 Units in 500 mL 0.45% NACL, 0-1,000 Units/hr, Intravenous, Continuous, Stopped. (Insulin or Heparin) at 04/28/25 0404 **AND** [DISCONTINUED] heparin 25 units/mL in 500mL D5W infusion (for use with IMPELLA Pump),  Units/hr, Other, Continuous, Stopped at 04/27/25 1414 **AND** [CANCELED] Notify Provider and Pharmacy if heparin dose is LESS than the amount being delivered in purge solution alone., , , CONTINUOUS, Angel Blanco M.D.    DOBUTamine (Dobutrex) 1 mg/1 mL premix infusion, 2.5 mcg/kg/min  (Ideal), Intravenous, Continuous, Daniel Frost M.D., Last Rate: 11.6 mL/hr at 25 0739, 2.5 mcg/kg/min at 25 0739    norepinephrine (Levophed) 8 mg in 250 mL NS infusion (premix), 0-1 mcg/kg/min (Ideal), Intravenous, Continuous, Cresencio Pratt M.D., Last Rate: 50.9 mL/hr at 25 0740, 0.35 mcg/kg/min at 25 0740    [COMPLETED] amiodarone (Nexterone) 360 mg/200 mL infusion, 1 mg/min, Intravenous, Once, Stopped at 25 0148 **FOLLOWED BY** amiodarone (Nexterone) 360 mg/200 mL infusion, 0.5 mg/min, Intravenous, Continuous, Daniel Frost M.D., Last Rate: 16.7 mL/hr at 25 0738, 0.5 mg/min at 25 0738    Respiratory Therapy Consult, , Nebulization, Continuous RT, Daniel Frost M.D. MD Alert...ICU Electrolyte Replacement per Pharmacy, , Other, PHARMACY TO DOSE, Daniel Frost M.D.    lidocaine (Xylocaine) 1 % injection 2 mL, 2 mL, Tracheal Tube, Q30 MIN PRN, Daniel Frost M.D.    HYDROmorphone (Dilaudid) injection 0.5 mg, 0.5 mg, Intravenous, Q HOUR PRN, 0.5 mg at 25 2231 **OR** HYDROmorphone (Dilaudid) injection 1 mg, 1 mg, Intravenous, Q HOUR PRN, Daniel Frost M.D., 1 mg at 25 0501    Physical Exam:   Vital Signs: /57   Pulse (!) 122   Temp 36.9 °C (98.4 °F)   Resp 14   Ht 1.829 m (6')   Wt 100 kg (220 lb 14.4 oz)   SpO2 99%   BMI 29.96 kg/m²   Temp  Av.3 °C (99.2 °F)  Min: 36.2 °C (97.2 °F)  Max: 38.6 °C (101.5 °F)  Vital signs reviewed    Constitutional: Patient is intubated and alert, follows command and communicate with the help of his son via nonverbal means.  Appears well-developed and well-nourished. No distress  Head: suture placed on anterior forehead, healing wound with no discharge,  normocephalic  Eyes: Conjunctivae normal  Mouth/Throat: no oral lesions, ET tube in situ   Cardiovascular: regular rate and rhythm  Pulmonary/Chest: No respiratory distress. Unlabored respiratory effort.  Abdominal: Non distended  Musculoskeletal: No  joint tenderness, swelling, erythema, or restriction of motion noted.  No lower extremity edema.  Neurological: Alert and follows command.  Skin: Skin is warm and dry. No rashes or embolic phenomena noted on exposed skin  Psychiatric: Normal mood and affect. Behavior is normal.     Devices In Places ECG lead, Blood Pressure Cuff, Pulse Ox, Rodríguez, Arterial Line, SCD's, ET Tube, OG/NG, Impella, Leg Immobilizer, and Central Line,  Also has L AVF.    LABS:  Recent Labs     04/27/25  0508 04/28/25  0039 04/28/25  0426   WBC 18.6* 13.7* 12.5*      Recent Labs     04/27/25  0508 04/27/25  0746 04/27/25  1734 04/28/25  0039 04/28/25  0426   HEMOGLOBIN 12.9*   < > 11.0* 10.3* 8.9*   HEMATOCRIT 38.4*   < > 33.4* 31.9* 27.2*   MCV 83.8  --   --  87.2 87.7   MCH 28.2  --   --  28.1 28.7   PLATELETCT 141*  --   --  103* 109*    < > = values in this interval not displayed.       Recent Labs     04/27/25  1300 04/27/25  1726 04/28/25  0220   SODIUM 135 137 134*   POTASSIUM 3.7 3.8 3.6   CHLORIDE 98 99 98   CO2 22 24 21   CREATININE 2.49* 2.61* 2.75*        Recent Labs     04/25/25  1945 04/26/25  0310 04/27/25  0508   ALBUMIN 3.5 3.2 3.1*        MICRO:  Blood Culture   Date Value Ref Range Status   08/22/2018 No growth after 5 days of incubation.  Final   08/22/2018 No growth after 5 days of incubation.  Final        Latest pertinent labs were reviewed    IMAGING STUDIES:  Per my read, chest x-ray showed mild improvement in aeration of left lung base.    Hospital Course/Assessment:   This is a 59 y.o. male with a history of HFrEF, CAD nonobstructive, paroxysmal atrial fibrillation on Eliquis, type 2 diabetes mellitus on insulin and end-stage renal disease s/p transplant 04/2023 on immunosuppressive agents -mycophenolate and tacrolimus  presenting with syncope and chest pain was found to have NSTEMI who underwent cardiac procedure during which he was intubated for PEA cardiac arrest, had PCI to LAD with PTCA of circumflex and  impella placed.    He was found to have gram-positive bacteremia. He received vancomycin 1 dose and then was switched to unasyn.     Pertinent Diagnoses:  - Insulin-dependent type 2 diabetes mellitus  -End-stage renal disease s/p renal transplant on immunosuppression (mycophenolate 250 mg BID and Tacrolimus 0.5mg AM and 1mg PM)  which is currently on hold  - NSTEMI s/p cardiac cath, placement of impella, to be removed   - Gram-positive bacteremia (Streptococcus salivarius/vestibularis), likely due to mucosal trauma, secondary to procedure such as intubation and feeding tube in setting of immunocompromised state  -leukocytosis, stable, likely reactive   -CAMPBELL, worsening, likely 2/2 cardiorenal syndrome   -HFrEF, EF 15%  -Shock most likely cardiogenic and hypovolemic (d/t acute blood loss from impella site); cannot rule out septic shock     Plan:   - Continue Unasyn 3gm every 6 hours (Day 2)  - Follow Repeat blood culture report   - Procalcitonin tomorrow morning     Plan discussed with primary team Resident.     Please feel free to call with questions.    This illness poses a threat to the patient's life or bodily function.    We will continue to follow.     Dennis Stanley M.D.    Please note that this dictation was created using voice recognition software. I have worked with technical experts from [facility]  [employer] to optimize the interface.  I have made every reasonable attempt to correct obvious errors, but there may be errors of grammar and possibly content that I did not discover before finalizing the note.

## 2025-04-28 NOTE — PROGRESS NOTES
1U prbc tramsfused. Hemodynamics improved, slowly weaning down levophed. CVP 7, MAPS 65-70. Bleed to impella groin site slowed. Impella P2. CI/CO 2.5/5.5.

## 2025-04-28 NOTE — PROGRESS NOTES
Critical Care Progress Note    Date of admission  4/25/2025    Chief Complaint  59 y.o. male admitted 4/25/2025 with history of hypertension, hyperlipidemia, HFimpEF (most recent 50%), paroxysmal atrial fibrillation on apixaban, ESRD s/p DDKT 4/2023, who presented with chest pain, dyspnea and syncope this morning.  He was in his usual state of health prior to experiencing mild/moderate substernal chest pain.  About 30 minutes later he felt his vision become obscured and fell, striking his head.  He presented to the emergency department where he had a noncontrast head CT that was unrevealing.  His forehead laceration was repaired.     EKG demonstrated anterior ST elevations and dynamic EKG changes.  Given concern for malignant dysrhythmia he proceeded to the cath lab.     In the cath lab he sustained PEA cardiac arrest for ~10-30 seconds and was intubated.  He had Impella placed and PCI to LAD with PTCA of the circumflex.  He had escalating inotrope and vasoactive requirements.  Initial CI was 3.7 on dobutamine 5, epi 0.03.  Taken from Dr Frost note.    Hospital Course  4/25 admitted post cath lab brief arrest, impella, cardiogenic shock on vent support.  4/26 ongoing cardiogenic shock, impella P5, dobutamine 2.5, iHD, low vent support, nasal bleeding     4/28-Worsening shock this AM, hemorrhagic/vasoplegic, low filling pressures, PRBCs, impella removed, DAPT restarted, BCXs with Strep species, continue unasyn, hemos and index improved with blood and volume, hold diuresis and afterload reduction, trend H/H, continue to hold heparin overnight, restart DAPT after impella removal    Interval Problem Update  Reviewed last 24 hour events:  Hypotensive this AM  Volume responsive overnight to 1 L LR  Continued bleeding from impella site, down to P1 overnight, back to P2 with hypotension    Awaiting PRBC transfusions  Heparin and DAPT currently help      Neuro:   AxO on IMV  CV:  HR AF -130s  SBP 80s/40s this AM  NE  0.35   2.5      PAC:   @ 2.5, NE 0.3, P2    CO/CI 4.1/1.9  PA 47/20  CVP 6  PCWP 16    MAP 60    Resp:   APVcmv  14/470/8/0.3  CXR left basilar airspace opacity  7.42/37.4/89/97%    GI: Last BM 4/25    I/O: +3L (+1900)  UOP: 1815    Tmax: AF  Heme: WBC 12.5    Abx:   Unasyn     Micro:  4/26-BCXs GPC x2    Endo: BG WTR    LDA: PIV< left femoral impella, RIJ nathan Mahmood, ETT  SUP: H2RA  VTE: DAPT, apix  Diet: TF        Review of Systems  Review of Systems   Unable to perform ROS: Critical illness        Vital Signs for last 24 hours   Temp:  [36.2 °C (97.2 °F)-36.7 °C (98.1 °F)] 36.2 °C (97.2 °F)  Pulse:  [] 98  Resp:  [14-27] 19  BP: ()/(47-73) 96/47  SpO2:  [73 %-100 %] 99 %    Hemodynamic parameters for last 24 hours  CVP:  [4 MM HG-11 MM HG] 5 MM HG  PCWP:  [11 MM HG-23 MM HG] 15 MM HG  CO:  [4.13-6.5] 5.3  CI:  [1.91-3.01] 2.45    Respiratory Information for the last 24 hours  Vent Mode: APVCMV  Rate (breaths/min): 14  Vt Target (mL): 470  PEEP/CPAP: 8  P Support (PS + PEEP): 5  MAP: 12  Control VTE (exp VT): 509    Physical Exam   Physical Exam  Vitals and nursing note reviewed.   Constitutional:       General: He is not in acute distress.     Appearance: He is obese. He is ill-appearing and diaphoretic.      Comments: Ill appearing male on ventilator with son at bedside   HENT:      Head:      Comments: Forehead laceration, sutured clean and dry     Mouth/Throat:      Mouth: Mucous membranes are moist.      Comments: ET in place, OG clamped  Eyes:      Pupils: Pupils are equal, round, and reactive to light.      Comments: Left upper eye lid bruising, left nasal bleeding   Cardiovascular:      Rate and Rhythm: Normal rate.      Heart sounds: No murmur heard.  Pulmonary:      Effort: No respiratory distress.      Breath sounds: No stridor. Rales present. No wheezing or rhonchi.   Abdominal:      General: There is no distension.      Palpations: There is no mass.      Tenderness:  There is no abdominal tenderness.      Hernia: No hernia is present.   Musculoskeletal:         General: No swelling or tenderness.      Comments: FAHAD AVF  Left groin with large hematoma, fresh blood soaked dressing   Skin:     General: Skin is warm.      Capillary Refill: Capillary refill takes less than 2 seconds.   Neurological:      Mental Status: He is alert.      Comments: Awake, nodding, answering questions  ALANIS no deficits     Psychiatric:         Mood and Affect: Mood normal.         Medications  Current Facility-Administered Medications   Medication Dose Route Frequency Provider Last Rate Last Admin    ampicillin/sulbactam (Unasyn) 3 g in  mL IVPB  3 g Intravenous Q6HRS Cresencio Pratt M.D.   Stopped at 04/28/25 1742    [Held by provider] isosorbide dinitrate (Isordil) tablet 10 mg  10 mg Enteral Tube TID David Choe M.D.   10 mg at 04/27/25 1746    [Held by provider] furosemide (Lasix) injection 100 mg  100 mg Intravenous BID Rolando Hackett D.OEdilberto   100 mg at 04/28/25 1651    [Held by provider] hydrALAZINE (Apresoline) tablet 25 mg  25 mg Enteral Tube Q8HRS David Choe M.D.   25 mg at 04/27/25 2230    famotidine (Pepcid) tablet 20 mg  20 mg Enteral Tube DAILY Cresencio Pratt M.D.   20 mg at 04/28/25 0551    Or    famotidine (Pepcid) injection 20 mg  20 mg Intravenous DAILY Cresencio Pratt M.D.        insulin regular (HumuLIN R/NovoLIN R) 100 Units in  mL infusion premix  0-29 Units/hr Intravenous Continuous CELIO Arthur.O. 5 mL/hr at 04/28/25 1818 5 Units/hr at 04/28/25 1818    dextrose 50 % (D50W) injection 12.5-25 g  12.5-25 g Intravenous PRN Ken Sims D.OEdilberto        aspirin (Asa) chewable tab 81 mg  81 mg Enteral Tube DAILY Cresencio Pratt M.D.   81 mg at 04/27/25 0511    clopidogrel (Plavix) tablet 75 mg  75 mg Enteral Tube DAILY Cresencio Pratt M.D.   75 mg at 04/27/25 0511    senna-docusate (Pericolace Or Senokot S) 8.6-50 MG per tablet 2 Tablet  2 Tablet Enteral Tube  Q EVENING Cresencio Pratt M.D.   2 Tablet at 04/28/25 1659    And    polyethylene glycol/lytes (Miralax) Packet 1 Packet  1 Packet Enteral Tube QDAY PRN Cresencio Pratt M.D.   1 Packet at 04/28/25 1659    acetaminophen (Tylenol) tablet 650 mg  650 mg Enteral Tube Q6HRS PRN Cresencio Pratt M.D.        ondansetron (Zofran ODT) dispertab 4 mg  4 mg Enteral Tube Q4HRS PRN Cresencio Pratt M.D.        Pharmacy Consult: Enteral tube insertion - review meds/change route/product selection   Other PHARMACY TO DOSE Cresencio Pratt M.D.        NS (Bolus) 0.9 % infusion 100 mL  100 mL Intravenous DIALYSIS PRN Rolando Hackett D.O.        [Held by provider] hydrALAZINE (Apresoline) injection 10 mg  10 mg Intravenous Q4HRS PRN Frederick Edwards M.D.        ondansetron (Zofran) syringe/vial injection 4 mg  4 mg Intravenous Q4HRS PRN Frederick Edwards M.D.   4 mg at 04/27/25 0948    [Held by provider] gabapentin (Neurontin) capsule 200 mg  200 mg Oral DAILY Frederick Edwards M.D.        rosuvastatin (Crestor) tablet 40 mg  40 mg Enteral Tube DAILY Frederick Edwards M.D.   40 mg at 04/28/25 0550    dexmedetomidine (Precedex) 400 MCG/100ML infusion  0.1-1.5 mcg/kg/hr (Ideal) Intravenous Continuous Daniel Frost M.D. 29.1 mL/hr at 04/28/25 1820 1.5 mcg/kg/hr at 04/28/25 1820    DOBUTamine (Dobutrex) 1 mg/1 mL premix infusion  2.5 mcg/kg/min (Ideal) Intravenous Continuous Daniel Frost M.D. 11.6 mL/hr at 04/28/25 1300 2.5 mcg/kg/min at 04/28/25 1300    norepinephrine (Levophed) 8 mg in 250 mL NS infusion (premix)  0-1 mcg/kg/min (Ideal) Intravenous Continuous Cresencio Pratt M.D. 21.8 mL/hr at 04/28/25 1649 0.15 mcg/kg/min at 04/28/25 1649    amiodarone (Nexterone) 360 mg/200 mL infusion  0.5 mg/min Intravenous Continuous Daniel Frost M.D. 16.7 mL/hr at 04/28/25 1650 0.5 mg/min at 04/28/25 1650    Respiratory Therapy Consult   Nebulization Continuous RT Daniel Frost M.D. MD Alert...ICU Electrolyte  Replacement per Pharmacy   Other PHARMACY TO DOSE Daniel Frost M.D.        lidocaine (Xylocaine) 1 % injection 2 mL  2 mL Tracheal Tube Q30 MIN PRN Daniel Frost M.D.        HYDROmorphone (Dilaudid) injection 0.5 mg  0.5 mg Intravenous Q HOUR PRN Daniel Frost M.D.   0.5 mg at 04/25/25 2231    Or    HYDROmorphone (Dilaudid) injection 1 mg  1 mg Intravenous Q HOUR PRN Daniel Frost M.D.   1 mg at 04/28/25 1654       Fluids    Intake/Output Summary (Last 24 hours) at 4/28/2025 1826  Last data filed at 4/28/2025 1629  Gross per 24 hour   Intake 3936.88 ml   Output 1245 ml   Net 2691.88 ml       Laboratory  Recent Labs     04/28/25  0526 04/28/25  0953 04/28/25  0957 04/28/25  1412 04/28/25  1415   ISTATAPH 7.432 7.419  --  7.405  --    ISTATAPCO2 38.6 35.8  --  37.5  --    ISTATAPO2 87 88  --  75*  --    ISTATATCO2 27* 24*  --  25*  --    [id number] 97 97  --  95  --    ISTATARTHCO3 25.8 23.2  --  23.5  --    ISTATARTBE 1 -1  --  -1  --    ISTATTEMP 36.4 C 36.5 C 36.5 C 36.0 C 36.0 C   ISTATFIO2  --  30 30 30 30   ISTATSPEC Arterial Arterial Venous Arterial Venous   ISTATAPHTC 7.441 7.427  --  7.420  --    [id number] 84 86  --  70*  --          Recent Labs     04/26/25  0310 04/27/25  0508 04/27/25  0746 04/28/25  0220 04/28/25  0426 04/28/25  1345 04/28/25  1705   SODIUM 135  --    < > 134*  --  137 136   POTASSIUM 5.5  --    < > 3.6  --  3.9 3.9   CHLORIDE 103  --    < > 98  --  101 100   CO2 12*  --    < > 21  --  23 21   BUN 34*  --    < > 53*  --  54* 57*   CREATININE 1.81*  --    < > 2.75*  --  2.94* 3.09*   MAGNESIUM 1.4* 1.9  --   --  2.0  --   --    PHOSPHORUS 2.1* 3.3  --   --  4.6*  --   --    CALCIUM 9.2  --    < > 8.4*  --  8.8 8.4*    < > = values in this interval not displayed.     Recent Labs     04/27/25  0508 04/27/25  0746 04/28/25  0220 04/28/25  0426 04/28/25  0948 04/28/25  1345 04/28/25  1705   ALTSGPT 77*  --   --  44 41  --   --    ASTSGOT 181*  --   --  41 33  --   --    ALKPHOSPHAT 89   --   --  69 71  --   --    TBILIRUBIN 2.2*  --   --  0.9 0.8  --   --    DBILIRUBIN 0.8*  --   --  0.5 0.4  --   --    PREALBUMIN  --   --  10.3*  --   --   --   --    GLUCOSE  --    < > 183*  --   --  137* 219*    < > = values in this interval not displayed.     Recent Labs     04/27/25  0508 04/28/25  0039 04/28/25 0426 04/28/25  0948   WBC 18.6* 13.7* 12.5* 16.0*   NEUTSPOLYS  --   --   --  94.80*   LYMPHOCYTES  --   --   --  2.60*   MONOCYTES  --   --   --  2.60   EOSINOPHILS  --   --   --  0.00   BASOPHILS  --   --   --  0.00   ASTSGOT 181*  --  41 33   ALTSGPT 77*  --  44 41   ALKPHOSPHAT 89  --  69 71   TBILIRUBIN 2.2*  --  0.9 0.8     Recent Labs     04/25/25  1945 04/26/25  0310 04/26/25  2100 04/27/25  0508 04/28/25  0039 04/28/25  0426 04/28/25  0948   RBC 5.28   < >  --    < > 3.66* 3.10* 3.73*   HEMOGLOBIN 15.0   < >  --    < > 10.3* 8.9* 10.2*   HEMATOCRIT 46.5   < >  --    < > 31.9* 27.2* 31.7*   PLATELETCT 250   < >  --    < > 103* 109* 114*   PROTHROMBTM 19.7*  --  20.2*  --   --   --  18.8*   INR 1.67*  --  1.72*  --   --   --  1.57*    < > = values in this interval not displayed.       Imaging  X-Ray:  I have personally reviewed the images and compared with prior images.    Assessment/Plan  Epistaxis due to trauma  Assessment & Plan  Due to syncope and facial trauma worsened by heparin and DAPT  Rhinorocket placed on antibiotics, monitor need for ENT    Bacteremia due to Gram-positive bacteria  Assessment & Plan  2/2 GPC in chains/pair start unasyn and vanco  Hx of amp resistant enterococcus  Hold immunosuppression with bacteremia  Repeat blood cx 4/28 am    Atrial fibrillation with RVR (HCC)  Assessment & Plan  Heparin  Amiodarone    Cardiac arrest (HCC)  Assessment & Plan  Witnessed PEA arrest in the cath lab with CPR for <1min.  Follow commands no signs of neurologic injury  Avoid fever    Cardiogenic shock (HCC)  Assessment & Plan  Cardiogenic shock due to acute ischemia a post arrest.   PA  with pulmonary venous hypertension with good Christian but reduced LV function and elevated left sided filling pressures.   Impella P5, dobutamine 2.5, clevidipine gtt for elevated map and high SVR  need iHD 4/26, improving urine output ongoing lasix challenge to achieve euvolemic  Cardiology consulting  Monitor end organ perfusion with neurologic status, skin, lactate, renal function and output, trend serial markers LFT's, BMP, lactate, ScVO2      Abnormal CT of the head  Assessment & Plan  Patient found to have 2 to 3 mm faintly hyperdense focus in the right mesial occipital lobe could represent vascular calcification or cavernous malformation.  This could be confirmed by MRI brain.    MRI pending cardiovascular stabilization    Kidney transplanted  Assessment & Plan  Baseline creatinine 1.4.    Monitor UOP and creatinine  Home tacrolimus and mycophenolate  Tac level in AM  Nephrology consultation Dr juan j Hackett 4/26  Worsening renal function attempt iHD via left upper arm fistula if not working will need temporary catheter  Due to tiana, cardiogenic shock, pigmented hemolysis via impella and s/p contrast die load      Acute respiratory failure with hypoxia (HCC)  Assessment & Plan  Intubated date: 4/25 due to brief PEA arrest in cath lab  Goal saturation > 92%  Monitor ventilator waveforms and titrate flow/peep and volumes according.   Lung protective ventilation strategy w/ A-F bundle  CXR: monitor lung volumes and tube/line placement  VAP bundle prevention, oral care, post pyloric feeding  Head of bed > 30 degree  GI prophylaxis  Daily awakening and SBT trials unless contraindicated  Monitor for liberation  Respiratory treatments: prn        LAD stenosis- (present on admission)  Assessment & Plan  PCI to LAD with Dr. 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 In review of yesterday's note (4/27/2025), there are no changes except as documented above.     Discussed patient condition and risk of morbidity and/or mortality with Family, RN, RT, Therapies, Pharmacy, Patient, and cardiology and nephrology  The patient remains critically ill.  Critical care time = 90 minutes in directly providing and coordinating critical care and extensive data review.  No time overlap and excludes procedures.

## 2025-04-28 NOTE — PROGRESS NOTES
[facility] Progress Note      Admit Date: 4/25/2025    Resident(s): Navin Ardon M.D.   Attending:  DAMON CHAVIRA/ Dr. Hugo Sparks    Patient ID:    Name:  Dariel Diamond     YOB: 1965  Age:  59 y.o.  male   MRN:  [id number]    Hospital Course (carried forward and updated):  59 y.o. male admitted 4/25/2025 with history of hypertension, hyperlipidemia, HFimpEF (most recent 50%), paroxysmal atrial fibrillation on apixaban, ESRD s/p DDKT 4/2023, who presented with chest pain, dyspnea and syncope this morning.  He was in his usual state of health prior to experiencing mild/moderate substernal chest pain.  About 30 minutes later he felt his vision become obscured and fell, striking his head.  He presented to the emergency department where he had a noncontrast head CT that was unrevealing.  His forehead laceration was repaired.     EKG demonstrated anterior ST elevations and dynamic EKG changes.  Given concern for malignant dysrhythmia he proceeded to the cath lab.     In the cath lab he sustained PEA cardiac arrest for ~10-30 seconds and was intubated.  He had Impella placed and PCI to LAD with PTCA of the circumflex.  He had escalating inotrope and vasoactive requirements.  Initial CI was 3.7 on dobutamine 5, epi 0.03.  Taken from Dr Frost note.      4/25 admitted post cath lab brief arrest, impella, cardiogenic shock on vent support.  4/26 ongoing cardiogenic shock, impella P5, dobutamine 2.5, iHD, low vent support, nasal bleeding     Consultants:  Critical Care  Nephrology    Interval Events:  Patient is afebrile  Patient had significant oozing from Impella site, plan to remove impella today.  HR: 100-130's w/frequent PVCs and bigeminal ectopy.  SBP: 110-1770's  Lines: right lj landy vargas foley  Antibx: gpc 2/2 in blood hx of enterococcus amp resistant, Unasyn and vanco, MRSA nares neg      Vitals Range last 24h:  Temp:  [36.7 °C (98.1 °F)-37.3 °C (99.1 °F)] 36.7 °C (98.1 °F)  Pulse:  []  123  Resp:  [7-32] 19  BP: ()/(46-73) 171/64  SpO2:  [73 %-100 %] 99 %      Intake/Output Summary (Last 24 hours) at 4/28/2025 1127  Last data filed at 4/28/2025 0800  Gross per 24 hour   Intake 4505.81 ml   Output 1615 ml   Net 2890.81 ml        ROS  Unable to perform ROS: Critical illness    PHYSICAL EXAM:  Vitals:    04/28/25 0930 04/28/25 0945 04/28/25 1000 04/28/25 1015   BP:       Pulse: (!) 118 (!) 120 (!) 121 (!) 123   Resp: 19 15 14 19   Temp:       TempSrc:       SpO2:       Weight:       Height:        Body mass index is 29.96 kg/m².    O2 therapy: Pulse Oximetry: 99 %, O2 Delivery Device: Ventilator    Date 04/28/25 0700 - 04/29/25 0659   Shift 8738-7877 4387-0447 0642-8301 24 Hour Total   INTAKE   I.V. 81.2   81.2     Volume (mL) Insulin 6   6     Precedex Volume 29.1   29.1     Amiodarone Volume 16.6   16.6     Norepinephrine Volume 17.8   17.8     Heparin Volume 0   0     Dobutamine Volume 11.7   11.7   NG/GT 70   70     Intake (mL) (Enteral Tube 04/25/25 Orogastric 16 Fr. Oral) 70   70   IV Piggyback 14   14     Volume (mL) (sodium bicarbonate 8.4 % 25 mEq in dextrose 5% 1,000 mL infusion (Impella Purge Solution)) 14   14   Shift Total 165.2   165.2   OUTPUT   Urine 30   30     Indwelling Cathether 30   30   Shift Total 30   30   .2   135.2        Physical Exam  Constitutional:       Appearance: He is obese. He is ill-appearing.      Comments: Ill appearing male on ventilator with son at bedside    HENT:      Head:      Comments: Forehead laceration     Mouth/Throat:      Comments: ET in place, OG clamped  Cardiovascular:      Rate and Rhythm: Tachycardia present. Rhythm irregular.   Pulmonary:      Effort: Pulmonary effort is normal.      Breath sounds: Normal breath sounds.   Abdominal:      General: Abdomen is flat. Bowel sounds are normal.      Palpations: Abdomen is soft.   Musculoskeletal:      Comments:  Left upper arm fistula with thrill, cold hands and feet, left toe prior  amputation, left leg impella    Skin:     Capillary Refill: Capillary refill takes less than 2 seconds.   Neurological:      General: No focal deficit present.         Recent Labs     04/28/25  0330 04/28/25  0526 04/28/25  0953 04/28/25  0957   ISTATAPH 7.426 7.432 7.419  --    ISTATAPCO2 37.4 38.6 35.8  --    ISTATAPO2 89 87 88  --    ISTATATCO2 26* 27* 24*  --    [id number] 97 97 97  --    ISTATARTHCO3 24.6 25.8 23.2  --    ISTATARTBE 0 1 -1  --    ISTATTEMP 36.3 C 36.4 C 36.5 C 36.5 C   ISTATFIO2 30  --  30 30   ISTATSPEC Arterial Arterial Arterial Venous   ISTATAPHTC 7.437 7.441 7.427  --    XVIUOSEL2OL 85 84 86  --      Recent Labs     04/26/25  0310 04/27/25  0508 04/27/25  0746 04/27/25  1300 04/27/25  1726 04/28/25 0220 04/28/25  0426   SODIUM 135  --    < > 135 137 134*  --    POTASSIUM 5.5  --    < > 3.7 3.8 3.6  --    CHLORIDE 103  --    < > 98 99 98  --    CO2 12*  --    < > 22 24 21  --    BUN 34*  --    < > 39* 46* 53*  --    CREATININE 1.81*  --    < > 2.49* 2.61* 2.75*  --    MAGNESIUM 1.4* 1.9  --   --   --   --  2.0   PHOSPHORUS 2.1* 3.3  --   --   --   --  4.6*   CALCIUM 9.2  --    < > 8.9 8.9 8.4*  --     < > = values in this interval not displayed.     Recent Labs     04/25/25  1201 04/25/25  1945 04/26/25  0310 04/27/25  0508 04/27/25  0746 04/27/25  1300 04/27/25  1726 04/28/25  0220 04/28/25  0426   ALTSGPT 15   < > 44 77*  --   --   --   --  44   ASTSGOT 31   < > 323* 181*  --   --   --   --  41   ALKPHOSPHAT 105*   < > 97 89  --   --   --   --  69   TBILIRUBIN 1.0   < > 3.1* 2.2*  --   --   --   --  0.9   DBILIRUBIN  --   --  0.8* 0.8*  --   --   --   --  0.5   LIPASE 46  --   --   --   --   --   --   --   --    PREALBUMIN  --   --   --   --   --   --   --  10.3*  --    GLUCOSE 213*   < > 474*  --    < > 167* 159* 183*  --     < > = values in this interval not displayed.     Recent Labs     04/25/25  1201 04/25/25  1405 04/25/25  1945 04/26/25  0310 04/26/25  2100 04/27/25  0508  04/27/25  0746 04/27/25  1734 04/28/25  0039 04/28/25  0426 04/28/25  0948   RBC 5.15  --  5.28   < >  --  4.58*  --   --  3.66* 3.10*  --    HEMOGLOBIN 14.5  --  15.0   < >  --  12.9*   < > 11.0* 10.3* 8.9*  --    HEMATOCRIT 45.5  --  46.5   < >  --  38.4*   < > 33.4* 31.9* 27.2*  --    PLATELETCT 208  --  250   < >  --  141*  --   --  103* 109*  --    PROTHROMBTM 17.2* 17.9* 19.7*  --  20.2*  --   --   --   --   --  18.8*   APTT 32.2 29.0  --   --   --   --   --   --   --   --   --    INR 1.40* 1.47* 1.67*  --  1.72*  --   --   --   --   --  1.57*    < > = values in this interval not displayed.     Recent Labs     04/25/25  1201 04/25/25  1945 04/26/25  0310 04/26/25 2000 04/27/25  0508 04/28/25  0039 04/28/25  0426   WBC 11.8*   < > 10.5   < > 18.6* 13.7* 12.5*   NEUTSPOLYS 83.60*  --   --   --   --   --   --    LYMPHOCYTES 10.60*  --   --   --   --   --   --    MONOCYTES 4.70  --   --   --   --   --   --    EOSINOPHILS 0.30  --   --   --   --   --   --    BASOPHILS 0.40  --   --   --   --   --   --    ASTSGOT 31   < > 323*  --  181*  --  41   ALTSGPT 15   < > 44  --  77*  --  44   ALKPHOSPHAT 105*   < > 97  --  89  --  69   TBILIRUBIN 1.0   < > 3.1*  --  2.2*  --  0.9    < > = values in this interval not displayed.       Meds:   [MAR Hold] ampicillin-sulbactam (UNASYN) IV  3 g 3 g (04/28/25 1106)    [MAR Hold] isosorbide dinitrate  10 mg      [MAR Hold] furosemide  100 mg      [MAR Hold] hydrALAZINE  25 mg      sodium bicarbonate 8.4 % 25 mEq in dextrose 5% 1,000 mL infusion (Impella Purge Solution)  25 mEq 14 mL/hr at 04/28/25 0000    [MAR Hold] famotidine  20 mg      Or    [MAR Hold] famotidine  20 mg      insulin regular (NovoLIN R/HumuLIN R) 100 units in  mL infusion  0-29 Units/hr 5 Units/hr (04/28/25 1022)    [MAR Hold] dextrose bolus  12.5-25 g      [MAR Hold] aspirin  81 mg      [MAR Hold] clopidogrel  75 mg      [MAR Hold] senna-docusate  2 Tablet      And    [MAR Hold] polyethylene glycol/lytes   1 Packet      [MAR Hold] acetaminophen  650 mg      [MAR Hold] ondansetron  4 mg      [MAR Hold] Pharmacy        [MAR Hold] thiamine  200 mg      [MAR Hold] NS  100 mL      [MAR Hold] hydrALAZINE  10 mg      [MAR Hold] ondansetron  4 mg      [Held by provider] gabapentin  200 mg      [MAR Hold] rosuvastatin  40 mg      dexmedetomidine (Precedex) infusion  0.1-1.5 mcg/kg/hr (Ideal) 1.5 mcg/kg/hr (04/28/25 1046)    heparin  0-1,000 Units/hr Stopped (04/28/25 0404)    DOBUTamine-Dextrose  2.5 mcg/kg/min (Ideal) 2.5 mcg/kg/min (04/28/25 0739)    NORepinephrine  0-1 mcg/kg/min (Ideal) 0.35 mcg/kg/min (04/28/25 0740)    amiodarone infusion  0.5 mg/min 0.5 mg/min (04/28/25 0738)    [MAR Hold] Respiratory Therapy Consult        [MAR Hold] MD Alert...Adult ICU Electrolyte Replacement per Pharmacy        [MAR Hold] lidocaine  2 mL      [MAR Hold] HYDROmorphone  0.5 mg      Or    [MAR Hold] HYDROmorphone  1 mg          Procedures:  None    Imaging:  DX-CHEST-PORTABLE (1 VIEW)   Final Result      Mild improvement in aeration of the left lung base.      EC-ECHOCARDIOGRAM LTD W/O CONT   Final Result      DX-CHEST-PORTABLE (1 VIEW)   Final Result      Mild interval improvement in aeration of the left lung base.      EC-ECHOCARDIOGRAM LTD W/O CONT   Final Result      DX-CHEST-PORTABLE (1 VIEW)   Final Result         1. Lines and tubes as above.   2. Interval improvement in bilateral pulmonary opacities. No pleural effusions.         DX-ABDOMEN FOR TUBE PLACEMENT   Final Result      NG tube tip projects over the stomach.      EC-ECHOCARDIOGRAM LTD W/O CONT   Final Result      DX-CHEST-PORTABLE (1 VIEW)   Final Result      1.  Pulmonary arterial catheter tip projects over the right main pulmonary artery. No pneumothorax is identified.   2.  Increasing perihilar interstitial opacities are consistent with pulmonary edema.   3.  Left basilar opacification likely represents atelectasis.      EC-ECHOCARDIOGRAM LTD W/O CONT   Final Result       DX-CHEST-PORTABLE (1 VIEW)   Final Result      No acute cardiac or pulmonary abnormalities are identified.      DX-CHEST-PORTABLE (1 VIEW)   Final Result      No acute cardiopulmonary process.      CT-CSPINE WITHOUT PLUS RECONS   Final Result      1.  No acute fracture or traumatic malalignment of the cervical spine.   2.  Patchy airspace opacities in the visualized lung apices.         CT-HEAD W/O   Final Result      1.  No acute intracranial hemorrhage.   2.  A 2 to 3 mm faintly hyperdense focus in the right mesial occipital lobe could represent vascular calcification or cavernous malformation. This could be confirmed by brain MRI.   3.  Mildly displaced nasal bone fractures with surrounding soft tissue swelling and foci of air.   4.  There is a 2.4 cm solid mass within the superficial lobe of the left parotid gland with calcification. Although the parotid gland is incompletely visualized on this exam, benign and malignant entities cannot be reliably distinguished by imaging alone    and ultimately an ENT referral will be needed for consideration of tissue sampling. This can be done on a nonemergent basis.      CL-LEFT HEART CATHETERIZATION WITH POSSIBLE INTERVENTION    (Results Pending)   EC-ECHOCARDIOGRAM LTD W/O CONT    (Results Pending)       ASSESSEMENT and PLAN:    * NSTEMI (non-ST elevation myocardial infarction) (HCC)- (present on admission)  Assessment & Plan  Patient found to significantly with troponin of 157 with active chest pain.  continuing antiplatelet heparin as per APTT levels and monitor for signs of bleeding.  He is on Eliquis that is why he is on APTT protocol.      Cardiogenic shock (HCC)  Assessment & Plan  Cardiogenic shock due to acute ischemia a post arrest.   PA with pulmonary venous hypertension with good Christian but reduced LV function and elevated left sided filling pressures.   Impella P5, dobutamine 2.5, clevidipine gtt for elevated map and high SVR  need iHD 4/26, improving urine  output ongoing lasix challenge to achieve euvolemic  Monitor end organ perfusion with neurologic status, skin, lactate, renal function and output, trend serial markers LFT's, BMP, lactate, ScVO2  Patient has significant oozing from impella site, we consulted vascular surgery to remove the impella    Mass of parotid gland  Assessment & Plan  CT head showed 2.4 cm solid mass within the superficial lobe of the left parotid gland with calcification.  Patient will need outpatient ENT referral.    Abnormal CT of the head  Assessment & Plan  Patient found to have 2 to 3 mm faintly hyperdense focus in the right mesial occipital lobe could represent vascular calcification or cavernous malformation.  This could be confirmed by MRI brain.  Patient will need MRI after he is stable from his acute NSTEMI      Kidney transplanted  Assessment & Plan  Current BUN is 22 and creatinine of 1.17  I am resuming outpatient immunosuppressive medication.      Acute respiratory failure with hypoxia (HCC)  Assessment & Plan  Patient found to have acute respiratory failure with hypoxia most likely secondary to pulmonary edema he received a dose of IV Lasix Titrate down oxygen as tolerated.      Syncope  Assessment & Plan  Patient had a syncopal episode and he developed a laceration on his face.  She underwent suture placement by ER.  His syncopal episode could be secondary to severe chest pain as she has been started prior to syncopal episode or could be due to underlying possible arrhythmia.      Ventricular tachycardia (HCC)- (present on admission)  Assessment & Plan  History of    HTN (hypertension)- (present on admission)  Assessment & Plan  Continue outpatient medications.    Dyslipidemia- (present on admission)  Assessment & Plan  Continue statin    Troponin level elevated- (present on admission)  Assessment & Plan  Secondary to NSTEMI    Type 2 diabetes mellitus with chronic kidney disease on chronic dialysis (HCC)- (present on  admission)  Assessment & Plan  Continue outpatient insulin glargine  on insulin sliding scale with hypoglycemia protocol  Follow-up HbA1c             DISPO: Pending  VTE: Heparin  Ulcer: H2 antagonist  CODE STATUS: Full

## 2025-04-29 NOTE — DIETARY
Nutrition Update:    Day 4 of admit.  Dariel Diamond is a 59 y.o. male with admitting DX of NSTEMI (non-ST elevation myocardial infarction) (HCC) [I21.4].  Patient being followed to optimize nutrition.    Pt extubated this a.m., transitioned from TF to PO diet. Current diet: Cardiac. PO intake not yet documented due to PO diet started <1 hr ago.    Problem: Nutritional:  Goal: Achieve adequate nutritional intake  Description: Patient will consume >% of meals  Outcome: Not met      RD following.

## 2025-04-29 NOTE — CARE PLAN
The patient is Watcher - Medium risk of patient condition declining or worsening    Shift Goals  Clinical Goals: Hemodynamic stability, monitor vital signs and swan numbers  Patient Goals: Comfort  Family Goals: Updates, safety, comfort    Progress made toward(s) clinical / shift goals:    Problem: Knowledge Deficit - Standard  Goal: Patient and family/care givers will demonstrate understanding of plan of care, disease process/condition, diagnostic tests and medications  Outcome: Progressing     Problem: Safety - Medical Restraint  Goal: Remains free of injury from restraints (Restraint for Interference with Medical Device)  Outcome: Progressing  Flowsheets (Taken 4/29/2025 0731)  Addressed this shift: Remains free of injury from restraints (restraint for interference with medical device):   Determine that other, less restrictive measures have been tried or would not be effective before applying the restraint   Evaluate the patient's condition at the time of restraint application   Inform patient/family regarding the reason for restraint   Every 2 hours: Monitor safety, psychosocial status, comfort, nutrition and hydration  Goal: Free from restraint(s) (Restraint for Interference with Medical Device)  Outcome: Progressing  Flowsheets (Taken 4/29/2025 0731)  Addressed this shift: Free from restraint(s) (restraint for interference with medical device):   ONCE/SHIFT or MINIMUM Every 12 hours: Assess and document the continuing need for restraints   Every 24 hours: Continued use of restraint requires Licensed Independent Practitioner to perform face to face examination and written order   Identify and implement measures to help patient regain control     Problem: Pain - Standard  Goal: Alleviation of pain or a reduction in pain to the patient’s comfort goal  Outcome: Progressing     Problem: Skin Integrity  Goal: Skin integrity is maintained or improved  Outcome: Progressing     Problem: Fall Risk  Goal: Patient will  remain free from falls  Outcome: Progressing       Patient is not progressing towards the following goals:

## 2025-04-29 NOTE — CARE PLAN
Problem: Ventilation  Goal: Ability to achieve and maintain unassisted ventilation or tolerate decreased levels of ventilator support  Description: Target End Date:  4 days Document on Vent flowsheet1.  Support and monitor invasive and noninvasive mechanical ventilation2.  Monitor ventilator weaning response3.  Perform ventilator associated pneumonia prevention interventions4.  Manage ventilation therapy by monitoring diagnostic test results  Outcome: Progressing       Vent day 4  7.5 @ 24  14 470 8+ 30%

## 2025-04-29 NOTE — PROGRESS NOTES
[facility] INFECTIOUS DISEASES INPATIENT CONSULT NOTE     Date of Service: 4/28/2025    Consult Requested By: Bridger Arias M.D.    Reason for Consultation: Bacteremia    Chief Complaint: Syncope and chest pain    History of Present Illness:   Dariel Diamond is a 59 y.o. male admitted on 04/25/2025 with pertinent medical history of HFrEF, CAD nonobstructive, paroxysmal atrial fibrillation on Eliquis, type 2 diabetes mellitus on insulin and end-stage renal disease s/p transplant 04/2023 on immunosuppressive agents -mycophenolate and tacrolimus presenting with chest pain and syncopal episode was found to have NSTEMI and underwent cardiac procedure during which he sustained PEA cardiac arrest for which he was intubated. PCI to LAD with PTCA of circumflex was done and was initially placed on Impella ventricular assist device, which was removed on 04/28 due to profuse bleeding. He was on ventilatory support and requiring vasopressor- levophed and extubated 04/29.  He was found to have gram-positive bacteremia 04/26 with Streptococcus salivarius/vestibularis, for which ID was consulted.     Review of Systems:  Patient is extubated this morning. He is alert and oriented. Able to communicate verberally (son helping to translate Thai language). He was stating slight difficulty in his nose, was wondering whether that's due to intervention done for nasal bleeding.     Past Medical History:   Diagnosis Date    Depression     Dilated cardiomyopathy (HCC)     Hyperlipidemia     Hypertension     Type II or unspecified type diabetes mellitus without mention of complication, not stated as uncontrolled        Past Surgical History:   Procedure Laterality Date    GA REMOVAL LV VAD DIFF SESSION Left 4/28/2025    Procedure: LEFT FEMORAL CUTDOWN REMOVAL IMPELLA;  Surgeon: Kapil Dobson M.D.;  Location: SURGERY [facility];  Service: Vascular    TOE AMPUTATION Left 8/23/2018    Procedure: TOE AMPUTATION L 2ND TOE;  Surgeon: Luiz  CHAUNCEY Ma M.D.;  Location: SURGERY [facility];  Service: Orthopedics    AV FISTULA CREATION  5/1/2014    Performed by Beau Cowart M.D. at SURGERY [facility]       Family History   Problem Relation Age of Onset    Hypertension Mother     Other Father         DM       Social History     Socioeconomic History    Marital status:      Spouse name: Not on file    Number of children: Not on file    Years of education: Not on file    Highest education level: Not on file   Occupational History    Not on file   Tobacco Use    Smoking status: Never    Smokeless tobacco: Never   Substance and Sexual Activity    Alcohol use: No    Drug use: No    Sexual activity: Not on file   Other Topics Concern    Not on file   Social History Narrative    Not on file     Social Drivers of Health     Financial Resource Strain: Not on File (8/24/2019)    Received from [facility]    Financial Resource Strain     Financial Resource Strain: 0   Food Insecurity: Patient Unable To Answer (4/25/2025)    Hunger Vital Sign     Worried About Running Out of Food in the Last Year: Patient unable to answer     Ran Out of Food in the Last Year: Patient unable to answer   Transportation Needs: Patient Unable To Answer (4/25/2025)    PRAPARE - Transportation     Lack of Transportation (Medical): Patient unable to answer     Lack of Transportation (Non-Medical): Patient unable to answer   Physical Activity: Not on File (8/24/2019)    Received from [facility]    Physical Activity     Physical Activity: 0   Stress: Not on File (8/24/2019)    Received from [facility]    Stress     Stress: 0   Social Connections: Not on File (8/24/2019)    Received from [facility]    Social Connections     Social Connections and Isolation: 0   Intimate Partner Violence: Patient Unable To Answer (4/25/2025)    Humiliation, Afraid, Rape, and Kick questionnaire     Fear of Current or Ex-Partner: Patient unable to answer     Emotionally Abused: Patient  unable to answer     Physically Abused: Patient unable to answer     Sexually Abused: Patient unable to answer   Housing Stability: Patient Unable To Answer (4/25/2025)    Housing Stability Vital Sign     Unable to Pay for Housing in the Last Year: Patient unable to answer     Number of Times Moved in the Last Year: 0     Homeless in the Last Year: Patient unable to answer       Allergies   Allergen Reactions    Contrast Media With Iodine [Iodine] Rash and Itching     Per patient       Medications:    Current Facility-Administered Medications:     Nozin nasal  swab, 1 Applicator, Each Nostril, BID, Bridegr Arias M.D.    vasopressin (Vasostrict) 20 Units in  mL Infusion, 0.03 Units/min, Intravenous, Continuous, Bridger Arias M.D.    ampicillin/sulbactam (Unasyn) 3 g in  mL IVPB, 3 g, Intravenous, Q6HRS, Cresencio Pratt M.D., Stopped at 04/29/25 0602    [Held by provider] isosorbide dinitrate (Isordil) tablet 10 mg, 10 mg, Enteral Tube, TID, David Choe M.D., 10 mg at 04/27/25 1746    [Held by provider] furosemide (Lasix) injection 100 mg, 100 mg, Intravenous, BID, Rolando Hackett D.OEdilberto, 100 mg at 04/28/25 1651    [Held by provider] hydrALAZINE (Apresoline) tablet 25 mg, 25 mg, Enteral Tube, Q8HRS, David Choe M.D., 25 mg at 04/27/25 2230    famotidine (Pepcid) tablet 20 mg, 20 mg, Enteral Tube, DAILY, 20 mg at 04/29/25 0519 **OR** famotidine (Pepcid) injection 20 mg, 20 mg, Intravenous, DAILY, Cresencio Pratt M.D.    insulin regular (HumuLIN R/NovoLIN R) 100 Units in  mL infusion premix, 0-29 Units/hr, Intravenous, Continuous, Ken Sims D.O., Last Rate: 3.5 mL/hr at 04/29/25 0841, 3.5 Units/hr at 04/29/25 0841    dextrose 50 % (D50W) injection 12.5-25 g, 12.5-25 g, Intravenous, PRN, Ken Sims D.O.    aspirin (Asa) chewable tab 81 mg, 81 mg, Enteral Tube, DAILY, Cresencio Pratt M.D., 81 mg at 04/29/25 0519    clopidogrel (Plavix) tablet 75 mg, 75 mg, Enteral  Tube, DAILY, Cresencio Pratt M.D., 75 mg at 04/29/25 0519    senna-docusate (Pericolace Or Senokot S) 8.6-50 MG per tablet 2 Tablet, 2 Tablet, Enteral Tube, Q EVENING, 2 Tablet at 04/28/25 1659 **AND** polyethylene glycol/lytes (Miralax) Packet 1 Packet, 1 Packet, Enteral Tube, QDAY PRN, Cresencio Pratt M.D., 1 Packet at 04/28/25 1659    acetaminophen (Tylenol) tablet 650 mg, 650 mg, Enteral Tube, Q6HRS PRN, Cresencio Pratt M.D.    ondansetron (Zofran ODT) dispertab 4 mg, 4 mg, Enteral Tube, Q4HRS PRN, Cresencio Pratt M.D.    Pharmacy Consult: Enteral tube insertion - review meds/change route/product selection, , Other, PHARMACY TO DOSE, Cresencio Pratt M.D.    NS (Bolus) 0.9 % infusion 100 mL, 100 mL, Intravenous, DIALYSIS PRN, Rolando Hackett D.O.    [Held by provider] hydrALAZINE (Apresoline) injection 10 mg, 10 mg, Intravenous, Q4HRS PRN, Frederick Edwards M.D.    ondansetron (Zofran) syringe/vial injection 4 mg, 4 mg, Intravenous, Q4HRS PRN, Frederick Edwards M.D., 4 mg at 04/27/25 0948    [Held by provider] gabapentin (Neurontin) capsule 200 mg, 200 mg, Oral, DAILY, Frederick Edwards M.D.    rosuvastatin (Crestor) tablet 40 mg, 40 mg, Enteral Tube, DAILY, Frederick Edwards M.D., 40 mg at 04/29/25 0519    dexmedetomidine (Precedex) 400 MCG/100ML infusion, 0.1-1.5 mcg/kg/hr (Ideal), Intravenous, Continuous, Daniel Frost M.D., Stopped at 04/29/25 0549    DOBUTamine (Dobutrex) 1 mg/1 mL premix infusion, 2.5 mcg/kg/min (Ideal), Intravenous, Continuous, Daniel Frost M.D., Last Rate: 11.6 mL/hr at 04/29/25 0742, 2.5 mcg/kg/min at 04/29/25 0742    norepinephrine (Levophed) 8 mg in 250 mL NS infusion (premix), 0-1 mcg/kg/min (Ideal), Intravenous, Continuous, Cresencio Pratt M.D., Last Rate: 32 mL/hr at 04/29/25 0742, 0.22 mcg/kg/min at 04/29/25 0742    [COMPLETED] amiodarone (Nexterone) 360 mg/200 mL infusion, 1 mg/min, Intravenous, Once, Stopped at 04/26/25 0148 **FOLLOWED BY**  amiodarone (Nexterone) 360 mg/200 mL infusion, 0.5 mg/min, Intravenous, Continuous, Daniel Frost M.D., Last Rate: 16.7 mL/hr at 25 0742, 0.5 mg/min at 25 0742    Respiratory Therapy Consult, , Nebulization, Continuous RT, Daniel Frost M.D. MD Alert...ICU Electrolyte Replacement per Pharmacy, , Other, PHARMACY TO DOSE, Daniel Frost M.D.    lidocaine (Xylocaine) 1 % injection 2 mL, 2 mL, Tracheal Tube, Q30 MIN PRN, Daniel Frost M.D.    HYDROmorphone (Dilaudid) injection 0.5 mg, 0.5 mg, Intravenous, Q HOUR PRN, 0.5 mg at 25 2231 **OR** HYDROmorphone (Dilaudid) injection 1 mg, 1 mg, Intravenous, Q HOUR PRN, Daniel Frost M.D., 1 mg at 25 0250    Physical Exam:   Vital Signs: /59   Pulse (!) 104   Temp 36.9 °C (98.4 °F) (Core)   Resp 20   Ht 1.829 m (6')   Wt 100 kg (220 lb 7.4 oz)   SpO2 96%   BMI 29.90 kg/m²   Temp  Av.3 °C (99.2 °F)  Min: 36.2 °C (97.2 °F)  Max: 38.6 °C (101.5 °F)  Vital signs reviewed    Constitutional: Patient is extubated today and alert, communicate with the help of his son. Appears well-developed and well-nourished. No distress  Head: suture placed on anterior forehead, healing wound with no discharge,  normocephalic  Eyes: Conjunctivae normal  Mouth/Throat: suture placed on upper lip, no inflammatory signs  Cardiovascular: regular rate and rhythm  Pulmonary/Chest: No respiratory distress. Unlabored respiratory effort.  Abdominal: Non distended  Musculoskeletal: No joint tenderness, swelling, erythema, or restriction of motion noted.  No lower extremity edema.  Neurological: Alert and follows command.  Skin: Skin is warm and dry. No rashes or embolic phenomena noted on exposed skin  Psychiatric: Normal mood and affect. Behavior is normal.     Devices In Places ECG lead, Blood Pressure Cuff, Pulse Ox, Rodríguez, Arterial Line, SCD's, and Central Line.  Also has L AVF.    LABS:  Recent Labs     25  0508 25  0039 25  0426   WBC  18.6* 13.7* 12.5*      Recent Labs     04/28/25  0948 04/28/25  1830 04/29/25  0101 04/29/25  0539 04/29/25  0747   HEMOGLOBIN 10.2* 9.2* 8.4* 8.0* 8.4*   HEMATOCRIT 31.7* 27.6* 27.6* 24.6* 25.1*   MCV 85.0 84.1  --  86.0  --    MCH 27.3 28.0  --  28.0  --    PLATELETCT 114* 92*  --  94*  --        Recent Labs     04/27/25  1300 04/27/25  1726 04/28/25  0220   SODIUM 135 137 134*   POTASSIUM 3.7 3.8 3.6   CHLORIDE 98 99 98   CO2 22 24 21   CREATININE 2.49* 2.61* 2.75*        Recent Labs     04/25/25  1945 04/26/25  0310 04/27/25  0508   ALBUMIN 3.5 3.2 3.1*        MICRO:  Blood Culture   Date Value Ref Range Status   08/22/2018 No growth after 5 days of incubation.  Final   08/22/2018 No growth after 5 days of incubation.  Final        Latest pertinent labs were reviewed    IMAGING STUDIES:  Per my read, chest x-ray showed mild improvement in aeration of left lung base.    Hospital Course/Assessment:   This is a 59 y.o. male with a history of HFrEF, CAD nonobstructive, paroxysmal atrial fibrillation on Eliquis, type 2 diabetes mellitus on insulin and end-stage renal disease s/p transplant 04/2023 on immunosuppressive agents -mycophenolate and tacrolimus  presenting with syncope and chest pain was found to have NSTEMI who underwent cardiac procedure during which he was intubated for PEA cardiac arrest, had PCI to LAD with PTCA of circumflex and impella placed which was removed 04/28 for intractable bleeding. He was extubated on 04/29. He was found to have gram-positive bacteremia. He received vancomycin 1 dose and then was switched to unasyn.     Pertinent Diagnoses:  - Insulin-dependent type 2 diabetes mellitus  -End-stage renal disease s/p renal transplant on immunosuppression (mycophenolate 250 mg BID and Tacrolimus 0.5mg AM and 1mg PM)  which is currently on hold  - NSTEMI s/p cardiac cath and impella, which is removed  - Gram-positive bacteremia (Streptococcus salivarius/vestibularis), likely due to mucosal  trauma, secondary to multiple intervention/procedure in setting of immunocompromised state  -leukocytosis, likely reactive, resolved   -CAMPBELL, worsening, likely 2/2 cardiorenal syndrome   -HFrEF, EF 15%  -Shock most likely cardiogenic and hypovolemic (d/t acute blood loss)    Plan:   - Switch Unaysn to cefazolin 1gm every 12 hours  - Follow Repeat blood culture report   - Trend Procalcitonin   - Remove lines when feasible   - Renally dose medications  - Anticipate antibiotic course of 2-week duration, but final duration to be decided     Plan discussed with primary team Resident.     Please feel free to call with questions.    This illness poses a threat to the patient's life or bodily function.    We will continue to follow.     Dennis Stanley M.D.    Please note that this dictation was created using voice recognition software. I have worked with technical experts from [facility] to optimize the interface.  I have made every reasonable attempt to correct obvious errors, but there may be errors of grammar and possibly content that I did not discover before finalizing the note.

## 2025-04-29 NOTE — PROGRESS NOTES
"[facility] -  PROGRESS NOTE    Date & Time:   4/29/2025  1:17 PM    Author:   Rolando Hackett D.O.      REASON FOR CONSULTATION:   - CAMPBELL/Management of acute and chronic conditions related to Nephrology      CHIEF COMPLAINT:   -  \"Chest pain  \"      HISTORY OF PRESENT ILLNESS:    59Y  M w hx of ESRD s/p Renal Transplant (DDKT at [facility] on 4/3/2023), CAD, ICM, paroxysmal A-fib presented with chest pain and syncope on 4/25. Pt found to have NSTEMI and taken to cath lab.   Pt had PCI done to circumflex with impella support. Pt also suffered PEA arrest during cath lab with ROSC achieved.   Pt in ICU and Cr lucas from 1.1->1.8 since admission. Poor UOP noted and ICU team concerned for worsening volume overload/cardiorenal syndrome.   Pt currently intubated    DAILY NEPHROLOGY SUMMARY:  4/26: consult done, pt had HD  4/27: pt tolerated HD yesterday via AVF. I/O 2500/3520. On minimal vent settings. BP not requiring any pressor support. Son at bedside. Pt on abx due to gram+ bacteremia. Immunosuppression on hold.  4/28: Pt remains intubated this AM. To have impella removed. 1815cc UOP noted with diuretics.    4/29: Pt extubated, son at bedside. Cr still rising. I//1890 (610cc uop)    REVIEW OF SYSTEMS:    UTO due to intubation      PAST MEDICAL HISTORY:   Past Medical History:   Diagnosis Date    Depression     Dilated cardiomyopathy (HCC)     Hyperlipidemia     Hypertension     Type II or unspecified type diabetes mellitus without mention of complication, not stated as uncontrolled           PAST SURGICAL HISTORY:   Past Surgical History:   Procedure Laterality Date    MT REMOVAL LV VAD DIFF SESSION Left 4/28/2025    Procedure: LEFT FEMORAL CUTDOWN REMOVAL IMPELLA;  Surgeon: Kapil Dobson M.D.;  Location: SURGERY [facility];  Service: Vascular    TOE AMPUTATION Left 8/23/2018    Procedure: TOE AMPUTATION L 2ND TOE;  Surgeon: Luiz Ma M.D.;  Location: [facility] " ORS;  Service: Orthopedics    AV FISTULA CREATION  5/1/2014    Performed by Beau Cowart M.D. at SURGERY [facility]          FAMILY HISTORY:   Family History   Problem Relation Age of Onset    Hypertension Mother     Other Father         DM       SOCIAL HISTORY:   Social History     Tobacco Use    Smoking status: Never    Smokeless tobacco: Never   Substance Use Topics    Alcohol use: No    Drug use: No            HOME MEDICATIONS: reviewed  Home Medications    Medication Sig Taking? Last Dose Authorizing Provider   apixaban (ELIQUIS) 5mg Tab Take 5 mg by mouth 2 times a day. Yes 4/25/2025 Morning Physician Outpatient   amLODIPine (NORVASC) 10 MG Tab Take 10 mg by mouth every day. Yes 4/25/2025 Morning Physician Outpatient   cloNIDine (CATAPRES) 0.1 MG/24HR PATCH WEEKLY patch Place 1 Patch on the skin every Saturday. Yes 4/19/2025 Physician Outpatient   furosemide (LASIX) 80 MG Tab Take 40 mg by mouth 1 time a day as needed (If needed for weight gain > 2). Yes 4/24/2025 Evening Physician Outpatient   gabapentin (NEURONTIN) 100 MG Cap Take 200 mg by mouth every day. Yes 4/24/2025 Evening Physician Outpatient   Insulin Aspart PenFill 100 UNIT/ML Solution Cartridge Inject 14 Units under the skin 3 times a day before meals. Yes 4/24/2025 Evening Physician Outpatient   losartan (COZAAR) 100 MG Tab Take 100 mg by mouth every day. Yes 4/25/2025 Morning Physician Outpatient   metoprolol SR (TOPROL XL) 50 MG TABLET SR 24 HR Take 50 mg by mouth every day. Yes 4/24/2025 Evening Physician Outpatient   mycophenolate (CELLCEPT) 250 MG Cap Take 750 mg by mouth 2 times a day. Yes 4/25/2025 Morning Physician Outpatient   polyethylene glycol 3350 (MIRALAX) 17 GM/SCOOP Powder Take 17 g by mouth 2 times a day. Yes 4/25/2025 Morning Physician Outpatient   rosuvastatin (CRESTOR) 40 MG tablet Take 40 mg by mouth every day. Yes 4/25/2025 Morning Physician Outpatient   SPS, SODIUM POLYSTYRENE SULF, 15 GM/60ML Suspension Take 15 g  by mouth see administration instructions. Takes on Tuesday, Thursday, and Sunday Yes 4/24/2025 Physician Outpatient   tacrolimus (PROGRAF) 0.5 MG Cap Take 0.5-1 mg by mouth 2 times a day. 0.5 mg in the AM  1 mg in the PM Yes 4/25/2025 Morning Physician Outpatient   insulin glargine 100 UNIT/ML Solution Pen-injector injection Inject 10 Units under the skin every evening. Yes 4/24/2025 Evening Physician Outpatient   tamsulosin (FLOMAX) 0.4 MG capsule Take 0.4 mg by mouth every day. Yes 4/24/2025 Evening Physician Outpatient   multivitamin Tab Take 1 Tablet by mouth every day. Yes 4/24/2025 Evening Physician Outpatient   docusate sodium (COLACE) 100 MG Cap Take 100 mg by mouth 2 times a day. Yes 4/25/2025 Morning Physician Outpatient   aspirin EC 81 MG EC tablet Take 1 Tab by mouth every day. Yes 4/24/2025 Delmi Quiroz M.D.       ALLERGIES:  Contrast media with iodine [iodine]      VITAL SIGNS:  /59   Pulse (!) 104   Temp 36.9 °C (98.4 °F) (Core)   Resp 20   Ht 1.829 m (6')   Wt 100 kg (220 lb 7.4 oz)   SpO2 96%   BMI 29.90 kg/m²     Physical Exam  Constitutional:       General: He is not in acute distress.  HENT:      Head: Normocephalic and atraumatic.      Mouth/Throat:      Mouth: Mucous membranes are moist.   Cardiovascular:      Rate and Rhythm: Normal rate and regular rhythm.   Pulmonary:      Effort: Pulmonary effort is normal.      Breath sounds: No wheezing or rhonchi.   Abdominal:      General: There is no distension.      Palpations: Abdomen is soft.   Musculoskeletal:      Right lower leg: Edema present.      Left lower leg: Edema present.      Comments: trace   Neurological:      General: No focal deficit present.      Mental Status: He is alert.       Access: L AVF with good thrill and bruit      FLUID BALANCE:  In: 2760.9 [I.V.:2196.9; Enteral:130]  Out: 1890       LABS:  Recent Labs     04/28/25  1345 04/28/25  1705 04/29/25  0101   SODIUM 137 136 136   POTASSIUM 3.9 3.9 3.8   CHLORIDE  101 100 102   CO2 23 21 19*   GLUCOSE 137* 219* 211*   BUN 54* 57* 59*   CREATININE 2.94* 3.09* 3.25*   CALCIUM 8.8 8.4* 8.5      Recent Labs     04/28/25  1345 04/28/25  1705 04/29/25  0101   SODIUM 137 136 136   POTASSIUM 3.9 3.9 3.8   CHLORIDE 101 100 102   CO2 23 21 19*   GLUCOSE 137* 219* 211*   BUN 54* 57* 59*   CREATININE 2.94* 3.09* 3.25*          IMAGING:  - Imaging studies reviewed      ASSESSMENTS:    -CAMPBELL, likely 2/2 cardiorenal syndrome  -Fluid overload, improved  -Acidosis  -Hyperkalemia  -Hx of Renal Transplant     [facility] DDKT on 4/5/2023    Mycophenolate 750/750    Tacrolimus 0.5 AM and 1 mg PM (per [facility] 3/10/25 note)  -Gram Positive Bacteremia/Sepsis     Abx per primary team     Immunosuppression on hold  -NSTEMI      s/p PCI with impella support     On dobutamine  -s/p PEA arrest in cath lab  -Hx of HFrEF     EF recovered 20->50%  -CAD  -Paroxysmal A-fib      PLAN    -Cr worse, but no compeling indication for HD today  -defer diuretics to ICU team as pt with Reading Mauricio  -dose all meds for GFR< 15  -agree with abx for bacteremia, immunosuppression on hold due to severe infection  -will consider restarting IS if repeat cultures remain negative from 4/28  -appreciate ID recommendations for abx  -keep strict I/O  -appreciate ICU and cardiology mgmt of other issues    Please page nephrology with any questions or concerns    Rolando Hackett DO  Nephrologist   [facility]  [phone]

## 2025-04-29 NOTE — PROGRESS NOTES
Cardiology Follow-up Consult Note    Date of Service:    2025      Consulting Physician: Bridger Arias M.D.    Name:   Dariel Diamond     YOB: 1965  Age:   59 y.o.  male   MRN:   [id number]      Assessment/Recommendations:   1.  Loss of consciousness on initial presentation.  Etiology is not clear with severely decreased LVEF it could be ischemia with left main disease.  EF is less than 15% and he may need a LifeVest upon discharge.      2.  NSTEMI, LM stent into LAD .  Impella removed .  Missed DAPT  due to bleed, but now back on it.     -good CI numbers off impella, on  2.5 mcg/kg/min    -ECASA 81 mg po qd and clopidogrel 75 mg po qd   -Stop hydralazine and isordil for now till off norepi   -continue  at low dose for now    -can consider d/c swan from cardiac standpoint, but still shock will defer to intensivist  3.  A. Fib rates still difficult but likely will be if other issues still on going, continue amiodarone gtt.    -continue amiodarone gt   -restart heparin gtt if hgb stable     4.  Renal insufficiency s/p renal transplant followed by renal      Patient Identifier/Subjective:59 year old man with hx VT, recovered HFrEF 20-50%, ICM, CAD, p. Afib, DM, ESRD sp renal transplant 2023, admitted for loss of consciousness and DKA.  Troponins were mildly elevated on 2025.  Echocardiogram 2025 showed severely decreased LVEF of 15% or less.  Cardiac catheterization 2025 revealed left main and LAD disease for which he was treated with a left main going into LAD stent.  He had the Impella removed on 2025.  He did have significant bleeding and required blood transfusion.  That has since stabilized.    He also developed atrial fibrillation 2025.  IV amiodarone is continued.    Interim History: He was extubated this morning.  He is complaining of some throat pain.  Son is at bedside helping with translation.    Impella removed 25  Amiodarone gtt    gtt  Norepi gtt  Vasopression add    Pertinent Labs/Studies:  Hgb 8  Cr 3.25  +1.330 in,     Telemetry Reviewed (personally reviewed) SR, ST    All other review of systems reviewed and negative.    Past medical, surgical, social, and family history reviewed and unchanged from admission except as noted in assessment and plan.    Medications Prior to Admission   Medication Sig Dispense Refill Last Dose/Taking    apixaban (ELIQUIS) 5mg Tab Take 5 mg by mouth 2 times a day.   4/25/2025 Morning    amLODIPine (NORVASC) 10 MG Tab Take 10 mg by mouth every day.   4/25/2025 Morning    cloNIDine (CATAPRES) 0.1 MG/24HR PATCH WEEKLY patch Place 1 Patch on the skin every Saturday.   4/19/2025    furosemide (LASIX) 80 MG Tab Take 40 mg by mouth 1 time a day as needed (If needed for weight gain > 2).   4/24/2025 Evening    gabapentin (NEURONTIN) 100 MG Cap Take 200 mg by mouth every day.   4/24/2025 Evening    Insulin Aspart PenFill 100 UNIT/ML Solution Cartridge Inject 14 Units under the skin 3 times a day before meals.   4/24/2025 Evening    losartan (COZAAR) 100 MG Tab Take 100 mg by mouth every day.   4/25/2025 Morning    metoprolol SR (TOPROL XL) 50 MG TABLET SR 24 HR Take 50 mg by mouth every day.   4/24/2025 Evening    mycophenolate (CELLCEPT) 250 MG Cap Take 750 mg by mouth 2 times a day.   4/25/2025 Morning    polyethylene glycol 3350 (MIRALAX) 17 GM/SCOOP Powder Take 17 g by mouth 2 times a day.   4/25/2025 Morning    rosuvastatin (CRESTOR) 40 MG tablet Take 40 mg by mouth every day.   4/25/2025 Morning    SPS, SODIUM POLYSTYRENE SULF, 15 GM/60ML Suspension Take 15 g by mouth see administration instructions. Takes on Tuesday, Thursday, and Sunday 4/24/2025    tacrolimus (PROGRAF) 0.5 MG Cap Take 0.5-1 mg by mouth 2 times a day. 0.5 mg in the AM  1 mg in the PM   4/25/2025 Morning    insulin glargine 100 UNIT/ML Solution Pen-injector injection Inject 10 Units under the skin every evening.   4/24/2025 Evening     tamsulosin (FLOMAX) 0.4 MG capsule Take 0.4 mg by mouth every day.   4/24/2025 Evening    multivitamin Tab Take 1 Tablet by mouth every day.   4/24/2025 Evening    docusate sodium (COLACE) 100 MG Cap Take 100 mg by mouth 2 times a day.   4/25/2025 Morning    aspirin EC 81 MG EC tablet Take 1 Tab by mouth every day. 30 Tab 2 4/24/2025 Noon       Inpatient Medications Reviewed    Allergies   Allergen Reactions    Contrast Media With Iodine [Iodine] Rash and Itching     Per patient         Intake/Output Summary (Last 24 hours) at 4/29/2025 0740  Last data filed at 4/29/2025 0630  Gross per 24 hour   Intake 2355.69 ml   Output 1170 ml   Net 1185.69 ml        Physical Exam  Body mass index is 29.9 kg/m².  /59   Pulse (!) 104   Temp 36.9 °C (98.4 °F) (Core)   Resp 20   Ht 1.829 m (6')   Wt 100 kg (220 lb 7.4 oz)   SpO2 96%   Vitals:    04/29/25 0515 04/29/25 0530 04/29/25 0532 04/29/25 0545   BP:   119/59    Pulse: (!) 101 (!) 105 (!) 105 (!) 104   Resp: 18 19 (!) 22 20   Temp:       TempSrc:       SpO2: 97%  97% 96%   Weight:       Height:         Oxygen Therapy:  Pulse Oximetry: 96 %, O2 Delivery Device: Ventilator    Physical Exam  Constitutional:       Appearance: Normal appearance.   Neck:      Vascular: No JVD.   Cardiovascular:      Rate and Rhythm: Tachycardia present. Rhythm irregularly irregular.      Pulses: Normal pulses and intact distal pulses.      Heart sounds: S1 normal and S2 normal. Heart sounds are distant. No murmur heard.     No friction rub. No S3 or S4 sounds.   Pulmonary:      Effort: Pulmonary effort is normal. No respiratory distress.      Breath sounds: Normal breath sounds. No wheezing or rales.   Skin:     General: Skin is warm and dry.   Neurological:      Mental Status: He is alert.         Labs (personally reviewed and notable for):   Lab Results   Component Value Date/Time    SODIUM 136 04/29/2025 01:01 AM    POTASSIUM 3.8 04/29/2025 01:01 AM    CHLORIDE 102 04/29/2025 01:01  AM    CO2 19 (L) 04/29/2025 01:01 AM    GLUCOSE 211 (H) 04/29/2025 01:01 AM    BUN 59 (H) 04/29/2025 01:01 AM    CREATININE 3.25 (H) 04/29/2025 01:01 AM      Lab Results   Component Value Date/Time    WBC 10.6 04/29/2025 05:39 AM    RBC 2.86 (L) 04/29/2025 05:39 AM    HEMOGLOBIN 8.0 (L) 04/29/2025 05:39 AM    HEMATOCRIT 24.6 (L) 04/29/2025 05:39 AM    MCV 86.0 04/29/2025 05:39 AM    MCH 28.0 04/29/2025 05:39 AM    MCHC 32.5 04/29/2025 05:39 AM    MPV 11.9 04/29/2025 05:39 AM    NEUTSPOLYS 94.80 (H) 04/28/2025 09:48 AM    LYMPHOCYTES 2.60 (L) 04/28/2025 09:48 AM    MONOCYTES 2.60 04/28/2025 09:48 AM    EOSINOPHILS 0.00 04/28/2025 09:48 AM    BASOPHILS 0.00 04/28/2025 09:48 AM      Lab Results   Component Value Date/Time    CHOLSTRLTOT 72 (L) 04/26/2025 03:10 AM    LDL 32 04/26/2025 03:10 AM    HDL 29 (A) 04/26/2025 03:10 AM    TRIGLYCERIDE 56 04/26/2025 03:10 AM       Lab Results   Component Value Date/Time    TROPONINT 157 (H) 04/25/2025 1319     Lab Results   Component Value Date/Time    NTPROBNP 5387 (H) 04/25/2025 1945       I personally reviewed all blood test results, EKG tracings and images of his cardiac testings.       Michelle Silva MD  Cardiologist, [facility]    Please note that this dictation was created using voice recognition software. I have made every reasonable attempt to correct obvious errors, but it is possible there are errors of grammar and possibly content that I did not discover before finalizing the note.

## 2025-04-29 NOTE — PROGRESS NOTES
4 Eyes Skin Assessment Completed by HEATHER Sommer and HEATHER Swanson.    Head Bruising  - forehead lac, eye bruising  Ears Redness and Blanching  Nose Redness and Blanching, bleeding, rhino rocket in place  Mouth Redness and Discoloration - cut on right upper lip  Neck WDL  Breast/Chest Shiny and Edema  Shoulder Blades WDL  Spine WDL  (R) Arm/Elbow/Hand Redness, Blanching, and Bruising - right upper arm art line bruise, elbow bruise  (L) Arm/Elbow/Hand Redness, Blanching, and Bruising  Abdomen Scar  Groin Bruising, Swelling, and Incision - left groin incision site + hematoma  Scrotum/Coccyx/Buttocks Discoloration   (R) Leg Bruising and Swelling  (L) Leg Bruising and Swelling  (R) Heel/Foot/Toe Redness, Blanching, Discoloration, Boggy, Swelling, and Scar  (L) Heel/Foot/Toe Redness, Blanching, Discoloration, Boggy, Scar, and Edema - left toe amputation          Devices In Places Tele Box, Blood Pressure Cuff, Pulse Ox, Rodríguez, Arterial Line, ET Tube, OG/NG, and Central Line      Interventions In Place InterDry, Heel Mepilex, Sacral Mepilex, Heel Float Boots, TAP System, Pillows, Elbow Mepilex, Optifoam, Q2 Turns, Low Air Loss Mattress, ZFlo Pillow, Dri-Sunil Pads, Heels Loaded W/Pillows, and Pressure Redistribution Mattress    Possible Skin Injury Yes    Pictures Uploaded Into Epic Yes  Wound Consult Placed Yes  RN Wound Prevention Protocol Ordered Yes

## 2025-04-29 NOTE — PROGRESS NOTES
[facility] ICU Progress Note      Admit Date: 4/25/2025    Resident(s): Navin Ardon M.D.   Attending:  DAMON CHAVIRA/ Dr. Bridger Arias M.D.    Patient ID:    Name:  Dariel Diamond     YOB: 1965  Age:  59 y.o.  male   MRN:  [id number]    Hospital Course (carried forward and updated):  59 y.o. male admitted 4/25/2025 with history of hypertension, hyperlipidemia, HFimpEF (most recent 50%), paroxysmal atrial fibrillation on apixaban, ESRD s/p DDKT 4/2023, who presented with chest pain, dyspnea and syncope this morning.  He was in his usual state of health prior to experiencing mild/moderate substernal chest pain.  About 30 minutes later he felt his vision become obscured and fell, striking his head.  He presented to the emergency department where he had a noncontrast head CT that was unrevealing.  His forehead laceration was repaired.     EKG demonstrated anterior ST elevations and dynamic EKG changes.  Given concern for malignant dysrhythmia he proceeded to the cath lab.     In the cath lab he sustained PEA cardiac arrest for ~10-30 seconds and was intubated.  He had Impella placed and PCI to LAD with PTCA of the circumflex.  He had escalating inotrope and vasoactive requirements.  Initial CI was 3.7 on dobutamine 5, epi 0.03.  Taken from Dr Frost note.      4/25 admitted post cath lab brief arrest, impella, cardiogenic shock on vent support.  4/26 ongoing cardiogenic shock, impella P5, dobutamine 2.5, iHD, low vent support, nasal bleeding      Consultants:  Critical Care  Nephrology    Interval Events:  Patient afebrile  HR:  frequent multifocal PVC  Hgb 8, creatinine 3.25    Abx changed from Unasyn to cefazolin per ID    Cr lucas from 1.1->1.8 since admission. Poor UOP noted and ICU team concerned for worsening volume overload/cardiorenal syndrome.     Vitals Range last 24h:  Temp:  [36.9 °C (98.4 °F)-37.1 °C (98.8 °F)] 37 °C (98.6 °F)  Pulse:  [] 119  Resp:  [4-37] 11  BP: ()/(33-63)  99/45  SpO2:  [56 %-100 %] 96 %      Intake/Output Summary (Last 24 hours) at 4/29/2025 1526  Last data filed at 4/29/2025 1400  Gross per 24 hour   Intake 3065.23 ml   Output 1890 ml   Net 1175.23 ml        ROS  Unable to perform ROS: Critical illness  PHYSICAL EXAM:  Vitals:    04/29/25 1400 04/29/25 1415 04/29/25 1430 04/29/25 1445   BP:       Pulse: (!) 118 (!) 123 (!) 122 (!) 119   Resp: (!) 23 (!) 10 15 (!) 11   Temp:       TempSrc:       SpO2: 97% 95% 96% 96%   Weight:       Height:        Body mass index is 29.9 kg/m².    O2 therapy: Pulse Oximetry: 96 %, O2 (LPM): 4, O2 Delivery Device: Oxymask    Date 04/29/25 0700 - 04/30/25 0659   Shift 7571-1369 7209-5476 9953-1370 24 Hour Total   INTAKE   I.V. 738.1   738.1     Volume (mL) Insulin 89.1   89.1     Magnesium Sulfate Volume 65.8   65.8     Amiodarone Volume 208   208     Vasopressin Volume 43.7   43.7     Norepinephrine Volume 294.8   294.8     Dobutamine Volume 36.6   36.6   Other         Balloon Inflated (mL) (Rectal Tube) 1 x   1 x   IV Piggyback 48.6   48.6     Volume (mL) (ceFAZolin in dextrose (Ancef) IVPB premix 1 g) 48.6   48.6   Shift Total 786.7   786.7   OUTPUT   Urine 400   400     Output (mL) (Urethral Catheter) 400   400   Shift Total 400   400   .7   386.7        Physical Exam  HENT:      Nose: Nose normal.      Mouth/Throat:      Mouth: Mucous membranes are moist.   Eyes:      Pupils: Pupils are equal, round, and reactive to light.   Cardiovascular:      Rate and Rhythm: Normal rate and regular rhythm.   Pulmonary:      Effort: Pulmonary effort is normal.      Breath sounds: Normal breath sounds.   Abdominal:      General: Abdomen is flat. Bowel sounds are normal.      Palpations: Abdomen is soft.   Musculoskeletal:      Cervical back: Normal range of motion.      Right lower leg: Edema present.      Left lower leg: Edema present.   Skin:     General: Skin is warm.      Capillary Refill: Capillary refill takes less than 2 seconds.    Neurological:      General: No focal deficit present.      Mental Status: He is alert and oriented to person, place, and time.   Psychiatric:         Mood and Affect: Mood normal.         Recent Labs     04/28/25  1412 04/28/25  1415 04/28/25  1837 04/29/25  0540 04/29/25  0746 04/29/25  0750 04/29/25  1132 04/29/25  1134   ISTATAPH 7.405  --    < > 7.376 7.417  --  7.403  --    ISTATAPCO2 37.5  --    < > 33.5 34.8  --  32.9  --    ISTATAPO2 75*  --    < > 74* 63*  --  64*  --    ISTATATCO2 25*  --    < > 21* 23*  --  22*  --    JSQQZLX1JLD 95  --    < > 94 92*  --  92*  --    ISTATARTHCO3 23.5  --    < > 19.6* 22.4  --  20.5*  --    ISTATARTBE -1  --    < > -5* -2  --  -4  --    ISTATTEMP 36.0 C 36.0 C   < > 36.9 C 36.7 C 36.7 C 37.1 C 37.1 C   ISTATFIO2 30 30  --  30  --   --   --   --    ISTATSPEC Arterial Venous   < > Arterial Arterial Venous Arterial Venous   ISTATAPHTC 7.420  --    < > 7.377 7.422  --  7.401  --    [id number] 70*  --    < > 73* 62*  --  64*  --     < > = values in this interval not displayed.     Recent Labs     04/27/25  0508 04/27/25  0746 04/28/25  0426 04/28/25  1345 04/28/25  1705 04/29/25  0101 04/29/25  1249   SODIUM  --    < >  --    < > 136 136 135   POTASSIUM  --    < >  --    < > 3.9 3.8 3.5*   CHLORIDE  --    < >  --    < > 100 102 101   CO2  --    < >  --    < > 21 19* 18*   BUN  --    < >  --    < > 57* 59* 61*   CREATININE  --    < >  --    < > 3.09* 3.25* 3.40*   MAGNESIUM 1.9  --  2.0  --   --   --   --    PHOSPHORUS 3.3  --  4.6*  --   --   --   --    CALCIUM  --    < >  --    < > 8.4* 8.5 8.5    < > = values in this interval not displayed.     Recent Labs     04/28/25  0220 04/28/25  0426 04/28/25  0948 04/28/25  1345 04/28/25  1705 04/29/25  0101 04/29/25  0539 04/29/25  1249   ALTSGPT  --  44 41  --   --   --  30  --    ASTSGOT  --  41 33  --   --   --  20  --    ALKPHOSPHAT  --  69 71  --   --   --  70  --    TBILIRUBIN  --  0.9 0.8  --   --   --  0.6  --    DBILIRUBIN   --  0.5 0.4  --   --   --  0.3  --    PREALBUMIN 10.3*  --   --   --   --   --   --   --    GLUCOSE 183*  --   --    < > 219* 211*  --  186*    < > = values in this interval not displayed.     Recent Labs     04/26/25  2100 04/27/25  0508 04/28/25  0948 04/28/25  1830 04/29/25  0101 04/29/25  0539 04/29/25  0747 04/29/25  1132   RBC  --    < > 3.73* 3.28*  --  2.86*  --   --    HEMOGLOBIN  --    < > 10.2* 9.2*   < > 8.0* 8.4* 8.1*   HEMATOCRIT  --    < > 31.7* 27.6*   < > 24.6* 25.1* 24.5*   PLATELETCT  --    < > 114* 92*  --  94*  --   --    PROTHROMBTM 20.2*  --  18.8*  --   --   --   --   --    INR 1.72*  --  1.57*  --   --   --   --   --     < > = values in this interval not displayed.     Recent Labs     04/28/25  0426 04/28/25  0948 04/28/25  1830 04/29/25  0539   WBC 12.5* 16.0* 10.8 10.6   NEUTSPOLYS  --  94.80*  --   --    LYMPHOCYTES  --  2.60*  --   --    MONOCYTES  --  2.60  --   --    EOSINOPHILS  --  0.00  --   --    BASOPHILS  --  0.00  --   --    ASTSGOT 41 33  --  20   ALTSGPT 44 41  --  30   ALKPHOSPHAT 69 71  --  70   TBILIRUBIN 0.9 0.8  --  0.6       Meds:   Nozin nasal  swab  1 Applicator      vasopressin (Vasostrict) infusion  0.03 Units/min 0.03 Units/min (04/29/25 0907)    ceFAZolin  1 g Stopped (04/29/25 1306)    insulin lispro  0-14 Units      [Held by provider] isosorbide dinitrate  10 mg      [Held by provider] furosemide  100 mg      [Held by provider] hydrALAZINE  25 mg      insulin regular (NovoLIN R/HumuLIN R) 100 units in  mL infusion  0-29 Units/hr 4.5 Units/hr (04/29/25 1439)    dextrose bolus  12.5-25 g      aspirin  81 mg      clopidogrel  75 mg      senna-docusate  2 Tablet      And    polyethylene glycol/lytes  1 Packet      acetaminophen  650 mg      ondansetron  4 mg      Pharmacy        NS  100 mL      [Held by provider] hydrALAZINE  10 mg      ondansetron  4 mg      [Held by provider] gabapentin  200 mg      rosuvastatin  40 mg      dexmedetomidine  (Precedex) infusion  0.1-1.5 mcg/kg/hr (Ideal) Stopped (04/29/25 3971)    DOBUTamine-Dextrose  2.5 mcg/kg/min (Ideal) Stopped (04/29/25 0951)    NORepinephrine  0-1 mcg/kg/min (Ideal) 0.24 mcg/kg/min (04/29/25 1147)    amiodarone infusion  0.5 mg/min 0.5 mg/min (04/29/25 9812)    Respiratory Therapy Consult        MD Alert...Adult ICU Electrolyte Replacement per Pharmacy        lidocaine  2 mL      HYDROmorphone  0.5 mg      Or    HYDROmorphone  1 mg          Procedures:  None    Imaging:  DX-CHEST-PORTABLE (1 VIEW)   Final Result         1. Increasing mild basilar atelectasis.      2. ETT tip at T2, 6 cm above [place].      3. [place]. Right arm midline. NG tube in stomach.      4. Left subclavian vascular stent.      DX-CHEST-PORTABLE (1 VIEW)   Final Result      Mild improvement in aeration of the left lung base.      EC-ECHOCARDIOGRAM LTD W/O CONT   Final Result      DX-CHEST-PORTABLE (1 VIEW)   Final Result      Mild interval improvement in aeration of the left lung base.      EC-ECHOCARDIOGRAM LTD W/O CONT   Final Result      DX-CHEST-PORTABLE (1 VIEW)   Final Result         1. Lines and tubes as above.   2. Interval improvement in bilateral pulmonary opacities. No pleural effusions.         DX-ABDOMEN FOR TUBE PLACEMENT   Final Result      NG tube tip projects over the stomach.      EC-ECHOCARDIOGRAM LTD W/O CONT   Final Result      DX-CHEST-PORTABLE (1 VIEW)   Final Result      1.  Pulmonary arterial catheter tip projects over the right main pulmonary artery. No pneumothorax is identified.   2.  Increasing perihilar interstitial opacities are consistent with pulmonary edema.   3.  Left basilar opacification likely represents atelectasis.      EC-ECHOCARDIOGRAM LTD W/O CONT   Final Result      DX-CHEST-PORTABLE (1 VIEW)   Final Result      No acute cardiac or pulmonary abnormalities are identified.      DX-CHEST-PORTABLE (1 VIEW)   Final Result      No acute cardiopulmonary process.      CT-CSPINE WITHOUT  PLUS RECONS   Final Result      1.  No acute fracture or traumatic malalignment of the cervical spine.   2.  Patchy airspace opacities in the visualized lung apices.         CT-HEAD W/O   Final Result      1.  No acute intracranial hemorrhage.   2.  A 2 to 3 mm faintly hyperdense focus in the right mesial occipital lobe could represent vascular calcification or cavernous malformation. This could be confirmed by brain MRI.   3.  Mildly displaced nasal bone fractures with surrounding soft tissue swelling and foci of air.   4.  There is a 2.4 cm solid mass within the superficial lobe of the left parotid gland with calcification. Although the parotid gland is incompletely visualized on this exam, benign and malignant entities cannot be reliably distinguished by imaging alone    and ultimately an ENT referral will be needed for consideration of tissue sampling. This can be done on a nonemergent basis.      CL-LEFT HEART CATHETERIZATION WITH POSSIBLE INTERVENTION    (Results Pending)       ASSESSEMENT and PLAN:    * NSTEMI (non-ST elevation myocardial infarction) (HCC)- (present on admission)  Assessment & Plan  Patient found to significantly with troponin of 157 with active chest pain.  continuing antiplatelet heparin as per APTT levels and monitor for signs of bleeding.  He is on Eliquis that is why he is on APTT protocol.  Impella removed 25  Amiodarone gtt   gtt  Norepi gtt  Vasopression add    Cardiogenic shock (HCC)  Assessment & Plan  Cardiogenic shock due to acute ischemia a post arrest.   PA with pulmonary venous hypertension with good Christian but reduced LV function and elevated left sided filling pressures.   Impella P5, dobutamine 2.5, clevidipine gtt for elevated map and high SVR  need iHD , improving urine output ongoing lasix challenge to achieve euvolemic  Monitor end organ perfusion with neurologic status, skin, lactate, renal function and output, trend serial markers LFT's, BMP, lactate,  ScVO2  Switch Unaysn to cefazolin 1gm every 12 hours  Follow Repeat blood culture report   Trend Procalcitonin   Impella removed 25  Amiodarone gtt   gtt  Norepi gtt  Vasopression add    Mass of parotid gland  Assessment & Plan  CT head showed 2.4 cm solid mass within the superficial lobe of the left parotid gland with calcification.  Patient will need outpatient ENT referral.    Abnormal CT of the head  Assessment & Plan  Patient found to have 2 to 3 mm faintly hyperdense focus in the right mesial occipital lobe could represent vascular calcification or cavernous malformation.  This could be confirmed by MRI brain.  Patient will need MRI after he is stable from his acute NSTEMI      Kidney transplanted  Assessment & Plan  Current BUN is 22 and creatinine of 1.17  Cr worse, but no compeling indication for HD today  dose all meds for GFR< 15  abx for bacteremia, immunosuppression on hold due to severe infection  keep strict I/O        Acute respiratory failure with hypoxia (HCC)  Assessment & Plan  Patient found to have acute respiratory failure with hypoxia most likely secondary to pulmonary edema he received a dose of IV Lasix Titrate down oxygen as tolerated.      Syncope  Assessment & Plan  Patient had a syncopal episode and he developed a laceration on his face.  She underwent suture placement by ER.  His syncopal episode could be secondary to severe chest pain as she has been started prior to syncopal episode or could be due to underlying possible arrhythmia.      Ventricular tachycardia (HCC)- (present on admission)  Assessment & Plan  History of    HTN (hypertension)- (present on admission)  Assessment & Plan  Continue outpatient medications.    Dyslipidemia- (present on admission)  Assessment & Plan  Continue statin    Troponin level elevated- (present on admission)  Assessment & Plan  Secondary to NSTEMI    Type 2 diabetes mellitus with chronic kidney disease on chronic dialysis (HCC)- (present on  admission)  Assessment & Plan  Continue outpatient insulin glargine  on insulin sliding scale with hypoglycemia protocol  Follow-up HbA1c             DISPO: Pending  VTE: Heparin  Ulcer: H2 antagonist  CODE STATUS: Full

## 2025-04-29 NOTE — DISCHARGE PLANNING
Case Management Discharge Planning    Admission Date: 4/25/2025  GMLOS: 5.1  ALOS: 4    6-Clicks ADL Score: 6  6-Clicks Mobility Score: 6  PT and/or OT Eval ordered: No  Post-acute Referrals Ordered: Yes  Post-acute Choice Obtained: No  Has referral(s) been sent to post-acute provider:  Yes      Anticipated Discharge Dispo: Discharge Disposition: Disch to a long term care facility (63)  Discharge Contact Phone Number: [phone]    Action(s) Taken:   Pt accepted to [facility].  RN CCM met with pt and sonHusam at bedside.  Pt lives with his spouse, Alma Frausto and sons, Husam and Dariel in an apartment, [address] [place] [address].    Pt was independent with ADLs and IADLs.  Pt currently unemployed.  His PCP is Estephania Pepper.    Medically Clear: No    Next Steps:   Follow up with pt and family.  Follow up with [facility].    Barriers to Discharge: Medical clearance, Pending Placement, Pending PT Evaluation, and Pending Procedures      Care Transition Team Assessment    Information Source  Orientation Level: Oriented X4  Information Given By: Relative  Who is responsible for making decisions for patient? : Patient    Readmission Evaluation  Is this a readmission?: No    Elopement Risk  Legal Hold: No  Ambulatory or Self Mobile in Wheelchair: No-Not an Elopement Risk  Elopement Risk: Not at Risk for Elopement    Interdisciplinary Discharge Planning  Lives with - Patient's Self Care Capacity: Spouse  Patient or legal guardian wants to designate a caregiver: No  Support Systems: Spouse / Significant Other, Family Member(s), Friends / Neighbors  Housing / Facility: Unable To Determine At This Time    Discharge Preparedness  What is your plan after discharge?: Uncertain - pending medical team collaboration  What are your discharge supports?: Spouse, Child  Prior Functional Level: Ambulatory, Drives Self, Independent with Activities of Daily Living, Independent with Medication Management  Difficulity with ADLs:  Bathing, Brushing teeth, Dressing, Eating, Toileting, Walking  Difficulity with IADLs: Cooking, Driving, Keeping track of finances, Laundry, Managing medication, Shopping, Using the telephone or computer    Functional Assesment  Prior Functional Level: Ambulatory, Drives Self, Independent with Activities of Daily Living, Independent with Medication Management    Finances  Financial Barriers to Discharge: No  Prescription Coverage: Yes              Advance Directive  Advance Directive?: None    Domestic Abuse  Have you ever been the victim of abuse or violence?: No  Possible Abuse/Neglect Reported to:: Not Applicable    Psychological Assessment  History of Substance Abuse: None  History of Psychiatric Problems: No    Discharge Risks or Barriers  Discharge risks or barriers?: No  Patient risk factors: Language barrier    Anticipated Discharge Information  Discharge Disposition: Disch to a long term care facility (63)  Discharge Contact Phone Number: [phone]

## 2025-04-29 NOTE — PROGRESS NOTES
Monitor summary:        Rhythm: SR, ST, Afib  Rate: 70 - 140  Ectopy: Frequent multifocal PVC          12hr chart check

## 2025-04-29 NOTE — PROGRESS NOTES
Critical Care Progress Note    Date of admission  4/25/2025    Chief Complaint  59 y.o. male admitted 4/25/2025 with history of hypertension, hyperlipidemia, HFimpEF (most recent 50%), paroxysmal atrial fibrillation on apixaban, ESRD s/p DDKT 4/2023, who presented with chest pain, dyspnea and syncope this morning.  He was in his usual state of health prior to experiencing mild/moderate substernal chest pain.  About 30 minutes later he felt his vision become obscured and fell, striking his head.  He presented to the emergency department where he had a noncontrast head CT that was unrevealing.  His forehead laceration was repaired.     EKG demonstrated anterior ST elevations and dynamic EKG changes.  Given concern for malignant dysrhythmia he proceeded to the cath lab.     In the cath lab he sustained PEA cardiac arrest for ~10-30 seconds and was intubated.  He had Impella placed and PCI to LAD with PTCA of the circumflex.  He had escalating inotrope and vasoactive requirements.  Initial CI was 3.7 on dobutamine 5, epi 0.03.  Taken from Dr Frost note.    Hospital Course  4/25 admitted post cath lab brief arrest, impella, cardiogenic shock on vent support.  4/26 ongoing cardiogenic shock, impella P5, dobutamine 2.5, iHD, low vent support, nasal bleeding     4/28-Worsening shock this AM, hemorrhagic/vasoplegic, low filling pressures, PRBCs, impella removed, DAPT restarted, BCXs with Strep species, continue unasyn, hemos and index improved with blood and volume, hold diuresis and afterload reduction, trend H/H, continue to hold heparin overnight, restart DAPT after impella removal    4/29-VD4, extubated in the AM, titrating vasopressors for low SVR vasoplegic shock, off , CI improved, begin diuresis with elevated filling pressures, remove rhin rocke with some scant oozing will hold heparin for one more day, Unasyn to Cefazolin, requiring high doses of vasopressor but lactate remains normal    Interval Problem  Update  Reviewed last 24 hour events:  AxO this AM, slight down titration of NE overnight  Hb drifted a bit, no overt bleeding, no recurrent epistaxis    Is wide awake, denies complaint, low airway pressures, audible leak, seems the tube is partially out, will go ahead and extubate given he is doing well on PSV    Hypotensive, SVR trending down  Filling pressures ok, NE trending up this AM    Start Vaso    Neuro:     CV:  HR 90s-110  -120    PAC 0400:   2.5, NE 0.2    CO/CI 7.3/3.4    PAP 35/26  CVP 10  PCWP 28    0836:  CO/CI- 7.9/3.7  CVP 9  MAP 58    PCWP 23      Resp:   PSV 5/8, Sp02 98%  Following, taking good volumes  Hemodynamics appropriate on PSV    GI: Multiple BMs overnight, loose stool this AM x 1 brown    I/O: +1300  UOP: 610    Tmax: AF  Heme: WBC 10.6    Abx:   (4/27-4/28) Vanc/Unasyn  (4/28- ) Unasyn    Micro:  4/26-BCXs S. Salivarius/vestibularis 2/2 bottles  4/28-BCXs NGTD  4/26-MRSA negative    Endo: BG WTR    LDA: PIV, RIJ nathan Chandler, EDSNOT  SUP: H2RA  VTE: DAPT, apix  Diet: TF      Review of Systems  Review of Systems   Unable to perform ROS: Critical illness        Vital Signs for last 24 hours   Temp:  [36.9 °C (98.4 °F)-37.1 °C (98.8 °F)] 37 °C (98.6 °F)  Pulse:  [] 119  Resp:  [4-29] 11  BP: ()/(33-63) 99/45  SpO2:  [56 %-100 %] 96 %    Hemodynamic parameters for last 24 hours  CVP:  [2 MM HG-17 MM HG] 16 MM HG  PCWP:  [15 MM HG-28 MM HG] 28 MM HG  CO:  [5.3-7.9] 7.6  CI:  [2.45-3.65] 3.52    Respiratory Information for the last 24 hours  Vent Mode: Spont  Rate (breaths/min): 14  Vt Target (mL): 470  PEEP/CPAP: 8  P Support (PS + PEEP): 13  MAP: 11  Control VTE (exp VT): 469    Physical Exam   Physical Exam  Vitals and nursing note reviewed.   Constitutional:       General: He is not in acute distress.     Appearance: He is obese. He is ill-appearing.      Comments: Ill appearing male on ventilator with son at bedside   HENT:      Head:       Comments: Forehead laceration, sutured clean and dry     Mouth/Throat:      Mouth: Mucous membranes are moist.      Comments: ET in place, OG clamped  Eyes:      Pupils: Pupils are equal, round, and reactive to light.      Comments: Left upper eye lid bruising, left nasal bleeding   Cardiovascular:      Rate and Rhythm: Normal rate.      Heart sounds: No murmur heard.  Pulmonary:      Effort: No respiratory distress.      Breath sounds: No stridor. No wheezing or rhonchi.   Abdominal:      General: There is no distension.      Palpations: There is no mass.      Tenderness: There is no abdominal tenderness.      Hernia: No hernia is present.   Musculoskeletal:         General: No swelling or tenderness.      Comments: Left groin soft, some ecchy tracking down laterally but no ttp   Skin:     General: Skin is warm.      Capillary Refill: Capillary refill takes less than 2 seconds.   Neurological:      Mental Status: He is alert.      Comments: Awake, nodding, answering questions  ALANIS no deficits     Psychiatric:         Mood and Affect: Mood normal.         Medications  Current Facility-Administered Medications   Medication Dose Route Frequency Provider Last Rate Last Admin    Nozin nasal  swab  1 Applicator Each Nostril BID Bridger Arias M.D.        vasopressin (Vasostrict) 20 Units in  mL Infusion  0.03 Units/min Intravenous Continuous Bridger Arias M.D. 9 mL/hr at 04/29/25 0907 0.03 Units/min at 04/29/25 0907    ceFAZolin in dextrose (Ancef) IVPB premix 1 g  1 g Intravenous Q12HRS Dennis Stanley M.D.   Stopped at 04/29/25 1306    insulin lispro (HumaLOG,AdmeLOG) subcutaneous injection  0-14 Units Subcutaneous TID AC Bridger Arias M.D.   4 Units at 04/29/25 1440    [Held by provider] isosorbide dinitrate (Isordil) tablet 10 mg  10 mg Enteral Tube TID David Choe M.D.   10 mg at 04/27/25 1746    [Held by provider] furosemide (Lasix) injection 100 mg  100 mg Intravenous BID Rolando Hackett  D.OEdilberto   100 mg at 04/28/25 1651    [Held by provider] hydrALAZINE (Apresoline) tablet 25 mg  25 mg Enteral Tube Q8HRS David Choe M.D.   25 mg at 04/27/25 2230    insulin regular (HumuLIN R/NovoLIN R) 100 Units in  mL infusion premix  0-29 Units/hr Intravenous Continuous CELIO Arthur.OEdilberto 2.5 mL/hr at 04/29/25 1715 2.5 Units/hr at 04/29/25 1715    dextrose 50 % (D50W) injection 12.5-25 g  12.5-25 g Intravenous PRN Ken Sims D.OEdilberto        aspirin (Asa) chewable tab 81 mg  81 mg Enteral Tube DAILY Cresencio Pratt M.D.   81 mg at 04/29/25 0519    clopidogrel (Plavix) tablet 75 mg  75 mg Enteral Tube DAILY Cresencio Pratt M.D.   75 mg at 04/29/25 0519    senna-docusate (Pericolace Or Senokot S) 8.6-50 MG per tablet 2 Tablet  2 Tablet Enteral Tube Q EVENING Cresencio Pratt M.D.   2 Tablet at 04/28/25 1659    And    polyethylene glycol/lytes (Miralax) Packet 1 Packet  1 Packet Enteral Tube QDAY PRN Cresencio Pratt M.D.   1 Packet at 04/28/25 1659    acetaminophen (Tylenol) tablet 650 mg  650 mg Enteral Tube Q6HRS PRN Cresencio Pratt M.D.        ondansetron (Zofran ODT) dispertab 4 mg  4 mg Enteral Tube Q4HRS PRN Cresencio Pratt M.D.        Pharmacy Consult: Enteral tube insertion - review meds/change route/product selection   Other PHARMACY TO DOSE Cresencio Pratt M.D.        NS (Bolus) 0.9 % infusion 100 mL  100 mL Intravenous DIALYSIS PRN Rolando Hackett D.OEdilberto        [Held by provider] hydrALAZINE (Apresoline) injection 10 mg  10 mg Intravenous Q4HRS PRN Frederick Edwards M.D.        ondansetron (Zofran) syringe/vial injection 4 mg  4 mg Intravenous Q4HRS PRN Frederick Edwards M.D.   4 mg at 04/27/25 0948    [Held by provider] gabapentin (Neurontin) capsule 200 mg  200 mg Oral DAILY Frederick Edwards M.D.        rosuvastatin (Crestor) tablet 40 mg  40 mg Enteral Tube DAILY Frederick Edwards M.D.   40 mg at 04/29/25 0519    dexmedetomidine (Precedex) 400 MCG/100ML infusion   0.1-1.5 mcg/kg/hr (Ideal) Intravenous Continuous Daniel Frost M.D.   Stopped at 04/29/25 0549    DOBUTamine (Dobutrex) 1 mg/1 mL premix infusion  2.5 mcg/kg/min (Ideal) Intravenous Continuous Daniel Frost M.D.   Stopped at 04/29/25 0938    norepinephrine (Levophed) 8 mg in 250 mL NS infusion (premix)  0-1 mcg/kg/min (Ideal) Intravenous Continuous Cresencio Pratt M.D. 34.9 mL/hr at 04/29/25 1147 0.24 mcg/kg/min at 04/29/25 1147    amiodarone (Nexterone) 360 mg/200 mL infusion  0.5 mg/min Intravenous Continuous Daniel Frost M.D. 16.7 mL/hr at 04/29/25 1512 0.5 mg/min at 04/29/25 1512    Respiratory Therapy Consult   Nebulization Continuous RT Daniel Frost M.D. MD Alert...ICU Electrolyte Replacement per Pharmacy   Other PHARMACY TO DOSE Daniel Frost M.D.        lidocaine (Xylocaine) 1 % injection 2 mL  2 mL Tracheal Tube Q30 MIN PRN Daniel Frost M.D.        HYDROmorphone (Dilaudid) injection 0.5 mg  0.5 mg Intravenous Q HOUR PRN Daniel Frost M.D.   0.5 mg at 04/25/25 2231    Or    HYDROmorphone (Dilaudid) injection 1 mg  1 mg Intravenous Q HOUR PRN Daniel Frost M.D.   1 mg at 04/29/25 0250       Fluids    Intake/Output Summary (Last 24 hours) at 4/29/2025 1718  Last data filed at 4/29/2025 1400  Gross per 24 hour   Intake 2910.35 ml   Output 1680 ml   Net 1230.35 ml       Laboratory  Recent Labs     04/28/25  1412 04/28/25  1415 04/28/25  1837 04/29/25  0540 04/29/25  0746 04/29/25  0750 04/29/25  1132 04/29/25  1134 04/29/25  1548 04/29/25  1549   ISTATAPH 7.405  --    < > 7.376 7.417  --  7.403  --  7.413  --    ISTATAPCO2 37.5  --    < > 33.5 34.8  --  32.9  --  32.8  --    ISTATAPO2 75*  --    < > 74* 63*  --  64*  --  53*  --    ISTATATCO2 25*  --    < > 21* 23*  --  22*  --  22*  --    AVWSJAD9GCP 95  --    < > 94 92*  --  92*  --  88*  --    ISTATARTHCO3 23.5  --    < > 19.6* 22.4  --  20.5*  --  20.9*  --    ISTATARTBE -1  --    < > -5* -2  --  -4  --  -3  --    ISTATTEMP 36.0 C 36.0 C    < > 36.9 C 36.7 C   < > 37.1 C 37.1 C 37.6 C 37.4 C   ISTATFIO2 30 30  --  30  --   --   --   --   --   --    ISTATSPEC Arterial Venous   < > Arterial Arterial   < > Arterial Venous Arterial Venous   ISTATAPHTC 7.420  --    < > 7.377 7.422  --  7.401  --  7.404  --    PIGFPUIT6SQ 70*  --    < > 73* 62*  --  64*  --  55*  --     < > = values in this interval not displayed.         Recent Labs     04/27/25  0508 04/27/25  0746 04/28/25  0426 04/28/25  1345 04/28/25  1705 04/29/25  0101 04/29/25  1249   SODIUM  --    < >  --    < > 136 136 135   POTASSIUM  --    < >  --    < > 3.9 3.8 3.5*   CHLORIDE  --    < >  --    < > 100 102 101   CO2  --    < >  --    < > 21 19* 18*   BUN  --    < >  --    < > 57* 59* 61*   CREATININE  --    < >  --    < > 3.09* 3.25* 3.40*   MAGNESIUM 1.9  --  2.0  --   --   --   --    PHOSPHORUS 3.3  --  4.6*  --   --   --   --    CALCIUM  --    < >  --    < > 8.4* 8.5 8.5    < > = values in this interval not displayed.     Recent Labs     04/28/25  0220 04/28/25  0426 04/28/25  0948 04/28/25  1345 04/28/25  1705 04/29/25  0101 04/29/25  0539 04/29/25  1249   ALTSGPT  --  44 41  --   --   --  30  --    ASTSGOT  --  41 33  --   --   --  20  --    ALKPHOSPHAT  --  69 71  --   --   --  70  --    TBILIRUBIN  --  0.9 0.8  --   --   --  0.6  --    DBILIRUBIN  --  0.5 0.4  --   --   --  0.3  --    PREALBUMIN 10.3*  --   --   --   --   --   --   --    GLUCOSE 183*  --   --    < > 219* 211*  --  186*    < > = values in this interval not displayed.     Recent Labs     04/28/25  0426 04/28/25  0948 04/28/25  1830 04/29/25  0539   WBC 12.5* 16.0* 10.8 10.6   NEUTSPOLYS  --  94.80*  --   --    LYMPHOCYTES  --  2.60*  --   --    MONOCYTES  --  2.60  --   --    EOSINOPHILS  --  0.00  --   --    BASOPHILS  --  0.00  --   --    ASTSGOT 41 33  --  20   ALTSGPT 44 41  --  30   ALKPHOSPHAT 69 71  --  70   TBILIRUBIN 0.9 0.8  --  0.6     Recent Labs     04/26/25  2100 04/27/25  0508 04/28/25  0948 04/28/25 1830  04/29/25  0101 04/29/25  0539 04/29/25  0747 04/29/25  1132 04/29/25  1547   RBC  --    < > 3.73* 3.28*  --  2.86*  --   --   --    HEMOGLOBIN  --    < > 10.2* 9.2*   < > 8.0* 8.4* 8.1* 8.0*   HEMATOCRIT  --    < > 31.7* 27.6*   < > 24.6* 25.1* 24.5* 23.9*   PLATELETCT  --    < > 114* 92*  --  94*  --   --   --    PROTHROMBTM 20.2*  --  18.8*  --   --   --   --   --   --    INR 1.72*  --  1.57*  --   --   --   --   --   --     < > = values in this interval not displayed.       Imaging  X-Ray:  I have personally reviewed the images and compared with prior images.    Assessment/Plan  Epistaxis due to trauma  Assessment & Plan  Due to syncope and facial trauma worsened by heparin and DAPT  Rhinorocket placed on antibiotics, monitor need for ENT    Bacteremia due to Gram-positive bacteria  Assessment & Plan  2/2 Strep Salivarius in Blood in chains/pairs changed to cefazolin    Hx of amp resistant enterococcus  Hold immunosuppression with bacteremia  Repeat blood cx 4/28 NGTD  ID following    Atrial fibrillation with RVR ([facility])  Assessment & Plan  Heparin  Amiodarone    Cardiac arrest ([facility])  Assessment & Plan  Witnessed PEA arrest in the cath lab with CPR for <1min.  Follow commands no signs of neurologic injury  Avoid fever    Cardiogenic shock ([facility])  Assessment & Plan  Cardiogenic shock due to acute ischemia a post arrest.   MCS with Impella removed 4/28, off dobutamine 4/29     iHD 4/26, improving urine output ongoing lasix challenge to achieve euvolemic  Cardiology consulting  Monitor end organ perfusion with neurologic status, skin, lactate, renal function and output, trend serial markers LFT's, BMP, lactate, ScVO2      Abnormal CT of the head  Assessment & Plan  Patient found to have 2 to 3 mm faintly hyperdense focus in the right mesial occipital lobe could represent vascular calcification or cavernous malformation.  This could be confirmed by MRI brain.    MRI pending cardiovascular stabilization    Kidney  transplanted  Assessment & Plan  Baseline creatinine 1.4.    Monitor UOP and creatinine  Home tacrolimus and mycophenolate  Tac level in AM  Nephrology consultation Dr juan j Hackett 4/26  Worsening renal function attempt iHD via left upper arm fistula if not working will need temporary catheter  Due to tiana, cardiogenic shock, pigmented hemolysis via impella and s/p contrast die load    Will resume IS meds and tac as soon as cultures clear      Acute respiratory failure with hypoxia (HCC)  Assessment & Plan  Intubated date: 4/25 due to brief PEA arrest in cath lab  Goal saturation > 92%  Monitor ventilator waveforms and titrate flow/peep and volumes according.   Lung protective ventilation strategy w/ A-F bundle  CXR: monitor lung volumes and tube/line placement  VAP bundle prevention, oral care, post pyloric feeding  Head of bed > 30 degree  GI prophylaxis  Daily awakening and SBT trials unless contraindicated  Monitor for liberation  Respiratory treatments: prn        LAD stenosis- (present on admission)  Assessment & Plan  PCI to LAD with Dr. 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 In review of yesterday's note (4/28/2025), there are no changes except as documented above.     Discussed patient condition and risk of morbidity and/or mortality with Family, RN, RT, Therapies, Pharmacy, Dietary, and Patient  The patient remains critically ill.  Critical care time = 45 minutes in directly providing and coordinating critical care and extensive data review.  No time overlap and excludes procedures.

## 2025-04-29 NOTE — HOSPITAL COURSE
4/25 admitted post cath lab brief arrest, impella, cardiogenic shock on vent support.  4/26 ongoing cardiogenic shock, impella P5, dobutamine 2.5, iHD, low vent support, nasal bleeding     4/28-Worsening shock this AM, hemorrhagic/vasoplegic, low filling pressures, PRBCs, impella removed, DAPT restarted, BCXs with Strep species, continue unasyn, hemos and index improved with blood and volume, hold diuresis and afterload reduction, trend H/H, continue to hold heparin overnight, restart DAPT after impella removal    4/29-VD4, extubated in the AM, titrating vasopressors for low SVR vasoplegic shock, off , CI improved, begin diuresis with elevated filling pressures, remove rhin rocke with some scant oozing will hold heparin for one more day, Unasyn to Cefazolin, requiring high doses of vasopressor but lactate remains normal    OVENIGHT:  Worsening vasoplegia refractory to vasopressors, added HC/FC, escalated to Zosyn, MB infusion, ultimately nonresponsive to resus and had prolonged PEA arrest, ROSC achieved, intubated, on multiple vasoactives

## 2025-04-30 NOTE — DISCHARGE PLANNING
Medical Social Work    Referral: Code Blue    Intervention: MSW responded to T627 for a Code Blue.  Pt is Dariel Diamond (: 1965).  Pt's son, Husam ([phone]) was in the back of pt's room during code.  Pt's son was provided with emotional support and regular updated by MD.  Pt's son is attempting to reach pt's wife and other son; however, they are not currently answering the phone.  Nursing staff to call SW as needed.    Plan: SW will remain available.

## 2025-04-30 NOTE — PROGRESS NOTES
Critical Care Progress Note    Date of admission  4/25/2025    Chief Complaint  59 y.o. male admitted 4/25/2025 with history of hypertension, hyperlipidemia, HFimpEF (most recent 50%), paroxysmal atrial fibrillation on apixaban, ESRD s/p DDKT 4/2023, who presented with chest pain, dyspnea and syncope this morning.  He was in his usual state of health prior to experiencing mild/moderate substernal chest pain.  About 30 minutes later he felt his vision become obscured and fell, striking his head.  He presented to the emergency department where he had a noncontrast head CT that was unrevealing.  His forehead laceration was repaired.     EKG demonstrated anterior ST elevations and dynamic EKG changes.  Given concern for malignant dysrhythmia he proceeded to the cath lab.     In the cath lab he sustained PEA cardiac arrest for ~10-30 seconds and was intubated.  He had Impella placed and PCI to LAD with PTCA of the circumflex.  He had escalating inotrope and vasoactive requirements.  Initial CI was 3.7 on dobutamine 5, epi 0.03.  Taken from Dr Frost note.    Hospital Course  4/25 admitted post cath lab brief arrest, impella, cardiogenic shock on vent support.  4/26 ongoing cardiogenic shock, impella P5, dobutamine 2.5, iHD, low vent support, nasal bleeding     4/28-Worsening shock this AM, hemorrhagic/vasoplegic, low filling pressures, PRBCs, impella removed, DAPT restarted, BCXs with Strep species, continue unasyn, hemos and index improved with blood and volume, hold diuresis and afterload reduction, trend H/H, continue to hold heparin overnight, restart DAPT after impella removal    4/29-VD4, extubated in the AM, titrating vasopressors for low SVR vasoplegic shock, off , CI improved, begin diuresis with elevated filling pressures, remove rhin rocke with some scant oozing will hold heparin for one more day, Unasyn to Cefazolin, requiring high doses of vasopressor but lactate remains normal    OVENIGHT:  Worsening  vasoplegia refractory to vasopressors, added HC/FC, escalated to Zosyn, MB infusion, ultimately nonresponsive to resus and had prolonged PEA arrest, ROSC achieved, intubated, on multiple vasoactives        Interval Problem Update  Reviewed last 24 hour events:  Refractory shock overnight resulting in PEA arrest.  Intubated, on multiple pressors  HC, Zosyn added    Lactate to ~15 and climbing, refractory shock with MAP 50s on max vasoactives support    Added LNZ and raul empirically this AM    Neuro:   Does respond and follows intermittently    CV:  HR 120s  SBP   MAPS 50s-70s  Epi 0.1  NE @ 1  Vaso @ 0.03   @ 7.5  HC + FC    PAC:  Epi 0.1  NE @ 1  Vaso @ 0.03   @ 7.5  S/P MB infusion    RAP-18  PAP-63/34  mPAP-43  PCWP-34  TPG-9  PVR-  SVR-550    Christian-1.39  -0.75    CO/CI TD-5.6/2.6    Resp:   APVcmv  30/470/12/1.0  7.2/22/103/97%  CXR R basilar airspace disease    GI: 200    I/O: +2L  UOP: 645    Tmax: AF  Heme: WBC 17    Abx:   (4/27-4/28) Vanc/Unasyn  (4/28- ) Unasyn  (4/30- ) LNZ/PT/Raul    Micro:      4/26-BCXs S. Salivarius/vestibularis 2/2 bottles  4/28-BCXs NGTD  4/26-MRSA negative  4/30-BCXs peding  4/30-Fungal cultures pending  4/30 TA pending  4/30 PCT     Endo: BG 240s during event, ISS    LDA: RIJ PAC, R femoral lnady, R femoral CVC, ETT  SUP: H2RA  VTE: DAPT, hep on hold  Diet: NPO    Review of Systems  Review of Systems   Unable to perform ROS: Critical illness        Vital Signs for last 24 hours   Temp:  [36.6 °C (97.9 °F)-37.2 °C (99 °F)] 36.8 °C (98.2 °F)  Pulse:  [] 128  Resp:  [] 32  BP: ()/() 77/37  SpO2:  [80 %-100 %] 92 %    Hemodynamic parameters for last 24 hours  CVP:  [3 MM HG-229 MM HG] 8 MM HG  PCWP:  [20 MM HG-34 MM HG] 23 MM HG  CO:  [5-6.4] 5.7  CI:  [2.3-2.94] 2.4    Respiratory Information for the last 24 hours  Vent Mode: APVCMV  Rate (breaths/min): 30  Vt Target (mL): 470  PEEP/CPAP: 12  MAP: 14  Control VTE (exp VT): 305    Physical Exam    Physical Exam  Vitals and nursing note reviewed.   Constitutional:       General: He is not in acute distress.     Appearance: He is obese. He is ill-appearing.      Comments: Ill appearing male   In distress   HENT:      Head:      Comments: Forehead laceration, sutured clean and dry     Mouth/Throat:      Mouth: Mucous membranes are moist.      Comments: ET in place, OG clamped  Eyes:      Pupils: Pupils are equal, round, and reactive to light.      Comments: Left upper eye lid bruising, left nasal bleeding   Cardiovascular:      Rate and Rhythm: Normal rate.      Heart sounds: No murmur heard.  Pulmonary:      Effort: No respiratory distress.      Breath sounds: No stridor. No wheezing or rhonchi.   Abdominal:      General: There is no distension.      Palpations: There is no mass.      Tenderness: There is no abdominal tenderness.      Hernia: No hernia is present.   Musculoskeletal:         General: No swelling or tenderness.      Comments: Generalized edema now all four extremities  Left groin soft, some ecchy tracking down laterally but no ttp   Skin:     General: Skin is warm.      Capillary Refill: Capillary refill takes less than 2 seconds.   Neurological:      Mental Status: He is alert.      Comments: Patient does opens his eyes to voice  Seems to be following  Squeezes hand bilateral  Not moving his feet             Psychiatric:         Mood and Affect: Mood normal.         Medications  Current Facility-Administered Medications   Medication Dose Route Frequency Provider Last Rate Last Admin    midazolam (Versed) injection             MD ALERT...adult comfort care   Other PRN Bridger Arias M.D.        morphine 10 mg/mL injection 5 mg  5 mg Intravenous Q30 MIN PRN Bridger Arias M.D.        morphine 10 mg/mL injection 10 mg  10 mg Intravenous Q30 MIN PRN Bridger Arias M.D.        LORazepam (Ativan) 2 MG/ML oral conc 1 mg  1 mg Sublingual Q HOUR PRN Bridger Arias M.D.        Or     midazolam (Versed) injection 2 mg  2 mg Intravenous Q HOUR PRN Bridger Arias M.D.           Fluids    Intake/Output Summary (Last 24 hours) at 4/30/2025 1150  Last data filed at 4/30/2025 1000  Gross per 24 hour   Intake 4357.65 ml   Output 755 ml   Net 3602.65 ml       Laboratory  Recent Labs     04/30/25  0119 04/30/25  0208 04/30/25  0822   ISTATAPH 7.191* 7.205* 7.154*   ISTATAPCO2 31.8* 22.0* 29.2*   ISTATAPO2 118* 103 64*   ISTATATCO2 13* 9* 11*   HDUZIJA8LHA 98 97 86*   ISTATARTHCO3 12.2* 8.7* 10.2*   ISTATARTBE -15* -18* -17*   ISTATTEMP 36.6 C 36.6 C 36.7 C  36.7 C   ISTATFIO2 100 100 100  100   ISTATSPEC Arterial Arterial Arterial  Venous   ISTATAPHTC 7.196* 7.211* 7.158*   OWFOLXUL1BL 116* 101 63*         Recent Labs     04/28/25  0426 04/28/25  1345 04/30/25  0020 04/30/25  0125 04/30/25  0700   SODIUM  --    < > 136 139 141   POTASSIUM  --    < > 4.5 4.3 4.0   CHLORIDE  --    < > 98 100 98   CO2  --    < > 15* 9* 10*   BUN  --    < > 62* 60* 62*   CREATININE  --    < > 3.72* 3.86* 4.15*   MAGNESIUM 2.0  --   --  2.6*  --    PHOSPHORUS 4.6*  --   --  7.2*  --    CALCIUM  --    < > 9.9 10.0 9.8    < > = values in this interval not displayed.     Recent Labs     04/28/25  0220 04/28/25  0426 04/28/25  0948 04/28/25  1345 04/29/25  0539 04/29/25  1249 04/30/25  0020 04/30/25  0125 04/30/25  0700   ALTSGPT  --    < > 41  --  30  --   --  35  --    ASTSGOT  --    < > 33  --  20  --   --  46*  --    ALKPHOSPHAT  --    < > 71  --  70  --   --  115*  --    TBILIRUBIN  --    < > 0.8  --  0.6  --   --  0.7  --    DBILIRUBIN  --    < > 0.4  --  0.3  --   --  0.4  --    PREALBUMIN 10.3*  --   --   --   --   --   --   --   --    GLUCOSE 183*  --   --    < >  --    < > 241* 202* 179*    < > = values in this interval not displayed.     Recent Labs     04/28/25  0948 04/28/25  1830 04/29/25  0539 04/30/25  0125   WBC 16.0* 10.8 10.6 17.7*   NEUTSPOLYS 94.80*  --   --   --    LYMPHOCYTES 2.60*  --   --   --     MONOCYTES 2.60  --   --   --    EOSINOPHILS 0.00  --   --   --    BASOPHILS 0.00  --   --   --    ASTSGOT 33  --  20 46*   ALTSGPT 41  --  30 35   ALKPHOSPHAT 71  --  70 115*   TBILIRUBIN 0.8  --  0.6 0.7     Recent Labs     04/28/25  0948 04/28/25  1830 04/29/25  0101 04/29/25  0539 04/29/25  0747 04/30/25  0020 04/30/25  0125 04/30/25  0814   RBC 3.73* 3.28*  --  2.86*  --   --  2.72*  --    HEMOGLOBIN 10.2* 9.2*   < > 8.0*   < > 7.6* 7.6* 8.3*   HEMATOCRIT 31.7* 27.6*   < > 24.6*   < > 23.4* 24.0* 27.0*   PLATELETCT 114* 92*  --  94*  --   --  130*  --    PROTHROMBTM 18.8*  --   --   --   --   --   --   --    INR 1.57*  --   --   --   --   --   --   --     < > = values in this interval not displayed.       Imaging  X-Ray:  I have personally reviewed the images and compared with prior images.  EKG:  I have personally reviewed the images and compared with prior images.    Assessment/Plan  Epistaxis due to trauma  Assessment & Plan  Due to syncope and facial trauma worsened by heparin and DAPT  Rhinorocket placed on antibiotics, monitor need for ENT    Rhino rocket removed 4/29, bleeding under control for now    Bacteremia due to Gram-positive bacteria  Assessment & Plan  2/2 Strep Salivarius in Blood in chains/pairs changed to cefazolin  Broadened antibiotics 4/30 in the setting of rapid and fulmnat deterioration in hemodynamic status, including empiric antifungals    Reculture  Will bronch once feasible  WBCT once clinically stable enough to take to CT scanner    Continue to hold immunosuppression with bacteremia  Repeat blood cx 4/28 NGTD  ID following    Atrial fibrillation with RVR ([facility])  Assessment & Plan  Heparin  Amiodarone    Cardiac arrest (HCC)  Assessment & Plan  Witnessed PEA arrest in the cath lab with CPR for <1min. At initial admission during PCU  Follow commands no signs of neurologic injury      4/30 overnight had prolonged PEA arrest (~20 minutes) due to intractable vasoplegic shock and  acidemia    Hemodynamic:   Goal SBP >90 mmHg, MAP >65 mmHg; fluid rescucitation and vasoactives as guided by bedside exam, ultrasound and hemodynamics  Consider stress dose steroids and invasive hemodynamic monitoring if vasopressor refractory shock    Cardiac:   Post arrest EKG AF no STEMI  Intractable vasoplegic shock with low SVR, relatively preserved CI in spite of multiple vasoactives and inotropes. Not a candidate for MCS per cardiology service and given vasoplegia and low systemic vascular resistance unlikely to benefit from [facility] ECMO. Continue to support medically to achieve MAP goals    Pulmonary:  Empiric Unasyn written for two days for VAP prophylaxis (ATHARTIC and PROPHY-VAP trials)  LPV/LTV  Target normocapnea/normoxia  Goal PCO2 40-45, PaO2 ~100    Metabolic: Serial lactate, goal blood glucose <180, maintain euvolemia, monitor urine output, maintain potassium greater than 3.5, maintain magnesium greater than 2      Neurologic:   Following post arrest, though encephalopathic on IMV  Will provide some degree of analgosedation given discomfort and need to achieve ventilatory goals    Serial neurologic exams, repeat NCHCT PRN for neuro changes, stat EEG to rule out nonconvulsive status, targeted temperature management,   Strict normothermia, fever avoidance  Antipyretics and analgosedation if needed to control shivering or ventilator dyssynchrony, avoid long acting sedatives if able    Neuro prognostication in 48-72 hours, will consider neurology consult and MRI if survives current shock state    Cardiology following      Cardiogenic shock (HCC)  Assessment & Plan  Cardiogenic shock due to acute ischemia a post arrest.   MCS with Impella removed 4/28, off dobutamine 4/29    His physiology shifted into a more vasoplegic shock state with intractably low SVR state non responsive to adrenergic vasopressors  Methylene blue infusion given with minimal effect  Requiring multiple vasopressors and is minimally  responsive to them    Broaden antibiotics for now to include antifungals  Reculture  Will need urgent KRT if within GOC  Monitor end organ perfusion with neurologic status, skin, lactate, renal function and output, trend serial markers LFT's, BMP, lactate, ScVO2      Abnormal CT of the head  Assessment & Plan  Patient found to have 2 to 3 mm faintly hyperdense focus in the right mesial occipital lobe could represent vascular calcification or cavernous malformation.      This could be confirmed by MRI brain once clinically stable    Kidney transplanted  Assessment & Plan  Worsening oliguria starting 4/30 in the setting of rapidly progressive shock state, multifactorial    Will need KRT if within GOC  Monitor UOP and creatinine  Continue to hold tacrolimus and mycophenolate    Nephrology consultation Dr juan j Hackett 4/26  Due to tiana, cardiogenic shock, pigmented hemolysis via impella    Will resume IS meds and tac as soon as cultures clear      Acute respiratory failure with hypoxia (HCC)  Assessment & Plan  Intubated date: 4/25 due to brief PEA arrest in cath lab  Extubated without incident morning of 4/29    Unfortunately was reintubated overnight due to rapidly progressive shock and acidemia which ultimately resulted in cardiac arrest     Goal saturation > 92%  Monitor ventilator waveforms and titrate flow/peep and volumes according.   Lung protective ventilation strategy w/ A-F bundle  CXR: monitor lung volumes and tube/line placement  VAP bundle prevention, oral care, post pyloric feeding  Head of bed > 30 degree  GI prophylaxis  Daily awakening and SBT trials unless contraindicated  Monitor for liberation  Respiratory treatments: prn        LAD stenosis- (present on admission)  Assessment & Plan  PCI to LAD with Dr. Blanco  DAPT  Statin  Follow up ECHO 4/26    Type 2 diabetes mellitus with chronic kidney disease on chronic dialysis (HCC)- (present on admission)  Assessment & Plan  Insulin titrated for glycemic  goal 140-180mg/dL stress hyperglycemia   Beta hydroxy 2 not in DKA    Able to transition to SQ insulin 4/29    Now worsening iso of shock ad steroids and critical illness  Will reinitiate insulin infusion until more clinically stabilized         I have performed a physical exam and reviewed and updated ROS and Plan today (4/30/2025). In review of yesterday's note (4/29/2025), there are no changes except as documented above.     Discussed patient condition and risk of morbidity and/or mortality with Family, RN, RT, Therapies, Pharmacy, and Dietary  The patient remains critically ill.  Critical care time = 140 minutes in directly providing and coordinating critical care and extensive data review.  No time overlap and excludes procedures.

## 2025-04-30 NOTE — CARE PLAN
The patient is Watcher - Medium risk of patient condition declining or worsening    Shift Goals  Clinical Goals: hemodynamic stabiity, wean vasopressors  Patient Goals: Comfort  Family Goals: Updates, safety, comfort    Progress made toward(s) clinical / shift goals:    Problem: Safety - Medical Restraint  Goal: Free from restraint(s) (Restraint for Interference with Medical Device)  Outcome: Progressing  Note: Pt remains out of restraints     Problem: Pain - Standard  Goal: Alleviation of pain or a reduction in pain to the patient’s comfort goal  Outcome: Progressing  Note: Pt has some soreness, but pain was relieved with repositioning      Problem: Hemodynamics  Goal: Patient's hemodynamics, fluid balance and neurologic status will be stable or improve  Outcome: Progressing  Note: Weaned off dobutamine and weaned down on Levo. Did add vaso       Patient is not progressing towards the following goals:

## 2025-04-30 NOTE — PROGRESS NOTES
Dr. Arias to bedside for art line replacement. New R radial artline placed and R axillary artline removed.

## 2025-04-30 NOTE — PROGRESS NOTES
Responded to a code blue, PEA    The patient had been having ongoing distributive shock (previous covering physician had ordered stress steroids and methylene blue) then suddenly entered PEA.     Upon my arrival he was undergoing high quality CPR, bagging via Igel, Epi getting delivered - see flow chart for details.    He was given bicarb, CaCl, glucose check WNL. I performed intubation since the Igel had an unreliable seal.    After nearly 20 minutes he achieved ROSC and was neuro intact after this but in high grade shock. I spoke with his son who said he has a POLST which reads, to the effect, keep Mr. Diamond full care/full code.    POCUS shows severely reduced LVEF, with ongoing rise in lactic acid - I spoke with on call cardiology who did not think an impella or MCS would provide a benefit given that his SVR is very low and CI adequate.     Antibiotics upgraded to zosyn, he is unfortunately too unstable on vaso, epi, levo, and dobut to obtain CT scans to further characterize possible sources of sepsis.     Respiratory failure  04/30/25 reintubated  High MV to compensate for MGMA  Lung protective ventilation strategies  Optimize oxygenation, ventilation, and acid base balance  ABCDEF Bundle     Shock  Empiric antibiotics  Stress steroids  [place] guided therapy  Cardiology does not believe MCS is indicated at this time given adequate CI    Cardiac arrest  PEA  Appears neuro intact  Maintain adequate perfusion  Optimize pH and electrolytes   Avoid fevers    The patient remains critically ill.  Critical care time = 120 minutes in directly providing and coordinating critical care and extensive data review.  No time overlap and excludes procedures.

## 2025-04-30 NOTE — PROGRESS NOTES
See infectious disease progress progress note from yesterday by resident Dr. Stanley with my attestation.

## 2025-04-30 NOTE — PROGRESS NOTES
[facility] Progress Note      Admit Date: 4/25/2025    Resident(s): Navin Ardon M.D.   Attending:  DAMON CHAVIRA/ Dr. Bridger Arias MD    Patient ID:    Name:  Dariel Diamond     YOB: 1965  Age:  59 y.o.  male   MRN:  [id number]    Hospital Course (carried forward and updated):  59 y.o. male admitted 4/25/2025 with history of hypertension, hyperlipidemia, HFimpEF (most recent 50%), paroxysmal atrial fibrillation on apixaban, ESRD s/p DDKT 4/2023, who presented with chest pain, dyspnea and syncope this morning.  He was in his usual state of health prior to experiencing mild/moderate substernal chest pain.  About 30 minutes later he felt his vision become obscured and fell, striking his head.  He presented to the emergency department where he had a noncontrast head CT that was unrevealing.  His forehead laceration was repaired.     EKG demonstrated anterior ST elevations and dynamic EKG changes.  Given concern for malignant dysrhythmia he proceeded to the cath lab.     In the cath lab he sustained PEA cardiac arrest for ~10-30 seconds and was intubated.  He had Impella placed and PCI to LAD with PTCA of the circumflex.  He had escalating inotrope and vasoactive requirements.  Initial CI was 3.7 on dobutamine 5, epi 0.03.  Taken from Dr Frost note.      4/25 admitted post cath lab brief arrest, impella, cardiogenic shock on vent support.  4/26 ongoing cardiogenic shock, impella P5, dobutamine 2.5, iHD, low vent support, nasal bleeding       4/28-Worsening shock this AM, hemorrhagic/vasoplegic, low filling pressures, PRBCs, impella removed, DAPT restarted, BCXs with Strep species, continue unasyn, hemos and index improved with blood and volume, hold diuresis and afterload reduction, trend H/H, continue to hold heparin overnight, restart DAPT after impella removal     4/29-VD4, extubated in the AM, titrating vasopressors for low SVR vasoplegic shock, off , CI improved, begin diuresis with elevated  filling pressures, remove rhin rocke with some scant oozing will hold heparin for one more day, Unasyn to Cefazolin, requiring high doses of vasopressor but lactate remains normal    4/30-PEA arrest in the ICU and patient re-intubated    Consultants:  Critical Care  Nephrology  Cardiology      Interval Events:  Patient has worsening hypotension with escalating vasopressor requirements and 500cc of LR and he had PEA cardiac arrest. Zosyn added  Patient re-intubated 4/30/25      Vitals Range last 24h:  Temp:  [36.6 °C (97.9 °F)-37.2 °C (99 °F)] 36.8 °C (98.2 °F)  Pulse:  [] 128  Resp:  [] 32  BP: ()/() 77/37  SpO2:  [80 %-100 %] 92 %      Intake/Output Summary (Last 24 hours) at 4/30/2025 1355  Last data filed at 4/30/2025 1144  Gross per 24 hour   Intake 4769.59 ml   Output 755 ml   Net 4014.59 ml        ROS  Unable to perform:critical illness   PHYSICAL EXAM:  Vitals:    04/30/25 0900 04/30/25 1000 04/30/25 1005 04/30/25 1100   BP: (!) 80/44 (!) 79/39 (!) 79/39 (!) 77/37   Pulse: (!) 126 (!) 128 (!) 122 (!) 128   Resp: (!) 37 (!) 32 (!) 34 (!) 32   Temp:       TempSrc:       SpO2:   92%    Weight:       Height:        Body mass index is 32.77 kg/m².    O2 therapy: Pulse Oximetry: 92 %, O2 (LPM): 10, O2 Delivery Device: Ventilator    Date 04/30/25 0700 - 05/01/25 0659   Shift 7363-8090 5551-2649 8043-5348 24 Hour Total   INTAKE   P.O. 0   0     P.O. 0   0   I.V. 2216.5   2216.5     Fentanyl Volume 26.1   26.1     Magnesium Sulfate Volume 42.3   42.3     Precedex Volume 83.6   83.6     Vasopressin Volume 112.6   112.6     Norepinephrine Volume 680.2   680.2     Epinephrine Volume 979.7   979.7     Dobutamine Volume 292   292   Other 0   0     Medications (PO/Enteral Liquids) 0   0   IV Piggyback 293.5   293.5     Volume (mL) (Linezolid (Zyvox) premix 600 mg) 293.5   293.5   Shift Total 2510   2510   OUTPUT   Urine 60   60     Output (mL) (Urethral Catheter) 60   60   Shift Total 60   60   NET  2450   2450        Physical Exam  Constitutional:       General: He is in acute distress.      Appearance: He is obese. He is ill-appearing.      Comments: Ill appearing male on ventilator with son at bedside    HENT:      Head:      Comments: Forehead laceration, sutured clean and dry     Mouth/Throat:      Comments: ET in place, OG clamped  Eyes:      Comments: Left upper eye lid bruising, left nasal bleeding    Cardiovascular:      Rate and Rhythm: Normal rate and regular rhythm.   Pulmonary:      Effort: Pulmonary effort is normal.      Breath sounds: Normal breath sounds.      Comments: intubated  Abdominal:      General: Abdomen is flat. Bowel sounds are normal.      Palpations: Abdomen is soft.   Musculoskeletal:      Comments: Left groin soft, some ecchy tracking down laterally but no ttp    Neurological:      General: No focal deficit present.         Recent Labs     04/30/25  0119 04/30/25  0208 04/30/25  0822   ISTATAPH 7.191* 7.205* 7.154*   ISTATAPCO2 31.8* 22.0* 29.2*   ISTATAPO2 118* 103 64*   ISTATATCO2 13* 9* 11*   [id number] 98 97 86*   ISTATARTHCO3 12.2* 8.7* 10.2*   ISTATARTBE -15* -18* -17*   ISTATTEMP 36.6 C 36.6 C 36.7 C  36.7 C   ISTATFIO2 100 100 100  100   ISTATSPEC Arterial Arterial Arterial  Venous   ISTATAPHTC 7.196* 7.211* 7.158*   [id number] 116* 101 63*     Recent Labs     04/28/25  0426 04/28/25  1345 04/30/25  0020 04/30/25  0125 04/30/25  0700   SODIUM  --    < > 136 139 141   POTASSIUM  --    < > 4.5 4.3 4.0   CHLORIDE  --    < > 98 100 98   CO2  --    < > 15* 9* 10*   BUN  --    < > 62* 60* 62*   CREATININE  --    < > 3.72* 3.86* 4.15*   MAGNESIUM 2.0  --   --  2.6*  --    PHOSPHORUS 4.6*  --   --  7.2*  --    CALCIUM  --    < > 9.9 10.0 9.8    < > = values in this interval not displayed.     Recent Labs     04/28/25  0220 04/28/25  0426 04/28/25  0948 04/28/25  1345 04/29/25  0539 04/29/25  1249 04/30/25  0020 04/30/25  0125 04/30/25  0700   ALTSGPT  --    < > 41  --  30   --   --  35  --    ASTSGOT  --    < > 33  --  20  --   --  46*  --    ALKPHOSPHAT  --    < > 71  --  70  --   --  115*  --    TBILIRUBIN  --    < > 0.8  --  0.6  --   --  0.7  --    DBILIRUBIN  --    < > 0.4  --  0.3  --   --  0.4  --    PREALBUMIN 10.3*  --   --   --   --   --   --   --   --    GLUCOSE 183*  --   --    < >  --    < > 241* 202* 179*    < > = values in this interval not displayed.     Recent Labs     04/28/25  0948 04/28/25  1830 04/29/25  0101 04/29/25  0539 04/29/25  0747 04/30/25  0020 04/30/25  0125 04/30/25  0814   RBC 3.73* 3.28*  --  2.86*  --   --  2.72*  --    HEMOGLOBIN 10.2* 9.2*   < > 8.0*   < > 7.6* 7.6* 8.3*   HEMATOCRIT 31.7* 27.6*   < > 24.6*   < > 23.4* 24.0* 27.0*   PLATELETCT 114* 92*  --  94*  --   --  130*  --    PROTHROMBTM 18.8*  --   --   --   --   --   --   --    INR 1.57*  --   --   --   --   --   --   --     < > = values in this interval not displayed.     Recent Labs     04/28/25  0948 04/28/25 1830 04/29/25  0539 04/30/25  0125   WBC 16.0* 10.8 10.6 17.7*   NEUTSPOLYS 94.80*  --   --   --    LYMPHOCYTES 2.60*  --   --   --    MONOCYTES 2.60  --   --   --    EOSINOPHILS 0.00  --   --   --    BASOPHILS 0.00  --   --   --    ASTSGOT 33  --  20 46*   ALTSGPT 41  --  30 35   ALKPHOSPHAT 71  --  70 115*   TBILIRUBIN 0.8  --  0.6 0.7       Meds:   MD ALERT...adult comfort care        morphine injection  5 mg      morphine injection  10 mg      LORazepam  1 mg      Or    midazolam  2 mg          Procedures:  None    Imaging:  EC-ECHOCARDIOGRAM LTD W/ CONT   Final Result      DX-ABDOMEN FOR TUBE PLACEMENT   Final Result         1. NG tube in stomach.      2. Upper abdomen bowel distention.                     DX-CHEST-PORTABLE (1 VIEW)   Final Result      1.  Low lung volumes. Left greater than right base airspace disease.   2.  Support apparatus as above.      DX-CHEST-PORTABLE (1 VIEW)   Final Result      1.  Interval extubation and removal of the nasogastric tube.      2.   Bibasilar atelectasis.      DX-CHEST-PORTABLE (1 VIEW)   Final Result         1. Increasing mild basilar atelectasis.      2. ETT tip at T2, 6 cm above [place].      3. [place]. Right arm midline. NG tube in stomach.      4. Left subclavian vascular stent.      DX-CHEST-PORTABLE (1 VIEW)   Final Result      Mild improvement in aeration of the left lung base.      EC-ECHOCARDIOGRAM LTD W/O CONT   Final Result      DX-CHEST-PORTABLE (1 VIEW)   Final Result      Mild interval improvement in aeration of the left lung base.      EC-ECHOCARDIOGRAM LTD W/O CONT   Final Result      DX-CHEST-PORTABLE (1 VIEW)   Final Result         1. Lines and tubes as above.   2. Interval improvement in bilateral pulmonary opacities. No pleural effusions.         DX-ABDOMEN FOR TUBE PLACEMENT   Final Result      NG tube tip projects over the stomach.      EC-ECHOCARDIOGRAM LTD W/O CONT   Final Result      DX-CHEST-PORTABLE (1 VIEW)   Final Result      1.  Pulmonary arterial catheter tip projects over the right main pulmonary artery. No pneumothorax is identified.   2.  Increasing perihilar interstitial opacities are consistent with pulmonary edema.   3.  Left basilar opacification likely represents atelectasis.      EC-ECHOCARDIOGRAM LTD W/O CONT   Final Result      DX-CHEST-PORTABLE (1 VIEW)   Final Result      No acute cardiac or pulmonary abnormalities are identified.      DX-CHEST-PORTABLE (1 VIEW)   Final Result      No acute cardiopulmonary process.      CT-CSPINE WITHOUT PLUS RECONS   Final Result      1.  No acute fracture or traumatic malalignment of the cervical spine.   2.  Patchy airspace opacities in the visualized lung apices.         CT-HEAD W/O   Final Result      1.  No acute intracranial hemorrhage.   2.  A 2 to 3 mm faintly hyperdense focus in the right mesial occipital lobe could represent vascular calcification or cavernous malformation. This could be confirmed by brain MRI.   3.  Mildly displaced nasal bone fractures  with surrounding soft tissue swelling and foci of air.   4.  There is a 2.4 cm solid mass within the superficial lobe of the left parotid gland with calcification. Although the parotid gland is incompletely visualized on this exam, benign and malignant entities cannot be reliably distinguished by imaging alone    and ultimately an ENT referral will be needed for consideration of tissue sampling. This can be done on a nonemergent basis.      CL-LEFT HEART CATHETERIZATION WITH POSSIBLE INTERVENTION    (Results Pending)       ASSESSEMENT and PLAN:    * NSTEMI (non-ST elevation myocardial infarction) (HCC)- (present on admission)  Assessment & Plan  Patient found to significantly with troponin of 157 with active chest pain.  continuing antiplatelet heparin as per APTT levels and monitor for signs of bleeding.  He is on Eliquis that is why he is on APTT protocol.  Impella removed 25  Amiodarone gtt   gtt  Norepi gtt  Vasopression add    Cardiogenic shock (HCC)  Assessment & Plan  Cardiogenic shock due to acute ischemia a post arrest.   PA with pulmonary venous hypertension with good Christian but reduced LV function and elevated left sided filling pressures.   Impella P5, dobutamine 2.5, clevidipine gtt for elevated map and high SVR  need iHD , improving urine output ongoing lasix challenge to achieve euvolemic  Monitor end organ perfusion with neurologic status, skin, lactate, renal function and output, trend serial markers LFT's, BMP, lactate, ScVO2  Switch Unaysn to cefazolin 1gm every 12 hours  Follow Repeat blood culture report   Trend Procalcitonin   Impella removed 25  Amiodarone gtt   gtt  Norepi gtt  Vasopression   Patient developed vasoplegic shock with low SVR.  Not a candidate for MCS per cardiology and he is unlikely to benefit from [facility] ECMO given his current condition.  Discussed with family and chose comfortable care       Mass of parotid gland  Assessment & Plan  CT head showed 2.4 cm solid  mass within the superficial lobe of the left parotid gland with calcification.  Patient will need outpatient ENT referral.    Abnormal CT of the head  Assessment & Plan  Patient found to have 2 to 3 mm faintly hyperdense focus in the right mesial occipital lobe could represent vascular calcification or cavernous malformation.  This could be confirmed by MRI brain.  Patient will need MRI after he is stable from his acute NSTEMI      Kidney transplanted  Assessment & Plan  Current BUN is 22 and creatinine of 1.17  Cr worse, but no compeling indication for HD today  dose all meds for GFR< 15  abx for bacteremia, immunosuppression on hold due to severe infection  keep strict I/O  due to maximal vasopressor use and continued low Bps, it was explained to the family that he would unlikely tolerate any form of RRT due to his severe, and worsening critical illness  After long conversation family decided to go with comfortable care.        Acute respiratory failure with hypoxia (HCC)  Assessment & Plan  Patient found to have acute respiratory failure with hypoxia most likely secondary to pulmonary edema he received a dose of IV Lasix Titrate down oxygen as tolerated.      Syncope  Assessment & Plan  Patient had a syncopal episode and he developed a laceration on his face.  She underwent suture placement by ER.  His syncopal episode could be secondary to severe chest pain as she has been started prior to syncopal episode or could be due to underlying possible arrhythmia.      Ventricular tachycardia (HCC)- (present on admission)  Assessment & Plan  History of    HTN (hypertension)- (present on admission)  Assessment & Plan  Continue outpatient medications.    Dyslipidemia- (present on admission)  Assessment & Plan  Continue statin    Troponin level elevated- (present on admission)  Assessment & Plan  Secondary to NSTEMI    Type 2 diabetes mellitus with chronic kidney disease on chronic dialysis (HCC)- (present on  admission)  Assessment & Plan  Continue outpatient insulin glargine  on insulin sliding scale with hypoglycemia protocol  Follow-up HbA1c             DISPO: Pending  VTE: Heparin  Ulcer: H2 antagonist  CODE STATUS: Full

## 2025-04-30 NOTE — PROGRESS NOTES
Dr Lopez updated of Collins placement 10 cm out from where last charted. X ray also confirms migration. Re floated on to 58cm by MD. Wedges appropriately.     Also updated on Artline pressures being in the 50's while cuff is 's. Trouble shooted the artline and remained very positional.

## 2025-04-30 NOTE — PROCEDURES
"Arterial Line Insertion    Date/Time: 4/29/2025 5:18 PM    Performed by: Bridger Arias M.D.  Authorized by: Bridger Arias M.D.  Consent: Verbal consent obtained.  Risks and benefits: risks, benefits and alternatives were discussed  Consent given by: patient  Patient understanding: patient states understanding of the procedure being performed  Patient identity confirmed: verbally with patient and hospital-assigned identification number  Time out: Immediately prior to procedure a \"time out\" was called to verify the correct patient, procedure, equipment, support staff and site/side marked as required.  Preparation: Patient was prepped and draped in the usual sterile fashion.  Indications: multiple ABGs, respiratory failure and hemodynamic monitoring  Location: right radial  Anesthesia: local infiltration    Anesthesia:  Local Anesthetic: lidocaine 1% without epinephrine    Sedation:  Patient sedated: no    Needle gauge: 20  Seldinger technique: Seldinger technique used  Number of attempts: 1  Post-procedure: line sutured  Post-procedure CMS: normal  Patient tolerance: patient tolerated the procedure well with no immediate complications              "

## 2025-04-30 NOTE — PROGRESS NOTES
"[facility] -  PROGRESS NOTE    Date & Time:   4/30/2025  1:09 PM    Author:   Rolando Hackett D.O.      REASON FOR CONSULTATION:   - CAMPBELL/Management of acute and chronic conditions related to Nephrology      CHIEF COMPLAINT:   -  \"Chest pain  \"      HISTORY OF PRESENT ILLNESS:    59Y  M w hx of ESRD s/p Renal Transplant (DDKT at [facility] on 4/3/2023), CAD, ICM, paroxysmal A-fib presented with chest pain and syncope on 4/25. Pt found to have NSTEMI and taken to cath lab.   Pt had PCI done to circumflex with impella support. Pt also suffered PEA arrest during cath lab with ROSC achieved.   Pt in ICU and Cr lucas from 1.1->1.8 since admission. Poor UOP noted and ICU team concerned for worsening volume overload/cardiorenal syndrome.   Pt currently intubated    DAILY NEPHROLOGY SUMMARY:  4/26: consult done, pt had HD  4/27: pt tolerated HD yesterday via AVF. I/O 2500/3520. On minimal vent settings. BP not requiring any pressor support. Son at bedside. Pt on abx due to gram+ bacteremia. Immunosuppression on hold.  4/28: Pt remains intubated this AM. To have impella removed. 1815cc UOP noted with diuretics.    4/29: Pt extubated, son at bedside. Cr still rising. I//1890 (610cc uop)  4/30: pt deteriorated overnight. Shock worsened requiring more vasopressor support. Pt had cardiac arrest around 2AM per notes, and required intubation. Shock continued to progress and worsen. Discussed situation with son and family at bedside.     REVIEW OF SYSTEMS:    UTO due to intubation      PAST MEDICAL HISTORY:   Past Medical History:   Diagnosis Date    Depression     Dilated cardiomyopathy (HCC)     Hyperlipidemia     Hypertension     Type II or unspecified type diabetes mellitus without mention of complication, not stated as uncontrolled           PAST SURGICAL HISTORY:   Past Surgical History:   Procedure Laterality Date    NJ REMOVAL LV VAD DIFF SESSION Left 4/28/2025    Procedure: LEFT FEMORAL " CUTDOWN REMOVAL IMPELLA;  Surgeon: Kapil Dobson M.D.;  Location: SURGERY [facility];  Service: Vascular    TOE AMPUTATION Left 8/23/2018    Procedure: TOE AMPUTATION L 2ND TOE;  Surgeon: Luiz Ma M.D.;  Location: SURGERY [facility];  Service: Orthopedics    AV FISTULA CREATION  5/1/2014    Performed by Beau Cowart M.D. at SURGERY [facility]          FAMILY HISTORY:   Family History   Problem Relation Age of Onset    Hypertension Mother     Other Father         DM       SOCIAL HISTORY:   Social History     Tobacco Use    Smoking status: Never    Smokeless tobacco: Never   Substance Use Topics    Alcohol use: No    Drug use: No            HOME MEDICATIONS: reviewed  Home Medications    Medication Sig Taking? Last Dose Authorizing Provider   apixaban (ELIQUIS) 5mg Tab Take 5 mg by mouth 2 times a day. Yes 4/25/2025 Morning Physician Outpatient   amLODIPine (NORVASC) 10 MG Tab Take 10 mg by mouth every day. Yes 4/25/2025 Morning Physician Outpatient   cloNIDine (CATAPRES) 0.1 MG/24HR PATCH WEEKLY patch Place 1 Patch on the skin every Saturday. Yes 4/19/2025 Physician Outpatient   furosemide (LASIX) 80 MG Tab Take 40 mg by mouth 1 time a day as needed (If needed for weight gain > 2). Yes 4/24/2025 Evening Physician Outpatient   gabapentin (NEURONTIN) 100 MG Cap Take 200 mg by mouth every day. Yes 4/24/2025 Evening Physician Outpatient   Insulin Aspart PenFill 100 UNIT/ML Solution Cartridge Inject 14 Units under the skin 3 times a day before meals. Yes 4/24/2025 Evening Physician Outpatient   losartan (COZAAR) 100 MG Tab Take 100 mg by mouth every day. Yes 4/25/2025 Morning Physician Outpatient   metoprolol SR (TOPROL XL) 50 MG TABLET SR 24 HR Take 50 mg by mouth every day. Yes 4/24/2025 Evening Physician Outpatient   mycophenolate (CELLCEPT) 250 MG Cap Take 750 mg by mouth 2 times a day. Yes 4/25/2025 Morning Physician Outpatient   polyethylene glycol 3350 (MIRALAX) 17 GM/SCOOP Powder  Take 17 g by mouth 2 times a day. Yes 4/25/2025 Morning Physician Outpatient   rosuvastatin (CRESTOR) 40 MG tablet Take 40 mg by mouth every day. Yes 4/25/2025 Morning Physician Outpatient   SPS, SODIUM POLYSTYRENE SULF, 15 GM/60ML Suspension Take 15 g by mouth see administration instructions. Takes on Tuesday, Thursday, and Sunday Yes 4/24/2025 Physician Outpatient   tacrolimus (PROGRAF) 0.5 MG Cap Take 0.5-1 mg by mouth 2 times a day. 0.5 mg in the AM  1 mg in the PM Yes 4/25/2025 Morning Physician Outpatient   insulin glargine 100 UNIT/ML Solution Pen-injector injection Inject 10 Units under the skin every evening. Yes 4/24/2025 Evening Physician Outpatient   tamsulosin (FLOMAX) 0.4 MG capsule Take 0.4 mg by mouth every day. Yes 4/24/2025 Evening Physician Outpatient   multivitamin Tab Take 1 Tablet by mouth every day. Yes 4/24/2025 Evening Physician Outpatient   docusate sodium (COLACE) 100 MG Cap Take 100 mg by mouth 2 times a day. Yes 4/25/2025 Morning Physician Outpatient   aspirin EC 81 MG EC tablet Take 1 Tab by mouth every day. Yes 4/24/2025 Delmi Quiroz M.D.       ALLERGIES:  Contrast media with iodine [iodine]      VITAL SIGNS:  BP (!) 77/37   Pulse (!) 128   Temp 36.8 °C (98.2 °F) (Core)   Resp (!) 32   Ht 1.829 m (6')   Wt 110 kg (241 lb 10 oz)   SpO2 92%   BMI 32.77 kg/m²     Physical Exam  Constitutional:       Comments: intubated   HENT:      Head: Normocephalic and atraumatic.      Mouth/Throat:      Mouth: Mucous membranes are moist.   Cardiovascular:      Rate and Rhythm: Normal rate and regular rhythm.   Pulmonary:      Effort: Pulmonary effort is normal.      Breath sounds: No wheezing or rhonchi.   Abdominal:      General: There is no distension.      Palpations: Abdomen is soft.   Musculoskeletal:      Right lower leg: Edema present.      Left lower leg: Edema present.   Neurological:      Comments: intubated       Access: L AVF with good thrill and bruit      FLUID BALANCE:  In:  2866.7 [P.O.:300; I.V.:2468.4]  Out: 845       LABS:  Recent Labs     04/30/25  0020 04/30/25  0125 04/30/25  0700   SODIUM 136 139 141   POTASSIUM 4.5 4.3 4.0   CHLORIDE 98 100 98   CO2 15* 9* 10*   GLUCOSE 241* 202* 179*   BUN 62* 60* 62*   CREATININE 3.72* 3.86* 4.15*   CALCIUM 9.9 10.0 9.8      Recent Labs     04/30/25  0020 04/30/25  0125 04/30/25  0700   SODIUM 136 139 141   POTASSIUM 4.5 4.3 4.0   CHLORIDE 98 100 98   CO2 15* 9* 10*   GLUCOSE 241* 202* 179*   BUN 62* 60* 62*   CREATININE 3.72* 3.86* 4.15*          IMAGING:  - Imaging studies reviewed      ASSESSMENTS:    -CAMPBELL, likely 2/2 cardiorenal syndrome, shock  -Fluid overload  -Acidosis, worsening  -Hyperkalemia  -Hx of Renal Transplant     [facility] DDKT on 4/5/2023    Mycophenolate 750/750    Tacrolimus 0.5 AM and 1 mg PM (per [facility] 3/10/25 note)  -Gram Positive Bacteremia/Sepsis     Abx per primary team     Immunosuppression on hold  -NSTEMI      s/p PCI with impella support     On dobutamine  -s/p PEA arrest in cath lab  -Hx of HFrEF     EF recovered 20->50%  -CAD  -Paroxysmal A-fib      PLAN    -pt unfortunately suffered cardiac arrest and shock has been worsening  -discussed worsened renal function as a result  -due to maximal vasopressor use and continued low Bps, it was explained to the family that he would unlikely tolerate any form of RRT due to his severe, and worsening critical illness  -recommend discussing goals of care and/or comfort care due to the unfortunate grim prognosis  -son and family stated understanding    Dispo: pt unlikely to tolerate any form of RRT at this time. Family to discuss goals of care    Please page nephrology with any questions or concerns    Rolando Hackett DO  Nephrologist   [facility]  [phone]

## 2025-04-30 NOTE — PROCEDURES
Central Line Insertion    Date/Time: 4/30/2025 5:11 AM    Performed by: Fiorella Chin  Authorized by: Fiorella Chin    Consent:     Consent obtained:  Emergent situation and verbal (family informed before they stepped out of the room)    Consent given by: family.    Risks discussed:  Arterial puncture, incorrect placement, nerve damage, pneumothorax, infection and bleeding    Alternatives discussed:  No treatment and delayed treatment  Universal protocol:     Procedure explained and questions answered to patient or proxy's satisfaction: yes      Relevant documents present and verified: yes      Test results available and properly labeled: yes      Imaging studies available: yes      Required blood products, implants, devices, and special equipment available: yes      Site/side marked: yes      Immediately prior to procedure, a time out was called: yes      Patient identity confirmed:  Arm band and hospital-assigned identification number  Pre-procedure details:     Hand hygiene: Hand hygiene performed prior to insertion      Sterile barrier technique: All elements of maximal sterile technique followed      Skin preparation:  ChloraPrep    Skin preparation agent: Skin preparation agent completely dried prior to procedure    Sedation:     Sedation type:  None  Anesthesia:     Anesthesia method:  Local infiltration    Local anesthetic:  Lidocaine 1% w/o epi  Procedure details:     Location:  R femoral    Patient position: 15-30 degrees elevated HOB.    Catheter size:  7 Fr    Landmarks identified: yes      Ultrasound guidance: yes      Sterile ultrasound techniques: Sterile gel and sterile probe covers were used      Number of attempts:  2    Successful placement: yes    Post-procedure details:     Post-procedure:  Dressing applied and line sutured    Guidewire: guidewire removal confirmed      Assessment:  Blood return through all ports and free fluid flow (no xray as the line was placed femorally)    Patient  tolerance of procedure:  Tolerated well, no immediate complications  Comments:      The patient tolerated the procedure well, the wire is accounted for. The line is ok to use.

## 2025-04-30 NOTE — ACP (ADVANCE CARE PLANNING)
Advanced Care Planing and Goals of Care       Date/time: 11:32 AM      Medical decision maker: Wife, son, multiple family members present for family conference      Narrative of discussion:   I was able to sit down with Campos and his mom this morning and subsequently were joined by multiple members of the family.  We went over the course of events overnight, including his intractable shock, his multiorgan failure and refractory acidemia and vasopressor refractory shock and vasoplegic state.    I did review with him the ongoing therapies including multiple vasoactives, mechanical ventilation, push dose pressors, and medical management of his acid-base derangements as well as his oliguria.    We initially discussed the outcomes of CPR and critical illness particularly with this degree of physiologic derangements and acid-base disturbances.  After a fairly brief discussion we decided to transition him to DNR    Later we were joined by the rest the family we had an extensive long conversation with the  assistance, regarding potential options for continued care including the need for emergent hemodialysis/CRRT which would be very poorly tolerated given his hemodynamic instability and discussed with him the circumstances in which we are unable to obtain perfusion or MAP goals in spite of high doses of multiple vasoactive's.    We discussed a couple of options to move forward including aggressive care and supportive care and a trial of renal replacement, however after we discussed the pros and cons and the risks and benefits of this we opted to transition him to more of a comfort focused care which I think is really appropriate given his intractable state of shock care.  In spite of all aggressive efforts were unable to maintain any sustainable mean arterial pressure or perfusion.    After an extensive discussion we have t decided to transition him to comfort care at this time.    Summary: Comfort care    Time  statement:  I discussed advance care planning with the patient's family and patient's DPOA for at least 60 minutes, including diagnosis, prognosis, plan of care, risks and benefits of any therapies that could be offered, as well as alternatives including palliation and hospice, as appropriate, exclusive of evaluation and management or other separately billable procedures.

## 2025-04-30 NOTE — PROGRESS NOTES
Progress Note    Called by RN for worsening hypotension with escalating vasopressor requirements (Norepi doubled from 0.2 to 0.4mcg/kg/min). ON exam his CVP was 7 and PCWP 22. We gave him 500cc of LR without changes in his MAP or SBP, but did have increase in his CVP to 11 and PCWP to 25.    Repeat PAC numbers following IVF and increasing vasopressin to 0.04 and Norepi to 0.55mcg/kg/min:    CVP 11  PCWP 25  CI 2.8      He remains profoundly hypotensive and vasoplegic with adequate CO. This vasoplegia is likely 2/2 his bacteremia.     A/P  --SDS  --Methylene blue infusion, discussed with pharmacy    Stefano Varela DO MPH  Critical Care Medicine    CC Time: 60 minutes

## 2025-04-30 NOTE — CARE PLAN
The patient is Unstable - High likelihood or risk of patient condition declining or worsening    Shift Goals  Clinical Goals: hemodynamic stabiity, wean vasopressors  Patient Goals: Comfort  Family Goals: Updates, safety, comfort    Progress made toward(s) clinical / shift goals:    Problem: Hemodynamics  Goal: Patient's hemodynamics, fluid balance and neurologic status will be stable or improve  Outcome: Not Progressing     Problem: Knowledge Deficit - Standard  Goal: Patient and family/care givers will demonstrate understanding of plan of care, disease process/condition, diagnostic tests and medications  Outcome: Progressing     Problem: Safety - Medical Restraint  Goal: Remains free of injury from restraints (Restraint for Interference with Medical Device)  Outcome: Progressing  Flowsheets (Taken 4/30/2025 3027)  Addressed this shift: Remains free of injury from restraints (restraint for interference with medical device):   Determine that other, less restrictive measures have been tried or would not be effective before applying the restraint   Evaluate the patient's condition at the time of restraint application   Inform patient/family regarding the reason for restraint   Every 2 hours: Monitor safety, psychosocial status, comfort, nutrition and hydration  Goal: Free from restraint(s) (Restraint for Interference with Medical Device)  Outcome: Progressing  Flowsheets (Taken 4/30/2025 6927)  Addressed this shift: Free from restraint(s) (restraint for interference with medical device):   ONCE/SHIFT or MINIMUM Every 12 hours: Assess and document the continuing need for restraints   Every 24 hours: Continued use of restraint requires Licensed Independent Practitioner to perform face to face examination and written order   Identify and implement measures to help patient regain control     Problem: Pain - Standard  Goal: Alleviation of pain or a reduction in pain to the patient’s comfort goal  Outcome: Progressing      Problem: Skin Integrity  Goal: Skin integrity is maintained or improved  Outcome: Progressing     Problem: Fall Risk  Goal: Patient will remain free from falls  Outcome: Progressing       Patient is not progressing towards the following goals:      Problem: Hemodynamics  Goal: Patient's hemodynamics, fluid balance and neurologic status will be stable or improve  Outcome: Not Progressing

## 2025-04-30 NOTE — PROGRESS NOTES
Dr. Arias at bedside and removed rhino rocket from L nare. Gauze soaked with TXA was placed in L nare s/p removal of rhino rocket.

## 2025-04-30 NOTE — PROGRESS NOTES
Hemodynamic number for day shift 4/30:    SWAN 0800    CO 5.7  CI 2.4    PA 54/27  WEDGE 23  CVP 11  GTTS:   Levophed @1mcg/kg/min  Epinephrine @ 1mcg/kg/min  Dobutamine @7.5mcg/kg/min  Vasopressin @0.04 units/min    SEEMA 0800  CO 6.8  CI 2.9  SV 54  SaO2 86%  SvO2 47%

## 2025-04-30 NOTE — PROGRESS NOTES
[place] numbers obtained and artline blood pressure remains low. Non invasive BP remains 90's  Vasopressin gtt ordered. Mag and calcium also ordered

## 2025-04-30 NOTE — PROGRESS NOTES
Monitor Summary:    -140  Frequent PVC's        BBB         Adbry Pregnancy And Lactation Text: It is unknown if this medication will adversely affect pregnancy or breast feeding.

## 2025-04-30 NOTE — PROCEDURES
"Arterial Line Insertion    Date/Time: 4/30/2025 5:30 AM    Performed by: Fiorella Chin  Authorized by: Fiorella Chin  Consent: The procedure was performed in an emergent situation. Verbal consent obtained.  Risks and benefits discussed: family informed before they stepped out of the room.  Consent given by: family.  Patient understanding: patient states understanding of the procedure being performed  Patient consent: the patient's understanding of the procedure matches consent given  Procedure consent: procedure consent matches procedure scheduled  Relevant documents: relevant documents present and verified  Test results: test results available and properly labeled  Site marked: the operative site was marked  Imaging studies: imaging studies available  Required items: required blood products, implants, devices, and special equipment available  Patient identity confirmed: arm band and hospital-assigned identification number  Time out: Immediately prior to procedure a \"time out\" was called to verify the correct patient, procedure, equipment, support staff and site/side marked as required.  Preparation: Patient was prepped and draped in the usual sterile fashion.  Indications: multiple ABGs, respiratory failure and hemodynamic monitoring  Location: right femoral (replacing the right radial art line for more central BP)  Anesthesia: local infiltration    Anesthesia:  Local Anesthetic: lidocaine 1% without epinephrine  Anesthetic total: 0.5 mL    Sedation:  Patient sedated: see mar.    Perez's test: n/a.  Needle gauge: 18  Seldinger technique: Seldinger technique used  Number of attempts: 2  Post-procedure: line sutured and dressing applied  Post-procedure CMS: normal and unchanged  Patient tolerance: patient tolerated the procedure well with no immediate complications  Comments: The wire is accounted for, there was an appropriate waveform noted on the monitor & SBP pressures observed were ~20pts higher than the " radial art line was reading.

## 2025-04-30 NOTE — PROCEDURES
Intubation    Date/Time: 4/30/2025 2:16 AM    Performed by: Raul Amaral M.D.  Authorized by: Raul Amaral M.D.    Consent:     Consent obtained:  Emergent situation  Pre-procedure details:     Patient status:  Unresponsive    Mallampati score:  I    Paralytics:  None  Procedure details:     Preoxygenation:  Bag valve mask    CPR in progress: yes      Intubation method:  Oral    Oral intubation technique:  Video-assisted    Laryngoscope type:  GlideScope    Cormack-Lehane Classification:  Grade 1    Tube size (mm):  8.0    Tube type:  Cuffed    Number of attempts:  1  Placement assessment:     ETT to lip:  24    Tube secured with:  Adhesive tape and ETT vasques    Breath sounds:  Equal    Placement verification: chest rise and CXR verification      CXR findings:  ETT in proper place  Post-procedure details:     Patient tolerance of procedure:  Tolerated well, no immediate complications

## 2025-05-03 NOTE — DISCHARGE SUMMARY
Death Summary    Cause of Death  Cardiac arrest in the setting of comfort care due to intractable mixed cardiogenic and vasoplegic due to acute coronary syndrome and consequent cardiogenic shock due to coronary vascular disease.    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 ST at origin of very       small PDA. 90% mid JOSE. 90% origin of large ISA. 30% prox LAD. 30-40%       lLAD immediately distal to LADD1 origin.100% small OM 2 with L to L       filling. LV EF45% with akinetic anterior wall.    Syncope (POA: Unknown)    Acute respiratory failure with hypoxia (HCC) (POA: Unknown)    Kidney transplanted (POA: Unknown)    Abnormal CT of the head (POA: Unknown)    Mass of parotid gland (POA: Unknown)    Cardiogenic shock (HCC) (POA: Unknown)    Cardiac arrest (HCC) (POA: Unknown)    Atrial fibrillation with RVR (HCC) (POA: Unknown)    Bacteremia due to Gram-positive bacteria (POA: Unknown)    Epistaxis due to trauma (POA: Unknown)  Resolved Problems:    Full code status (POA: Unknown)    Leukocytosis (POA: Unknown)      History of Presenting Illness and Hospital Course  59 y.o. male admitted 4/25/2025 with history of hypertension, hyperlipidemia, HFimpEF (most recent 50%), paroxysmal atrial fibrillation on apixaban, ESRD s/p DDKT 4/2023, who presented with chest pain, dyspnea and syncope this morning.  He was in his usual state of health prior to experiencing mild/moderate substernal chest pain.  About 30 minutes later he felt his vision become obscured and fell, striking his head.  He  presented to the emergency department where he had a noncontrast head CT that was unrevealing.  His forehead laceration was repaired.     EKG demonstrated anterior ST elevations and dynamic EKG changes.  Given concern for malignant dysrhythmia he proceeded to the cath lab.     In the cath lab he sustained PEA cardiac arrest for ~10-30 seconds and was intubated.  He had Impella placed and PCI to LAD with PTCA of the circumflex.  He had escalating inotrope and vasoactive requirements.  Initial CI was 3.7 on dobutamine 5, epi 0.03.  Taken from Dr Frost note.    4/25 admitted post cath lab brief arrest, impella, cardiogenic shock on vent support.  4/26 ongoing cardiogenic shock, impella P5, dobutamine 2.5, iHD, low vent support, nasal bleeding      4/28-Worsening shock this AM, hemorrhagic/vasoplegic, low filling pressures, PRBCs, impella removed, DAPT restarted, BCXs with Strep species, continue unasyn, hemos and index improved with blood and volume, hold diuresis and afterload reduction, trend H/H, continue to hold heparin overnight, restart DAPT after impella removal     4/29-VD4, extubated in the AM, titrating vasopressors for low SVR vasoplegic shock, off , CI improved, begin diuresis with elevated filling pressures, remove rhin rocke with some scant oozing will hold heparin for one more day, Unasyn to Cefazolin, requiring high doses of vasopressor but lactate remains normal     OVENIGHT:  Worsening vasoplegia refractory to vasopressors, added HC/FC, escalated to Zosyn, MB infusion, ultimately nonresponsive to resus and had prolonged PEA arrest, ROSC achieved, intubated, on multiple vasoactives    On the morning of 4/30 the patient's clinical status deteriorated significantly with vasopressor refractory shock, metabolic shock, intractable vasoplegia and metabolic acidosis.  After a long family conference including several options posed to the family the they were under the impression that he would not want to go  through this nor to be functionally limited or debilitated following a critical illness such as this, and in spite of aggressive support were unable to sustain perfusing blood pressure.  And after discussing also with the family they have requested that we transition him to comfort care and this was done and he  very shortly thereafter peacefully with his family at the bedside.    Death Date: 25   Death Time: 1208

## 2025-05-30 NOTE — DOCUMENTATION QUERY
"                                                                         [facility]                                                                       Query Response Note      PATIENT:               ROSANNE ROSS  ACCT #:                  [id number]  MRN:                     [id number]  :                      1965  ADMIT DATE:       2025 11:30 AM  DISCH DATE:        2025 12:08 PM  RESPONDING  PROVIDER #:        [id number]           QUERY TEXT:    \"GI: NPO, NG clamped bloody emesis\"  is documented in the Critical Care Note dated 2025. Per Procedure Note dated 2025 the patient had Epistaxis that required nasal packing.    Please clarify the clinical/diagnostic relevance for the documented findings.    The patient's clinical indicators include:  59 y.o. M presents s/p GLF w/ NSTEMI, AHRF.     CC H&P: AFib, Heparin, Amiodarone   Procedure: Nasal packing for epistaxis.   GI: NPO, NG clamped bloody emesis.     -  Hgb: 15.0 -> 13.6 -> 14.3 -> 12.9 -> 11.0 -> 10.3 -> 8.9     Anesthesia Note: Bleeding at impella site, transfused, stable on P2, plan to remove impella.    Treatment: NPO; NG tube; monitor CBC, Transfusion PRBC's on  & ; nasal packing  Risk Factors: PAF on home Apixaban; Heparin infusion  - ; Epistaxis.    Thank You,  Jessa Anderson RN, CCDS  Senior Clinical    [email or web address]  Connect via [email or web address] Messenger  Options provided:   -- Bloody emesis is not clinically significant and was related to epistaxis - hematemesis is ruled out   -- Bloody emesis (hematemesis) is clinically significant and ruled in   -- Other explanation, (Please specify the other explanation)      Query created by: Jessa Anderson on 2025 9:09 AM    RESPONSE TEXT:    Bloody emesis is not clinically significant and was related to epistaxis - hematemesis is ruled out          Electronically signed by:  ZEB MENDOZA MD 2025 4:24 PM      "

## (undated) DEVICE — BLOCK

## (undated) DEVICE — SLEEVE VASO DVT COMPRESSION CALF MED - (10PR/CA)

## (undated) DEVICE — GOWN WARMING STANDARD FLEX - (30/CA)

## (undated) DEVICE — BLADE SURGICAL #15 - (50/BX 3BX/CA)

## (undated) DEVICE — MASK ANESTHESIA ADULT  - (100/CA)

## (undated) DEVICE — COVER LIGHT HANDLE ALC PLUS DISP (18EA/BX)

## (undated) DEVICE — MASK, LARYNGEAL AIRWAY #4

## (undated) DEVICE — CONTAINER, SPECIMEN, STERILE

## (undated) DEVICE — SUTURE 5-0 PROLENE C-1 D/A 24 (36PK/BX)"

## (undated) DEVICE — HEAD HOLDER JUNIOR/ADULT

## (undated) DEVICE — KIT ANESTHESIA W/CIRCUIT & 3/LT BAG W/FILTER (20EA/CA)

## (undated) DEVICE — GUIDEWIRES STARTER (PTFE COATED) J CURVED FIXED CORE 0.035 180CM 10 3 MM J FS

## (undated) DEVICE — GLOVE BIOGEL INDICATOR SZ 8.5 SURGICAL PF LTX - (50/BX 4BX/CA)

## (undated) DEVICE — SUCTION INSTRUMENT YANKAUER BULBOUS TIP W/O VENT (50EA/CA)

## (undated) DEVICE — SLEEVE, VASO, THIGH, MED

## (undated) DEVICE — CLIP SM INTNL HRZN TI ESCP LGT - (24EA/PK 25PK/BX)

## (undated) DEVICE — GLOVE BIOGEL PI INDICATOR SZ 7.0 SURGICAL PF LF - (50/BX 4BX/CA)

## (undated) DEVICE — SET EXTENSION WITH 2 PORTS (48EA/CA) ***PART #2C8610 IS A SUBSTITUTE*****

## (undated) DEVICE — TOURNIQUET CUFF 34 X 4 ONE PORT DISP - STERILE (10/BX)

## (undated) DEVICE — SODIUM CHL IRRIGATION 0.9% 1000ML (12EA/CA)

## (undated) DEVICE — GLOVE SZ 6.5 BIOGEL PI MICRO - PF LF (50PR/BX)

## (undated) DEVICE — GLOVE BIOGEL INDICATOR SZ 6.5 SURGICAL PF LTX - (50PR/BX 4BX/CA)

## (undated) DEVICE — NEPTUNE 4 PORT MANIFOLD - (20/PK)

## (undated) DEVICE — GLOVE BIOGEL SZ 6.5 SURGICAL PF LTX (50PR/BX 4BX/CA)

## (undated) DEVICE — KIT ROOM DECONTAMINATION

## (undated) DEVICE — HEMO CLIP MEDIUM - (36/BX)

## (undated) DEVICE — ELECTRODE DUAL RETURN W/ CORD - (50/PK)

## (undated) DEVICE — GLOVE BIOGEL SZ 8 SURGICAL PF LTX - (50PR/BX 4BX/CA)

## (undated) DEVICE — SET LEADWIRE 5 LEAD BEDSIDE DISPOSABLE ECG (1SET OF 5/EA)

## (undated) DEVICE — GLOVE BIOGEL SZ 7 SURGICAL PF LTX - (50PR/BX 4BX/CA)

## (undated) DEVICE — DRAPE LARGE 3 QUARTER - (20/CA)

## (undated) DEVICE — SUTURE 0 VICRYL PLUS CTX - 36 INCH (36/BX)

## (undated) DEVICE — CANISTER SUCTION 3000ML MECHANICAL FILTER AUTO SHUTOFF MEDI-VAC NONSTERILE LF DISP (40EA/CA)

## (undated) DEVICE — SUTURE 3-0 ETHILON FS-1 - (36/BX) 30 INCH

## (undated) DEVICE — PAD LAP STERILE 18 X 18 - (5/PK 40PK/CA)

## (undated) DEVICE — CANISTER SUCTION 3000ML MECHANICAL FILTER AUTO SHUTOFF MEDI-VAC NONSTERILE LF DISP  (40EA/CA)

## (undated) DEVICE — PROTECTOR ULNA NERVE - (36PR/CA)

## (undated) DEVICE — BAG SPONGE COUNT 10.25 X 32 - BLUE (250/CA)

## (undated) DEVICE — TUBING CLEARLINK DUO-VENT - C-FLO (48EA/CA)

## (undated) DEVICE — CHLORAPREP 26 ML APPLICATOR - ORANGE TINT(25/CA)

## (undated) DEVICE — SUTURE 2-0 VICRYL PLUS CT-1 36 (36PK/BX)"

## (undated) DEVICE — SUTURE GENERAL

## (undated) DEVICE — GOWN SURGEONS LARGE - (32/CA)

## (undated) DEVICE — SYRINGE STERILE 10 ML LL (200/BX)

## (undated) DEVICE — SUTURE 3-0 VICRYL PLUS SH - 8X 18 INCH (12/BX)

## (undated) DEVICE — VESSELOOP MINI BLUE STERILE - SURG-I-LOOP (10EA/BX)

## (undated) DEVICE — PACK LOWER EXTREMITY - (2/CA)

## (undated) DEVICE — SOD. CHL. INJ. 0.9% 250 ML - (36/CA 50CA/PF)

## (undated) DEVICE — DRAPE SURGICAL U 77X120 - (10/CA)

## (undated) DEVICE — ELECTRODE 850 FOAM ADHESIVE - HYDROGEL RADIOTRNSPRNT (50/PK)

## (undated) DEVICE — SUTURE 3-0 SILK 12 X 18 IN - (36/BX)

## (undated) DEVICE — SUTURE 2-0 ETHILON FS - (36/BX) 18 INCH

## (undated) DEVICE — DRESSING TRANSPARENT FILM TEGADERM 2.375 X 2.75" (100EA/BX)"

## (undated) DEVICE — LACTATED RINGERS INJ 1000 ML - (14EA/CA 60CA/PF)

## (undated) DEVICE — SYRINGE 10 ML CONTROL LL (25EA/BX 4BX/CA)

## (undated) DEVICE — NEEDLE THINWALL 19GA X 7CM

## (undated) DEVICE — BANDAGE STERILE 2 IN X 75 IN (12EA/BX 8BX/CA)

## (undated) DEVICE — SUTURE 4-0 30CM STRATAFIX SPIRAL PS-2 (12EA/BX)

## (undated) DEVICE — VESSELOOP MAXI BLUE STERILE- SURG-I-LOOP (10EA/BX)

## (undated) DEVICE — SUTURE 5-0 PROLENE BLUE C-1 HS 1 X 30 (36EA/BX)"

## (undated) DEVICE — DECANTER FLD BLS - (50/CA)

## (undated) DEVICE — BANDAGE STERILE 3 IN X 75 IN (12EA/BX 8BX/CA)

## (undated) DEVICE — SENSOR SPO2 NEO LNCS ADHESIVE (20/BX) SEE USER NOTES

## (undated) DEVICE — SENSOR OXIMETER ADULT SPO2 RD SET (20EA/BX)